# Patient Record
Sex: FEMALE | Race: WHITE | NOT HISPANIC OR LATINO | Employment: OTHER | URBAN - METROPOLITAN AREA
[De-identification: names, ages, dates, MRNs, and addresses within clinical notes are randomized per-mention and may not be internally consistent; named-entity substitution may affect disease eponyms.]

---

## 2017-02-13 PROBLEM — R10.2 PELVIC PAIN IN FEMALE: Status: ACTIVE | Noted: 2017-02-13

## 2017-02-14 PROBLEM — M35.00 SJOEGREN SYNDROME: Status: ACTIVE | Noted: 2017-02-14

## 2017-02-14 PROBLEM — M05.79 RHEUMATOID ARTHRITIS INVOLVING MULTIPLE SITES WITH POSITIVE RHEUMATOID FACTOR: Status: ACTIVE | Noted: 2017-02-14

## 2017-02-14 PROBLEM — N92.0 MENORRHAGIA WITH REGULAR CYCLE: Status: ACTIVE | Noted: 2017-02-14

## 2018-01-29 ENCOUNTER — HOSPITAL ENCOUNTER (OUTPATIENT)
Dept: RADIOLOGY | Facility: HOSPITAL | Age: 38
Discharge: HOME | End: 2018-01-29
Attending: INTERNAL MEDICINE
Payer: COMMERCIAL

## 2018-01-29 ENCOUNTER — OFFICE VISIT (OUTPATIENT)
Dept: RHEUMATOLOGY | Facility: CLINIC | Age: 38
End: 2018-01-29
Payer: COMMERCIAL

## 2018-01-29 VITALS
WEIGHT: 176.69 LBS | DIASTOLIC BLOOD PRESSURE: 98 MMHG | HEART RATE: 94 BPM | SYSTOLIC BLOOD PRESSURE: 153 MMHG | BODY MASS INDEX: 30.33 KG/M2

## 2018-01-29 DIAGNOSIS — M45.9 ANKYLOSING SPONDYLITIS, UNSPECIFIED SITE OF SPINE: ICD-10-CM

## 2018-01-29 DIAGNOSIS — M08.80 JIA (JUVENILE IDIOPATHIC ARTHRITIS): ICD-10-CM

## 2018-01-29 DIAGNOSIS — H20.9 UVEITIS: ICD-10-CM

## 2018-01-29 DIAGNOSIS — M35.00 SJOGREN'S SYNDROME, WITH UNSPECIFIED ORGAN INVOLVEMENT: Primary | ICD-10-CM

## 2018-01-29 PROCEDURE — 3008F BODY MASS INDEX DOCD: CPT | Mod: S$GLB,,, | Performed by: INTERNAL MEDICINE

## 2018-01-29 PROCEDURE — 72070 X-RAY EXAM THORAC SPINE 2VWS: CPT | Mod: TC,PO

## 2018-01-29 PROCEDURE — 72040 X-RAY EXAM NECK SPINE 2-3 VW: CPT | Mod: TC,PO

## 2018-01-29 PROCEDURE — 99999 PR PBB SHADOW E&M-EST. PATIENT-LVL IV: CPT | Mod: PBBFAC,,, | Performed by: INTERNAL MEDICINE

## 2018-01-29 PROCEDURE — 72110 X-RAY EXAM L-2 SPINE 4/>VWS: CPT | Mod: 26,,, | Performed by: RADIOLOGY

## 2018-01-29 PROCEDURE — 72220 X-RAY EXAM SACRUM TAILBONE: CPT | Mod: 26,,, | Performed by: RADIOLOGY

## 2018-01-29 PROCEDURE — 72040 X-RAY EXAM NECK SPINE 2-3 VW: CPT | Mod: 26,,, | Performed by: RADIOLOGY

## 2018-01-29 PROCEDURE — 72110 X-RAY EXAM L-2 SPINE 4/>VWS: CPT | Mod: TC,PO

## 2018-01-29 PROCEDURE — 96372 THER/PROPH/DIAG INJ SC/IM: CPT | Mod: S$GLB,,, | Performed by: INTERNAL MEDICINE

## 2018-01-29 PROCEDURE — 72070 X-RAY EXAM THORAC SPINE 2VWS: CPT | Mod: 26,,, | Performed by: RADIOLOGY

## 2018-01-29 PROCEDURE — 99205 OFFICE O/P NEW HI 60 MIN: CPT | Mod: 25,S$GLB,, | Performed by: INTERNAL MEDICINE

## 2018-01-29 PROCEDURE — 72220 X-RAY EXAM SACRUM TAILBONE: CPT | Mod: TC,PO

## 2018-01-29 RX ORDER — BUPRENORPHINE 10 UG/H
PATCH TRANSDERMAL
COMMUNITY
Start: 2017-08-24 | End: 2018-06-06

## 2018-01-29 RX ORDER — KETOROLAC TROMETHAMINE 30 MG/ML
60 INJECTION, SOLUTION INTRAMUSCULAR; INTRAVENOUS
Status: COMPLETED | OUTPATIENT
Start: 2018-01-29 | End: 2018-01-29

## 2018-01-29 RX ORDER — METHYLPREDNISOLONE ACETATE 80 MG/ML
160 INJECTION, SUSPENSION INTRA-ARTICULAR; INTRALESIONAL; INTRAMUSCULAR; SOFT TISSUE
Status: COMPLETED | OUTPATIENT
Start: 2018-01-29 | End: 2018-01-29

## 2018-01-29 RX ORDER — PREDNISONE 5 MG/1
10 TABLET ORAL DAILY
Qty: 60 TABLET | Refills: 3 | Status: SHIPPED | OUTPATIENT
Start: 2018-01-29 | End: 2018-02-28

## 2018-01-29 RX ORDER — LANOLIN ALCOHOL/MO/W.PET/CERES
1 CREAM (GRAM) TOPICAL
COMMUNITY
End: 2018-05-08

## 2018-01-29 RX ORDER — CYANOCOBALAMIN 1000 UG/ML
1000 INJECTION, SOLUTION INTRAMUSCULAR; SUBCUTANEOUS
Status: COMPLETED | OUTPATIENT
Start: 2018-01-29 | End: 2018-01-29

## 2018-01-29 RX ORDER — SODIUM FLUORIDE 5 MG/G
GEL, DENTIFRICE DENTAL
COMMUNITY
Start: 2016-09-01 | End: 2020-12-14

## 2018-01-29 RX ORDER — DOXEPIN HYDROCHLORIDE 25 MG/1
CAPSULE ORAL
COMMUNITY
Start: 2017-08-24 | End: 2018-12-05

## 2018-01-29 RX ORDER — FLUOROMETHOLONE 1 MG/ML
1 SUSPENSION/ DROPS OPHTHALMIC
COMMUNITY
Start: 2018-01-23 | End: 2018-02-02

## 2018-01-29 RX ORDER — ONDANSETRON HYDROCHLORIDE 8 MG/1
8 TABLET, FILM COATED ORAL EVERY 8 HOURS PRN
Qty: 45 TABLET | Refills: 2 | Status: SHIPPED | OUTPATIENT
Start: 2018-01-29 | End: 2018-08-15 | Stop reason: SDUPTHER

## 2018-01-29 RX ADMIN — METHYLPREDNISOLONE ACETATE 160 MG: 80 INJECTION, SUSPENSION INTRA-ARTICULAR; INTRALESIONAL; INTRAMUSCULAR; SOFT TISSUE at 02:01

## 2018-01-29 RX ADMIN — CYANOCOBALAMIN 1000 MCG: 1000 INJECTION, SOLUTION INTRAMUSCULAR; SUBCUTANEOUS at 02:01

## 2018-01-29 RX ADMIN — KETOROLAC TROMETHAMINE 60 MG: 30 INJECTION, SOLUTION INTRAMUSCULAR; INTRAVENOUS at 02:01

## 2018-01-29 NOTE — PROGRESS NOTES
Subjective:       Patient ID: Breanna Sanchez is a 37 y.o. female.    Chief Complaint: Consult    HPI: 36 yo female with CHARLIE, and RA, tried enbrel, Humira, Orencia, MTX, plaquenil. Patient complains of arthralgias and myalgias for which has been present for a few years. Pain is located in multiple joints, both shoulder(s), both elbow(s), both wrist(s), both MCP(s): 1st, 2nd, 3rd, 4th and 5th, both PIP(s): 1st, 2nd, 3rd, 4th and 5th, both DIP(s): 1st and 2nd, both hip(s), both knee(s) and both MTP(s): 1st, 2nd, 3rd, 4th and 5th, is described as aching, pulsating, shooting and throbbing, and is constant, moderate .  Associated symptoms include: crepitation, decreased range of motion, edema, effusion, tenderness and warmth.              Rheumatoid Arthritis    The disease course has been fluctuating.     She complains of pain, stiffness, joint swelling and joint warmth. The symptoms have been worsening. Affected locations include the neck, right shoulder, right elbow, right wrist, right MCP, right PIP, right DIP, right hip, right toes, right foot, right ankle, right knee, left shoulder, left elbow, left wrist, left MCP, left PIP, left DIP, left hip, left knee, left toes, left foot and left ankle. Associated symptoms include fatigue, fever, pain at night, pain while resting, Raynaud's syndrome, uveitis, myalgias and pleurisy. Pertinent negatives include no dysuria, trouble swallowing, dry eyes, headaches, stress, jaw pain or weight loss. She complains of morning stiffness lasting between 1 and 2 hours after awakening.The neck, left shoulder, left elbow, right elbow, left wrist, right wrist, left hand, right hand, left knee, right hip, left hip, right knee, left ankle and right ankle presents with Arthralgia.    Past treatments include corticosteroids, methotrexate and NSAIDs. The treatment provided significant relief. Factors aggravating her arthritis include activity.     Review of Systems   Constitutional: Positive  for fatigue and fever. Negative for activity change, appetite change, chills, diaphoresis, unexpected weight change and weight loss.   HENT: Negative for congestion, dental problem, ear discharge, ear pain, facial swelling, mouth sores, nosebleeds, postnasal drip, rhinorrhea, sinus pressure, sneezing, sore throat, tinnitus, trouble swallowing and voice change.    Eyes: Negative for photophobia, pain, discharge, redness and itching.   Respiratory: Negative for apnea, cough, chest tightness, shortness of breath and wheezing.    Cardiovascular: Positive for leg swelling. Negative for chest pain and palpitations.   Gastrointestinal: Negative for abdominal distention, abdominal pain, constipation, diarrhea, nausea and vomiting.   Endocrine: Negative for cold intolerance, heat intolerance, polydipsia and polyuria.   Genitourinary: Negative for decreased urine volume, difficulty urinating, dysuria, flank pain, frequency, hematuria and urgency.   Musculoskeletal: Positive for arthralgias, back pain, gait problem, joint swelling, myalgias, neck pain, neck stiffness and stiffness.   Skin: Negative for pallor, rash and wound.   Allergic/Immunologic: Positive for immunocompromised state.   Neurological: Negative for dizziness, tremors, weakness, numbness and headaches.   Hematological: Negative for adenopathy. Does not bruise/bleed easily.   Psychiatric/Behavioral: Negative for sleep disturbance. The patient is not nervous/anxious.          Objective:   BP (!) 153/98 (BP Location: Right arm, Patient Position: Sitting, BP Method: Large (Automatic))   Pulse 94   Wt 80.2 kg (176 lb 11.2 oz)   LMP 02/12/2017   BMI 30.33 kg/m²      Physical Exam   Nursing note and vitals reviewed.  Constitutional: She is oriented to person, place, and time. She appears distressed.   HENT:   Head: Normocephalic and atraumatic.   Mouth/Throat: Oropharynx is clear and moist.   Eyes: EOM are normal. Pupils are equal, round, and reactive to light.    Neck: Neck supple. No thyromegaly present.   Cardiovascular: Normal rate, regular rhythm and normal heart sounds.  Exam reveals no gallop and no friction rub.    No murmur heard.  Pulmonary/Chest: Breath sounds normal. She has no wheezes. She has no rales. She exhibits no tenderness.   Abdominal: There is no tenderness. There is no rebound and no guarding.       Right Side Rheumatological Exam     Examination finds the elbow normal.    The patient is tender to palpation of the shoulder, wrist, knee, 1st PIP, 1st MCP, 2nd PIP, 2nd MCP, 3rd PIP, 3rd MCP, 4th PIP, 4th MCP, 5th PIP and 5th MCP    She has swelling of the 1st PIP, 1st MCP, 2nd PIP, 2nd MCP, 3rd PIP, 3rd MCP, 4th PIP, 4th MCP, 5th PIP and 5th MCP    Shoulder Exam   Tenderness Location: no tenderness    Range of Motion   Active Abduction: abnormal   Adduction: abnormal  Sensation: normal    Knee Exam   Patellofemoral Crepitus: positive  Effusion: negative  Sensation: normal    Hip Exam   Tenderness Location: posterior  Sensation: normal    Elbow/Wrist Exam   Tenderness Location: no tenderness  Sensation: normal    Left Side Rheumatological Exam     Examination finds the elbow normal.    The patient is tender to palpation of the shoulder, wrist, knee, 1st PIP, 1st MCP, 2nd PIP, 2nd MCP, 3rd PIP, 3rd MCP, 4th PIP, 4th MCP, 5th PIP and 5th MCP.    She has swelling of the 1st PIP, 1st MCP, 2nd PIP, 2nd MCP, 3rd PIP, 3rd MCP, 4th PIP, 4th MCP, 5th PIP and 5th MCP    Shoulder Exam   Tenderness Location: no tenderness    Range of Motion   Active Abduction: abnormal   Sensation: normal    Knee Exam     Patellofemoral Crepitus: positive  Effusion: negative  Sensation: normal    Hip Exam   Tenderness Location: posterior  Sensation: normal    Elbow/Wrist Exam   Sensation: normal      Back/Neck Exam   General Inspection   Gait: normal         Lymphadenopathy:     She has no cervical adenopathy.   Neurological: She is alert and oriented to person, place, and time.  Gait normal.   Skin: No rash noted. No erythema. No pallor.     Psychiatric: Mood and affect normal.   Musculoskeletal: She exhibits tenderness and deformity.           Assessment:       1. Sjogren's syndrome, with unspecified organ involvement    2. Ankylosing spondylitis, unspecified site of spine    3. Uveitis    4. CHARLIE (juvenile idiopathic arthritis)            Plan:       Breanna was seen today for consult.    Diagnoses and all orders for this visit:    Sjogren's syndrome, with unspecified organ involvement  -     AGAPITO; Future  -     Anti Sm/RNP Antibody; Future  -     Anti-DNA antibody, double-stranded; Future  -     Anti-Histone Antibody; Future  -     Anti-scleroderma antibody; Future  -     Anti-Smith antibody; Future  -     IgE; Future  -     IgG; Future  -     IgG 1, 2, 3, and 4; Future  -     IgM; Future  -     CBC auto differential; Future  -     Comprehensive metabolic panel; Future  -     C-reactive protein; Future  -     Cyclic citrul peptide antibody, IgG; Future  -     Sjogrens syndrome-A extractable nuclear antibody; Future  -     Sjogrens syndrome-B extractable nuclear antibody; Future  -     Sedimentation rate, manual; Future  -     Rheumatoid factor; Future  -     TSH; Future  -     Thyroid peroxidase antibody; Future  -     T4, free; Future  -     T3, free; Future  -     Anti-thyroglobulin antibody; Future  -     IgA; Future  -     Vitamin D; Future  -     Celiac Disease HLA Genotyping; Future  -     Celiac Disease Panel; Future  -     Lymphocyte Profile II; Future  -     Urinalysis; Future  -     methylPREDNISolone acetate injection 160 mg; Inject 2 mLs (160 mg total) into the muscle one time.  -     ketorolac injection 60 mg; Inject 2 mLs (60 mg total) into the muscle one time.    Ankylosing spondylitis, unspecified site of spine  -     AGAPITO; Future  -     Anti Sm/RNP Antibody; Future  -     Anti-DNA antibody, double-stranded; Future  -     Anti-Histone Antibody; Future  -      Anti-scleroderma antibody; Future  -     Anti-Smith antibody; Future  -     IgE; Future  -     IgG; Future  -     IgG 1, 2, 3, and 4; Future  -     IgM; Future  -     CBC auto differential; Future  -     Comprehensive metabolic panel; Future  -     C-reactive protein; Future  -     Cyclic citrul peptide antibody, IgG; Future  -     Sjogrens syndrome-A extractable nuclear antibody; Future  -     Sjogrens syndrome-B extractable nuclear antibody; Future  -     Sedimentation rate, manual; Future  -     Rheumatoid factor; Future  -     TSH; Future  -     Thyroid peroxidase antibody; Future  -     T4, free; Future  -     T3, free; Future  -     Anti-thyroglobulin antibody; Future  -     IgA; Future  -     Vitamin D; Future  -     Celiac Disease HLA Genotyping; Future  -     Celiac Disease Panel; Future  -     Lymphocyte Profile II; Future  -     Urinalysis; Future  -     methylPREDNISolone acetate injection 160 mg; Inject 2 mLs (160 mg total) into the muscle one time.  -     ketorolac injection 60 mg; Inject 2 mLs (60 mg total) into the muscle one time.    Uveitis  -     AGAPITO; Future  -     Anti Sm/RNP Antibody; Future  -     Anti-DNA antibody, double-stranded; Future  -     Anti-Histone Antibody; Future  -     Anti-scleroderma antibody; Future  -     Anti-Smith antibody; Future  -     IgE; Future  -     IgG; Future  -     IgG 1, 2, 3, and 4; Future  -     IgM; Future  -     CBC auto differential; Future  -     Comprehensive metabolic panel; Future  -     C-reactive protein; Future  -     Cyclic citrul peptide antibody, IgG; Future  -     Sjogrens syndrome-A extractable nuclear antibody; Future  -     Sjogrens syndrome-B extractable nuclear antibody; Future  -     Sedimentation rate, manual; Future  -     Rheumatoid factor; Future  -     TSH; Future  -     Thyroid peroxidase antibody; Future  -     T4, free; Future  -     T3, free; Future  -     Anti-thyroglobulin antibody; Future  -     IgA; Future  -     Vitamin D;  Future  -     Celiac Disease HLA Genotyping; Future  -     Celiac Disease Panel; Future  -     Lymphocyte Profile II; Future  -     Urinalysis; Future  -     methylPREDNISolone acetate injection 160 mg; Inject 2 mLs (160 mg total) into the muscle one time.  -     ketorolac injection 60 mg; Inject 2 mLs (60 mg total) into the muscle one time.    CHARLIE (juvenile idiopathic arthritis)  -     AGAPITO; Future  -     Anti Sm/RNP Antibody; Future  -     Anti-DNA antibody, double-stranded; Future  -     Anti-Histone Antibody; Future  -     Anti-scleroderma antibody; Future  -     Anti-Smith antibody; Future  -     IgE; Future  -     IgG; Future  -     IgG 1, 2, 3, and 4; Future  -     IgM; Future  -     CBC auto differential; Future  -     Comprehensive metabolic panel; Future  -     C-reactive protein; Future  -     Cyclic citrul peptide antibody, IgG; Future  -     Sjogrens syndrome-A extractable nuclear antibody; Future  -     Sjogrens syndrome-B extractable nuclear antibody; Future  -     Sedimentation rate, manual; Future  -     Rheumatoid factor; Future  -     TSH; Future  -     Thyroid peroxidase antibody; Future  -     T4, free; Future  -     T3, free; Future  -     Anti-thyroglobulin antibody; Future  -     IgA; Future  -     Vitamin D; Future  -     Celiac Disease HLA Genotyping; Future  -     Celiac Disease Panel; Future  -     Lymphocyte Profile II; Future  -     Urinalysis; Future  -     methylPREDNISolone acetate injection 160 mg; Inject 2 mLs (160 mg total) into the muscle one time.  -     ketorolac injection 60 mg; Inject 2 mLs (60 mg total) into the muscle one time.

## 2018-02-02 RX ORDER — ERGOCALCIFEROL 1.25 MG/1
50000 CAPSULE ORAL
Qty: 4 CAPSULE | Refills: 6 | Status: SHIPPED | OUTPATIENT
Start: 2018-02-02 | End: 2018-11-16 | Stop reason: SDUPTHER

## 2018-02-02 RX ORDER — NITROFURANTOIN 25; 75 MG/1; MG/1
100 CAPSULE ORAL 2 TIMES DAILY
Qty: 20 CAPSULE | Refills: 0 | Status: SHIPPED | OUTPATIENT
Start: 2018-02-02 | End: 2018-02-12

## 2018-02-06 ENCOUNTER — PATIENT MESSAGE (OUTPATIENT)
Dept: RHEUMATOLOGY | Facility: CLINIC | Age: 38
End: 2018-02-06

## 2018-02-09 ENCOUNTER — PATIENT MESSAGE (OUTPATIENT)
Dept: RHEUMATOLOGY | Facility: CLINIC | Age: 38
End: 2018-02-09

## 2018-02-27 ENCOUNTER — PATIENT MESSAGE (OUTPATIENT)
Dept: RHEUMATOLOGY | Facility: CLINIC | Age: 38
End: 2018-02-27

## 2018-02-27 ENCOUNTER — TELEPHONE (OUTPATIENT)
Dept: RHEUMATOLOGY | Facility: CLINIC | Age: 38
End: 2018-02-27

## 2018-02-27 NOTE — TELEPHONE ENCOUNTER
Pt sent e-mail 2/6/2018. Requesting prescription for aquatic therapy and message therapy. Pt has specific location in mind, she will send the location/faciltiy via e-mail.   Pt is also requesting to know if she is going to start another medication, was told prior that her immune system is too weak for IV therapy. Please review and advise.

## 2018-02-27 NOTE — TELEPHONE ENCOUNTER
----- Message from Lanny Estrada sent at 2/27/2018 12:33 PM CST -----  Contact: self [phone]  She is calling back in regards to her message of 2/6.  She is hurting much worse.  Please call her about her medication.  Thank you!

## 2018-03-03 ENCOUNTER — NURSE TRIAGE (OUTPATIENT)
Dept: ADMINISTRATIVE | Facility: CLINIC | Age: 38
End: 2018-03-03

## 2018-03-04 NOTE — TELEPHONE ENCOUNTER
"  Reason for Disposition   Weakness of a leg or foot (e.g., unable to bear weight, dragging foot)    Answer Assessment - Initial Assessment Questions  1. ONSET: "When did the pain begin?"         2. LOCATION: "Where does it hurt?" (upper, mid or lower back)      *No Answer*  3. SEVERITY: "How bad is the pain?"  (e.g., Scale 1-10; mild, moderate, or severe)    - MILD (1-3): doesn't interfere with normal activities     - MODERATE (4-7): interferes with normal activities or awakens from sleep     - SEVERE (8-10): excruciating pain, unable to do any normal activities       *No Answer*  4. PATTERN: "Is the pain constant?" (e.g., yes, no; constant, intermittent)       *No Answer*  5. RADIATION: "Does the pain shoot into your legs or elsewhere?"      *No Answer*  6. CAUSE:  "What do you think is causing the back pain?"       *No Answer*  7. BACK OVERUSE:  "Any recent lifting of heavy objects, strenuous work or exercise?"      *No Answer*  8. MEDICATIONS: "What have you taken so far for the pain?" (e.g., nothing, acetaminophen, NSAIDS)      *No Answer*  9. NEUROLOGIC SYMPTOMS: "Do you have any weakness, numbness, or problems with bowel/bladder control?"      *No Answer*  10. OTHER SYMPTOMS: "Do you have any other symptoms?" (e.g., fever, abdominal pain, burning with urination, blood in urine)        *No Answer*  11. PREGNANCY: "Is there any chance you are pregnant?" (e.g., yes, no; LMP)        *No Answer*    Protocols used: ST BACK PAIN-A-AH    Patient states that her back is hot and inflamed and she is unable to walk because of the pain and weakness. Patient advised to go to the ED and she was afraid because it may be against her pain management contract. Patient states she will see if pain mgt has after hours line before she goes to the ED. Call ended.   "

## 2018-03-05 ENCOUNTER — HOSPITAL ENCOUNTER (EMERGENCY)
Facility: HOSPITAL | Age: 38
Discharge: HOME | End: 2018-03-05
Attending: EMERGENCY MEDICINE
Payer: COMMERCIAL

## 2018-03-05 ENCOUNTER — PATIENT MESSAGE (OUTPATIENT)
Dept: RHEUMATOLOGY | Facility: CLINIC | Age: 38
End: 2018-03-05

## 2018-03-05 VITALS
TEMPERATURE: 98 F | RESPIRATION RATE: 18 BRPM | DIASTOLIC BLOOD PRESSURE: 87 MMHG | BODY MASS INDEX: 29.37 KG/M2 | OXYGEN SATURATION: 97 % | HEART RATE: 86 BPM | HEIGHT: 64 IN | WEIGHT: 172 LBS | SYSTOLIC BLOOD PRESSURE: 133 MMHG

## 2018-03-05 DIAGNOSIS — R00.0 TACHYCARDIA: ICD-10-CM

## 2018-03-05 DIAGNOSIS — G89.29 OTHER CHRONIC PAIN: Primary | ICD-10-CM

## 2018-03-05 PROCEDURE — 96372 THER/PROPH/DIAG INJ SC/IM: CPT

## 2018-03-05 PROCEDURE — 25000003 PHARM REV CODE 250: Performed by: PHYSICIAN ASSISTANT

## 2018-03-05 PROCEDURE — 93005 ELECTROCARDIOGRAM TRACING: CPT

## 2018-03-05 PROCEDURE — 99283 EMERGENCY DEPT VISIT LOW MDM: CPT | Mod: 25

## 2018-03-05 PROCEDURE — 99284 EMERGENCY DEPT VISIT MOD MDM: CPT | Mod: ,,, | Performed by: PHYSICIAN ASSISTANT

## 2018-03-05 PROCEDURE — 63600175 PHARM REV CODE 636 W HCPCS: Performed by: PHYSICIAN ASSISTANT

## 2018-03-05 PROCEDURE — 93010 ELECTROCARDIOGRAM REPORT: CPT | Mod: ,,, | Performed by: INTERNAL MEDICINE

## 2018-03-05 RX ORDER — METHOCARBAMOL 750 MG/1
1500 TABLET, FILM COATED ORAL 3 TIMES DAILY
Qty: 30 TABLET | Refills: 0 | Status: SHIPPED | OUTPATIENT
Start: 2018-03-05 | End: 2018-03-10

## 2018-03-05 RX ORDER — OXYCODONE HYDROCHLORIDE 5 MG/1
5 TABLET ORAL
Status: COMPLETED | OUTPATIENT
Start: 2018-03-05 | End: 2018-03-05

## 2018-03-05 RX ORDER — ORPHENADRINE CITRATE 30 MG/ML
60 INJECTION INTRAMUSCULAR; INTRAVENOUS
Status: COMPLETED | OUTPATIENT
Start: 2018-03-05 | End: 2018-03-05

## 2018-03-05 RX ADMIN — OXYCODONE HYDROCHLORIDE 5 MG: 5 TABLET ORAL at 10:03

## 2018-03-05 RX ADMIN — ORPHENADRINE CITRATE 60 MG: 30 INJECTION INTRAMUSCULAR; INTRAVENOUS at 09:03

## 2018-03-06 NOTE — DISCHARGE INSTRUCTIONS
Use Robaxin as needed as directed  Follow up with your rheumatologist and a primary care doctor  Return to the emergency department for any new symptoms      Our goal in the emergency department is to always give you outstanding care and exceptional service. You may receive a survey by mail or e-mail in the next week regarding your experience in our ED. We would greatly appreciate your completing and returning the survey. Your feedback provides us with a way to recognize our staff who give very good care and it helps us learn how to improve when your experience was below our aspiration of excellence.

## 2018-03-06 NOTE — ED NOTES
Adult Physical Assessment  LOC: Breanna Sanchez, 37 y.o. female verified via two identifiers.  The patient is awake, alert, oriented and speaking appropriately at this time.  APPEARANCE: Patient appears to be in no acute distress at this time. Patient is clean and well groomed, patient's clothing is properly fastened.  SKIN:The skin is warm and dry, color consistent with ethnicity, patient has normal skin turgor and moist mucus membranes, skin intact, no breakdown or brusing noted.  MUSCULOSKELETAL: Patient moving all extremities well, ambulatory without difficulty.  RESPIRATORY: Airway is open and patent, respirations are spontaneous, patient has a normal effort and rate, no accessory muscle use noted.  NEUROLOGIC: Eyes open spontaneously, behavior appropriate to situation, follows commands, facial expression symmetrical, bilateral hand grasp equal and even, purposeful motor response noted, normal sensation in all extremities when touched with a finger.

## 2018-03-06 NOTE — ED PROVIDER NOTES
Encounter Date: 3/5/2018       History     Chief Complaint   Patient presents with    Back Pain     pt sees a specialist who was suppose to change her treatment but has not heard from MD. pt complaining of back pain and warm to the touch     37-year-old female to the ER for evaluation of chronic low back pain.  Patient has chronic pain secondary to her ankylosing spondylitis, fibromyalgia, and rheumatoid arthritis.  She is followed by rheumatology.  She reports recent changes in her medication regimen and currently having increased pain in her back.  She denies any fever, chills, nausea, vomiting.  She denies any shortness of breath or chest pain.  She denies any abdominal pain.  She is currently using a butrans patch for her pain.           Review of patient's allergies indicates:   Allergen Reactions    Seroquel [quetiapine] Anaphylaxis    Aleve [naproxen sodium]     Sulfa (sulfonamide antibiotics)     Tramadol      Past Medical History:   Diagnosis Date    Fibromyalgia     Lupus     Rheumatoid arthritis     Sjoegren syndrome      Past Surgical History:   Procedure Laterality Date    APPENDECTOMY      KNEE ARTHROSCOPY Left     SALPINGOOPHORECTOMY Right     TONSILLECTOMY      TOTAL SHOULDER ARTHROPLASTY Right      Family History   Problem Relation Age of Onset    Diabetes Mother      type 1    Stroke Mother     Heart disease Mother     Cancer Father      prostate    Alzheimer's disease Father     Cancer Sister      uterine    Diabetes Brother      type 2     Social History   Substance Use Topics    Smoking status: Current Every Day Smoker     Packs/day: 1.00     Types: Cigarettes     Start date: 2/10/1995    Smokeless tobacco: Never Used    Alcohol use 1.8 oz/week     3 Glasses of wine per week     Review of Systems   Constitutional: Negative for fever.   HENT: Negative for sore throat.    Respiratory: Negative for shortness of breath.    Cardiovascular: Negative for chest pain.    Gastrointestinal: Negative for nausea.   Genitourinary: Negative for dysuria.   Musculoskeletal: Positive for arthralgias and back pain.   Skin: Negative for rash.   Neurological: Negative for weakness.   Hematological: Does not bruise/bleed easily.       Physical Exam     Initial Vitals [03/05/18 2048]   BP Pulse Resp Temp SpO2   (!) 161/104 (!) 130 20 98.1 °F (36.7 °C) 98 %      MAP       123         Physical Exam    Constitutional: Vital signs are normal. She appears well-developed and well-nourished. She is not diaphoretic. No distress.   HENT:   Head: Normocephalic and atraumatic.   Right Ear: External ear normal.   Left Ear: External ear normal.   Eyes: Conjunctivae are normal.   Cardiovascular: Regular rhythm. Tachycardia present.  Exam reveals no gallop and no friction rub.    No murmur heard.  Pulmonary/Chest: No respiratory distress. She has no wheezes. She has no rhonchi. She has no rales. She exhibits no tenderness.   Lungs clear on exam   Abdominal: Soft. Normal appearance and bowel sounds are normal. She exhibits no distension and no mass. There is no tenderness. There is no rebound and no guarding.   Musculoskeletal: Normal range of motion.   Mild pain over the lumbar spine on exam  No brusing or rashes or lesions  Increased pain with ROM  No CVA pain     Neurological: She is alert and oriented to person, place, and time.   Strength, sensation, ROM normal  No acute findings   Skin: Skin is warm and intact.   Psychiatric: She has a normal mood and affect. Her speech is normal and behavior is normal. Cognition and memory are normal.         ED Course   Procedures  Labs Reviewed - No data to display          Medical Decision Making:   ED Management:  36 yo F with chronic pain secondary to multiple Rheumatologic conditions.   Pain improved in the ED with norflex and PO Oxycodone.   Plan, Continue pain medication @ home,   Followup with rheumatology and est. Care with PCP  Kristin PRN for a few  days  Return instructions given.               Attending Attestation:     Physician Attestation Statement for NP/PA:   I discussed this assessment and plan of this patient with the NP/PA, but I did not personally examine the patient. The face to face encounter was performed by the NP/PA.                     Clinical Impression:   The primary encounter diagnosis was Other chronic pain. A diagnosis of Tachycardia was also pertinent to this visit.    Disposition:   Disposition: Discharged  Condition: Stable      Silver June PA-C  03/05/18 [id number]       Rosio Soares MD  03/06/18 0055

## 2018-03-22 ENCOUNTER — OFFICE VISIT (OUTPATIENT)
Dept: RHEUMATOLOGY | Facility: CLINIC | Age: 38
End: 2018-03-22
Payer: COMMERCIAL

## 2018-03-22 ENCOUNTER — TELEPHONE (OUTPATIENT)
Dept: RHEUMATOLOGY | Facility: CLINIC | Age: 38
End: 2018-03-22

## 2018-03-22 VITALS
HEART RATE: 105 BPM | SYSTOLIC BLOOD PRESSURE: 159 MMHG | WEIGHT: 175.5 LBS | HEIGHT: 64 IN | DIASTOLIC BLOOD PRESSURE: 103 MMHG | BODY MASS INDEX: 29.96 KG/M2

## 2018-03-22 DIAGNOSIS — E87.6 POTASSIUM (K) DEFICIENCY: ICD-10-CM

## 2018-03-22 DIAGNOSIS — M45.5 ANKYLOSING SPONDYLITIS OF THORACOLUMBAR REGION: Primary | ICD-10-CM

## 2018-03-22 DIAGNOSIS — M35.00 SJOGREN'S SYNDROME, WITH UNSPECIFIED ORGAN INVOLVEMENT: ICD-10-CM

## 2018-03-22 DIAGNOSIS — H20.9 UVEITIS: ICD-10-CM

## 2018-03-22 PROCEDURE — 99999 PR PBB SHADOW E&M-EST. PATIENT-LVL III: CPT | Mod: PBBFAC,,, | Performed by: INTERNAL MEDICINE

## 2018-03-22 PROCEDURE — 96372 THER/PROPH/DIAG INJ SC/IM: CPT | Mod: S$GLB,,, | Performed by: INTERNAL MEDICINE

## 2018-03-22 PROCEDURE — 99214 OFFICE O/P EST MOD 30 MIN: CPT | Mod: 25,S$GLB,, | Performed by: INTERNAL MEDICINE

## 2018-03-22 RX ORDER — CYANOCOBALAMIN 1000 UG/ML
1000 INJECTION, SOLUTION INTRAMUSCULAR; SUBCUTANEOUS
Status: COMPLETED | OUTPATIENT
Start: 2018-03-22 | End: 2018-03-22

## 2018-03-22 RX ORDER — ACETAMINOPHEN 325 MG/1
650 TABLET ORAL
Status: CANCELLED | OUTPATIENT
Start: 2018-03-22

## 2018-03-22 RX ORDER — BUPRENORPHINE 15 UG/H
1 PATCH, EXTENDED RELEASE TRANSDERMAL
Refills: 1 | COMMUNITY
Start: 2017-12-13 | End: 2018-03-22 | Stop reason: SDUPTHER

## 2018-03-22 RX ORDER — PREDNISONE 5 MG/1
5 TABLET ORAL DAILY
COMMUNITY
End: 2018-12-05 | Stop reason: SDUPTHER

## 2018-03-22 RX ORDER — POTASSIUM CHLORIDE 20 MEQ/15ML
20 SOLUTION ORAL DAILY
Qty: 473 ML | Refills: 3 | Status: SHIPPED | OUTPATIENT
Start: 2018-03-22 | End: 2018-04-21

## 2018-03-22 RX ORDER — DIPHENHYDRAMINE HYDROCHLORIDE 50 MG/ML
25 INJECTION INTRAMUSCULAR; INTRAVENOUS
Status: CANCELLED | OUTPATIENT
Start: 2018-03-22

## 2018-03-22 RX ORDER — IBUPROFEN AND FAMOTIDINE 26.6; 8 MG/1; MG/1
1 TABLET ORAL 3 TIMES DAILY
Qty: 90 TABLET | Refills: 11 | Status: SHIPPED | OUTPATIENT
Start: 2018-03-22 | End: 2019-01-04 | Stop reason: SDUPTHER

## 2018-03-22 RX ORDER — EPINEPHRINE 1 MG/ML
0.3 INJECTION, SOLUTION, CONCENTRATE INTRAVENOUS
Status: CANCELLED | OUTPATIENT
Start: 2018-03-22

## 2018-03-22 RX ORDER — IPRATROPIUM BROMIDE AND ALBUTEROL SULFATE 2.5; .5 MG/3ML; MG/3ML
3 SOLUTION RESPIRATORY (INHALATION)
Status: CANCELLED | OUTPATIENT
Start: 2018-03-22

## 2018-03-22 RX ORDER — HEPARIN 100 UNIT/ML
500 SYRINGE INTRAVENOUS
Status: CANCELLED | OUTPATIENT
Start: 2018-03-22

## 2018-03-22 RX ORDER — METHYLPREDNISOLONE SOD SUCC 125 MG
80 VIAL (EA) INJECTION
Status: CANCELLED | OUTPATIENT
Start: 2018-03-22

## 2018-03-22 RX ORDER — METHYLPREDNISOLONE SOD SUCC 125 MG
40 VIAL (EA) INJECTION
Status: CANCELLED | OUTPATIENT
Start: 2018-03-22

## 2018-03-22 RX ORDER — KETOROLAC TROMETHAMINE 30 MG/ML
60 INJECTION, SOLUTION INTRAMUSCULAR; INTRAVENOUS
Status: COMPLETED | OUTPATIENT
Start: 2018-03-22 | End: 2018-03-22

## 2018-03-22 RX ORDER — SODIUM CHLORIDE 0.9 % (FLUSH) 0.9 %
10 SYRINGE (ML) INJECTION
Status: CANCELLED | OUTPATIENT
Start: 2018-03-22

## 2018-03-22 RX ORDER — METHYLPREDNISOLONE ACETATE 80 MG/ML
160 INJECTION, SUSPENSION INTRA-ARTICULAR; INTRALESIONAL; INTRAMUSCULAR; SOFT TISSUE
Status: COMPLETED | OUTPATIENT
Start: 2018-03-22 | End: 2018-03-22

## 2018-03-22 RX ADMIN — CYANOCOBALAMIN 1000 MCG: 1000 INJECTION, SOLUTION INTRAMUSCULAR; SUBCUTANEOUS at 03:03

## 2018-03-22 RX ADMIN — METHYLPREDNISOLONE ACETATE 160 MG: 80 INJECTION, SUSPENSION INTRA-ARTICULAR; INTRALESIONAL; INTRAMUSCULAR; SOFT TISSUE at 03:03

## 2018-03-22 RX ADMIN — KETOROLAC TROMETHAMINE 60 MG: 30 INJECTION, SOLUTION INTRAMUSCULAR; INTRAVENOUS at 03:03

## 2018-03-22 NOTE — PROGRESS NOTES
Subjective:       Patient ID: Breanna Sanchez is a 37 y.o. female.    Chief Complaint: Follow-up (labs xrays)    HPI: 36 yo female with CHARLIE, and RA, tried enbrel, Humira, Orencia, MTX, plaquenil. Patient complains of arthralgias and myalgias for which has been present for a few years. Pain is located in multiple joints, both shoulder(s), both elbow(s), both wrist(s), both MCP(s): 1st, 2nd, 3rd, 4th and 5th, both PIP(s): 1st, 2nd, 3rd, 4th and 5th, both DIP(s): 1st and 2nd, both hip(s), both knee(s) and both MTP(s): 1st, 2nd, 3rd, 4th and 5th, is described as aching, pulsating, shooting and throbbing, and is constant, moderate .  Associated symptoms include: crepitation, decreased range of motion, edema, effusion, tenderness and warmth.        Review of Systems   Constitutional: Positive for activity change and fatigue. Negative for appetite change, chills, diaphoresis and unexpected weight change.   HENT: Negative for congestion, dental problem, ear discharge, ear pain, facial swelling, mouth sores, nosebleeds, postnasal drip, rhinorrhea, sinus pressure, sneezing, sore throat, tinnitus and voice change.    Eyes: Negative for photophobia, pain, discharge, redness and itching.   Respiratory: Negative for apnea, cough, chest tightness, shortness of breath and wheezing.    Cardiovascular: Positive for leg swelling. Negative for chest pain and palpitations.   Gastrointestinal: Negative for abdominal distention, abdominal pain, constipation, diarrhea, nausea and vomiting.   Endocrine: Negative for cold intolerance, heat intolerance, polydipsia and polyuria.   Genitourinary: Negative for decreased urine volume, difficulty urinating, flank pain, frequency, hematuria and urgency.   Musculoskeletal: Positive for arthralgias, back pain, gait problem, neck pain and neck stiffness.   Skin: Negative for pallor, rash and wound.   Allergic/Immunologic: Positive for immunocompromised state.   Neurological: Negative for dizziness,  "tremors, weakness and numbness.   Hematological: Negative for adenopathy. Does not bruise/bleed easily.   Psychiatric/Behavioral: Negative for sleep disturbance. The patient is not nervous/anxious.          Objective:   BP (!) 159/103   Pulse 105   Ht 5' 4" (1.626 m)   Wt 79.6 kg (175 lb 7.8 oz)   LMP 02/12/2017   BMI 30.12 kg/m²      Physical Exam   Nursing note and vitals reviewed.  Constitutional: She is oriented to person, place, and time. She appears distressed.   HENT:   Head: Normocephalic and atraumatic.   Mouth/Throat: Oropharynx is clear and moist.   Eyes: EOM are normal. Pupils are equal, round, and reactive to light.   Neck: Neck supple. No thyromegaly present.   Cardiovascular: Normal rate, regular rhythm and normal heart sounds.  Exam reveals no gallop and no friction rub.    No murmur heard.  Pulmonary/Chest: Breath sounds normal. She has no wheezes. She has no rales. She exhibits no tenderness.   Abdominal: There is no tenderness. There is no rebound and no guarding.       Right Side Rheumatological Exam     Examination finds the elbow normal.    The patient is tender to palpation of the shoulder, wrist, knee, 1st PIP, 1st MCP, 2nd PIP, 2nd MCP, 3rd PIP, 3rd MCP, 4th PIP, 4th MCP, 5th PIP and 5th MCP    She has swelling of the 1st PIP, 1st MCP, 2nd PIP, 2nd MCP, 3rd PIP, 3rd MCP, 4th PIP, 4th MCP, 5th PIP and 5th MCP    Shoulder Exam   Tenderness Location: no tenderness    Range of Motion   Active Abduction: abnormal   Adduction: abnormal  Sensation: normal    Knee Exam   Patellofemoral Crepitus: positive  Effusion: negative  Sensation: normal    Hip Exam   Tenderness Location: posterior  Sensation: normal    Elbow/Wrist Exam   Tenderness Location: no tenderness  Sensation: normal    Left Side Rheumatological Exam     Examination finds the elbow normal.    The patient is tender to palpation of the shoulder, wrist, knee, 1st PIP, 1st MCP, 2nd PIP, 2nd MCP, 3rd PIP, 3rd MCP, 4th PIP, 4th MCP, 5th " PIP and 5th MCP.    She has swelling of the 1st PIP, 1st MCP, 2nd PIP, 2nd MCP, 3rd PIP, 3rd MCP, 4th PIP, 4th MCP, 5th PIP and 5th MCP    Shoulder Exam   Tenderness Location: no tenderness    Range of Motion   Active Abduction: abnormal   Sensation: normal    Knee Exam     Patellofemoral Crepitus: positive  Effusion: negative  Sensation: normal    Hip Exam   Tenderness Location: posterior  Sensation: normal    Elbow/Wrist Exam   Sensation: normal      Back/Neck Exam   General Inspection   Gait: normal       Tenderness Right paramedian tenderness of the Upper C-Spine, Upper T-Spine, Upper L-Spine and Lower L-Spine.Left paramedian tenderness of the Upper C-Spine, Lower C-Spine, Upper T-Spine, Lower T-Spine, Upper L-Spine and Lower L-Spine.    Neck Range of Motion   Flexion: Limited  Extension: Limited  Lymphadenopathy:     She has no cervical adenopathy.   Neurological: She is alert and oriented to person, place, and time. Gait normal.   Skin: No rash noted. No erythema. No pallor.     Psychiatric: Mood and affect normal.   Musculoskeletal: She exhibits tenderness and deformity.           Results for orders placed or performed in visit on 01/29/18   Urinalysis   Result Value Ref Range    Specimen UA Urine, Clean Catch     Color, UA Yellow Yellow, Straw, Janell    Appearance, UA Clear Clear    pH, UA 7.0 5.0 - 8.0    Specific Gravity, UA 1.010 1.005 - 1.030    Protein, UA Negative Negative    Glucose, UA Negative Negative    Ketones, UA Negative Negative    Bilirubin (UA) Negative Negative    Occult Blood UA Negative Negative    Nitrite, UA Positive (A) Negative    Leukocytes, UA Negative Negative   Urinalysis Microscopic   Result Value Ref Range    WBC, UA 5 0 - 5 /hpf    Bacteria, UA Many (A) None-Occ /hpf    Microscopic Comment SEE COMMENT      Notes Recorded by Michael Sawant MD on 2/2/2018 at 4:19 PM CST  Celiac neg, d is very low, immune system low, ( I already called in vit d)      Ref Range & Units 1mo ago      CD3 % Total T Cell 55 - 83 % 78.3    Absolute CD3 700 - 2100 cells/ul 2960     CD8 % Suppressor T Cell 10 - 39 % 29.7    Absolute CD8 200 - 900 cells/ul 1122     CD4 % [place] T Cell 28 - 57 % 47.8    Absolute CD4 300 - 1400 cells/ul 1809     CD4/CD8 Ratio 0.9 - 3.6 1.61    CD56 + CD16 Natural Killers 7 - 31 % 6.6     Absolute CD56 + CD16 90 - 600 cells/ul 248    CD19 B Cells 6 - 19 % 13.2    Absolute CD19 100 - 500 cells/ul 494    Comments: This test was developed and its performance characteristics   determined by [facility].     It has not been cleared or approved by the U.S. Food and Drug   Administration. The FDA has determined that such clearance or   approval is not necessary.  This test is used for clinical purposes.     It should not be regarded as investigational or for research.  This   laboratory is certified under CLIA-88 as qualified to perform high   complexity clinical laboratory testing.              Assessment:       1. Ankylosing spondylitis of thoracolumbar region    2. Potassium (K) deficiency    3. Uveitis    4. Sjogren's syndrome, with unspecified organ involvement            Plan:       Breanna was seen today for follow-up.    Diagnoses and all orders for this visit:    Ankylosing spondylitis of thoracolumbar region  -     Quantiferon Gold TB; Future  -     Hepatitis B core antibody, IgM; Future  -     Hepatitis C antibody; Future  -     Quantiferon Gold TB  -     Hepatitis B core antibody, IgM  -     Hepatitis C antibody  -     ketorolac injection 60 mg; Inject 2 mLs (60 mg total) into the muscle one time.  -     methylPREDNISolone acetate injection 160 mg; Inject 2 mLs (160 mg total) into the muscle one time.  -     cyanocobalamin injection 1,000 mcg; Inject 1 mL (1,000 mcg total) into the muscle one time.  -     ibuprofen-famotidine (DUEXIS) 800-26.6 mg Tab; Take 1 tablet by mouth 3 (three) times daily.    Potassium (K) deficiency  -      potassium chloride 10% (KAYCIEL) 20 mEq/15 mL solution; Take 15 mLs (20 mEq total) by mouth once daily.  -     Quantiferon Gold TB; Future  -     Hepatitis B core antibody, IgM; Future  -     Hepatitis C antibody; Future  -     Quantiferon Gold TB  -     Hepatitis B core antibody, IgM  -     Hepatitis C antibody  -     ketorolac injection 60 mg; Inject 2 mLs (60 mg total) into the muscle one time.  -     methylPREDNISolone acetate injection 160 mg; Inject 2 mLs (160 mg total) into the muscle one time.  -     cyanocobalamin injection 1,000 mcg; Inject 1 mL (1,000 mcg total) into the muscle one time.  -     ibuprofen-famotidine (DUEXIS) 800-26.6 mg Tab; Take 1 tablet by mouth 3 (three) times daily.    Uveitis  -     Quantiferon Gold TB; Future  -     Hepatitis B core antibody, IgM; Future  -     Hepatitis C antibody; Future  -     Quantiferon Gold TB  -     Hepatitis B core antibody, IgM  -     Hepatitis C antibody  -     ketorolac injection 60 mg; Inject 2 mLs (60 mg total) into the muscle one time.  -     methylPREDNISolone acetate injection 160 mg; Inject 2 mLs (160 mg total) into the muscle one time.  -     cyanocobalamin injection 1,000 mcg; Inject 1 mL (1,000 mcg total) into the muscle one time.  -     ibuprofen-famotidine (DUEXIS) 800-26.6 mg Tab; Take 1 tablet by mouth 3 (three) times daily.    Sjogren's syndrome, with unspecified organ involvement  -     ketorolac injection 60 mg; Inject 2 mLs (60 mg total) into the muscle one time.  -     methylPREDNISolone acetate injection 160 mg; Inject 2 mLs (160 mg total) into the muscle one time.  -     cyanocobalamin injection 1,000 mcg; Inject 1 mL (1,000 mcg total) into the muscle one time.  -     ibuprofen-famotidine (DUEXIS) 800-26.6 mg Tab; Take 1 tablet by mouth 3 (three) times daily.    Other orders  -     heparin, porcine (PF) 100 unit/mL injection flush 500 Units; Inject 5 mLs (500 Units total) into the vein as needed for Line Care (If port-a-cath accessed  then flush line after use.).  -     sodium chloride 0.9% flush 10 mL; Inject 10 mLs into the vein as needed for Line Care.  -     acetaminophen tablet 650 mg; Take 2 tablets (650 mg total) by mouth one time.  -     diphenhydrAMINE injection 25 mg; Inject 0.5 mLs (25 mg total) into the vein one time.  -     methylPREDNISolone sodium succinate injection 80 mg; Inject 80 mg into the vein one time.  -     inFLIXimab (REMICADE) 400 mg in sodium chloride 0.9% 250 mL IVPB; Inject 400 mg into the vein once.  -     inFLIXimab (REMICADE) 400 mg in sodium chloride 0.9% 250 mL IVPB; Inject 400 mg into the vein one time.  -     inFLIXimab (REMICADE) 400 mg in sodium chloride 0.9% 250 mL IVPB; Inject 400 mg into the vein one time.  -     diphenhydrAMINE injection 25 mg; Inject 0.5 mLs (25 mg total) into the vein as needed (Allergic Reaction).  -     methylPREDNISolone sodium succinate injection 40 mg; Inject 40 mg into the vein as needed (IV push if benadryl does not resolve reaction).  -     acetaminophen tablet 650 mg; Take 2 tablets (650 mg total) by mouth as needed for Temperature greater than 101.  -     albuterol-ipratropium 2.5mg-0.5mg/3mL nebulizer solution 3 mL; Take 3 mLs by nebulization as needed for Wheezing.  -     EPINEPHrine (PF) injection 0.3 mg; Inject 0.3 mLs (0.3 mg total) into the skin as needed (Allergic Reaction).       Pt is doing poorly we will start remicade asap, she needs help with additional pain control until her Remicade is effective

## 2018-03-22 NOTE — TELEPHONE ENCOUNTER
Jese has remicade plan in. Pt [place] TB gold and [place] [facility] Please contact pt to schedule.     Thanks,    Corky Paige

## 2018-03-22 NOTE — PROGRESS NOTES
Administered 1 cc Vitamin B12 1000mcg/cc to right gluteal. Pt tolerated well. No acute reaction noted to site. Pt instructed on S/S to report. Advised patient to wait in lobby 15 minutes after receiving injection to monitor for any reactions. Pt verbalized understanding.     Lot: [id number]  Exp: [id number]  Administered 2 cc DepoMedrol 80mg/cc  to right gluteal. Pt tolerated well. No acute reaction noted to site. Pt instructed on S/S to report. Advised patient to wait in lobby 15 minutes after receiving injection to monitor for any reactions..  Pt verbalized understanding.     Lot: [id number]  Exp: 02/2019  Administered 2 cc  Toradol 30mg/cc  to left gluteal. Pt tolerated well. No acute reaction noted to site. Pt instructed on S/S to report. Advised patient to wait in lobby 15 minutes after receiving injection to monitor for any reactions. Pt verbalized understanding.     Lot: -[id number]  Exp: [id number]

## 2018-03-29 ENCOUNTER — TELEPHONE (OUTPATIENT)
Dept: INFUSION THERAPY | Facility: HOSPITAL | Age: 38
End: 2018-03-29

## 2018-03-29 NOTE — TELEPHONE ENCOUNTER
Appointment was made  Jese has remicade plan in. Pt having TB gold and Hep panels done at [facility] today. Please contact pt to schedule.      Thanks,     Corky Paige

## 2018-04-04 ENCOUNTER — DOCUMENTATION ONLY (OUTPATIENT)
Dept: INFUSION THERAPY | Facility: HOSPITAL | Age: 38
End: 2018-04-04

## 2018-04-04 ENCOUNTER — INFUSION (OUTPATIENT)
Dept: INFUSION THERAPY | Facility: HOSPITAL | Age: 38
End: 2018-04-04
Attending: INTERNAL MEDICINE
Payer: COMMERCIAL

## 2018-04-04 VITALS — BODY MASS INDEX: 31.02 KG/M2 | HEIGHT: 64 IN | WEIGHT: 181.69 LBS

## 2018-04-04 DIAGNOSIS — M45.5 ANKYLOSING SPONDYLITIS OF THORACOLUMBAR REGION: Primary | ICD-10-CM

## 2018-04-04 PROCEDURE — 63600175 PHARM REV CODE 636 W HCPCS: Mod: PN | Performed by: INTERNAL MEDICINE

## 2018-04-04 PROCEDURE — 96413 CHEMO IV INFUSION 1 HR: CPT | Mod: PN

## 2018-04-04 PROCEDURE — A4216 STERILE WATER/SALINE, 10 ML: HCPCS | Mod: PN | Performed by: INTERNAL MEDICINE

## 2018-04-04 PROCEDURE — 96415 CHEMO IV INFUSION ADDL HR: CPT | Mod: PN

## 2018-04-04 PROCEDURE — 25000003 PHARM REV CODE 250: Mod: PN | Performed by: INTERNAL MEDICINE

## 2018-04-04 PROCEDURE — 96375 TX/PRO/DX INJ NEW DRUG ADDON: CPT | Mod: PN

## 2018-04-04 RX ORDER — METHYLPREDNISOLONE SOD SUCC 125 MG
40 VIAL (EA) INJECTION
Status: DISCONTINUED | OUTPATIENT
Start: 2018-04-04 | End: 2018-04-04 | Stop reason: HOSPADM

## 2018-04-04 RX ORDER — METHYLPREDNISOLONE SOD SUCC 125 MG
80 VIAL (EA) INJECTION
Status: COMPLETED | OUTPATIENT
Start: 2018-04-04 | End: 2018-04-04

## 2018-04-04 RX ORDER — EPINEPHRINE 1 MG/ML
0.3 INJECTION, SOLUTION, CONCENTRATE INTRAVENOUS
Status: CANCELLED | OUTPATIENT
Start: 2018-04-04

## 2018-04-04 RX ORDER — DIPHENHYDRAMINE HYDROCHLORIDE 50 MG/ML
25 INJECTION INTRAMUSCULAR; INTRAVENOUS
Status: COMPLETED | OUTPATIENT
Start: 2018-04-04 | End: 2018-04-04

## 2018-04-04 RX ORDER — EPINEPHRINE 1 MG/ML
0.3 INJECTION, SOLUTION INTRACARDIAC; INTRAMUSCULAR; INTRAVENOUS; SUBCUTANEOUS
Status: DISCONTINUED | OUTPATIENT
Start: 2018-04-04 | End: 2018-04-04 | Stop reason: HOSPADM

## 2018-04-04 RX ORDER — SODIUM CHLORIDE 0.9 % (FLUSH) 0.9 %
10 SYRINGE (ML) INJECTION
Status: DISCONTINUED | OUTPATIENT
Start: 2018-04-04 | End: 2018-04-04 | Stop reason: HOSPADM

## 2018-04-04 RX ORDER — DIPHENHYDRAMINE HYDROCHLORIDE 50 MG/ML
25 INJECTION INTRAMUSCULAR; INTRAVENOUS
Status: DISCONTINUED | OUTPATIENT
Start: 2018-04-04 | End: 2018-04-04 | Stop reason: SDUPTHER

## 2018-04-04 RX ORDER — HEPARIN 100 UNIT/ML
500 SYRINGE INTRAVENOUS
Status: DISCONTINUED | OUTPATIENT
Start: 2018-04-04 | End: 2018-04-04 | Stop reason: HOSPADM

## 2018-04-04 RX ORDER — HEPARIN 100 UNIT/ML
500 SYRINGE INTRAVENOUS
Status: CANCELLED | OUTPATIENT
Start: 2018-04-04

## 2018-04-04 RX ORDER — SODIUM CHLORIDE 0.9 % (FLUSH) 0.9 %
10 SYRINGE (ML) INJECTION
Status: CANCELLED | OUTPATIENT
Start: 2018-04-04

## 2018-04-04 RX ORDER — METHYLPREDNISOLONE SOD SUCC 125 MG
80 VIAL (EA) INJECTION
Status: CANCELLED | OUTPATIENT
Start: 2018-04-04

## 2018-04-04 RX ORDER — IPRATROPIUM BROMIDE AND ALBUTEROL SULFATE 2.5; .5 MG/3ML; MG/3ML
3 SOLUTION RESPIRATORY (INHALATION)
Status: DISCONTINUED | OUTPATIENT
Start: 2018-04-04 | End: 2018-04-04 | Stop reason: HOSPADM

## 2018-04-04 RX ORDER — ACETAMINOPHEN 325 MG/1
650 TABLET ORAL
Status: CANCELLED | OUTPATIENT
Start: 2018-04-04

## 2018-04-04 RX ORDER — METHYLPREDNISOLONE SOD SUCC 125 MG
40 VIAL (EA) INJECTION
Status: CANCELLED | OUTPATIENT
Start: 2018-04-04

## 2018-04-04 RX ORDER — IPRATROPIUM BROMIDE AND ALBUTEROL SULFATE 2.5; .5 MG/3ML; MG/3ML
3 SOLUTION RESPIRATORY (INHALATION)
Status: CANCELLED | OUTPATIENT
Start: 2018-04-04

## 2018-04-04 RX ORDER — DIPHENHYDRAMINE HYDROCHLORIDE 50 MG/ML
25 INJECTION INTRAMUSCULAR; INTRAVENOUS
Status: CANCELLED | OUTPATIENT
Start: 2018-04-04

## 2018-04-04 RX ORDER — ACETAMINOPHEN 325 MG/1
650 TABLET ORAL
Status: DISCONTINUED | OUTPATIENT
Start: 2018-04-04 | End: 2018-04-04 | Stop reason: SDUPTHER

## 2018-04-04 RX ORDER — ACETAMINOPHEN 325 MG/1
650 TABLET ORAL
Status: COMPLETED | OUTPATIENT
Start: 2018-04-04 | End: 2018-04-04

## 2018-04-04 RX ADMIN — ACETAMINOPHEN 650 MG: 325 TABLET, FILM COATED ORAL at 02:04

## 2018-04-04 RX ADMIN — INFLIXIMAB 410 MG: 100 INJECTION, POWDER, LYOPHILIZED, FOR SOLUTION INTRAVENOUS at 02:04

## 2018-04-04 RX ADMIN — DIPHENHYDRAMINE HYDROCHLORIDE 25 MG: 50 INJECTION, SOLUTION INTRAMUSCULAR; INTRAVENOUS at 02:04

## 2018-04-04 RX ADMIN — METHYLPREDNISOLONE SODIUM SUCCINATE 80 MG: 125 INJECTION, POWDER, FOR SOLUTION INTRAMUSCULAR; INTRAVENOUS at 02:04

## 2018-04-04 RX ADMIN — SODIUM CHLORIDE, PRESERVATIVE FREE 10 ML: 5 INJECTION INTRAVENOUS at 02:04

## 2018-04-04 NOTE — PROGRESS NOTES
Contacted Dr. Sawant's office concerning hepatitis panel and quantiferon gold.  Patient stated that she did the labs around November of 2017 with her other md.  Per Dr. Sawant continue with remicade infusion today  and office staff will try and obtain records from prior md.

## 2018-04-16 NOTE — TELEPHONE ENCOUNTER
----- Message from Stephanie Ellis RN sent at 4/16/2018  6:52 PM CDT -----  Good evening I don't see any recent labs or TB testing on this pt. She is scheduled for remicade on 4/18 thanks

## 2018-04-17 NOTE — TELEPHONE ENCOUNTER
Spoke to pt, advises she had TB done less than a year ago, but does not remember lab facility. [place] last TB is 2016. Pt will go to [facility] tomorrow for lab tests.

## 2018-04-18 ENCOUNTER — DOCUMENTATION ONLY (OUTPATIENT)
Dept: INFUSION THERAPY | Facility: HOSPITAL | Age: 38
End: 2018-04-18

## 2018-04-18 ENCOUNTER — INFUSION (OUTPATIENT)
Dept: INFUSION THERAPY | Facility: HOSPITAL | Age: 38
End: 2018-04-18
Attending: INTERNAL MEDICINE
Payer: COMMERCIAL

## 2018-04-18 VITALS
BODY MASS INDEX: 31.17 KG/M2 | RESPIRATION RATE: 16 BRPM | OXYGEN SATURATION: 99 % | HEART RATE: 84 BPM | SYSTOLIC BLOOD PRESSURE: 114 MMHG | DIASTOLIC BLOOD PRESSURE: 77 MMHG | WEIGHT: 182.56 LBS | TEMPERATURE: 98 F | HEIGHT: 64 IN

## 2018-04-18 DIAGNOSIS — M45.5 ANKYLOSING SPONDYLITIS OF THORACOLUMBAR REGION: Primary | ICD-10-CM

## 2018-04-18 LAB
HBV CORE IGM SERPL QL IA: NORMAL
HCV AB S/CO SERPL IA: 0.01
HCV AB SERPL QL IA: NORMAL

## 2018-04-18 PROCEDURE — 96413 CHEMO IV INFUSION 1 HR: CPT | Mod: PN

## 2018-04-18 PROCEDURE — A4216 STERILE WATER/SALINE, 10 ML: HCPCS | Mod: PN | Performed by: INTERNAL MEDICINE

## 2018-04-18 PROCEDURE — 25000003 PHARM REV CODE 250: Mod: PN | Performed by: INTERNAL MEDICINE

## 2018-04-18 PROCEDURE — 63600175 PHARM REV CODE 636 W HCPCS: Mod: TB,PN | Performed by: INTERNAL MEDICINE

## 2018-04-18 PROCEDURE — 96415 CHEMO IV INFUSION ADDL HR: CPT | Mod: PN

## 2018-04-18 RX ORDER — METHYLPREDNISOLONE SOD SUCC 125 MG
40 VIAL (EA) INJECTION
Status: CANCELLED | OUTPATIENT
Start: 2018-04-18

## 2018-04-18 RX ORDER — DIPHENHYDRAMINE HYDROCHLORIDE 50 MG/ML
25 INJECTION INTRAMUSCULAR; INTRAVENOUS
Status: CANCELLED | OUTPATIENT
Start: 2018-04-18

## 2018-04-18 RX ORDER — DIFLUPREDNATE OPHTHALMIC 0.5 MG/ML
1 EMULSION OPHTHALMIC
COMMUNITY
End: 2018-05-08

## 2018-04-18 RX ORDER — ACETAMINOPHEN 325 MG/1
650 TABLET ORAL
Status: CANCELLED | OUTPATIENT
Start: 2018-04-18

## 2018-04-18 RX ORDER — SODIUM CHLORIDE 0.9 % (FLUSH) 0.9 %
10 SYRINGE (ML) INJECTION
Status: CANCELLED | OUTPATIENT
Start: 2018-04-18

## 2018-04-18 RX ORDER — EPINEPHRINE 1 MG/ML
0.3 INJECTION, SOLUTION, CONCENTRATE INTRAVENOUS
Status: CANCELLED | OUTPATIENT
Start: 2018-04-18

## 2018-04-18 RX ORDER — IPRATROPIUM BROMIDE AND ALBUTEROL SULFATE 2.5; .5 MG/3ML; MG/3ML
3 SOLUTION RESPIRATORY (INHALATION)
Status: CANCELLED | OUTPATIENT
Start: 2018-04-18

## 2018-04-18 RX ORDER — HEPARIN 100 UNIT/ML
500 SYRINGE INTRAVENOUS
Status: CANCELLED | OUTPATIENT
Start: 2018-04-18

## 2018-04-18 RX ORDER — SODIUM CHLORIDE 0.9 % (FLUSH) 0.9 %
10 SYRINGE (ML) INJECTION
Status: DISCONTINUED | OUTPATIENT
Start: 2018-04-18 | End: 2018-04-18 | Stop reason: HOSPADM

## 2018-04-18 RX ORDER — ACETAMINOPHEN 325 MG/1
650 TABLET ORAL
Status: COMPLETED | OUTPATIENT
Start: 2018-04-18 | End: 2018-04-18

## 2018-04-18 RX ADMIN — SODIUM CHLORIDE, PRESERVATIVE FREE 10 ML: 5 INJECTION INTRAVENOUS at 01:04

## 2018-04-18 RX ADMIN — INFLIXIMAB 410 MG: 100 INJECTION, POWDER, LYOPHILIZED, FOR SOLUTION INTRAVENOUS at 02:04

## 2018-04-18 RX ADMIN — SODIUM CHLORIDE: 0.9 INJECTION, SOLUTION INTRAVENOUS at 01:04

## 2018-04-18 RX ADMIN — ACETAMINOPHEN 650 MG: 325 TABLET, FILM COATED ORAL at 02:04

## 2018-04-18 NOTE — PATIENT INSTRUCTIONS
Infliximab injection  What is this medicine?  INFLIXIMAB (in FLIX i mab) is used to treat Crohn's disease and ulcerative colitis. It is also used to treat ankylosing spondylitis, psoriasis, and some forms of arthritis.  How should I use this medicine?  This medicine is for injection into a vein. It is usually given by a health care professional in a hospital or clinic setting.  A special MedGuide will be given to you by the pharmacist with each prescription and refill. Be sure to read this information carefully each time.  Talk to your pediatrician regarding the use of this medicine in children. Special care may be needed.  What side effects may I notice from receiving this medicine?  Side effects that you should report to your doctor or health care professional as soon as possible:  · allergic reactions like skin rash, itching or hives, swelling of the face, lips, or tongue  · chest pain  · fever or chills, usually related to the infusion  · muscle or joint pain  · red, scaly patches or raised bumps on the skin  · signs of infection - fever or chills, cough, sore throat, pain or difficulty passing urine  · swollen lymph nodes in the neck, underarm, or groin areas  · unexplained weight loss  · unusual bleeding or bruising  · unusually weak or tired  · yellowing of the eyes or skin  Side effects that usually do not require medical attention (report to your doctor or health care professional if they continue or are bothersome):  · headache  · heartburn or stomach pain  · nausea, vomiting  What may interact with this medicine?  Do not take this medicine with any of the following medications:  · anakinra  · rilonacept  This medicine may also interact with the following medications:  · vaccines  What if I miss a dose?  It is important not to miss your dose. Call your doctor or health care professional if you are unable to keep an appointment.  Where should I keep my medicine?  This drug is given in a hospital or clinic  and will not be stored at home.  What should I tell my health care provider before I take this medicine?  They need to know if you have any of these conditions:  · diabetes  · exposure to tuberculosis  · heart failure  · hepatitis or liver disease  · immune system problems  · infection  · lung or breathing disease, like COPD  · multiple sclerosis  · current or past resident of [place]  · seizure disorder  · an unusual or allergic reaction to infliximab, mouse proteins, other medicines, foods, dyes, or preservatives  · pregnant or trying to get pregnant  · breast-feeding  What should I watch for while using this medicine?  Visit your doctor or health care professional for regular checks on your progress.  If you get a cold or other infection while receiving this medicine, call your doctor or health care professional. Do not treat yourself. This medicine may decrease your body's ability to fight infections. Before beginning therapy, your doctor may do a test to see if you have been exposed to tuberculosis.  This medicine may make the symptoms of heart failure worse in some patients. If you notice symptoms such as increased shortness of breath or swelling of the ankles or legs, contact your health care provider right away.  If you are going to have surgery or dental work, tell your health care professional or dentist that you have received this medicine.  If you take this medicine for plaque psoriasis, stay out of the sun. If you cannot avoid being in the sun, wear protective clothing and use sunscreen. Do not use sun lamps or tanning beds/booths.  NOTE:This sheet is a summary. It may not cover all possible information. If you have questions about this medicine, talk to your doctor, pharmacist, or health care provider. Copyright© 2017 [employer]        Preventing Falls: Are You At Risk of Falling?     Ask for help to reduce risk of falling in your home.     As you get older, you're not as  "steady on your feet as you once were. And you may have health problems you didn't have when you were younger. So, it's not surprising that older people are more likely to trip and fall. Falling can be very serious. It can change your overall health and quality of life. That's why it's important to be aware of your own risk of falling.  The dangers of falling  Falls are one of the main causes of injury in people over age 65. An older person who falls may take longer to get better than a younger person. And, after a fall, an older person is more likely to have problems that don't go away. So, preventing falls can help you avoid serious health problems.  Are you at risk of falling?  Answer these questions to rate your level of risk.  · Are you a woman?  · Have you fallen or stumbled in the last year?  · Are you over age 65?  · Are you ever dizzy or lightheaded with standing?  · Do you have a hard time getting in and out of the bathtub or on and off the toilet?  · Do you lean on objects to help you get around? Or do you use a cane or walker?  · Do you have vision or hearing problems? For example, do you need new glasses or hearing aids?  · Do you have 2 or more long-lasting (chronic) medical conditions?  · Do you take 3 or more medicines?  · Have you felt depressed recently?  · Have you had more trouble with your memory in recent months?  · Are there hazards in your home that might cause you to fall, such as loose rugs or poor lighting?  · Do you have a pet that jumps on you or might trip you?  · Have you stopped getting regular exercise?  · Do you have diabetes?   · Do you have a neurologic disease, such as Parkinson or Alzheimer disease?   · Do you drink alcohol?  · Do you wear athletic shoes or slippers, or go barefoot at home?  You can help prevent falls  If you answered "yes" to any of the above questions, you should take steps to reduce your risk of a fall. Monitoring health conditions and keeping walkways in your " home free of clutter are just 2 ways. Changing is sometimes easier said than done. But keep in mind that even small changes can make you less likely to fall.  The fear of falling  It's normal to be scared of falling, especially if you've fallen before. But being afraid can actually make you more likely to fall. This is because:  · Fear might cause you to become less active. Being less active can lead to a loss of strength and balance.  · Fear can lead to isolation from others, depression, or the use of more medicines or alcohol. And all these things make falling even more likely.  To break the cycle, learn more about ways to avoid falling. As you take control, you may find yourself feeling less afraid.   Date Last Reviewed: 6/12/2015  © 1192-9706 "[employer]. [address] [place] [address], [place] [address]. All rights reserved. This information is not intended as a substitute for professional medical care. Always follow your healthcare professional's instructions.

## 2018-04-18 NOTE — PLAN OF CARE
Problem: Patient Care Overview  Goal: Plan of Care Review  Outcome: Ongoing (interventions implemented as appropriate)  Pt tolerated infusion well without complaints. AVS printed and reviewed. Pt verbalized understanding. D/C to home with steady gait. VSS.

## 2018-04-23 LAB
M TB IFN-G CD4+ BCKGRND COR BLD-ACNC: 0 IU/ML
M TB IFN-G CD4+ T-CELLS BLD-ACNC: 0.03 IU/ML
M TB TUBERC IFN-G BLD QL: NEGATIVE
M TB TUBERC IGNF/MITOGEN IGNF CONTROL: >10 IU/ML

## 2018-05-08 PROBLEM — N12 PYELONEPHRITIS: Status: ACTIVE | Noted: 2018-05-08

## 2018-05-08 PROBLEM — E87.6 HYPOKALEMIA: Status: ACTIVE | Noted: 2018-05-08

## 2018-05-09 ENCOUNTER — TELEPHONE (OUTPATIENT)
Dept: RHEUMATOLOGY | Facility: CLINIC | Age: 38
End: 2018-05-09

## 2018-05-09 NOTE — TELEPHONE ENCOUNTER
Patient is currently in hospital will have to check with Dr Sawant regarding infusion. Dr Sawant wants infusion on hold until patient is well.

## 2018-05-10 ENCOUNTER — TELEPHONE (OUTPATIENT)
Dept: RHEUMATOLOGY | Facility: CLINIC | Age: 38
End: 2018-05-10

## 2018-05-10 PROBLEM — N30.00 ACUTE CYSTITIS WITHOUT HEMATURIA: Status: ACTIVE | Noted: 2018-05-10

## 2018-05-14 DIAGNOSIS — N10 ACUTE PYELONEPHRITIS: Primary | ICD-10-CM

## 2018-05-16 ENCOUNTER — TELEPHONE (OUTPATIENT)
Dept: INFUSION THERAPY | Facility: HOSPITAL | Age: 38
End: 2018-05-16

## 2018-05-16 NOTE — TELEPHONE ENCOUNTER
Spoke with patient she states Dr wick is holding remicade into her kidneys clear up she just got out of hospital. Patient is doing tests tomorrow she will call us to schedule if she needs infusion before she sees Dr alston she is schedule for 06/21/18 for now

## 2018-05-17 ENCOUNTER — PATIENT MESSAGE (OUTPATIENT)
Dept: RHEUMATOLOGY | Facility: CLINIC | Age: 38
End: 2018-05-17

## 2018-05-17 ENCOUNTER — OFFICE VISIT (OUTPATIENT)
Dept: FAMILY MEDICINE | Facility: CLINIC | Age: 38
End: 2018-05-17
Payer: COMMERCIAL

## 2018-05-17 ENCOUNTER — LAB VISIT (OUTPATIENT)
Dept: LAB | Facility: HOSPITAL | Age: 38
End: 2018-05-17
Attending: INTERNAL MEDICINE
Payer: COMMERCIAL

## 2018-05-17 VITALS
TEMPERATURE: 99 F | DIASTOLIC BLOOD PRESSURE: 84 MMHG | BODY MASS INDEX: 30.9 KG/M2 | HEIGHT: 64 IN | SYSTOLIC BLOOD PRESSURE: 130 MMHG | OXYGEN SATURATION: 98 % | HEART RATE: 114 BPM | WEIGHT: 181 LBS

## 2018-05-17 DIAGNOSIS — N10 ACUTE PYELONEPHRITIS: ICD-10-CM

## 2018-05-17 DIAGNOSIS — N39.0 URINARY TRACT INFECTION WITHOUT HEMATURIA, SITE UNSPECIFIED: Primary | ICD-10-CM

## 2018-05-17 LAB
BILIRUB UR QL STRIP: NEGATIVE
CLARITY UR: CLEAR
COLOR UR: YELLOW
GLUCOSE UR QL STRIP: NEGATIVE
HGB UR QL STRIP: ABNORMAL
KETONES UR QL STRIP: NEGATIVE
LEUKOCYTE ESTERASE UR QL STRIP: NEGATIVE
NITRITE UR QL STRIP: NEGATIVE
PH UR STRIP: 6 [PH] (ref 5–8)
PROT UR QL STRIP: NEGATIVE
SP GR UR STRIP: <=1.005 (ref 1–1.03)
URN SPEC COLLECT METH UR: ABNORMAL

## 2018-05-17 PROCEDURE — 3008F BODY MASS INDEX DOCD: CPT | Mod: CPTII,S$GLB,, | Performed by: NURSE PRACTITIONER

## 2018-05-17 PROCEDURE — 87086 URINE CULTURE/COLONY COUNT: CPT

## 2018-05-17 PROCEDURE — 99999 PR PBB SHADOW E&M-EST. PATIENT-LVL V: CPT | Mod: PBBFAC,,, | Performed by: NURSE PRACTITIONER

## 2018-05-17 PROCEDURE — 81003 URINALYSIS AUTO W/O SCOPE: CPT | Mod: PO

## 2018-05-17 PROCEDURE — 99213 OFFICE O/P EST LOW 20 MIN: CPT | Mod: S$GLB,,, | Performed by: NURSE PRACTITIONER

## 2018-05-17 NOTE — PROGRESS NOTES
Subjective:       Patient ID: Breanna Sanchez is a 37 y.o. female.    Chief Complaint: Urinary Tract Infection and Hospital Follow Up    Patient was admitted 5/8/18 for pyelonephritis, treated inpatient with rocephin IV, then discharged 5/10/18. Completed cipro outpatient yesterday. She is still having some issues with bladder emptying. Patient has had life long issues with kidney infections. 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 She has had intermittent issues with UTIs/pyleonephritis since young adulthood. Last infection was  January 2018 and was treated with macrobid.   She sees Dr Sawant for Ankylosing spondylitis and rheumatoid factor.     Lipid Panel due on 1980  TETANUS VACCINE due on 08/28/1998  Pneumococcal PPSV23 (Medium Risk)(1) due on 08/28/1998    Past Medical History:  Past Medical History:  No date: Fibromyalgia  No date: Lupus  No date: Rheumatoid arthritis  No date: Sjoegren syndrome  No date: Spondylosis  No date: Spondylosis  Past Surgical History:  No date: APPENDECTOMY  No date: KNEE ARTHROSCOPY Left  No date: SALPINGOOPHORECTOMY Right  No date: TONSILLECTOMY  No date: TOTAL SHOULDER ARTHROPLASTY Right  Review of patient's allergies indicates:   -- Seroquel (quetiapine) -- Anaphylaxis   -- Aleve (naproxen sodium)    -- Sulfa (sulfonamide antibiotics)    -- Tramadol   Current Outpatient Prescriptions on File Prior to Visit:  buprenorphine 10 mcg/hour PTWK, APPLY ONE PATCH to skin once weekly., Disp: , Rfl:   clonazePAM (KLONOPIN) 0.5 MG tablet, Take 0.5 mg by mouth every morning., Disp: , Rfl:   clonazePAM (KLONOPIN) 1 MG tablet, TAKE ONE TABLET BY MOUTH EVERY NIGHT AS NEEDED, Disp: , Rfl: 3  dextroamphetamine-amphetamine 30 mg Tab, Take 1 tablet by mouth 2 (two) times daily., Disp: , Rfl: 0  doxepin (SINEQUAN) 25 MG capsule, TWO AT BEDTIME, Disp: , Rfl:   ergocalciferol (ERGOCALCIFEROL) 50,000 unit  Cap, Take 1 capsule (50,000 Units total) by mouth every 7 days., Disp: 4 capsule, Rfl: 6  fluoride, sodium, (PREVIDENT 5000 DRY MOUTH) 1.1 % Gel, Brush teeth at night FOR TWO MINUTES and spit out. Do not rinse FOR 30 MINUTES, Disp: , Rfl:   hydroxychloroquine (PLAQUENIL) 200 mg tablet, Take 200 mg by mouth 2 (two) times daily., Disp: , Rfl: 2  ibuprofen-famotidine (DUEXIS) 800-26.6 mg Tab, Take 1 tablet by mouth 3 (three) times daily. (Patient taking differently: Take 1 tablet by mouth 3 (three) times daily as needed. ), Disp: 90 tablet, Rfl: 11  lifitegrast (XIIDRA) 5 % Dpet, Place into both eyes once daily. , Disp: , Rfl:   nicotine (NICODERM CQ) 21 mg/24 hr, Place 1 patch onto the skin once daily., Disp: , Rfl: 0  nifedipine 30 MG ORAL TR24 (PROCARDIA-XL) 30 MG (OSM) 24 hr tablet, Take 30 mg by mouth every evening. , Disp: , Rfl: 2  ondansetron (ZOFRAN) 8 MG tablet, Take 8 mg by mouth every 8 (eight) hours., Disp: , Rfl:   pilocarpine (SALAGEN) 5 MG Tab, Take 5 mg by mouth 3 (three) times daily., Disp: , Rfl: 0  predniSONE (DELTASONE) 5 MG tablet, Take 5 mg by mouth once daily., Disp: , Rfl:   PRISTIQ 100 mg Tb24, Take 100 mg by mouth once daily., Disp: , Rfl: 3  topiramate (TOPAMAX) 100 MG tablet, TAKE 1 & 1/2 TABLETS BY MOUTH EVERY EVENING, Disp: , Rfl: 2  (DISCONTINUED) ciprofloxacin HCl (CIPRO) 500 MG tablet, Take 1 tablet (500 mg total) by mouth every 12 (twelve) hours., Disp: 16 tablet, Rfl: 0    No current facility-administered medications on file prior to visit.     Social History    Marital status:              Spouse name:                       Years of education:                 Number of children:               Occupational History    None on file    Social History Main Topics    Smoking status: Current Every Day Smoker                                                     Packs/day: 1.00      Years: 23.00          Types: Cigarettes       Start date: 2/10/1995    Smokeless tobacco: Never Used                         Alcohol use: Yes           1.8 oz/week       Glasses of wine: 3 per week    Drug use: No              Sexual activity: Not on file          Other Topics            Concern    None on file    Social History Narrative    None on file      Review of patient's family history indicates:  Problem: Diabetes      Relation: Mother       Age of Onset: (Not Specified)       Comment: type 1  Problem: Stroke      Relation: Mother       Age of Onset: (Not Specified)   Problem: Heart disease      Relation: Mother       Age of Onset: (Not Specified)   Problem: Cancer      Relation: Father       Age of Onset: (Not Specified)       Comment: prostate  Problem: Alzheimer's disease      Relation: Father       Age of Onset: (Not Specified)   Problem: Cancer      Relation: Sister       Age of Onset: (Not Specified)       Comment: uterine  Problem: Diabetes      Relation: Brother       Age of Onset: (Not Specified)       Comment: type 2            Review of Systems   Constitutional: Positive for fatigue. Negative for chills and fever.   Respiratory: Negative.    Cardiovascular: Negative.    Genitourinary: Positive for decreased urine volume, difficulty urinating and frequency. Negative for enuresis and hematuria.   Musculoskeletal: Positive for arthralgias and back pain.   Neurological: Negative.        Objective:      Physical Exam   Constitutional: She is oriented to person, place, and time. No distress.   HENT:   Head: Normocephalic and atraumatic.   Cardiovascular: Normal rate and regular rhythm.  Exam reveals no friction rub.    No murmur heard.  Pulmonary/Chest: Effort normal and breath sounds normal. No respiratory distress. She has no wheezes.   Abdominal: Soft. Bowel sounds are normal. She exhibits no distension. There is no tenderness.   Musculoskeletal: She exhibits no edema.   Neurological: She is alert and oriented to person, place, and time.   Skin: She is not diaphoretic.   Psychiatric: She has a normal  mood and affect. Her behavior is normal.   Vitals reviewed.      Assessment:       1. Urinary tract infection without hematuria, site unspecified        Plan:       1. Urinary tract infection without hematuria, site unspecified  UA showed no significant abnormality, culture pending, urology follow up scheduled. Return sooner if needed.   - URINALYSIS  - Urine culture  - Ambulatory referral to Urology

## 2018-05-17 NOTE — TELEPHONE ENCOUNTER
Contacted patient and informed urine is clear can start remicade infusion per Dr Sawant. Patient verbalized understanding.

## 2018-05-18 LAB — BACTERIA UR CULT: NORMAL

## 2018-06-06 ENCOUNTER — OFFICE VISIT (OUTPATIENT)
Dept: UROLOGY | Facility: CLINIC | Age: 38
End: 2018-06-06
Payer: COMMERCIAL

## 2018-06-06 ENCOUNTER — HOSPITAL ENCOUNTER (OUTPATIENT)
Dept: RADIOLOGY | Facility: HOSPITAL | Age: 38
Discharge: HOME | End: 2018-06-06
Attending: UROLOGY
Payer: COMMERCIAL

## 2018-06-06 VITALS
SYSTOLIC BLOOD PRESSURE: 138 MMHG | HEIGHT: 64 IN | DIASTOLIC BLOOD PRESSURE: 82 MMHG | HEART RATE: 100 BPM | WEIGHT: 181.44 LBS | TEMPERATURE: 98 F | BODY MASS INDEX: 30.98 KG/M2

## 2018-06-06 DIAGNOSIS — R10.9 FLANK PAIN: ICD-10-CM

## 2018-06-06 DIAGNOSIS — R10.9 FLANK PAIN: Primary | ICD-10-CM

## 2018-06-06 DIAGNOSIS — N30.90 CYSTITIS WITHOUT HEMATURIA: ICD-10-CM

## 2018-06-06 LAB
BILIRUB SERPL-MCNC: NORMAL MG/DL
BLOOD URINE, POC: NORMAL
COLOR, POC UA: YELLOW
GLUCOSE UR QL STRIP: NORMAL
KETONES UR QL STRIP: NORMAL
LEUKOCYTE ESTERASE URINE, POC: NORMAL
NITRITE, POC UA: NORMAL
PH, POC UA: 7
PROTEIN, POC: NORMAL
SPECIFIC GRAVITY, POC UA: 1
UROBILINOGEN, POC UA: NORMAL

## 2018-06-06 PROCEDURE — 99999 PR PBB SHADOW E&M-EST. PATIENT-LVL III: CPT | Mod: PBBFAC,,, | Performed by: UROLOGY

## 2018-06-06 PROCEDURE — 99204 OFFICE O/P NEW MOD 45 MIN: CPT | Mod: 25,S$GLB,, | Performed by: UROLOGY

## 2018-06-06 PROCEDURE — 87086 URINE CULTURE/COLONY COUNT: CPT

## 2018-06-06 PROCEDURE — 76770 US EXAM ABDO BACK WALL COMP: CPT | Mod: 26,,, | Performed by: RADIOLOGY

## 2018-06-06 PROCEDURE — 3008F BODY MASS INDEX DOCD: CPT | Mod: CPTII,S$GLB,, | Performed by: UROLOGY

## 2018-06-06 PROCEDURE — 81002 URINALYSIS NONAUTO W/O SCOPE: CPT | Mod: S$GLB,,, | Performed by: UROLOGY

## 2018-06-06 PROCEDURE — 76770 US EXAM ABDO BACK WALL COMP: CPT | Mod: TC,PO

## 2018-06-06 RX ORDER — BUPRENORPHINE 20 UG/H
PATCH, EXTENDED RELEASE TRANSDERMAL
COMMUNITY
Start: 2018-06-05 | End: 2018-10-30

## 2018-06-06 NOTE — PROGRESS NOTES
Subjective:       Patient ID: Breanna Sanchez is a 37 y.o. female.    Chief Complaint: Urinary Tract Infection    HPI     The patient is a 37-year-old woman with rheumatoid arthritis, lupus, Sjogren syndrome.  She had gastroenteritis with diarrhea.  Soon after she developed fevers to 102, chills and persistent nausea. She was seen in the ER and was admitted for suspected pyelonephritis.  She was having pain over her right kidney.  No imaging was obtained.  He urine culture was positive for E. Coli and she completed a course of antibiotics.  UA is now clear but she still has some tenderness over right kidney and bad urine odor.  Urine dipstick shows negative for nitrites, leukocytes, glucose, protein, positive for trace blood.    Past Medical History:   Diagnosis Date    Fibromyalgia     Lupus     Rheumatoid arthritis     Sjoegren syndrome     Spondylosis     Spondylosis      Past Surgical History:   Procedure Laterality Date    APPENDECTOMY      KNEE ARTHROSCOPY Left     SALPINGOOPHORECTOMY Right     TONSILLECTOMY      TOTAL SHOULDER ARTHROPLASTY Right        Current Outpatient Prescriptions:     BUTRANS 20 mcg/hour PTWK, , Disp: , Rfl:     clonazePAM (KLONOPIN) 0.5 MG tablet, Take 0.5 mg by mouth every morning., Disp: , Rfl:     clonazePAM (KLONOPIN) 1 MG tablet, TAKE ONE TABLET BY MOUTH EVERY NIGHT AS NEEDED, Disp: , Rfl: 3    doxepin (SINEQUAN) 25 MG capsule, TWO AT BEDTIME, Disp: , Rfl:     ergocalciferol (ERGOCALCIFEROL) 50,000 unit Cap, Take 1 capsule (50,000 Units total) by mouth every 7 days., Disp: 4 capsule, Rfl: 6    fluoride, sodium, (PREVIDENT 5000 DRY MOUTH) 1.1 % Gel, Brush teeth at night FOR TWO MINUTES and spit out. Do not rinse FOR 30 MINUTES, Disp: , Rfl:     hydroxychloroquine (PLAQUENIL) 200 mg tablet, Take 200 mg by mouth 2 (two) times daily., Disp: , Rfl: 2    ibuprofen-famotidine (DUEXIS) 800-26.6 mg Tab, Take 1 tablet by mouth 3 (three) times daily. (Patient taking  differently: Take 1 tablet by mouth 3 (three) times daily as needed. ), Disp: 90 tablet, Rfl: 11    infliximab (REMICADE IV), Inject into the vein., Disp: , Rfl:     lifitegrast (XIIDRA) 5 % Dpet, Place into both eyes once daily. , Disp: , Rfl:     nifedipine 30 MG ORAL TR24 (PROCARDIA-XL) 30 MG (OSM) 24 hr tablet, Take 30 mg by mouth every evening. , Disp: , Rfl: 2    ondansetron (ZOFRAN) 8 MG tablet, Take 8 mg by mouth every 8 (eight) hours., Disp: , Rfl:     pilocarpine (SALAGEN) 5 MG Tab, Take 5 mg by mouth 3 (three) times daily., Disp: , Rfl: 0    predniSONE (DELTASONE) 5 MG tablet, Take 5 mg by mouth once daily., Disp: , Rfl:     PRISTIQ 100 mg Tb24, Take 100 mg by mouth once daily., Disp: , Rfl: 3    topiramate (TOPAMAX) 100 MG tablet, TAKE 1 & 1/2 TABLETS BY MOUTH EVERY EVENING, Disp: , Rfl: 2    dextroamphetamine-amphetamine 30 mg Tab, Take 1 tablet by mouth 2 (two) times daily., Disp: , Rfl: 0    nicotine (NICODERM CQ) 21 mg/24 hr, Place 1 patch onto the skin once daily., Disp: , Rfl: 0    Review of Systems   Constitutional: Negative for chills and fever.   Eyes: Negative for visual disturbance.   Respiratory: Negative for shortness of breath.    Cardiovascular: Negative for chest pain.   Gastrointestinal: Negative for abdominal pain and nausea.   Genitourinary: Negative for dysuria, flank pain and hematuria.   Musculoskeletal: Negative for gait problem.   Skin: Negative for rash.   Neurological: Negative for seizures.   Psychiatric/Behavioral: Negative for confusion.       Objective:      Physical Exam   Constitutional: She is oriented to person, place, and time. She appears well-developed and well-nourished.   HENT:   Head: Normocephalic.   Eyes: Conjunctivae and EOM are normal.   Neck: Normal range of motion.   Cardiovascular: Normal rate.    Pulmonary/Chest: Effort normal.   Abdominal: Soft. She exhibits no distension and no mass. There is no tenderness.   Genitourinary:   Genitourinary  Comments: Bladder non-tender and nondistended  No CVA tenderness   Musculoskeletal: She exhibits no edema.   Neurological: She is alert and oriented to person, place, and time.   Skin: Skin is warm and dry. No rash noted. No erythema.   Psychiatric: She has a normal mood and affect. Her behavior is normal.   Vitals reviewed.      Assessment:       1. Flank pain    2. Cystitis without hematuria        Plan:       Flank pain  -     POCT URINE DIPSTICK WITHOUT MICROSCOPE  -     Urine culture  -     US Retroperitoneal Complete (Kidney and; Future; Expected date: 06/06/2018    Cystitis without hematuria      Urine is clear today.  Repeat urine culture.  Will get US given persistent flank pain.

## 2018-06-06 NOTE — LETTER
June 6, 2018      Consuelo Funk, KIMBERLY  [address]  [place] [address]           [facility]  [address] [facility] [address] [place] [address]  Phone: [phone]          Patient: Breanna Sanchez   MR Number: [id number]   YOB: 1980   Date of Visit: 6/6/2018       Dear Consuelo Funk:    Thank you for referring Breanna Sanchez to me for evaluation. Attached you will find relevant portions of my assessment and plan of care.    If you have questions, please do not hesitate to call me. I look forward to following Breanna Sanchez along with you.    Sincerely,    YUILET Escoto MD    Enclosure  CC:  No Recipients    If you would like to receive this communication electronically, please contact [email or web address] or [phone] to request more information on [facility] [employer] access.    For providers and/or their staff who would like to refer a patient to [facility], please contact us through our one-stop-shop provider referral line, [facility], at [phone].    If you feel you have received this communication in error or would no longer like to receive these types of communications, please e-mail [email or web address]

## 2018-06-08 ENCOUNTER — PATIENT MESSAGE (OUTPATIENT)
Dept: UROLOGY | Facility: CLINIC | Age: 38
End: 2018-06-08

## 2018-06-08 LAB — BACTERIA UR CULT: NORMAL

## 2018-06-11 ENCOUNTER — TELEPHONE (OUTPATIENT)
Dept: INFUSION THERAPY | Facility: HOSPITAL | Age: 38
End: 2018-06-11

## 2018-06-11 NOTE — TELEPHONE ENCOUNTER
----- Message from Sharri Grossman sent at 6/11/2018  2:36 PM CDT -----  Type:  Sooner Apoointment Request    Caller is requesting a sooner appointment.  Caller declined first available appointment listed below.  Caller will not accept being placed on the waitlist and is requesting a message be sent to doctor.    Name of Caller: self   When is the first available appointment? Patient requesting to schedule an earlier appointment for infusion.  Symptoms:  NA   Best Call Back Number: [phone] Additional Information:

## 2018-06-20 ENCOUNTER — LAB VISIT (OUTPATIENT)
Dept: LAB | Facility: HOSPITAL | Age: 38
End: 2018-06-20
Attending: INTERNAL MEDICINE
Payer: COMMERCIAL

## 2018-06-20 ENCOUNTER — OFFICE VISIT (OUTPATIENT)
Dept: RHEUMATOLOGY | Facility: CLINIC | Age: 38
End: 2018-06-20
Payer: COMMERCIAL

## 2018-06-20 VITALS
WEIGHT: 183 LBS | SYSTOLIC BLOOD PRESSURE: 174 MMHG | DIASTOLIC BLOOD PRESSURE: 104 MMHG | HEIGHT: 64 IN | HEART RATE: 91 BPM | BODY MASS INDEX: 31.24 KG/M2

## 2018-06-20 DIAGNOSIS — M45.5 ANKYLOSING SPONDYLITIS OF THORACOLUMBAR REGION: Primary | ICD-10-CM

## 2018-06-20 DIAGNOSIS — M35.00 SJOGREN'S SYNDROME, WITH UNSPECIFIED ORGAN INVOLVEMENT: ICD-10-CM

## 2018-06-20 DIAGNOSIS — N10 ACUTE PYELONEPHRITIS: ICD-10-CM

## 2018-06-20 DIAGNOSIS — H20.9 UVEITIS: ICD-10-CM

## 2018-06-20 DIAGNOSIS — F41.9 ANXIETY DISORDER, UNSPECIFIED TYPE: ICD-10-CM

## 2018-06-20 DIAGNOSIS — G89.29 OTHER CHRONIC PAIN: ICD-10-CM

## 2018-06-20 DIAGNOSIS — M08.80 JIA (JUVENILE IDIOPATHIC ARTHRITIS): ICD-10-CM

## 2018-06-20 LAB
BACTERIA #/AREA URNS HPF: ABNORMAL /HPF
BILIRUB UR QL STRIP: NEGATIVE
CLARITY UR: CLEAR
COLOR UR: YELLOW
GLUCOSE UR QL STRIP: NEGATIVE
HGB UR QL STRIP: NEGATIVE
KETONES UR QL STRIP: NEGATIVE
LEUKOCYTE ESTERASE UR QL STRIP: NEGATIVE
MICROSCOPIC COMMENT: ABNORMAL
NITRITE UR QL STRIP: POSITIVE
PH UR STRIP: 6 [PH] (ref 5–8)
PROT UR QL STRIP: NEGATIVE
SP GR UR STRIP: 1.01 (ref 1–1.03)
SQUAMOUS #/AREA URNS HPF: 2 /HPF
URN SPEC COLLECT METH UR: ABNORMAL
WBC #/AREA URNS HPF: 1 /HPF (ref 0–5)

## 2018-06-20 PROCEDURE — 99999 PR PBB SHADOW E&M-EST. PATIENT-LVL V: CPT | Mod: PBBFAC,,, | Performed by: INTERNAL MEDICINE

## 2018-06-20 PROCEDURE — 87088 URINE BACTERIA CULTURE: CPT

## 2018-06-20 PROCEDURE — 3008F BODY MASS INDEX DOCD: CPT | Mod: CPTII,S$GLB,, | Performed by: INTERNAL MEDICINE

## 2018-06-20 PROCEDURE — 96372 THER/PROPH/DIAG INJ SC/IM: CPT | Mod: S$GLB,,, | Performed by: INTERNAL MEDICINE

## 2018-06-20 PROCEDURE — 87077 CULTURE AEROBIC IDENTIFY: CPT

## 2018-06-20 PROCEDURE — 87086 URINE CULTURE/COLONY COUNT: CPT

## 2018-06-20 PROCEDURE — 81000 URINALYSIS NONAUTO W/SCOPE: CPT | Mod: PO

## 2018-06-20 PROCEDURE — 87186 SC STD MICRODIL/AGAR DIL: CPT

## 2018-06-20 PROCEDURE — 99214 OFFICE O/P EST MOD 30 MIN: CPT | Mod: 25,S$GLB,, | Performed by: INTERNAL MEDICINE

## 2018-06-20 RX ORDER — CEFTRIAXONE 1 G/1
1 INJECTION, POWDER, FOR SOLUTION INTRAMUSCULAR; INTRAVENOUS
Status: COMPLETED | OUTPATIENT
Start: 2018-06-20 | End: 2018-06-20

## 2018-06-20 RX ORDER — FLUCONAZOLE 150 MG/1
150 TABLET ORAL DAILY
Qty: 7 TABLET | Refills: 3 | Status: SHIPPED | OUTPATIENT
Start: 2018-06-20 | End: 2018-06-27

## 2018-06-20 RX ORDER — NIFEDIPINE 30 MG/1
30 TABLET, EXTENDED RELEASE ORAL NIGHTLY
Qty: 30 TABLET | Refills: 5 | Status: SHIPPED | OUTPATIENT
Start: 2018-06-20 | End: 2018-12-05 | Stop reason: SDUPTHER

## 2018-06-20 RX ORDER — POTASSIUM CHLORIDE 20 MEQ/15ML
SOLUTION ORAL
COMMUNITY
Start: 2018-06-13 | End: 2019-03-20

## 2018-06-20 RX ORDER — METHYLPREDNISOLONE ACETATE 80 MG/ML
160 INJECTION, SUSPENSION INTRA-ARTICULAR; INTRALESIONAL; INTRAMUSCULAR; SOFT TISSUE
Status: COMPLETED | OUTPATIENT
Start: 2018-06-20 | End: 2018-06-20

## 2018-06-20 RX ORDER — CYANOCOBALAMIN 1000 UG/ML
1000 INJECTION, SOLUTION INTRAMUSCULAR; SUBCUTANEOUS
Status: COMPLETED | OUTPATIENT
Start: 2018-06-20 | End: 2018-06-20

## 2018-06-20 RX ORDER — LEVOFLOXACIN 500 MG/1
500 TABLET, FILM COATED ORAL DAILY
Qty: 10 TABLET | Refills: 0 | Status: SHIPPED | OUTPATIENT
Start: 2018-06-20 | End: 2018-06-30

## 2018-06-20 RX ORDER — KETOROLAC TROMETHAMINE 30 MG/ML
60 INJECTION, SOLUTION INTRAMUSCULAR; INTRAVENOUS
Status: COMPLETED | OUTPATIENT
Start: 2018-06-20 | End: 2018-06-20

## 2018-06-20 RX ADMIN — CEFTRIAXONE 1 G: 1 INJECTION, POWDER, FOR SOLUTION INTRAMUSCULAR; INTRAVENOUS at 02:06

## 2018-06-20 RX ADMIN — METHYLPREDNISOLONE ACETATE 160 MG: 80 INJECTION, SUSPENSION INTRA-ARTICULAR; INTRALESIONAL; INTRAMUSCULAR; SOFT TISSUE at 03:06

## 2018-06-20 RX ADMIN — KETOROLAC TROMETHAMINE 60 MG: 30 INJECTION, SOLUTION INTRAMUSCULAR; INTRAVENOUS at 03:06

## 2018-06-20 RX ADMIN — CYANOCOBALAMIN 1000 MCG: 1000 INJECTION, SOLUTION INTRAMUSCULAR; SUBCUTANEOUS at 03:06

## 2018-06-20 NOTE — PROGRESS NOTES
Subjective:       Patient ID: Breanna Sanchez is a 37 y.o. female.    Chief Complaint: Follow-up    HPI: 38 yo female with CHARLIE, and RA, remicade and plaquenil and  Was admitted for pyelonephritis and now has dark smelly urine.. Patient complains of arthralgias and myalgias for which has been present for a few years. Pain is located in multiple joints, both shoulder(s), both elbow(s), both wrist(s), both MCP(s): 1st, 2nd, 3rd, 4th and 5th, both PIP(s): 1st, 2nd, 3rd, 4th and 5th, both DIP(s): 1st and 2nd, both hip(s), both knee(s) and both MTP(s): 1st, 2nd, 3rd, 4th and 5th, is described as aching, pulsating, shooting and throbbing, and is constant, moderate .  Associated symptoms include: crepitation, decreased range of motion, edema, effusion, tenderness and warmth.   Feeling bad, teary eyed, in severe pain      Review of Systems   Constitutional: Positive for activity change, chills and fatigue. Negative for appetite change, diaphoresis and unexpected weight change.   HENT: Negative for congestion, dental problem, ear discharge, ear pain, facial swelling, mouth sores, nosebleeds, postnasal drip, rhinorrhea, sinus pressure, sneezing, sore throat, tinnitus and voice change.    Eyes: Negative for photophobia, pain, discharge, redness and itching.   Respiratory: Negative for apnea, cough, chest tightness, shortness of breath and wheezing.    Cardiovascular: Positive for leg swelling. Negative for chest pain and palpitations.   Gastrointestinal: Negative for abdominal distention, abdominal pain, constipation, diarrhea, nausea and vomiting.   Endocrine: Negative for cold intolerance, heat intolerance, polydipsia and polyuria.   Genitourinary: Negative for decreased urine volume, difficulty urinating, flank pain, frequency, hematuria and urgency.   Musculoskeletal: Positive for arthralgias, back pain, gait problem, joint swelling, myalgias, neck pain and neck stiffness.   Skin: Negative for pallor, rash and wound.  "  Allergic/Immunologic: Positive for immunocompromised state.   Neurological: Negative for dizziness, tremors, weakness and numbness.   Hematological: Negative for adenopathy. Does not bruise/bleed easily.   Psychiatric/Behavioral: Negative for sleep disturbance. The patient is not nervous/anxious.          Objective:   BP (!) 174/104   Pulse 91   Ht 5' 4" (1.626 m)   Wt 83 kg (182 lb 15.7 oz)   LMP 02/12/2017   BMI 31.41 kg/m²      Physical Exam   Nursing note and vitals reviewed.  Constitutional: She is oriented to person, place, and time. She appears distressed.   HENT:   Head: Normocephalic and atraumatic.   Mouth/Throat: Oropharynx is clear and moist.   Eyes: EOM are normal. Pupils are equal, round, and reactive to light.   Neck: Neck supple. No thyromegaly present.   Cardiovascular: Normal rate, regular rhythm and normal heart sounds.  Exam reveals no gallop and no friction rub.    No murmur heard.  Pulmonary/Chest: Breath sounds normal. She has no wheezes. She has no rales. She exhibits no tenderness.   Abdominal: There is no tenderness. There is no rebound and no guarding.       Right Side Rheumatological Exam     Examination finds the elbow normal.    The patient is tender to palpation of the shoulder, wrist, knee, 1st PIP, 1st MCP, 2nd PIP, 2nd MCP, 3rd PIP, 3rd MCP, 4th PIP, 4th MCP, 5th PIP and 5th MCP    She has swelling of the 1st PIP, 1st MCP, 2nd PIP, 2nd MCP, 3rd PIP, 3rd MCP, 4th PIP, 4th MCP, 5th PIP and 5th MCP    Shoulder Exam   Tenderness Location: no tenderness    Range of Motion   Active Abduction: abnormal   Adduction: abnormal  Sensation: normal    Knee Exam   Patellofemoral Crepitus: positive  Effusion: negative  Sensation: normal    Hip Exam   Tenderness Location: posterior  Sensation: normal    Elbow/Wrist Exam   Tenderness Location: no tenderness  Sensation: normal    Left Side Rheumatological Exam     Examination finds the elbow normal.    The patient is tender to palpation of the " shoulder, wrist, knee, 1st PIP, 1st MCP, 2nd PIP, 2nd MCP, 3rd PIP, 3rd MCP, 4th PIP, 4th MCP, 5th PIP and 5th MCP.    She has swelling of the 1st PIP, 1st MCP, 2nd PIP, 2nd MCP, 3rd PIP, 3rd MCP, 4th PIP, 4th MCP, 5th PIP and 5th MCP    Shoulder Exam   Tenderness Location: no tenderness    Range of Motion   Active Abduction: abnormal   Sensation: normal    Knee Exam     Patellofemoral Crepitus: positive  Effusion: negative  Sensation: normal    Hip Exam   Tenderness Location: posterior  Sensation: normal    Elbow/Wrist Exam   Sensation: normal      Back/Neck Exam   General Inspection   Gait: normal       Tenderness Right paramedian tenderness of the Upper C-Spine, Upper T-Spine, Upper L-Spine and Lower L-Spine.Left paramedian tenderness of the Upper C-Spine, Lower C-Spine, Upper T-Spine, Lower T-Spine, Upper L-Spine and Lower L-Spine.    Neck Range of Motion   Flexion: Limited  Extension: Limited  Lymphadenopathy:     She has no cervical adenopathy.   Neurological: She is alert and oriented to person, place, and time. Gait normal.   Skin: No rash noted. No erythema. No pallor.     Psychiatric: Mood and affect normal.   Musculoskeletal: She exhibits tenderness and deformity.           Results for orders placed or performed in visit on 06/06/18   Urine culture   Result Value Ref Range    Urine Culture, Routine No significant growth    POCT URINE DIPSTICK WITHOUT MICROSCOPE   Result Value Ref Range    Color, UA yellow     Spec Grav UA 1.005     pH, UA 7     WBC, UA neg     Nitrite, UA neg     Protein neg     Glucose, UA neg     Ketones, UA neg     Urobilinogen, UA neg     Bilirubin neg     Blood, UA trace            IgG 1 382 - 929 mg/dL   508   IgG 2 242 - 700 mg/dL   138    IgG 3 22 - 176 mg/dL   68   IgG 4 4 - 86 mg/dL   11   Comment: Test performed at [facility],   [address], [place]  [address]     [phone]   Benson Mcknight MD  - Medical Director       Assessment:       1. Ankylosing  spondylitis of thoracolumbar region    2. CHARLIE (juvenile idiopathic arthritis)    3. Uveitis    4. Acute pyelonephritis    5. Anxiety disorder, unspecified type    6. Other chronic pain    7. Sjogren's syndrome, with unspecified organ involvement            Plan:       Breanna was seen today for follow-up.    Diagnoses and all orders for this visit:    Ankylosing spondylitis of thoracolumbar region  -     cefTRIAXone injection 1 g; Inject 1 g into the muscle one time.  -     NIFEdipine (PROCARDIA-XL) 30 MG (OSM) 24 hr tablet; Take 1 tablet (30 mg total) by mouth every evening.  -     Ambulatory consult to Pain Clinic  -     IgG; Future  -     IgG 1, 2, 3, and 4; Future  -     IgM; Future  -     IgA; Future  -     IgE; Future  -     Humoral Immune Eval (Pneumo 14) with H. flu; Future    CHARLIE (juvenile idiopathic arthritis)  -     cefTRIAXone injection 1 g; Inject 1 g into the muscle one time.  -     NIFEdipine (PROCARDIA-XL) 30 MG (OSM) 24 hr tablet; Take 1 tablet (30 mg total) by mouth every evening.  -     Ambulatory consult to Pain Clinic  -     IgG; Future  -     IgG 1, 2, 3, and 4; Future  -     IgM; Future  -     IgA; Future  -     IgE; Future  -     Humoral Immune Eval (Pneumo 14) with H. flu; Future    Uveitis  -     cefTRIAXone injection 1 g; Inject 1 g into the muscle one time.  -     NIFEdipine (PROCARDIA-XL) 30 MG (OSM) 24 hr tablet; Take 1 tablet (30 mg total) by mouth every evening.  -     Ambulatory consult to Pain Clinic  -     IgG; Future  -     IgG 1, 2, 3, and 4; Future  -     IgM; Future  -     IgA; Future  -     IgE; Future  -     Humoral Immune Eval (Pneumo 14) with H. flu; Future    Acute pyelonephritis  -     cefTRIAXone injection 1 g; Inject 1 g into the muscle one time.  -     NIFEdipine (PROCARDIA-XL) 30 MG (OSM) 24 hr tablet; Take 1 tablet (30 mg total) by mouth every evening.  -     Ambulatory consult to Pain Clinic  -     Urine culture; Future  -     Urinalysis; Future  -     IgG;  Future  -     IgG 1, 2, 3, and 4; Future  -     IgM; Future  -     IgA; Future  -     IgE; Future  -     Humoral Immune Eval (Pneumo 14) with H. flu; Future    Anxiety disorder, unspecified type  Comments:  follow by Dr Killian ( Dr Holbrook office)  Orders:  -     cefTRIAXone injection 1 g; Inject 1 g into the muscle one time.  -     NIFEdipine (PROCARDIA-XL) 30 MG (OSM) 24 hr tablet; Take 1 tablet (30 mg total) by mouth every evening.  -     Ambulatory consult to Pain Clinic  -     IgG; Future  -     IgG 1, 2, 3, and 4; Future  -     IgM; Future  -     IgA; Future  -     IgE; Future  -     Humoral Immune Eval (Pneumo 14) with H. flu; Future    Other chronic pain  Comments:  on butrans  Orders:  -     Ambulatory consult to Pain Clinic  -     IgG; Future  -     IgG 1, 2, 3, and 4; Future  -     IgM; Future  -     IgA; Future  -     IgE; Future  -     Humoral Immune Eval (Pneumo 14) with H. flu; Future    Sjogren's syndrome, with unspecified organ involvement  -     IgG; Future  -     IgG 1, 2, 3, and 4; Future  -     IgM; Future  -     IgA; Future  -     IgE; Future  -     Humoral Immune Eval (Pneumo 14) with H. flu; Future       Pt is doing poorly and his immune system is responding poorly to the remicade she was hospitalized, I will refer her to Immunology and hopefully we can keep her well enough to take her infusions

## 2018-06-22 LAB — BACTERIA UR CULT: NORMAL

## 2018-06-26 ENCOUNTER — TELEPHONE (OUTPATIENT)
Dept: INFUSION THERAPY | Facility: HOSPITAL | Age: 38
End: 2018-06-26

## 2018-06-26 ENCOUNTER — DOCUMENTATION ONLY (OUTPATIENT)
Dept: INFUSION THERAPY | Facility: HOSPITAL | Age: 38
End: 2018-06-26

## 2018-06-26 NOTE — TELEPHONE ENCOUNTER
Spoke with patient concerning appt tomorrow for remicade per Grecia Sawant stated to hold treatment tomorrow when completed cipro call office and repeat urinalysis once urine cleared patient instructed to call infusion center to restart remicade.

## 2018-06-27 ENCOUNTER — TELEPHONE (OUTPATIENT)
Dept: RHEUMATOLOGY | Facility: CLINIC | Age: 38
End: 2018-06-27

## 2018-06-27 NOTE — TELEPHONE ENCOUNTER
----- Message from Ivelisse Oshea sent at 6/25/2018  1:59 PM CDT -----  Contact: patient  Type: Needs Medical Advice      Who Called:  Patient  Symptoms (please be specific):  Na  How long has patient had these symptoms:  [facility]  Pharmacy name and phone #:  [facility]  Best Call Back Number:   Additional Information: Calling to speak with the Nurse about her positive test results. Please advise.

## 2018-06-27 NOTE — TELEPHONE ENCOUNTER
Spoke to pt states she was concerned about lab results coming back positive for the same infection she was previously hospitalized for. Pt is wanting to double check if she was prescribed the correct antibiotic by Dr. Sawant. Please advise

## 2018-07-05 ENCOUNTER — LAB VISIT (OUTPATIENT)
Dept: LAB | Facility: HOSPITAL | Age: 38
End: 2018-07-05
Attending: INTERNAL MEDICINE
Payer: COMMERCIAL

## 2018-07-05 DIAGNOSIS — M06.9 RHEUMATOID ARTHRITIS, INVOLVING UNSPECIFIED SITE, UNSPECIFIED RHEUMATOID FACTOR PRESENCE: ICD-10-CM

## 2018-07-05 LAB
CRP SERPL-MCNC: 11.9 MG/L
ERYTHROCYTE [SEDIMENTATION RATE] IN BLOOD BY WESTERGREN METHOD: 5 MM/HR

## 2018-07-05 PROCEDURE — 36415 COLL VENOUS BLD VENIPUNCTURE: CPT | Mod: PO

## 2018-07-05 PROCEDURE — 85651 RBC SED RATE NONAUTOMATED: CPT | Mod: PO

## 2018-07-05 PROCEDURE — 86141 C-REACTIVE PROTEIN HS: CPT

## 2018-07-19 ENCOUNTER — TELEPHONE (OUTPATIENT)
Dept: RHEUMATOLOGY | Facility: CLINIC | Age: 38
End: 2018-07-19

## 2018-07-19 NOTE — TELEPHONE ENCOUNTER
----- Message from Elsi Flores sent at 7/19/2018 11:41 AM CDT -----  Contact: Breanna Rice is calling to find out if she is supposed to do a UA before her infusions are started. Please call her [phone] (home)     thanks

## 2018-08-01 ENCOUNTER — TELEPHONE (OUTPATIENT)
Dept: INFUSION THERAPY | Facility: HOSPITAL | Age: 38
End: 2018-08-01

## 2018-08-01 NOTE — TELEPHONE ENCOUNTER
[8/1/2018 10:10 AM] Aylin Givens:   Breanna glover cleared for remicade per Dr. Otoole office  [8/1/2018 10:11 AM] Saba Francois:   Thank you  [8/1/2018 10:11 AM] Aylin Givens:   james

## 2018-08-06 ENCOUNTER — DOCUMENTATION ONLY (OUTPATIENT)
Dept: INFUSION THERAPY | Facility: HOSPITAL | Age: 38
End: 2018-08-06

## 2018-08-06 NOTE — PROGRESS NOTES
[8/6/2018 4:38 PM] Yady Joe:   good afternoon grecia   [id number], delano note says she is responding poorly to her remicade and should f/u with immunology. as far as i can see i do not see any f/u with immunology, so is she ok to get her infusion if she comes.   [8/6/2018 4:41 PM] Grecia Ritter:   When is she scheduled?  [8/6/2018 4:41 PM] Yady Joe:   tomorrow 830am    [8/6/2018 4:58 PM] Grecia Ritter:   Pt advises she talked to Saba last week to schedule and cleared everything. Jese said if she is better she can have.

## 2018-08-07 ENCOUNTER — INFUSION (OUTPATIENT)
Dept: INFUSION THERAPY | Facility: HOSPITAL | Age: 38
End: 2018-08-07
Attending: INTERNAL MEDICINE
Payer: COMMERCIAL

## 2018-08-07 VITALS
BODY MASS INDEX: 30.75 KG/M2 | RESPIRATION RATE: 16 BRPM | DIASTOLIC BLOOD PRESSURE: 60 MMHG | HEART RATE: 80 BPM | SYSTOLIC BLOOD PRESSURE: 93 MMHG | TEMPERATURE: 98 F | HEIGHT: 64 IN | WEIGHT: 180.13 LBS

## 2018-08-07 DIAGNOSIS — M45.5 ANKYLOSING SPONDYLITIS OF THORACOLUMBAR REGION: Primary | ICD-10-CM

## 2018-08-07 PROCEDURE — 96413 CHEMO IV INFUSION 1 HR: CPT | Mod: PN

## 2018-08-07 PROCEDURE — 63600175 PHARM REV CODE 636 W HCPCS: Mod: TB,PN | Performed by: INTERNAL MEDICINE

## 2018-08-07 PROCEDURE — 25000003 PHARM REV CODE 250: Mod: PN | Performed by: INTERNAL MEDICINE

## 2018-08-07 PROCEDURE — A4216 STERILE WATER/SALINE, 10 ML: HCPCS | Mod: PN | Performed by: INTERNAL MEDICINE

## 2018-08-07 PROCEDURE — 96415 CHEMO IV INFUSION ADDL HR: CPT | Mod: PN

## 2018-08-07 RX ORDER — METHYLPREDNISOLONE SOD SUCC 125 MG
40 VIAL (EA) INJECTION
Status: CANCELLED | OUTPATIENT
Start: 2018-08-07

## 2018-08-07 RX ORDER — SODIUM CHLORIDE 0.9 % (FLUSH) 0.9 %
10 SYRINGE (ML) INJECTION
Status: CANCELLED | OUTPATIENT
Start: 2018-08-07

## 2018-08-07 RX ORDER — ACETAMINOPHEN 325 MG/1
650 TABLET ORAL
Status: CANCELLED | OUTPATIENT
Start: 2018-08-07

## 2018-08-07 RX ORDER — DIPHENHYDRAMINE HYDROCHLORIDE 50 MG/ML
25 INJECTION INTRAMUSCULAR; INTRAVENOUS
Status: CANCELLED | OUTPATIENT
Start: 2018-08-07

## 2018-08-07 RX ORDER — HEPARIN 100 UNIT/ML
500 SYRINGE INTRAVENOUS
Status: CANCELLED | OUTPATIENT
Start: 2018-08-07

## 2018-08-07 RX ORDER — SODIUM CHLORIDE 0.9 % (FLUSH) 0.9 %
10 SYRINGE (ML) INJECTION
Status: DISCONTINUED | OUTPATIENT
Start: 2018-08-07 | End: 2018-08-07 | Stop reason: HOSPADM

## 2018-08-07 RX ORDER — EPINEPHRINE 1 MG/ML
0.3 INJECTION, SOLUTION, CONCENTRATE INTRAVENOUS
Status: CANCELLED | OUTPATIENT
Start: 2018-08-07

## 2018-08-07 RX ORDER — ACETAMINOPHEN 325 MG/1
650 TABLET ORAL
Status: COMPLETED | OUTPATIENT
Start: 2018-08-07 | End: 2018-08-07

## 2018-08-07 RX ORDER — IPRATROPIUM BROMIDE AND ALBUTEROL SULFATE 2.5; .5 MG/3ML; MG/3ML
3 SOLUTION RESPIRATORY (INHALATION)
Status: CANCELLED | OUTPATIENT
Start: 2018-08-07

## 2018-08-07 RX ADMIN — SODIUM CHLORIDE: 0.9 INJECTION, SOLUTION INTRAVENOUS at 09:08

## 2018-08-07 RX ADMIN — Medication 10 ML: at 09:08

## 2018-08-07 RX ADMIN — ACETAMINOPHEN 650 MG: 325 TABLET, FILM COATED ORAL at 09:08

## 2018-08-07 RX ADMIN — INFLIXIMAB 410 MG: 100 INJECTION, POWDER, LYOPHILIZED, FOR SOLUTION INTRAVENOUS at 09:08

## 2018-08-07 RX ADMIN — Medication 10 ML: at 11:08

## 2018-08-07 NOTE — PLAN OF CARE
Problem: Patient Care Overview  Goal: Plan of Care Review  Outcome: Ongoing (interventions implemented as appropriate)  Adequate for discharge.   Pt tolerated Remicade infusion without noted distress.  Reviewed upcoming appointments.  All questions answered. Advised to call MD with any questions or concerns.   Ambulated from infusion center independently with daughter.

## 2018-08-08 ENCOUNTER — TELEPHONE (OUTPATIENT)
Dept: RHEUMATOLOGY | Facility: CLINIC | Age: 38
End: 2018-08-08

## 2018-08-09 NOTE — TELEPHONE ENCOUNTER
Just received a call from the patient who had a Remicade infusion patient states she had a temp of a 104° last night and it came down after she took Tylenol however today she had 104 tip in its only come down to 102 she has a previous history of a UTI.  I recommended going into the emergency room he is going to going to [facility] to be evaluated I also called the emergency room and inform the ER doctor on call that I was worried about sepsis infection and less likely an allergic reaction the patient's MRN number was given to the physician on call and he is awaiting her arrival

## 2018-08-15 DIAGNOSIS — H20.9 UVEITIS: ICD-10-CM

## 2018-08-15 DIAGNOSIS — M35.00 SJOGREN'S SYNDROME, WITH UNSPECIFIED ORGAN INVOLVEMENT: ICD-10-CM

## 2018-08-15 DIAGNOSIS — M08.80 JIA (JUVENILE IDIOPATHIC ARTHRITIS): ICD-10-CM

## 2018-08-15 DIAGNOSIS — M45.9 ANKYLOSING SPONDYLITIS, UNSPECIFIED SITE OF SPINE: ICD-10-CM

## 2018-08-17 RX ORDER — ONDANSETRON HYDROCHLORIDE 8 MG/1
TABLET, FILM COATED ORAL
Qty: 45 TABLET | Refills: 2 | Status: SHIPPED | OUTPATIENT
Start: 2018-08-17 | End: 2019-01-04 | Stop reason: SDUPTHER

## 2018-08-21 ENCOUNTER — PATIENT MESSAGE (OUTPATIENT)
Dept: RHEUMATOLOGY | Facility: CLINIC | Age: 38
End: 2018-08-21

## 2018-08-27 ENCOUNTER — DOCUMENTATION ONLY (OUTPATIENT)
Dept: RHEUMATOLOGY | Facility: CLINIC | Age: 38
End: 2018-08-27

## 2018-08-27 NOTE — PROGRESS NOTES
Physical medical statement auto immune disorder assessment form completed. A copy was sent to scan. A copy was placed in the mail to patient at address list in chart. The original is at nurse desk in disability folder.

## 2018-09-05 ENCOUNTER — TELEPHONE (OUTPATIENT)
Dept: RHEUMATOLOGY | Facility: CLINIC | Age: 38
End: 2018-09-05

## 2018-09-05 NOTE — TELEPHONE ENCOUNTER
----- Message from Jr Madrigal sent at 9/5/2018 11:53 AM CDT -----  Type: Needs Medical Advice    Who Called:  Patient    Best Call Back Number: [phone]  Additional Information: Patient calling.  States that she wants to know if she can come in for a steroid shot.    Patient also states that she wants to know if her disability paperwork has been filled out yet.  She states that she left the paperwork about a month and a half ago.

## 2018-09-06 ENCOUNTER — CLINICAL SUPPORT (OUTPATIENT)
Dept: RHEUMATOLOGY | Facility: CLINIC | Age: 38
End: 2018-09-06
Payer: COMMERCIAL

## 2018-09-06 DIAGNOSIS — M45.5 ANKYLOSING SPONDYLITIS OF THORACOLUMBAR REGION: Primary | ICD-10-CM

## 2018-09-06 DIAGNOSIS — M08.80 JIA (JUVENILE IDIOPATHIC ARTHRITIS): ICD-10-CM

## 2018-09-06 DIAGNOSIS — L93.0 LUPUS ERYTHEMATOSUS, UNSPECIFIED FORM: ICD-10-CM

## 2018-09-06 PROCEDURE — 96372 THER/PROPH/DIAG INJ SC/IM: CPT | Mod: S$GLB,,, | Performed by: INTERNAL MEDICINE

## 2018-09-06 PROCEDURE — 99999 PR PBB SHADOW E&M-EST. PATIENT-LVL III: CPT | Mod: PBBFAC,,,

## 2018-09-06 RX ORDER — METHYLPREDNISOLONE ACETATE 80 MG/ML
160 INJECTION, SUSPENSION INTRA-ARTICULAR; INTRALESIONAL; INTRAMUSCULAR; SOFT TISSUE
Status: COMPLETED | OUTPATIENT
Start: 2018-09-06 | End: 2018-09-06

## 2018-09-06 RX ORDER — CYANOCOBALAMIN 1000 UG/ML
1000 INJECTION, SOLUTION INTRAMUSCULAR; SUBCUTANEOUS
COMMUNITY
End: 2018-12-05

## 2018-09-06 RX ORDER — LANOLIN ALCOHOL/MO/W.PET/CERES
1 CREAM (GRAM) TOPICAL
COMMUNITY
End: 2018-12-05

## 2018-09-06 RX ORDER — CYANOCOBALAMIN 1000 UG/ML
1000 INJECTION, SOLUTION INTRAMUSCULAR; SUBCUTANEOUS
Status: COMPLETED | OUTPATIENT
Start: 2018-09-06 | End: 2018-09-06

## 2018-09-06 RX ADMIN — METHYLPREDNISOLONE ACETATE 160 MG: 80 INJECTION, SUSPENSION INTRA-ARTICULAR; INTRALESIONAL; INTRAMUSCULAR; SOFT TISSUE at 02:09

## 2018-09-06 RX ADMIN — CYANOCOBALAMIN 1000 MCG: 1000 INJECTION, SOLUTION INTRAMUSCULAR; SUBCUTANEOUS at 02:09

## 2018-09-06 NOTE — PROGRESS NOTES
Pt presents for nurse injections per DARWIN Black 160mg DepoMedrol and 1000mcg B12. Pt reports generalized pain 8/10 on pain scale. Administered 2 cc DepoMedrol 80mg/cc  to left upper outer gluteal. Pt tolerated well. No acute reaction noted to site. Pt instructed on S/S to report. Advised patient to wait in lobby 15 minutes after receiving injection to monitor for any reactions..  Pt verbalized understanding.     Lot: [id number]  Exp: 08/19  Administered 1 cc Vitamin B12 1000mcg/cc to left upper outer gluteal. Pt tolerated well. No acute reaction noted to site. Pt instructed on S/S to report. Advised patient to wait in lobby 15 minutes after receiving injection to monitor for any reactions. Pt verbalized understanding.     Lot: [id number]  Exp: Nov19

## 2018-09-18 ENCOUNTER — TELEPHONE (OUTPATIENT)
Dept: INFUSION THERAPY | Facility: HOSPITAL | Age: 38
End: 2018-09-18

## 2018-09-18 NOTE — TELEPHONE ENCOUNTER
----- Message from Marii Bee sent at 9/18/2018  2:35 PM CDT -----  Caller: Patient                   Callback number: [phone]                        Reason: Patient needs to reschedule today's infusion apt. And the apt in Oct.                Thank you

## 2018-10-01 DIAGNOSIS — M45.9 ANKYLOSING SPONDYLITIS, UNSPECIFIED SITE OF SPINE: Primary | ICD-10-CM

## 2018-10-03 ENCOUNTER — INFUSION (OUTPATIENT)
Dept: INFUSION THERAPY | Facility: HOSPITAL | Age: 38
End: 2018-10-03
Attending: INTERNAL MEDICINE
Payer: COMMERCIAL

## 2018-10-03 VITALS
DIASTOLIC BLOOD PRESSURE: 85 MMHG | TEMPERATURE: 98 F | BODY MASS INDEX: 30.25 KG/M2 | HEIGHT: 64 IN | WEIGHT: 177.19 LBS | HEART RATE: 102 BPM | RESPIRATION RATE: 18 BRPM | SYSTOLIC BLOOD PRESSURE: 120 MMHG | OXYGEN SATURATION: 99 %

## 2018-10-03 DIAGNOSIS — M45.5 ANKYLOSING SPONDYLITIS OF THORACOLUMBAR REGION: ICD-10-CM

## 2018-10-03 DIAGNOSIS — M45.9 ANKYLOSING SPONDYLITIS, UNSPECIFIED SITE OF SPINE: Primary | ICD-10-CM

## 2018-10-03 LAB
ALBUMIN SERPL BCP-MCNC: 3.9 G/DL
ALP SERPL-CCNC: 79 U/L
ALT SERPL W/O P-5'-P-CCNC: 41 U/L
ANION GAP SERPL CALC-SCNC: 9 MMOL/L
AST SERPL-CCNC: 21 U/L
BASOPHILS # BLD AUTO: 0.07 K/UL
BASOPHILS NFR BLD: 0.5 %
BILIRUB SERPL-MCNC: 0.6 MG/DL
BUN SERPL-MCNC: 11 MG/DL
CALCIUM SERPL-MCNC: 9 MG/DL
CHLORIDE SERPL-SCNC: 106 MMOL/L
CO2 SERPL-SCNC: 25 MMOL/L
CREAT SERPL-MCNC: 0.4 MG/DL
CRP SERPL-MCNC: 1.6 MG/DL
DIFFERENTIAL METHOD: ABNORMAL
EOSINOPHIL # BLD AUTO: 0.3 K/UL
EOSINOPHIL NFR BLD: 2.1 %
ERYTHROCYTE [DISTWIDTH] IN BLOOD BY AUTOMATED COUNT: 14.6 %
ERYTHROCYTE [SEDIMENTATION RATE] IN BLOOD: 14 MM/HR
EST. GFR  (AFRICAN AMERICAN): >60 ML/MIN/1.73 M^2
EST. GFR  (NON AFRICAN AMERICAN): >60 ML/MIN/1.73 M^2
GLUCOSE SERPL-MCNC: 124 MG/DL
HCT VFR BLD AUTO: 42.8 %
HGB BLD-MCNC: 14 G/DL
LYMPHOCYTES # BLD AUTO: 3.7 K/UL
LYMPHOCYTES NFR BLD: 25.3 %
MCH RBC QN AUTO: 29.1 PG
MCHC RBC AUTO-ENTMCNC: 32.7 G/DL
MCV RBC AUTO: 89 FL
MONOCYTES # BLD AUTO: 0.9 K/UL
MONOCYTES NFR BLD: 6.4 %
NEUTROPHILS # BLD AUTO: 9.6 K/UL
NEUTROPHILS NFR BLD: 65.7 %
NRBC BLD-RTO: 0 /100 WBC
PLATELET # BLD AUTO: 349 K/UL
PMV BLD AUTO: 9.2 FL
POTASSIUM SERPL-SCNC: 3.3 MMOL/L
PROT SERPL-MCNC: 6.6 G/DL
RBC # BLD AUTO: 4.81 M/UL
SODIUM SERPL-SCNC: 140 MMOL/L
WBC # BLD AUTO: 14.58 K/UL

## 2018-10-03 PROCEDURE — 63600175 PHARM REV CODE 636 W HCPCS: Mod: TB,PN | Performed by: INTERNAL MEDICINE

## 2018-10-03 PROCEDURE — 96415 CHEMO IV INFUSION ADDL HR: CPT | Mod: PN

## 2018-10-03 PROCEDURE — 86140 C-REACTIVE PROTEIN: CPT | Mod: PN

## 2018-10-03 PROCEDURE — 85025 COMPLETE CBC W/AUTO DIFF WBC: CPT | Mod: PN

## 2018-10-03 PROCEDURE — 25000003 PHARM REV CODE 250: Mod: PN | Performed by: INTERNAL MEDICINE

## 2018-10-03 PROCEDURE — 85652 RBC SED RATE AUTOMATED: CPT

## 2018-10-03 PROCEDURE — 86140 C-REACTIVE PROTEIN: CPT

## 2018-10-03 PROCEDURE — 96375 TX/PRO/DX INJ NEW DRUG ADDON: CPT | Mod: PN

## 2018-10-03 PROCEDURE — 80053 COMPREHEN METABOLIC PANEL: CPT

## 2018-10-03 PROCEDURE — 85652 RBC SED RATE AUTOMATED: CPT | Mod: PN

## 2018-10-03 PROCEDURE — 96413 CHEMO IV INFUSION 1 HR: CPT | Mod: PN

## 2018-10-03 PROCEDURE — 85025 COMPLETE CBC W/AUTO DIFF WBC: CPT

## 2018-10-03 PROCEDURE — 80053 COMPREHEN METABOLIC PANEL: CPT | Mod: PN

## 2018-10-03 PROCEDURE — A4216 STERILE WATER/SALINE, 10 ML: HCPCS | Mod: PN | Performed by: INTERNAL MEDICINE

## 2018-10-03 RX ORDER — ACETAMINOPHEN 325 MG/1
650 TABLET ORAL
Status: CANCELLED | OUTPATIENT
Start: 2018-10-03

## 2018-10-03 RX ORDER — METHYLPREDNISOLONE SOD SUCC 125 MG
40 VIAL (EA) INJECTION
Status: CANCELLED | OUTPATIENT
Start: 2018-10-03

## 2018-10-03 RX ORDER — DIPHENHYDRAMINE HYDROCHLORIDE 50 MG/ML
25 INJECTION INTRAMUSCULAR; INTRAVENOUS
Status: CANCELLED | OUTPATIENT
Start: 2018-10-03

## 2018-10-03 RX ORDER — SODIUM CHLORIDE 0.9 % (FLUSH) 0.9 %
10 SYRINGE (ML) INJECTION
Status: DISCONTINUED | OUTPATIENT
Start: 2018-10-03 | End: 2018-10-03 | Stop reason: HOSPADM

## 2018-10-03 RX ORDER — EPINEPHRINE 1 MG/ML
0.3 INJECTION, SOLUTION, CONCENTRATE INTRAVENOUS
Status: CANCELLED | OUTPATIENT
Start: 2018-10-03

## 2018-10-03 RX ORDER — DIPHENHYDRAMINE HYDROCHLORIDE 50 MG/ML
25 INJECTION INTRAMUSCULAR; INTRAVENOUS
Status: COMPLETED | OUTPATIENT
Start: 2018-10-03 | End: 2018-10-03

## 2018-10-03 RX ORDER — IPRATROPIUM BROMIDE AND ALBUTEROL SULFATE 2.5; .5 MG/3ML; MG/3ML
3 SOLUTION RESPIRATORY (INHALATION)
Status: CANCELLED | OUTPATIENT
Start: 2018-10-03

## 2018-10-03 RX ORDER — HEPARIN 100 UNIT/ML
500 SYRINGE INTRAVENOUS
Status: CANCELLED | OUTPATIENT
Start: 2018-10-03

## 2018-10-03 RX ORDER — SODIUM CHLORIDE 0.9 % (FLUSH) 0.9 %
10 SYRINGE (ML) INJECTION
Status: CANCELLED | OUTPATIENT
Start: 2018-10-03

## 2018-10-03 RX ORDER — ACETAMINOPHEN 325 MG/1
650 TABLET ORAL
Status: COMPLETED | OUTPATIENT
Start: 2018-10-03 | End: 2018-10-03

## 2018-10-03 RX ADMIN — SODIUM CHLORIDE: 9 INJECTION, SOLUTION INTRAVENOUS at 01:10

## 2018-10-03 RX ADMIN — METHYLPREDNISOLONE SODIUM SUCCINATE 40 MG: 40 INJECTION, POWDER, FOR SOLUTION INTRAMUSCULAR; INTRAVENOUS at 01:10

## 2018-10-03 RX ADMIN — Medication 10 ML: at 01:10

## 2018-10-03 RX ADMIN — ACETAMINOPHEN 650 MG: 325 TABLET, FILM COATED ORAL at 01:10

## 2018-10-03 RX ADMIN — DIPHENHYDRAMINE HYDROCHLORIDE 25 MG: 50 INJECTION INTRAMUSCULAR; INTRAVENOUS at 01:10

## 2018-10-03 RX ADMIN — INFLIXIMAB 400 MG: 100 INJECTION, POWDER, LYOPHILIZED, FOR SOLUTION INTRAVENOUS at 01:10

## 2018-10-08 ENCOUNTER — LAB VISIT (OUTPATIENT)
Dept: LAB | Facility: HOSPITAL | Age: 38
End: 2018-10-08
Attending: SPECIALIST
Payer: COMMERCIAL

## 2018-10-08 DIAGNOSIS — J30.1 ALLERGIC RHINITIS DUE TO POLLEN: Primary | ICD-10-CM

## 2018-10-08 DIAGNOSIS — N11.1 CHRONIC OBSTRUCTIVE PYELONEPHRITIS: ICD-10-CM

## 2018-10-08 DIAGNOSIS — J31.0 CHRONIC RHINITIS: ICD-10-CM

## 2018-10-08 LAB
T4 FREE SERPL-MCNC: 1.12 NG/DL
TSH SERPL DL<=0.005 MIU/L-ACNC: 0.37 UIU/ML

## 2018-10-08 PROCEDURE — 86376 MICROSOMAL ANTIBODY EACH: CPT

## 2018-10-08 PROCEDURE — 84439 ASSAY OF FREE THYROXINE: CPT

## 2018-10-08 PROCEDURE — 84432 ASSAY OF THYROGLOBULIN: CPT

## 2018-10-08 PROCEDURE — 84443 ASSAY THYROID STIM HORMONE: CPT

## 2018-10-08 PROCEDURE — 86317 IMMUNOASSAY INFECTIOUS AGENT: CPT

## 2018-10-09 LAB — THYROPEROXIDASE IGG SERPL-ACNC: <6 IU/ML

## 2018-10-10 ENCOUNTER — DOCUMENTATION ONLY (OUTPATIENT)
Dept: RHEUMATOLOGY | Facility: CLINIC | Age: 38
End: 2018-10-10

## 2018-10-10 LAB
THRYOGLOBULIN INTERPRETATION: ABNORMAL
THYROGLOB AB SERPL-ACNC: <1.8 IU/ML
THYROGLOB SERPL-MCNC: 13 NG/ML

## 2018-10-10 NOTE — PROGRESS NOTES
Received progress notes from Dr Holden. Patient was seen 8-2018. Patient is to see nephrology, have pneumococcal vaccine, if continues to be symptomatic and kidney function stable may be candidate for immuno globin  Treatment.

## 2018-10-15 LAB
DEPRECATED S PNEUM 1 IGG SER-MCNC: 14 MCG/ML
DEPRECATED S PNEUM12 IGG SER-MCNC: <0.3 MCG/ML
DEPRECATED S PNEUM14 IGG SER-MCNC: <0.3 MCG/ML
DEPRECATED S PNEUM19 IGG SER-MCNC: 0.8 MCG/ML
DEPRECATED S PNEUM23 IGG SER-MCNC: 0.7 MCG/ML
DEPRECATED S PNEUM3 IGG SER-MCNC: 0.4 MCG/ML
DEPRECATED S PNEUM4 IGG SER-MCNC: <0.3 MCG/ML
DEPRECATED S PNEUM5 IGG SER-MCNC: 2.8 MCG/ML
DEPRECATED S PNEUM8 IGG SER-MCNC: 20.1 MCG/ML
DEPRECATED S PNEUM9 IGG SER-MCNC: 8.2 MCG/ML
S PNEUM DA 18C IGG SER-MCNC: <0.3 MCG/ML
S PNEUM DA 6B IGG SER-MCNC: 1.8 MCG/ML
S PNEUM DA 7F IGG SER-MCNC: <0.3 MCG/ML
S PNEUM DA 9V IGG SER-MCNC: 8.9 MCG/ML

## 2018-10-26 ENCOUNTER — PATIENT MESSAGE (OUTPATIENT)
Dept: RHEUMATOLOGY | Facility: CLINIC | Age: 38
End: 2018-10-26

## 2018-10-29 ENCOUNTER — PATIENT MESSAGE (OUTPATIENT)
Dept: RHEUMATOLOGY | Facility: CLINIC | Age: 38
End: 2018-10-29

## 2018-10-30 ENCOUNTER — OFFICE VISIT (OUTPATIENT)
Dept: FAMILY MEDICINE | Facility: CLINIC | Age: 38
End: 2018-10-30
Payer: COMMERCIAL

## 2018-10-30 ENCOUNTER — HOSPITAL ENCOUNTER (OUTPATIENT)
Dept: RADIOLOGY | Facility: HOSPITAL | Age: 38
Discharge: HOME | End: 2018-10-30
Attending: NURSE PRACTITIONER
Payer: COMMERCIAL

## 2018-10-30 ENCOUNTER — CLINICAL SUPPORT (OUTPATIENT)
Dept: RHEUMATOLOGY | Facility: CLINIC | Age: 38
End: 2018-10-30
Payer: COMMERCIAL

## 2018-10-30 VITALS
HEART RATE: 103 BPM | OXYGEN SATURATION: 98 % | WEIGHT: 179 LBS | DIASTOLIC BLOOD PRESSURE: 78 MMHG | SYSTOLIC BLOOD PRESSURE: 138 MMHG | HEIGHT: 64 IN | BODY MASS INDEX: 30.56 KG/M2

## 2018-10-30 DIAGNOSIS — M35.00 SJOGREN'S SYNDROME, WITH UNSPECIFIED ORGAN INVOLVEMENT: Primary | ICD-10-CM

## 2018-10-30 DIAGNOSIS — E05.90 HYPERTHYROIDISM: Primary | ICD-10-CM

## 2018-10-30 DIAGNOSIS — M08.80 JIA (JUVENILE IDIOPATHIC ARTHRITIS): ICD-10-CM

## 2018-10-30 DIAGNOSIS — M45.9 ANKYLOSING SPONDYLITIS, UNSPECIFIED SITE OF SPINE: ICD-10-CM

## 2018-10-30 DIAGNOSIS — R53.83 OTHER FATIGUE: ICD-10-CM

## 2018-10-30 DIAGNOSIS — H20.9 UVEITIS: ICD-10-CM

## 2018-10-30 DIAGNOSIS — L93.0 LUPUS ERYTHEMATOSUS, UNSPECIFIED FORM: ICD-10-CM

## 2018-10-30 DIAGNOSIS — M05.79 RHEUMATOID ARTHRITIS INVOLVING MULTIPLE SITES WITH POSITIVE RHEUMATOID FACTOR: ICD-10-CM

## 2018-10-30 DIAGNOSIS — E05.90 HYPERTHYROIDISM: ICD-10-CM

## 2018-10-30 PROCEDURE — 76536 US EXAM OF HEAD AND NECK: CPT | Mod: 26,,, | Performed by: RADIOLOGY

## 2018-10-30 PROCEDURE — 99999 PR PBB SHADOW E&M-EST. PATIENT-LVL III: CPT | Mod: PBBFAC,,,

## 2018-10-30 PROCEDURE — 90471 IMMUNIZATION ADMIN: CPT | Mod: S$GLB,,, | Performed by: NURSE PRACTITIONER

## 2018-10-30 PROCEDURE — 90686 IIV4 VACC NO PRSV 0.5 ML IM: CPT | Mod: S$GLB,,, | Performed by: NURSE PRACTITIONER

## 2018-10-30 PROCEDURE — 99213 OFFICE O/P EST LOW 20 MIN: CPT | Mod: 25,S$GLB,, | Performed by: NURSE PRACTITIONER

## 2018-10-30 PROCEDURE — 99999 PR PBB SHADOW E&M-EST. PATIENT-LVL V: CPT | Mod: PBBFAC,,, | Performed by: NURSE PRACTITIONER

## 2018-10-30 PROCEDURE — 3008F BODY MASS INDEX DOCD: CPT | Mod: CPTII,S$GLB,, | Performed by: NURSE PRACTITIONER

## 2018-10-30 PROCEDURE — 76536 US EXAM OF HEAD AND NECK: CPT | Mod: TC,PO

## 2018-10-30 PROCEDURE — 99211 OFF/OP EST MAY X REQ PHY/QHP: CPT | Mod: S$GLB,,, | Performed by: INTERNAL MEDICINE

## 2018-10-30 RX ORDER — ALBUTEROL SULFATE 90 UG/1
AEROSOL, METERED RESPIRATORY (INHALATION)
COMMUNITY
End: 2018-12-05

## 2018-10-30 RX ORDER — CYANOCOBALAMIN 1000 UG/ML
1000 INJECTION, SOLUTION INTRAMUSCULAR; SUBCUTANEOUS
Status: COMPLETED | OUTPATIENT
Start: 2018-10-30 | End: 2018-10-30

## 2018-10-30 RX ADMIN — CYANOCOBALAMIN 1000 MCG: 1000 INJECTION, SOLUTION INTRAMUSCULAR; SUBCUTANEOUS at 11:10

## 2018-10-30 NOTE — PROGRESS NOTES
Pt presents to clinic for nurse visit. Dr. Sawant reviewed labs, VORB for 1000mcg B12. Administered 1 cc B12 1000mcg to right upper outer gluteal .  Pt tolerated well.No acute reaction noted to site. Pt instructed on S/S to report. Advised pt to waitn in lobby 15 minutes after receiving injection to monitor for any reactions. Pt verbalized understanding.     Lot:[id number]  Exp:[id number]

## 2018-10-30 NOTE — PROGRESS NOTES
Subjective:       Patient ID: Braenna Sanchez is a 38 y.o. female.    Chief Complaint: Hyperthyroidism and Hypertension    Patient recently had labs which showed minimally decreased tsh. She says she is having symptoms such as palpitations, anxiety, and elevated Bp related to this. Her appointment with endocrinology is not until January and she is concerned about waiting that long to be seen. Feels the area of her throat over her thyroid feels somewhat tender and possibly swollen.   Lipid Panel due on 1980  TETANUS VACCINE due on 08/28/1998    Past Medical History:  Past Medical History:  No date: Fibromyalgia  No date: Lupus  No date: Rheumatoid arthritis  No date: Sjoegren syndrome  No date: Spondylosis  No date: Spondylosis  Past Surgical History:  No date: APPENDECTOMY  2/13/2017: HYSTERECTOMY-VAGINAL-LAPAROSCOPIC (LAV[facility]); N/A      Comment:  [facility]  No date: KNEE ARTHROSCOPY; Left  No date: SALPINGOOPHORECTOMY; Right  No date: TONSILLECTOMY  No date: TOTAL SHOULDER ARTHROPLASTY; Right  Review of patient's allergies indicates:   -- Seroquel (quetiapine) -- Anaphylaxis   -- Aleve (naproxen sodium)    -- Sulfa (sulfonamide antibiotics)    -- Tramadol     --  seizure  Current Outpatient Medications on File Prior to Visit:  albuterol (PROVENTIL/VENTOLIN HFA) 90 mcg/actuation inhaler, ProAir HFA 90 mcg/actuation aerosol inhaler INHALE TWO puffs EVERY 4 HOURS AS NEEDED, Disp: , Rfl:   calcium citrate-vitamin D3 315-200 mg (CITRACAL+D) 315-200 mg-unit per tablet, Take 1 tablet by mouth., Disp: , Rfl:   clonazePAM (KLONOPIN) 0.5 MG tablet, Take 0.5 mg by mouth every morning., Disp: , Rfl:   clonazePAM (KLONOPIN) 1 MG tablet, TAKE ONE TABLET BY MOUTH EVERY NIGHT AS NEEDED, Disp: , Rfl: 3  cyanocobalamin (VITAMIN B-12) 1,000 mcg/mL injection, Inject 1,000 mcg into the muscle., Disp: , Rfl:   dextroamphetamine-amphetamine 30 mg Tab, Take 1 tablet by mouth 2 (two) times daily.,  Disp: , Rfl: 0  doxepin (SINEQUAN) 25 MG capsule, TWO AT BEDTIME, Disp: , Rfl:   ergocalciferol (ERGOCALCIFEROL) 50,000 unit Cap, Take 1 capsule (50,000 Units total) by mouth every 7 days., Disp: 4 capsule, Rfl: 6  fluoride, sodium, (PREVIDENT 5000 DRY MOUTH) 1.1 % Gel, Brush teeth at night FOR TWO MINUTES and spit out. Do not rinse FOR 30 MINUTES, Disp: , Rfl:   hydroxychloroquine (PLAQUENIL) 200 mg tablet, Take 200 mg by mouth 2 (two) times daily., Disp: , Rfl: 2  ibuprofen-famotidine (DUEXIS) 800-26.6 mg Tab, Take 1 tablet by mouth 3 (three) times daily. (Patient taking differently: Take 1 tablet by mouth 3 (three) times daily as needed. ), Disp: 90 tablet, Rfl: 11  infliximab (REMICADE IV), Inject into the vein., Disp: , Rfl:   lifitegrast (XIIDRA) 5 % Dpet, Place into both eyes once daily. , Disp: , Rfl:   NIFEdipine (PROCARDIA-XL) 30 MG (OSM) 24 hr tablet, Take 1 tablet (30 mg total) by mouth every evening., Disp: 30 tablet, Rfl: 5  ondansetron (ZOFRAN) 8 MG tablet, TAKE 1 TABLET BY MOUTH EVERY 8 HOURS AS NEEDED FOR NAUSEA, Disp: 45 tablet, Rfl: 2  pilocarpine (SALAGEN) 5 MG Tab, Take 5 mg by mouth 3 (three) times daily., Disp: , Rfl: 0  potassium chloride 10% (KAYCIEL) 20 mEq/15 mL oral solution, , Disp: , Rfl:    predniSONE (DELTASONE) 5 MG tablet, Take 5 mg by mouth once daily., Disp: , Rfl:   PRISTIQ 100 mg Tb24, Take 100 mg by mouth once daily., Disp: , Rfl: 3  topiramate (TOPAMAX) 100 MG tablet, TAKE 1 & 1/2 TABLETS BY MOUTH EVERY EVENING, Disp: , Rfl: 2    No current facility-administered medications on file prior to visit.     Social History    Socioeconomic History      Marital status:       Spouse name: Not on file      Number of children: Not on file      Years of education: Not on file      Highest education level: Not on file    Social Needs      Financial resource strain: Not on file      Food insecurity - worry: Not on file      Food insecurity - inability: Not on file      Transportation  needs - medical: Not on file      Transportation needs - non-medical: Not on file    Occupational History      Not on file    Tobacco Use      Smoking status: Current Every Day Smoker        Packs/day: 1.00        Years: 23.00        Pack years: 23        Types: Cigarettes        Start date: 2/10/1995      Smokeless tobacco: Never Used    Substance and Sexual Activity      Alcohol use: Yes        Alcohol/week: 1.8 oz        Types: 3 Glasses of wine per week      Drug use: No      Sexual activity: Not on file    Other Topics      Concerns:        Not on file    Social History Narrative      Not on file    Review of patient's family history indicates:  Problem: Diabetes      Relation: Mother          Age of Onset: (Not Specified)          Comment: type 1  Problem: Stroke      Relation: Mother          Age of Onset: (Not Specified)  Problem: Heart disease      Relation: Mother          Age of Onset: (Not Specified)  Problem: Cancer      Relation: Father          Age of Onset: (Not Specified)          Comment: prostate  Problem: Alzheimer's disease      Relation: Father          Age of Onset: (Not Specified)  Problem: Cancer      Relation: Sister          Age of Onset: (Not Specified)          Comment: uterine  Problem: Diabetes      Relation: Brother          Age of Onset: (Not Specified)          Comment: type 2            Review of Systems   Constitutional: Positive for diaphoresis and fatigue.   Cardiovascular: Positive for palpitations.   Genitourinary: Negative.    Neurological: Negative.    Psychiatric/Behavioral: The patient is nervous/anxious.        Objective:      Physical Exam   Constitutional: She is oriented to person, place, and time.   HENT:   Head: Normocephalic and atraumatic.   Eyes: Pupils are equal, round, and reactive to light.   Neck: No thyromegaly present.   Cardiovascular: Normal rate and regular rhythm. Exam reveals no friction rub.   No murmur heard.  Pulmonary/Chest: Effort normal and breath  sounds normal. No stridor. No respiratory distress.   Lymphadenopathy:     She has no cervical adenopathy.   Neurological: She is alert and oriented to person, place, and time.   Psychiatric: She has a normal mood and affect. Her behavior is normal.       Assessment:       1. Hyperthyroidism        Plan:       1. Hyperthyroidism  US of thyroid. Will send message to try to get sooner appt with Endo, new patient appt with PCP scheduled in 2 weeks.   - US Soft Tissue Head Neck Thyroid; Future

## 2018-11-02 NOTE — TELEPHONE ENCOUNTER
That is she will be the Klonopin also I do not write for Butrans patches which she was on last hello can cover for short-term with Norco

## 2018-11-05 ENCOUNTER — PATIENT MESSAGE (OUTPATIENT)
Dept: RHEUMATOLOGY | Facility: CLINIC | Age: 38
End: 2018-11-05

## 2018-11-05 ENCOUNTER — LAB VISIT (OUTPATIENT)
Dept: LAB | Facility: HOSPITAL | Age: 38
End: 2018-11-05
Attending: SPECIALIST
Payer: COMMERCIAL

## 2018-11-05 DIAGNOSIS — J31.0 CHRONIC RHINITIS: Primary | ICD-10-CM

## 2018-11-05 LAB
IGA SERPL-MCNC: 200 MG/DL
IGG SERPL-MCNC: 830 MG/DL
IGM SERPL-MCNC: 211 MG/DL

## 2018-11-05 PROCEDURE — 82787 IGG 1 2 3 OR 4 EACH: CPT | Mod: 59

## 2018-11-05 PROCEDURE — 36415 COLL VENOUS BLD VENIPUNCTURE: CPT | Mod: PO

## 2018-11-05 PROCEDURE — 82784 ASSAY IGA/IGD/IGG/IGM EACH: CPT | Mod: 59

## 2018-11-07 LAB
IGG1 SER-MCNC: 577 MG/DL
IGG2 SER-MCNC: 155 MG/DL
IGG3 SER-MCNC: 41 MG/DL
IGG4 SER-MCNC: 5 MG/DL

## 2018-11-09 ENCOUNTER — PATIENT MESSAGE (OUTPATIENT)
Dept: RHEUMATOLOGY | Facility: CLINIC | Age: 38
End: 2018-11-09

## 2018-11-09 RX ORDER — HYDROCODONE BITARTRATE AND ACETAMINOPHEN 10; 325 MG/1; MG/1
1 TABLET ORAL EVERY 8 HOURS PRN
Qty: 90 TABLET | Refills: 0 | Status: SHIPPED | OUTPATIENT
Start: 2018-11-09 | End: 2018-12-05 | Stop reason: SDUPTHER

## 2018-11-14 ENCOUNTER — INFUSION (OUTPATIENT)
Dept: INFUSION THERAPY | Facility: HOSPITAL | Age: 38
End: 2018-11-14
Attending: INTERNAL MEDICINE
Payer: COMMERCIAL

## 2018-11-14 VITALS
HEIGHT: 64 IN | WEIGHT: 187.13 LBS | RESPIRATION RATE: 18 BRPM | DIASTOLIC BLOOD PRESSURE: 75 MMHG | BODY MASS INDEX: 31.95 KG/M2 | SYSTOLIC BLOOD PRESSURE: 116 MMHG | TEMPERATURE: 98 F | HEART RATE: 80 BPM

## 2018-11-14 DIAGNOSIS — M45.5 ANKYLOSING SPONDYLITIS OF THORACOLUMBAR REGION: Primary | ICD-10-CM

## 2018-11-14 PROCEDURE — 96375 TX/PRO/DX INJ NEW DRUG ADDON: CPT | Mod: PN

## 2018-11-14 PROCEDURE — 25000003 PHARM REV CODE 250: Mod: PN | Performed by: INTERNAL MEDICINE

## 2018-11-14 PROCEDURE — 96415 CHEMO IV INFUSION ADDL HR: CPT | Mod: PN

## 2018-11-14 PROCEDURE — 96413 CHEMO IV INFUSION 1 HR: CPT | Mod: PN

## 2018-11-14 PROCEDURE — 63600175 PHARM REV CODE 636 W HCPCS: Mod: PN | Performed by: INTERNAL MEDICINE

## 2018-11-14 PROCEDURE — A4216 STERILE WATER/SALINE, 10 ML: HCPCS | Mod: PN | Performed by: INTERNAL MEDICINE

## 2018-11-14 RX ORDER — SODIUM CHLORIDE 0.9 % (FLUSH) 0.9 %
10 SYRINGE (ML) INJECTION
Status: CANCELLED | OUTPATIENT
Start: 2018-11-14

## 2018-11-14 RX ORDER — DIPHENHYDRAMINE HYDROCHLORIDE 50 MG/ML
25 INJECTION INTRAMUSCULAR; INTRAVENOUS
Status: CANCELLED | OUTPATIENT
Start: 2018-11-14

## 2018-11-14 RX ORDER — DIPHENHYDRAMINE HYDROCHLORIDE 50 MG/ML
25 INJECTION INTRAMUSCULAR; INTRAVENOUS
Status: COMPLETED | OUTPATIENT
Start: 2018-11-14 | End: 2018-11-14

## 2018-11-14 RX ORDER — HEPARIN 100 UNIT/ML
500 SYRINGE INTRAVENOUS
Status: CANCELLED | OUTPATIENT
Start: 2018-11-14

## 2018-11-14 RX ORDER — EPINEPHRINE 1 MG/ML
0.3 INJECTION, SOLUTION, CONCENTRATE INTRAVENOUS
Status: CANCELLED | OUTPATIENT
Start: 2018-11-14

## 2018-11-14 RX ORDER — SODIUM CHLORIDE 0.9 % (FLUSH) 0.9 %
10 SYRINGE (ML) INJECTION
Status: DISCONTINUED | OUTPATIENT
Start: 2018-11-14 | End: 2018-11-14 | Stop reason: HOSPADM

## 2018-11-14 RX ORDER — ACETAMINOPHEN 325 MG/1
650 TABLET ORAL
Status: DISCONTINUED | OUTPATIENT
Start: 2018-11-14 | End: 2018-11-14 | Stop reason: HOSPADM

## 2018-11-14 RX ORDER — ACETAMINOPHEN 325 MG/1
650 TABLET ORAL
Status: CANCELLED | OUTPATIENT
Start: 2018-11-14

## 2018-11-14 RX ORDER — METHYLPREDNISOLONE SOD SUCC 125 MG
40 VIAL (EA) INJECTION
Status: CANCELLED | OUTPATIENT
Start: 2018-11-14

## 2018-11-14 RX ORDER — IPRATROPIUM BROMIDE AND ALBUTEROL SULFATE 2.5; .5 MG/3ML; MG/3ML
3 SOLUTION RESPIRATORY (INHALATION)
Status: CANCELLED | OUTPATIENT
Start: 2018-11-14

## 2018-11-14 RX ADMIN — DIPHENHYDRAMINE HYDROCHLORIDE 25 MG: 50 INJECTION INTRAMUSCULAR; INTRAVENOUS at 12:11

## 2018-11-14 RX ADMIN — METHYLPREDNISOLONE SODIUM SUCCINATE 40 MG: 40 INJECTION, POWDER, FOR SOLUTION INTRAMUSCULAR; INTRAVENOUS at 12:11

## 2018-11-14 RX ADMIN — SODIUM CHLORIDE: 9 INJECTION, SOLUTION INTRAVENOUS at 12:11

## 2018-11-14 RX ADMIN — INFLIXIMAB 420 MG: 100 INJECTION, POWDER, LYOPHILIZED, FOR SOLUTION INTRAVENOUS at 12:11

## 2018-11-14 RX ADMIN — Medication 10 ML: at 12:11

## 2018-11-18 RX ORDER — ERGOCALCIFEROL 1.25 MG/1
50000 CAPSULE ORAL
Qty: 12 CAPSULE | Refills: 3 | Status: SHIPPED | OUTPATIENT
Start: 2018-11-18 | End: 2019-10-31 | Stop reason: SDUPTHER

## 2018-11-21 ENCOUNTER — PATIENT MESSAGE (OUTPATIENT)
Dept: ADMINISTRATIVE | Facility: HOSPITAL | Age: 38
End: 2018-11-21

## 2018-11-21 ENCOUNTER — PATIENT OUTREACH (OUTPATIENT)
Dept: ADMINISTRATIVE | Facility: HOSPITAL | Age: 38
End: 2018-11-21

## 2018-12-05 ENCOUNTER — OFFICE VISIT (OUTPATIENT)
Dept: RHEUMATOLOGY | Facility: CLINIC | Age: 38
End: 2018-12-05
Payer: COMMERCIAL

## 2018-12-05 ENCOUNTER — TELEPHONE (OUTPATIENT)
Dept: RHEUMATOLOGY | Facility: CLINIC | Age: 38
End: 2018-12-05

## 2018-12-05 ENCOUNTER — PATIENT MESSAGE (OUTPATIENT)
Dept: RHEUMATOLOGY | Facility: CLINIC | Age: 38
End: 2018-12-05

## 2018-12-05 VITALS
HEART RATE: 94 BPM | DIASTOLIC BLOOD PRESSURE: 101 MMHG | SYSTOLIC BLOOD PRESSURE: 147 MMHG | BODY MASS INDEX: 31.15 KG/M2 | HEIGHT: 64 IN | WEIGHT: 182.44 LBS

## 2018-12-05 DIAGNOSIS — M35.00 SJOGREN'S SYNDROME, WITH UNSPECIFIED ORGAN INVOLVEMENT: ICD-10-CM

## 2018-12-05 DIAGNOSIS — M45.5 ANKYLOSING SPONDYLITIS OF THORACOLUMBAR REGION: Primary | ICD-10-CM

## 2018-12-05 DIAGNOSIS — M08.80 JIA (JUVENILE IDIOPATHIC ARTHRITIS): ICD-10-CM

## 2018-12-05 DIAGNOSIS — D80.3 IGG2 SUBCLASS DEFICIENCY: ICD-10-CM

## 2018-12-05 DIAGNOSIS — F41.9 ANXIETY DISORDER, UNSPECIFIED TYPE: ICD-10-CM

## 2018-12-05 DIAGNOSIS — N10 ACUTE PYELONEPHRITIS: ICD-10-CM

## 2018-12-05 DIAGNOSIS — G89.29 OTHER CHRONIC PAIN: ICD-10-CM

## 2018-12-05 DIAGNOSIS — M05.79 RHEUMATOID ARTHRITIS INVOLVING MULTIPLE SITES WITH POSITIVE RHEUMATOID FACTOR: ICD-10-CM

## 2018-12-05 DIAGNOSIS — H20.9 UVEITIS: ICD-10-CM

## 2018-12-05 PROCEDURE — 99999 PR PBB SHADOW E&M-EST. PATIENT-LVL III: CPT | Mod: PBBFAC,,, | Performed by: INTERNAL MEDICINE

## 2018-12-05 PROCEDURE — 96372 THER/PROPH/DIAG INJ SC/IM: CPT | Mod: S$GLB,,, | Performed by: INTERNAL MEDICINE

## 2018-12-05 PROCEDURE — 99214 OFFICE O/P EST MOD 30 MIN: CPT | Mod: 25,S$GLB,, | Performed by: INTERNAL MEDICINE

## 2018-12-05 PROCEDURE — 3008F BODY MASS INDEX DOCD: CPT | Mod: CPTII,S$GLB,, | Performed by: INTERNAL MEDICINE

## 2018-12-05 RX ORDER — LEVOCETIRIZINE DIHYDROCHLORIDE 5 MG/1
5 TABLET, FILM COATED ORAL NIGHTLY
COMMUNITY
Start: 2018-12-05 | End: 2023-01-31

## 2018-12-05 RX ORDER — PREDNISONE 5 MG/1
5 TABLET ORAL DAILY
Qty: 90 TABLET | Refills: 3 | Status: SHIPPED | OUTPATIENT
Start: 2018-12-05 | End: 2019-03-05

## 2018-12-05 RX ORDER — HYDROCODONE BITARTRATE AND ACETAMINOPHEN 10; 325 MG/1; MG/1
1 TABLET ORAL EVERY 8 HOURS PRN
Qty: 90 TABLET | Refills: 0 | Status: SHIPPED | OUTPATIENT
Start: 2019-02-05 | End: 2018-12-06

## 2018-12-05 RX ORDER — PSEUDOEPHEDRINE HCL 120 MG/1
120 TABLET, FILM COATED, EXTENDED RELEASE ORAL
COMMUNITY
End: 2019-04-29

## 2018-12-05 RX ORDER — KETOROLAC TROMETHAMINE 30 MG/ML
60 INJECTION, SOLUTION INTRAMUSCULAR; INTRAVENOUS
Status: COMPLETED | OUTPATIENT
Start: 2018-12-05 | End: 2018-12-05

## 2018-12-05 RX ORDER — HYDROCODONE BITARTRATE AND ACETAMINOPHEN 10; 325 MG/1; MG/1
1 TABLET ORAL EVERY 8 HOURS PRN
Qty: 90 TABLET | Refills: 0 | Status: SHIPPED | OUTPATIENT
Start: 2018-12-05 | End: 2018-12-05 | Stop reason: SDUPTHER

## 2018-12-05 RX ORDER — CEFTRIAXONE 1 G/1
1 INJECTION, POWDER, FOR SOLUTION INTRAMUSCULAR; INTRAVENOUS
Status: COMPLETED | OUTPATIENT
Start: 2018-12-05 | End: 2018-12-05

## 2018-12-05 RX ORDER — NIFEDIPINE 30 MG/1
30 TABLET, EXTENDED RELEASE ORAL NIGHTLY
Qty: 90 TABLET | Refills: 3 | Status: SHIPPED | OUTPATIENT
Start: 2018-12-05 | End: 2019-12-12 | Stop reason: SDUPTHER

## 2018-12-05 RX ORDER — IBUPROFEN AND FAMOTIDINE 26.6; 8 MG/1; MG/1
1 TABLET ORAL 3 TIMES DAILY
Qty: 270 TABLET | Refills: 3 | Status: SHIPPED | OUTPATIENT
Start: 2018-12-05 | End: 2019-03-05

## 2018-12-05 RX ORDER — CYANOCOBALAMIN 1000 UG/ML
1000 INJECTION, SOLUTION INTRAMUSCULAR; SUBCUTANEOUS
Status: COMPLETED | OUTPATIENT
Start: 2018-12-05 | End: 2018-12-05

## 2018-12-05 RX ORDER — METHYLPREDNISOLONE ACETATE 80 MG/ML
80 INJECTION, SUSPENSION INTRA-ARTICULAR; INTRALESIONAL; INTRAMUSCULAR; SOFT TISSUE
Status: COMPLETED | OUTPATIENT
Start: 2018-12-05 | End: 2018-12-05

## 2018-12-05 RX ORDER — HYDROCODONE BITARTRATE AND ACETAMINOPHEN 10; 325 MG/1; MG/1
1 TABLET ORAL EVERY 8 HOURS PRN
Qty: 90 TABLET | Refills: 0 | Status: SHIPPED | OUTPATIENT
Start: 2019-01-05 | End: 2018-12-05 | Stop reason: SDUPTHER

## 2018-12-05 RX ADMIN — KETOROLAC TROMETHAMINE 60 MG: 30 INJECTION, SOLUTION INTRAMUSCULAR; INTRAVENOUS at 05:12

## 2018-12-05 RX ADMIN — CYANOCOBALAMIN 1000 MCG: 1000 INJECTION, SOLUTION INTRAMUSCULAR; SUBCUTANEOUS at 05:12

## 2018-12-05 RX ADMIN — CEFTRIAXONE 1 G: 1 INJECTION, POWDER, FOR SOLUTION INTRAMUSCULAR; INTRAVENOUS at 05:12

## 2018-12-05 RX ADMIN — METHYLPREDNISOLONE ACETATE 80 MG: 80 INJECTION, SUSPENSION INTRA-ARTICULAR; INTRALESIONAL; INTRAMUSCULAR; SOFT TISSUE at 05:12

## 2018-12-05 NOTE — PROGRESS NOTES
Administered 1 cc ( 1000 mcg/ml ) of b12 to the right upper outer gluteal. Informed of s/s to report verbalized understanding. No adverse reactions noted.    Lot # [id number]  Expiration mar 20    Administered 1 cc ( 80 mg/ml ) of depomedrol to the right upper outer gluteal. Informed of s/s to report verbalized understanding. No adverse reactions noted.    Lot # [id number]  Expiration 08/19    Administered 2 cc ( 30 mg/ml ) of toradol to the right upper outer gluteal. Informed of s/s to report verbalized understanding. No adverse reactions noted.    Lot # [id number]  Expiration 1 dec 2019    Administered 1 gram of rocephin mixed with 2.1 ml of lidocaine. Given in the left upper outer gluteal. Informed of s/s to report verbalized understanding. No adverse reactions noted.    Lot # [id number]  Expiration 03/2021

## 2018-12-05 NOTE — PROGRESS NOTES
Subjective:       Patient ID: Breanna Sanchez is a 38 y.o. female.    Chief Complaint: Follow-up    HPI: 38 yo female with AS,  CHARLIE, and RA, on  remicade and plaquenil and  Was admitted for pyelonephritis and now has dark smelly urine.. Patient complains of arthralgias and myalgias for which has been present for a few years. Pain is located in multiple joints, both shoulder(s), both elbow(s), both wrist(s), both MCP(s): 1st, 2nd, 3rd, 4th and 5th, both PIP(s): 1st, 2nd, 3rd, 4th and 5th, both DIP(s): 1st and 2nd, both hip(s), both knee(s) and both MTP(s): 1st, 2nd, 3rd, 4th and 5th, is described as aching, pulsating, shooting and throbbing, and is constant, moderate .  Associated symptoms include: crepitation, decreased range of motion, edema, effusion, tenderness and warmth.   Feeling bad, teary eyed, in severe pain      Review of Systems   Constitutional: Positive for activity change, chills and fatigue. Negative for appetite change, diaphoresis and unexpected weight change.   HENT: Negative for congestion, dental problem, ear discharge, ear pain, facial swelling, mouth sores, nosebleeds, postnasal drip, rhinorrhea, sinus pressure, sneezing, sore throat, tinnitus and voice change.    Eyes: Negative for photophobia, pain, discharge, redness and itching.   Respiratory: Negative for apnea, cough, chest tightness, shortness of breath and wheezing.    Cardiovascular: Positive for leg swelling. Negative for chest pain and palpitations.   Gastrointestinal: Negative for abdominal distention, abdominal pain, constipation, diarrhea, nausea and vomiting.   Endocrine: Negative for cold intolerance, heat intolerance, polydipsia and polyuria.   Genitourinary: Negative for decreased urine volume, difficulty urinating, flank pain, frequency, hematuria and urgency.   Musculoskeletal: Positive for arthralgias, back pain, gait problem, joint swelling, myalgias, neck pain and neck stiffness.   Skin: Negative for pallor, rash and  "wound.   Allergic/Immunologic: Positive for immunocompromised state.   Neurological: Negative for dizziness, tremors, weakness and numbness.   Hematological: Negative for adenopathy. Does not bruise/bleed easily.   Psychiatric/Behavioral: Negative for sleep disturbance. The patient is not nervous/anxious.          Objective:   BP (!) 147/101 (BP Location: Right arm, Patient Position: Sitting, BP Method: Medium (Automatic))   Pulse 94   Ht 5' 4" (1.626 m)   Wt 82.7 kg (182 lb 6.9 oz)   LMP 02/12/2017   BMI 31.31 kg/m²      Physical Exam   Nursing note and vitals reviewed.  Constitutional: She is oriented to person, place, and time. She appears distressed.   HENT:   Head: Normocephalic and atraumatic.   Mouth/Throat: Oropharynx is clear and moist.   Eyes: EOM are normal. Pupils are equal, round, and reactive to light.   Neck: Neck supple. No thyromegaly present.   Cardiovascular: Normal rate, regular rhythm and normal heart sounds.  Exam reveals no gallop and no friction rub.    No murmur heard.  Pulmonary/Chest: Breath sounds normal. She has no wheezes. She has no rales. She exhibits no tenderness.   Abdominal: There is no tenderness. There is no rebound and no guarding.       Right Side Rheumatological Exam     Examination finds the elbow normal.    The patient is tender to palpation of the shoulder, wrist, knee, 1st PIP, 1st MCP, 2nd PIP, 2nd MCP, 3rd PIP, 3rd MCP, 4th PIP, 4th MCP, 5th PIP and 5th MCP    She has swelling of the 1st PIP, 1st MCP, 2nd PIP, 2nd MCP, 3rd PIP, 3rd MCP, 4th PIP, 4th MCP, 5th PIP and 5th MCP    Shoulder Exam   Tenderness Location: no tenderness    Range of Motion   Active abduction: abnormal   Adduction: abnormal  Sensation: normal    Knee Exam   Patellofemoral Crepitus: positive  Effusion: negative  Sensation: normal    Hip Exam   Tenderness Location: posterior  Sensation: normal    Elbow/Wrist Exam   Tenderness Location: no tenderness  Sensation: normal    Left Side " Rheumatological Exam     Examination finds the elbow normal.    The patient is tender to palpation of the shoulder, wrist, knee, 1st PIP, 1st MCP, 2nd PIP, 2nd MCP, 3rd PIP, 3rd MCP, 4th PIP, 4th MCP, 5th PIP and 5th MCP.    She has swelling of the 1st PIP, 1st MCP, 2nd PIP, 2nd MCP, 3rd PIP, 3rd MCP, 4th PIP, 4th MCP, 5th PIP and 5th MCP    Shoulder Exam   Tenderness Location: no tenderness    Range of Motion   Active abduction: abnormal   Sensation: normal    Knee Exam     Patellofemoral Crepitus: positive  Effusion: negative  Sensation: normal    Hip Exam   Tenderness Location: posterior  Sensation: normal    Elbow/Wrist Exam   Sensation: normal      Back/Neck Exam   General Inspection   Gait: normal       Tenderness Right paramedian tenderness of the Upper C-Spine, Upper T-Spine, Upper L-Spine and Lower L-Spine.Left paramedian tenderness of the Upper C-Spine, Lower C-Spine, Upper T-Spine, Lower T-Spine, Upper L-Spine and Lower L-Spine.    Neck Range of Motion   Flexion: Limited  Extension: Limited  Lymphadenopathy:     She has no cervical adenopathy.   Neurological: She is alert and oriented to person, place, and time. Gait normal.   Skin: No rash noted. No erythema. No pallor.     Psychiatric: Mood and affect normal.   Musculoskeletal: She exhibits tenderness and deformity.           Results for orders placed or performed in visit on 11/05/18   IMMUNOGLOBULINS (IGG, IGA, IGM) QUANTITATIVE   Result Value Ref Range    IgG - Serum 830 650 - 1600 mg/dL    IgA 200 40 - 350 mg/dL    IgM 211 50 - 300 mg/dL   IGG 1, 2, 3, AND 4   Result Value Ref Range    IgG 1 577 382 - 929 mg/dL    IgG 2 155 (L) 242 - 700 mg/dL    IgG 3 41 22 - 176 mg/dL    IgG 4 5 4 - 86 mg/dL           IgG 1 382 - 929 mg/dL   508   IgG 2 242 - 700 mg/dL   138    IgG 3 22 - 176 mg/dL   68   IgG 4 4 - 86 mg/dL   11   Comment: Test performed at [facility],   [address], [place]  [address]     [phone]   Benson Mcknight MD   - Medical Director       Assessment:       1. Ankylosing spondylitis of thoracolumbar region    2. Sjogren's syndrome, with unspecified organ involvement    3. Rheumatoid arthritis involving multiple sites with positive rheumatoid factor    4. Other chronic pain    5. IgG2 subclass deficiency    6. CHARLIE (juvenile idiopathic arthritis)    7. Uveitis    8. Acute pyelonephritis    9. Anxiety disorder, unspecified type            Plan:       Breanna was seen today for follow-up.    Diagnoses and all orders for this visit:    Ankylosing spondylitis of thoracolumbar region  -     HLA B27 Antigen; Future  -     CBC auto differential; Future  -     Comprehensive metabolic panel; Future  -     C-reactive protein; Future  -     Sedimentation rate; Future  -     ketorolac injection 60 mg  -     methylPREDNISolone acetate injection 80 mg  -     cefTRIAXone injection 1 g  -     cyanocobalamin injection 1,000 mcg  -     NIFEdipine (PROCARDIA-XL) 30 MG (OSM) 24 hr tablet; Take 1 tablet (30 mg total) by mouth every evening.    Sjogren's syndrome, with unspecified organ involvement  -     HLA B27 Antigen; Future  -     CBC auto differential; Future  -     Comprehensive metabolic panel; Future  -     C-reactive protein; Future  -     Sedimentation rate; Future  -     ketorolac injection 60 mg  -     methylPREDNISolone acetate injection 80 mg  -     cefTRIAXone injection 1 g  -     cyanocobalamin injection 1,000 mcg    Rheumatoid arthritis involving multiple sites with positive rheumatoid factor  -     HLA B27 Antigen; Future  -     CBC auto differential; Future  -     Comprehensive metabolic panel; Future  -     C-reactive protein; Future  -     Sedimentation rate; Future  -     ketorolac injection 60 mg  -     methylPREDNISolone acetate injection 80 mg  -     cefTRIAXone injection 1 g  -     cyanocobalamin injection 1,000 mcg    Other chronic pain  -     HLA B27 Antigen; Future  -     CBC auto differential; Future  -      Comprehensive metabolic panel; Future  -     C-reactive protein; Future  -     Sedimentation rate; Future  -     ketorolac injection 60 mg  -     methylPREDNISolone acetate injection 80 mg  -     cefTRIAXone injection 1 g  -     cyanocobalamin injection 1,000 mcg    IgG2 subclass deficiency  -     HLA B27 Antigen; Future  -     CBC auto differential; Future  -     Comprehensive metabolic panel; Future  -     C-reactive protein; Future  -     Sedimentation rate; Future  -     ketorolac injection 60 mg  -     methylPREDNISolone acetate injection 80 mg  -     cefTRIAXone injection 1 g  -     cyanocobalamin injection 1,000 mcg    CHARLIE (juvenile idiopathic arthritis)  -     NIFEdipine (PROCARDIA-XL) 30 MG (OSM) 24 hr tablet; Take 1 tablet (30 mg total) by mouth every evening.    Uveitis  -     NIFEdipine (PROCARDIA-XL) 30 MG (OSM) 24 hr tablet; Take 1 tablet (30 mg total) by mouth every evening.    Acute pyelonephritis  -     NIFEdipine (PROCARDIA-XL) 30 MG (OSM) 24 hr tablet; Take 1 tablet (30 mg total) by mouth every evening.    Anxiety disorder, unspecified type  Comments:  follow by Dr Killian ( Dr Holbrook office)  Orders:  -     NIFEdipine (PROCARDIA-XL) 30 MG (OSM) 24 hr tablet; Take 1 tablet (30 mg total) by mouth every evening.    Other orders  -     predniSONE (DELTASONE) 5 MG tablet; Take 1 tablet (5 mg total) by mouth once daily.  -     Discontinue: HYDROcodone-acetaminophen (NORCO)  mg per tablet; Take 1 tablet by mouth every 8 (eight) hours as needed for Pain.  -     Discontinue: HYDROcodone-acetaminophen (NORCO)  mg per tablet; Take 1 tablet by mouth every 8 (eight) hours as needed for Pain.  -     Discontinue: HYDROcodone-acetaminophen (NORCO)  mg per tablet; Take 1 tablet by mouth every 8 (eight) hours as needed for Pain.  -     ibuprofen-famotidine (DUEXIS) 800-26.6 mg Tab; Take 1 tablet by mouth 3 (three) times daily.       Will adjust meds to simponi aria

## 2018-12-07 RX ORDER — HYDROCODONE BITARTRATE AND ACETAMINOPHEN 10; 325 MG/1; MG/1
1 TABLET ORAL EVERY 8 HOURS PRN
Qty: 90 TABLET | Refills: 0 | Status: SHIPPED | OUTPATIENT
Start: 2019-02-05 | End: 2019-02-27 | Stop reason: SDUPTHER

## 2018-12-27 ENCOUNTER — PATIENT MESSAGE (OUTPATIENT)
Dept: RHEUMATOLOGY | Facility: CLINIC | Age: 38
End: 2018-12-27

## 2018-12-28 ENCOUNTER — PATIENT MESSAGE (OUTPATIENT)
Dept: RHEUMATOLOGY | Facility: CLINIC | Age: 38
End: 2018-12-28

## 2019-01-04 ENCOUNTER — OFFICE VISIT (OUTPATIENT)
Dept: FAMILY MEDICINE | Facility: CLINIC | Age: 39
End: 2019-01-04
Payer: COMMERCIAL

## 2019-01-04 VITALS
HEART RATE: 109 BPM | TEMPERATURE: 98 F | WEIGHT: 182.75 LBS | BODY MASS INDEX: 31.2 KG/M2 | DIASTOLIC BLOOD PRESSURE: 86 MMHG | HEIGHT: 64 IN | OXYGEN SATURATION: 98 % | SYSTOLIC BLOOD PRESSURE: 138 MMHG

## 2019-01-04 DIAGNOSIS — M35.00 SJOGREN'S SYNDROME, WITH UNSPECIFIED ORGAN INVOLVEMENT: ICD-10-CM

## 2019-01-04 DIAGNOSIS — R05.9 COUGH: Primary | ICD-10-CM

## 2019-01-04 DIAGNOSIS — J02.9 SORE THROAT: ICD-10-CM

## 2019-01-04 DIAGNOSIS — R94.6 ABNORMAL THYROID FUNCTION TEST: ICD-10-CM

## 2019-01-04 DIAGNOSIS — M45.9 ANKYLOSING SPONDYLITIS, UNSPECIFIED SITE OF SPINE: ICD-10-CM

## 2019-01-04 DIAGNOSIS — R06.02 SHORTNESS OF BREATH: ICD-10-CM

## 2019-01-04 DIAGNOSIS — R73.09 ABNORMAL GLUCOSE: ICD-10-CM

## 2019-01-04 DIAGNOSIS — Z13.6 SCREENING FOR CARDIOVASCULAR CONDITION: ICD-10-CM

## 2019-01-04 PROCEDURE — 87081 CULTURE SCREEN ONLY: CPT

## 2019-01-04 PROCEDURE — 99999 PR PBB SHADOW E&M-EST. PATIENT-LVL IV: ICD-10-PCS | Mod: PBBFAC,,, | Performed by: FAMILY MEDICINE

## 2019-01-04 PROCEDURE — 99214 OFFICE O/P EST MOD 30 MIN: CPT | Mod: S$GLB,,, | Performed by: FAMILY MEDICINE

## 2019-01-04 PROCEDURE — 99214 PR OFFICE/OUTPT VISIT, EST, LEVL IV, 30-39 MIN: ICD-10-PCS | Mod: S$GLB,,, | Performed by: FAMILY MEDICINE

## 2019-01-04 PROCEDURE — 3008F BODY MASS INDEX DOCD: CPT | Mod: CPTII,S$GLB,, | Performed by: FAMILY MEDICINE

## 2019-01-04 PROCEDURE — 99999 PR PBB SHADOW E&M-EST. PATIENT-LVL IV: CPT | Mod: PBBFAC,,, | Performed by: FAMILY MEDICINE

## 2019-01-04 PROCEDURE — 3008F PR BODY MASS INDEX (BMI) DOCUMENTED: ICD-10-PCS | Mod: CPTII,S$GLB,, | Performed by: FAMILY MEDICINE

## 2019-01-04 RX ORDER — PROMETHAZINE HYDROCHLORIDE AND DEXTROMETHORPHAN HYDROBROMIDE 6.25; 15 MG/5ML; MG/5ML
5 SYRUP ORAL NIGHTLY PRN
Qty: 180 BOTTLE | Refills: 0 | Status: SHIPPED | OUTPATIENT
Start: 2019-01-04 | End: 2019-01-14

## 2019-01-04 RX ORDER — LANOLIN ALCOHOL/MO/W.PET/CERES
1 CREAM (GRAM) TOPICAL
COMMUNITY
End: 2019-01-04

## 2019-01-04 RX ORDER — ONDANSETRON HYDROCHLORIDE 8 MG/1
8 TABLET, FILM COATED ORAL EVERY 8 HOURS PRN
Qty: 30 TABLET | Refills: 0 | Status: SHIPPED | OUTPATIENT
Start: 2019-01-04 | End: 2019-01-22 | Stop reason: SDUPTHER

## 2019-01-04 RX ORDER — MONTELUKAST SODIUM 10 MG/1
10 TABLET ORAL NIGHTLY
Qty: 30 TABLET | Refills: 0 | Status: SHIPPED | OUTPATIENT
Start: 2019-01-04 | End: 2019-02-05 | Stop reason: SDUPTHER

## 2019-01-04 RX ORDER — CLARITHROMYCIN 500 MG/1
500 TABLET, FILM COATED ORAL 2 TIMES DAILY
Refills: 0 | COMMUNITY
Start: 2018-11-29 | End: 2019-01-04 | Stop reason: ALTCHOICE

## 2019-01-04 RX ORDER — DOXEPIN HYDROCHLORIDE 25 MG/1
CAPSULE ORAL
COMMUNITY
Start: 2017-08-24 | End: 2019-01-04

## 2019-01-07 ENCOUNTER — TELEPHONE (OUTPATIENT)
Dept: FAMILY MEDICINE | Facility: CLINIC | Age: 39
End: 2019-01-07

## 2019-01-07 ENCOUNTER — TELEPHONE (OUTPATIENT)
Dept: RHEUMATOLOGY | Facility: CLINIC | Age: 39
End: 2019-01-07

## 2019-01-07 PROBLEM — R06.02 SHORTNESS OF BREATH: Status: ACTIVE | Noted: 2019-01-07

## 2019-01-07 PROBLEM — R94.6 ABNORMAL THYROID FUNCTION TEST: Status: ACTIVE | Noted: 2019-01-07

## 2019-01-07 PROBLEM — R05.9 COUGH: Status: ACTIVE | Noted: 2019-01-07

## 2019-01-07 PROBLEM — R73.09 ABNORMAL GLUCOSE: Status: ACTIVE | Noted: 2019-01-07

## 2019-01-07 PROBLEM — J02.9 SORE THROAT: Status: ACTIVE | Noted: 2019-01-07

## 2019-01-07 LAB — BACTERIA THROAT CULT: NORMAL

## 2019-01-07 RX ORDER — HEPARIN 100 UNIT/ML
500 SYRINGE INTRAVENOUS
Status: CANCELLED | OUTPATIENT
Start: 2019-01-07

## 2019-01-07 RX ORDER — SODIUM CHLORIDE 0.9 % (FLUSH) 0.9 %
10 SYRINGE (ML) INJECTION
Status: CANCELLED | OUTPATIENT
Start: 2019-01-07

## 2019-01-07 RX ORDER — ACETAMINOPHEN 325 MG/1
650 TABLET ORAL
Status: CANCELLED | OUTPATIENT
Start: 2019-01-07

## 2019-01-07 RX ORDER — DIPHENHYDRAMINE HYDROCHLORIDE 50 MG/ML
50 INJECTION INTRAMUSCULAR; INTRAVENOUS
Status: CANCELLED | OUTPATIENT
Start: 2019-01-07

## 2019-01-07 NOTE — PROGRESS NOTES
Subjective:       Patient ID: Breanna Sanchez is a 38 y.o. female.    Chief Complaint: Establish Care and URI    The patient also complains of pain all the joints, the symptoms are getting worse, the patient currently seen the rheumatologist.  Taking prednisone.  Has an appointment to see the rheumatologist next week.  Upon review of the lab work, the patient had abnormal glucose and also abnormal thyroid function.      Cough   This is a new problem. The current episode started 1 to 4 weeks ago. The problem has been gradually worsening. The problem occurs constantly. The cough is non-productive. Associated symptoms include ear congestion, headaches, nasal congestion, postnasal drip, rhinorrhea and a sore throat. Pertinent negatives include no chest pain, chills, ear pain, fever, heartburn, hemoptysis, myalgias, rash, shortness of breath, sweats, weight loss or wheezing. Nothing aggravates the symptoms. She has tried rest for the symptoms. The treatment provided no relief. There is no history of COPD, emphysema or pneumonia.   Shortness of Breath   This is a new problem. The current episode started 1 to 4 weeks ago. The problem occurs intermittently. The problem has been waxing and waning. Associated symptoms include headaches, neck pain, rhinorrhea and a sore throat. Pertinent negatives include no chest pain, ear pain, fever, hemoptysis, leg swelling, orthopnea, rash, sputum production, swollen glands, vomiting or wheezing. The symptoms are aggravated by exercise. She has tried nothing for the symptoms. The treatment provided no relief. Her past medical history is significant for allergies. There is no history of CAD, chronic lung disease, COPD, PE or pneumonia.   Sore Throat    This is a new problem. The current episode started 1 to 4 weeks ago. The problem has been gradually worsening. There has been no fever. The pain is moderate. Associated symptoms include coughing, headaches, a hoarse voice, a plugged ear  sensation and neck pain. Pertinent negatives include no diarrhea, drooling, ear pain, shortness of breath, swollen glands, trouble swallowing or vomiting. She has had no exposure to strep. She has tried nothing for the symptoms. The treatment provided no relief.     Past medical history, past social history was reviewed and discussed with the patient.    Review of Systems   Constitutional: Positive for fatigue. Negative for activity change, chills, fever, unexpected weight change and weight loss.   HENT: Positive for hoarse voice, postnasal drip, rhinorrhea and sore throat. Negative for drooling, ear pain, hearing loss and trouble swallowing.    Eyes: Negative for discharge and visual disturbance.   Respiratory: Positive for cough. Negative for hemoptysis, sputum production, chest tightness, shortness of breath and wheezing.    Cardiovascular: Negative for chest pain, palpitations, orthopnea and leg swelling.   Gastrointestinal: Negative for blood in stool, constipation, diarrhea, heartburn and vomiting.   Endocrine: Negative for polydipsia and polyuria.   Genitourinary: Negative for difficulty urinating, dysuria, hematuria and menstrual problem.   Musculoskeletal: Positive for arthralgias, joint swelling and neck pain. Negative for myalgias.   Skin: Negative for rash.   Neurological: Positive for headaches. Negative for weakness.   Psychiatric/Behavioral: Positive for dysphoric mood. Negative for confusion.       Objective:      Physical Exam   Constitutional: She appears well-developed and well-nourished. No distress.   HENT:   Head: Normocephalic and atraumatic.   Right Ear: External ear normal.   Left Ear: External ear normal.   Nose: Nose normal. No nasal deformity. No epistaxis. Right sinus exhibits no maxillary sinus tenderness and no frontal sinus tenderness. Left sinus exhibits no maxillary sinus tenderness and no frontal sinus tenderness.   Mouth/Throat: Oropharynx is clear and moist. No oropharyngeal  exudate.   Eyes: Conjunctivae are normal. Pupils are equal, round, and reactive to light. Right eye exhibits no discharge. Left eye exhibits no discharge.   Neck: Neck supple. No thyromegaly present.   Cardiovascular: Normal rate, regular rhythm, normal heart sounds and intact distal pulses.   No murmur heard.  Pulmonary/Chest: Effort normal and breath sounds normal. No respiratory distress. She has no wheezes.   Abdominal: She exhibits no distension and no mass. There is no tenderness.   Musculoskeletal: She exhibits no tenderness or deformity.   Neurological: She displays normal reflexes. No cranial nerve deficit. Coordination normal.   Skin: No rash noted. She is not diaphoretic. No erythema. No pallor.   Psychiatric: She has a normal mood and affect. Her behavior is normal. Judgment and thought content normal.   Nursing note and vitals reviewed.      Assessment:       1. Cough    2. Shortness of breath    3. Screening for cardiovascular condition    4. Abnormal glucose    5. Abnormal thyroid function test    6. Sore throat    7. Sjogren's syndrome, with unspecified organ involvement    8. Ankylosing spondylitis, unspecified site of spine    9. Uveitis    10. CHARLIE (juvenile idiopathic arthritis)        Plan:       Cough:  New problem, next visit workup  -     montelukast (SINGULAIR) 10 mg tablet; Take 1 tablet (10 mg total) by mouth every evening.  Dispense: 30 tablet; Refill: 0  -     promethazine-dextromethorphan (PROMETHAZINE-DM) 6.25-15 mg/5 mL Syrp; Take 5 mLs by mouth nightly as needed.  Dispense: 180 Bottle; Refill: 0  Shortness of breath:  New problem, workup needed  -     CBC auto differential; Future; Expected date: 01/04/2019  -     D dimer, quantitative; Future; Expected date: 01/04/2019  -     Brain natriuretic peptide; Future; Expected date: 01/04/2019    Screening for cardiovascular condition  -     Lipid panel; Future; Expected date: 01/04/2019    Abnormal glucose:  Worsening  -     Hemoglobin A1c;  Future; Expected date: 01/04/2019    Abnormal thyroid function test:  Worsening  -     TSH; Future; Expected date: 01/04/2019    Sore throat:  New problem, workup needed  -     Strep A culture, throat    Sjogren's syndrome, with unspecified organ involvement:  Stable  -     ondansetron (ZOFRAN) 8 MG tablet; Take 1 tablet (8 mg total) by mouth every 8 (eight) hours as needed.  Dispense: 30 tablet; Refill: 0    Ankylosing spondylitis, unspecified site of spine:  Stable  -     ondansetron (ZOFRAN) 8 MG tablet; Take 1 tablet (8 mg total) by mouth every 8 (eight) hours as needed.  Dispense: 30 tablet; Refill:     Will call the patient after we have the results of the test, drink plenty water, if the symptoms get worse, the patient will notify us immediately.  Will start patient on montelukast 10 mg q.h.s..  ER warning signs given to the patient.  I spent 30 min is encounter, from this time more than 50% of the time was spent in counseling and plan of care.  Patient agreed with assessment and plan. Patient verbalized understanding.

## 2019-01-20 ENCOUNTER — PATIENT MESSAGE (OUTPATIENT)
Dept: RHEUMATOLOGY | Facility: CLINIC | Age: 39
End: 2019-01-20

## 2019-01-22 DIAGNOSIS — M35.00 SJOGREN'S SYNDROME, WITH UNSPECIFIED ORGAN INVOLVEMENT: ICD-10-CM

## 2019-01-22 DIAGNOSIS — M45.9 ANKYLOSING SPONDYLITIS, UNSPECIFIED SITE OF SPINE: ICD-10-CM

## 2019-01-23 ENCOUNTER — LAB VISIT (OUTPATIENT)
Dept: LAB | Facility: HOSPITAL | Age: 39
End: 2019-01-23
Attending: FAMILY MEDICINE
Payer: COMMERCIAL

## 2019-01-23 DIAGNOSIS — R73.09 ABNORMAL GLUCOSE: ICD-10-CM

## 2019-01-23 DIAGNOSIS — J31.0 CHRONIC RHINITIS: Primary | ICD-10-CM

## 2019-01-23 DIAGNOSIS — D83.0 PREDOMINANTLY B-CELL DEFECT: ICD-10-CM

## 2019-01-23 DIAGNOSIS — R06.02 SHORTNESS OF BREATH: ICD-10-CM

## 2019-01-23 DIAGNOSIS — Z13.6 SCREENING FOR CARDIOVASCULAR CONDITION: ICD-10-CM

## 2019-01-23 DIAGNOSIS — R94.6 ABNORMAL THYROID FUNCTION TEST: ICD-10-CM

## 2019-01-23 LAB
ALBUMIN SERPL BCP-MCNC: 4.1 G/DL
ALP SERPL-CCNC: 101 U/L
ALT SERPL W/O P-5'-P-CCNC: 25 U/L
ANION GAP SERPL CALC-SCNC: 11 MMOL/L
AST SERPL-CCNC: 22 U/L
BASOPHILS # BLD AUTO: 0.08 K/UL
BASOPHILS NFR BLD: 0.6 %
BILIRUB SERPL-MCNC: 0.8 MG/DL
BNP SERPL-MCNC: <10 PG/ML
BUN SERPL-MCNC: 11 MG/DL
CALCIUM SERPL-MCNC: 10 MG/DL
CHLORIDE SERPL-SCNC: 103 MMOL/L
CHOLEST SERPL-MCNC: 215 MG/DL
CHOLEST/HDLC SERPL: 4.2 {RATIO}
CO2 SERPL-SCNC: 26 MMOL/L
CREAT SERPL-MCNC: 0.7 MG/DL
D DIMER PPP IA.FEU-MCNC: 0.3 MG/L FEU
DIFFERENTIAL METHOD: ABNORMAL
EOSINOPHIL # BLD AUTO: 0.3 K/UL
EOSINOPHIL NFR BLD: 1.8 %
ERYTHROCYTE [DISTWIDTH] IN BLOOD BY AUTOMATED COUNT: 13.5 %
EST. GFR  (AFRICAN AMERICAN): >60 ML/MIN/1.73 M^2
EST. GFR  (NON AFRICAN AMERICAN): >60 ML/MIN/1.73 M^2
ESTIMATED AVG GLUCOSE: 105 MG/DL
GLUCOSE SERPL-MCNC: 92 MG/DL
HBA1C MFR BLD HPLC: 5.3 %
HCT VFR BLD AUTO: 47.9 %
HDLC SERPL-MCNC: 51 MG/DL
HDLC SERPL: 23.7 %
HGB BLD-MCNC: 15.4 G/DL
IMM GRANULOCYTES # BLD AUTO: 0.09 K/UL
IMM GRANULOCYTES NFR BLD AUTO: 0.6 %
LDLC SERPL CALC-MCNC: 133 MG/DL
LYMPHOCYTES # BLD AUTO: 3.3 K/UL
LYMPHOCYTES NFR BLD: 23.2 %
MCH RBC QN AUTO: 29.4 PG
MCHC RBC AUTO-ENTMCNC: 32.2 G/DL
MCV RBC AUTO: 91 FL
MONOCYTES # BLD AUTO: 1.4 K/UL
MONOCYTES NFR BLD: 9.5 %
NEUTROPHILS # BLD AUTO: 9.2 K/UL
NEUTROPHILS NFR BLD: 64.3 %
NONHDLC SERPL-MCNC: 164 MG/DL
NRBC BLD-RTO: 0 /100 WBC
PLATELET # BLD AUTO: 424 K/UL
PMV BLD AUTO: 10.6 FL
POTASSIUM SERPL-SCNC: 3.5 MMOL/L
PROT SERPL-MCNC: 7.7 G/DL
RBC # BLD AUTO: 5.24 M/UL
SODIUM SERPL-SCNC: 140 MMOL/L
T4 FREE SERPL-MCNC: 1.25 NG/DL
THYROPEROXIDASE IGG SERPL-ACNC: <6 IU/ML
TRIGL SERPL-MCNC: 155 MG/DL
TSH SERPL DL<=0.005 MIU/L-ACNC: 0.87 UIU/ML
WBC # BLD AUTO: 14.26 K/UL

## 2019-01-23 PROCEDURE — 84439 ASSAY OF FREE THYROXINE: CPT

## 2019-01-23 PROCEDURE — 84443 ASSAY THYROID STIM HORMONE: CPT

## 2019-01-23 PROCEDURE — 80061 LIPID PANEL: CPT

## 2019-01-23 PROCEDURE — 85025 COMPLETE CBC W/AUTO DIFF WBC: CPT

## 2019-01-23 PROCEDURE — 86376 MICROSOMAL ANTIBODY EACH: CPT

## 2019-01-23 PROCEDURE — 86800 THYROGLOBULIN ANTIBODY: CPT

## 2019-01-23 PROCEDURE — 85379 FIBRIN DEGRADATION QUANT: CPT

## 2019-01-23 PROCEDURE — 83036 HEMOGLOBIN GLYCOSYLATED A1C: CPT

## 2019-01-23 PROCEDURE — 80053 COMPREHEN METABOLIC PANEL: CPT

## 2019-01-23 PROCEDURE — 83880 ASSAY OF NATRIURETIC PEPTIDE: CPT

## 2019-01-23 PROCEDURE — 36415 COLL VENOUS BLD VENIPUNCTURE: CPT | Mod: PO

## 2019-01-23 RX ORDER — ONDANSETRON HYDROCHLORIDE 8 MG/1
8 TABLET, FILM COATED ORAL EVERY 8 HOURS PRN
Qty: 30 TABLET | Refills: 0 | Status: SHIPPED | OUTPATIENT
Start: 2019-01-23 | End: 2019-04-30 | Stop reason: SDUPTHER

## 2019-01-25 LAB
THRYOGLOBULIN INTERPRETATION: ABNORMAL
THYROGLOB AB SERPL-ACNC: <1.8 IU/ML
THYROGLOB SERPL-MCNC: 34 NG/ML

## 2019-01-26 ENCOUNTER — TELEPHONE (OUTPATIENT)
Dept: FAMILY MEDICINE | Facility: CLINIC | Age: 39
End: 2019-01-26

## 2019-01-26 NOTE — TELEPHONE ENCOUNTER
----- Message from Roxanna Borja MD sent at 1/24/2019 10:47 AM CST -----  Please notify patient that the thyroid function was okay, the white blood cell count is still elevated went up from 13.5 to 14.2, the platelet count is elevated but improved from previous.  The patient went to the emergency room receive another course of antibiotics, please make her an appointment to see me back next week.  Decrease fried food, processed meat, processed starches.  Please schedule the patient for a follow-up appointment after ER visit.  Thank you

## 2019-01-28 ENCOUNTER — OFFICE VISIT (OUTPATIENT)
Dept: FAMILY MEDICINE | Facility: CLINIC | Age: 39
End: 2019-01-28
Payer: COMMERCIAL

## 2019-01-28 VITALS
OXYGEN SATURATION: 98 % | WEIGHT: 181 LBS | HEART RATE: 102 BPM | BODY MASS INDEX: 30.9 KG/M2 | SYSTOLIC BLOOD PRESSURE: 130 MMHG | TEMPERATURE: 98 F | DIASTOLIC BLOOD PRESSURE: 86 MMHG | HEIGHT: 64 IN

## 2019-01-28 DIAGNOSIS — D72.828 OTHER ELEVATED WHITE BLOOD CELL (WBC) COUNT: ICD-10-CM

## 2019-01-28 DIAGNOSIS — J30.89 SEASONAL ALLERGIC RHINITIS DUE TO OTHER ALLERGIC TRIGGER: Primary | ICD-10-CM

## 2019-01-28 DIAGNOSIS — E66.1 CLASS 1 DRUG-INDUCED OBESITY WITH SERIOUS COMORBIDITY AND BODY MASS INDEX (BMI) OF 31.0 TO 31.9 IN ADULT: ICD-10-CM

## 2019-01-28 DIAGNOSIS — F17.200 TOBACCO DEPENDENCE: ICD-10-CM

## 2019-01-28 DIAGNOSIS — R73.09 INCREASED GLUCOSE LEVEL: ICD-10-CM

## 2019-01-28 DIAGNOSIS — D75.839 THROMBOCYTOSIS: ICD-10-CM

## 2019-01-28 DIAGNOSIS — E87.6 HYPOKALEMIA: ICD-10-CM

## 2019-01-28 PROCEDURE — 3008F BODY MASS INDEX DOCD: CPT | Mod: CPTII,S$GLB,, | Performed by: FAMILY MEDICINE

## 2019-01-28 PROCEDURE — 3008F PR BODY MASS INDEX (BMI) DOCUMENTED: ICD-10-PCS | Mod: CPTII,S$GLB,, | Performed by: FAMILY MEDICINE

## 2019-01-28 PROCEDURE — 99213 PR OFFICE/OUTPT VISIT, EST, LEVL III, 20-29 MIN: ICD-10-PCS | Mod: S$GLB,,, | Performed by: FAMILY MEDICINE

## 2019-01-28 PROCEDURE — 99999 PR PBB SHADOW E&M-EST. PATIENT-LVL V: ICD-10-PCS | Mod: PBBFAC,,, | Performed by: FAMILY MEDICINE

## 2019-01-28 PROCEDURE — 99213 OFFICE O/P EST LOW 20 MIN: CPT | Mod: S$GLB,,, | Performed by: FAMILY MEDICINE

## 2019-01-28 PROCEDURE — 99999 PR PBB SHADOW E&M-EST. PATIENT-LVL V: CPT | Mod: PBBFAC,,, | Performed by: FAMILY MEDICINE

## 2019-01-28 RX ORDER — MOXIFLOXACIN HYDROCHLORIDE 400 MG/1
TABLET ORAL
Refills: 0 | COMMUNITY
Start: 2019-01-18 | End: 2019-02-13

## 2019-01-29 NOTE — PROGRESS NOTES
Subjective:       Patient ID: Breanna Sanchez is a 38 y.o. female.    Chief Complaint: Hospital Follow Up (URI since 12/28/18)    The patient is coming here today for a follow-up visit, upon review of the blood work, the patient had increased glucose levels, increased white blood cell count, increased platelet count, hypokalemia, the patient is coming here for assessment.  The patient went to the ER secondary to worsening symptoms of sinus, she has an appointment to see the allergist soon.      Sinus Problem   This is a chronic problem. The current episode started more than 1 month ago. The problem has been gradually worsening since onset. There has been no fever. Her pain is at a severity of 5/10. The pain is moderate. Associated symptoms include congestion, coughing, a hoarse voice and sinus pressure. Pertinent negatives include no chills, diaphoresis, ear pain, headaches, neck pain, shortness of breath, sneezing, sore throat or swollen glands. Past treatments include antibiotics (The patient was prescribed moxifloxacin). The treatment provided moderate relief.      Past medical history, past social history was reviewed discussed with the patient.  Review of Systems   Constitutional: Negative for activity change, chills, diaphoresis and unexpected weight change.   HENT: Positive for congestion, hoarse voice, rhinorrhea and sinus pressure. Negative for ear pain, hearing loss, sneezing, sore throat and trouble swallowing.    Eyes: Negative for discharge and visual disturbance.   Respiratory: Positive for cough. Negative for chest tightness, shortness of breath and wheezing.    Cardiovascular: Negative for chest pain and palpitations.   Gastrointestinal: Negative for blood in stool, constipation, diarrhea and vomiting.   Endocrine: Negative for polydipsia and polyuria.   Genitourinary: Negative for difficulty urinating, dysuria, hematuria and menstrual problem.   Musculoskeletal: Positive for arthralgias. Negative  for joint swelling and neck pain.   Neurological: Negative for weakness and headaches.   Psychiatric/Behavioral: Negative for confusion and dysphoric mood.       Objective:      Physical Exam   Constitutional: She appears well-developed and well-nourished. No distress.   HENT:   Head: Normocephalic and atraumatic.   Right Ear: External ear normal.   Left Ear: External ear normal.   Nose: Mucosal edema and rhinorrhea present.   Mouth/Throat: Oropharynx is clear and moist. No oropharyngeal exudate.   Eyes: Pupils are equal, round, and reactive to light. Right eye exhibits no discharge. Left eye exhibits no discharge. Right conjunctiva is injected. Left conjunctiva is injected.   Neck: Neck supple. No thyromegaly present.   Cardiovascular: Normal rate, regular rhythm, normal heart sounds and intact distal pulses.   No murmur heard.  Pulmonary/Chest: Effort normal and breath sounds normal. No respiratory distress. She has no wheezes.   Abdominal: She exhibits no distension.   Musculoskeletal: She exhibits no tenderness or deformity.   Lymphadenopathy:     She has no cervical adenopathy.   Neurological: No cranial nerve deficit.   Skin: She is not diaphoretic. No erythema. No pallor.   Psychiatric: She has a normal mood and affect. Her behavior is normal. Judgment and thought content normal.   Nursing note and vitals reviewed.      Assessment:       1. Seasonal allergic rhinitis due to other allergic trigger    2. Tobacco dependence    3. Increased glucose level    4. Hypokalemia    5. Other elevated white blood cell (WBC) count    6. Thrombocytosis    7. Class 1 drug-induced obesity with serious comorbidity and body mass index (BMI) of 31.0 to 31.9 in adult        Plan:       Seasonal allergic rhinitis due to other allergic trigger:  Worsening    Tobacco dependence:  Worsening    Increased glucose level:  New problem, workup needed  -     Hemoglobin A1c; Future; Expected date: 01/28/2019    Hypokalemia:  New problem,  workup needed  -     Basic metabolic panel; Future; Expected date: 01/28/2019    Other elevated white blood cell (WBC) count:  Worsening  -     CBC auto differential; Future; Expected date: 01/28/2019    Thrombocytosis:  Worsening  -     CBC auto differential; Future; Expected date: 01/28/2019    Class 1 drug-induced obesity with serious comorbidity and body mass index (BMI) of 31.0 to 31.9 in adult:  Worsening  -     INSULIN, RANDOM; Future; Expected date: 01/28/2019    Will call the patient after we have the results of the test, follow with allergies as directed.  The patient was strongly advised to quit tobacco, she is in the contemplation phase.The patient's BMI has been recorded in the chart. The patient has been provided educational materials regarding the benefits of attaining and maintaining a normal weight. We will continue to address and follow this issue during follow up visits.Patient agreed with assessment and plan. Patient verbalized understanding.

## 2019-02-05 DIAGNOSIS — R05.9 COUGH: ICD-10-CM

## 2019-02-05 RX ORDER — MONTELUKAST SODIUM 10 MG/1
TABLET ORAL
Qty: 30 TABLET | Refills: 0 | Status: SHIPPED | OUTPATIENT
Start: 2019-02-05 | End: 2019-03-01 | Stop reason: SDUPTHER

## 2019-02-11 ENCOUNTER — DOCUMENTATION ONLY (OUTPATIENT)
Dept: PHARMACY | Facility: HOSPITAL | Age: 39
End: 2019-02-11

## 2019-02-11 NOTE — NURSING
Breannakayla Sanchez, 38 y.o.  1980     Indication: Ankylosing spondylitis, Rheumatoid Arthritis (failed prior DMARD and first line biologic therapy)    Prescribed to receive RHU GOLIMUMAB (SIMPONI ARIA) INITIAL & RHU GOLIMUMAB (SIMPONI ARIA) MAINTENANCE    Last Hepatitis B screen:  No results found for: HEPBSAG  No results found for: HEPBCAB  Lab Results   Component Value Date    HEPBIGM NON-REACTIVE 04/17/2018       As needed medication  -Acetaminophen 650 mg PO  -Diphenhydramine 50 mg IVPB     Patient to receive Golimumab (SIMPONI ARIA) 2 mg/kg for weeks 0, 4 then 2 mg/kg every 8 weeks.

## 2019-02-13 ENCOUNTER — INFUSION (OUTPATIENT)
Dept: INFUSION THERAPY | Facility: HOSPITAL | Age: 39
End: 2019-02-13
Attending: INTERNAL MEDICINE
Payer: COMMERCIAL

## 2019-02-13 VITALS
DIASTOLIC BLOOD PRESSURE: 81 MMHG | HEIGHT: 64 IN | WEIGHT: 183.63 LBS | HEART RATE: 91 BPM | TEMPERATURE: 98 F | SYSTOLIC BLOOD PRESSURE: 128 MMHG | BODY MASS INDEX: 31.35 KG/M2 | RESPIRATION RATE: 16 BRPM

## 2019-02-13 DIAGNOSIS — M45.5 ANKYLOSING SPONDYLITIS OF THORACOLUMBAR REGION: Primary | ICD-10-CM

## 2019-02-13 PROCEDURE — 96375 TX/PRO/DX INJ NEW DRUG ADDON: CPT | Mod: PN

## 2019-02-13 PROCEDURE — 96365 THER/PROPH/DIAG IV INF INIT: CPT | Mod: PN

## 2019-02-13 PROCEDURE — 25000003 PHARM REV CODE 250: Mod: PN | Performed by: INTERNAL MEDICINE

## 2019-02-13 PROCEDURE — 63600175 PHARM REV CODE 636 W HCPCS: Mod: PN | Performed by: INTERNAL MEDICINE

## 2019-02-13 RX ORDER — SODIUM CHLORIDE 0.9 % (FLUSH) 0.9 %
10 SYRINGE (ML) INJECTION
Status: CANCELLED | OUTPATIENT
Start: 2019-02-13

## 2019-02-13 RX ORDER — DIPHENHYDRAMINE HYDROCHLORIDE 50 MG/ML
50 INJECTION INTRAMUSCULAR; INTRAVENOUS
Status: CANCELLED | OUTPATIENT
Start: 2019-02-13

## 2019-02-13 RX ORDER — HEPARIN 100 UNIT/ML
500 SYRINGE INTRAVENOUS
Status: CANCELLED | OUTPATIENT
Start: 2019-02-13

## 2019-02-13 RX ORDER — ACETAMINOPHEN 325 MG/1
650 TABLET ORAL
Status: CANCELLED | OUTPATIENT
Start: 2019-02-13

## 2019-02-13 RX ORDER — DIPHENHYDRAMINE HYDROCHLORIDE 50 MG/ML
50 INJECTION INTRAMUSCULAR; INTRAVENOUS
Status: DISCONTINUED | OUTPATIENT
Start: 2019-02-13 | End: 2019-02-13 | Stop reason: HOSPADM

## 2019-02-13 RX ADMIN — DIPHENHYDRAMINE HYDROCHLORIDE 50 MG: 50 INJECTION INTRAMUSCULAR; INTRAVENOUS at 03:02

## 2019-02-13 RX ADMIN — SODIUM CHLORIDE: 9 INJECTION, SOLUTION INTRAVENOUS at 03:02

## 2019-02-13 RX ADMIN — GOLIMUMAB 162.5 MG: 50 SOLUTION INTRAVENOUS at 03:02

## 2019-02-13 NOTE — PLAN OF CARE
Problem: Adult Inpatient Plan of Care  Goal: Plan of Care Review  Outcome: Ongoing (interventions implemented as appropriate)  Pt tolerated 1st Simponi infusion well.  No s/s of infusion reaction noted.  Instructed to call MD with any problems.

## 2019-03-01 DIAGNOSIS — R05.9 COUGH: ICD-10-CM

## 2019-03-01 RX ORDER — MONTELUKAST SODIUM 10 MG/1
10 TABLET ORAL NIGHTLY
Qty: 90 TABLET | Refills: 3 | Status: SHIPPED | OUTPATIENT
Start: 2019-03-01 | End: 2021-04-12

## 2019-03-01 RX ORDER — HYDROCODONE BITARTRATE AND ACETAMINOPHEN 10; 325 MG/1; MG/1
1 TABLET ORAL EVERY 8 HOURS PRN
Qty: 90 TABLET | Refills: 0 | Status: SHIPPED | OUTPATIENT
Start: 2019-03-04 | End: 2019-04-02 | Stop reason: SDUPTHER

## 2019-03-13 ENCOUNTER — INFUSION (OUTPATIENT)
Dept: INFUSION THERAPY | Facility: HOSPITAL | Age: 39
End: 2019-03-13
Attending: INTERNAL MEDICINE
Payer: COMMERCIAL

## 2019-03-13 VITALS
RESPIRATION RATE: 18 BRPM | HEART RATE: 78 BPM | SYSTOLIC BLOOD PRESSURE: 142 MMHG | TEMPERATURE: 98 F | DIASTOLIC BLOOD PRESSURE: 95 MMHG | WEIGHT: 181.13 LBS | HEIGHT: 64 IN | OXYGEN SATURATION: 98 % | BODY MASS INDEX: 30.92 KG/M2

## 2019-03-13 DIAGNOSIS — M45.5 ANKYLOSING SPONDYLITIS OF THORACOLUMBAR REGION: ICD-10-CM

## 2019-03-13 DIAGNOSIS — M45.9 ANKYLOSING SPONDYLITIS, UNSPECIFIED SITE OF SPINE: Primary | ICD-10-CM

## 2019-03-13 LAB
ALBUMIN SERPL BCP-MCNC: 4.4 G/DL
ALP SERPL-CCNC: 81 U/L
ALT SERPL W/O P-5'-P-CCNC: 19 U/L
ANION GAP SERPL CALC-SCNC: 9 MMOL/L
AST SERPL-CCNC: 20 U/L
BASOPHILS # BLD AUTO: 0.06 K/UL
BASOPHILS NFR BLD: 0.4 %
BILIRUB SERPL-MCNC: 0.3 MG/DL
BUN SERPL-MCNC: 11 MG/DL
CALCIUM SERPL-MCNC: 9.4 MG/DL
CHLORIDE SERPL-SCNC: 102 MMOL/L
CO2 SERPL-SCNC: 28 MMOL/L
CREAT SERPL-MCNC: 0.48 MG/DL
CRP SERPL-MCNC: 0.8 MG/DL
DIFFERENTIAL METHOD: ABNORMAL
EOSINOPHIL # BLD AUTO: 0.1 K/UL
EOSINOPHIL NFR BLD: 0.9 %
ERYTHROCYTE [DISTWIDTH] IN BLOOD BY AUTOMATED COUNT: 13.6 %
ERYTHROCYTE [SEDIMENTATION RATE] IN BLOOD BY PHOTOMETRIC METHOD: 16 MM/HR
EST. GFR  (AFRICAN AMERICAN): >60 ML/MIN/1.73 M^2
EST. GFR  (NON AFRICAN AMERICAN): >60 ML/MIN/1.73 M^2
GLUCOSE SERPL-MCNC: 106 MG/DL
HCT VFR BLD AUTO: 46.7 %
HGB BLD-MCNC: 15 G/DL
IMM GRANULOCYTES # BLD AUTO: 0.06 K/UL
IMM GRANULOCYTES NFR BLD AUTO: 0.4 %
LYMPHOCYTES # BLD AUTO: 4.2 K/UL
LYMPHOCYTES NFR BLD: 27.5 %
MCH RBC QN AUTO: 29.1 PG
MCHC RBC AUTO-ENTMCNC: 32.1 G/DL
MCV RBC AUTO: 91 FL
MONOCYTES # BLD AUTO: 1.2 K/UL
MONOCYTES NFR BLD: 7.6 %
NEUTROPHILS # BLD AUTO: 9.5 K/UL
NEUTROPHILS NFR BLD: 63.2 %
NRBC BLD-RTO: 0 /100 WBC
PLATELET # BLD AUTO: 385 K/UL
PMV BLD AUTO: 9.7 FL
POTASSIUM SERPL-SCNC: 3.4 MMOL/L
PROT SERPL-MCNC: 7.5 G/DL
RBC # BLD AUTO: 5.16 M/UL
SODIUM SERPL-SCNC: 139 MMOL/L
WBC # BLD AUTO: 15.1 K/UL

## 2019-03-13 PROCEDURE — 85652 RBC SED RATE AUTOMATED: CPT | Mod: PN

## 2019-03-13 PROCEDURE — 63600175 PHARM REV CODE 636 W HCPCS: Mod: JG,PN | Performed by: INTERNAL MEDICINE

## 2019-03-13 PROCEDURE — 80053 COMPREHEN METABOLIC PANEL: CPT | Mod: PN

## 2019-03-13 PROCEDURE — 80053 COMPREHEN METABOLIC PANEL: CPT

## 2019-03-13 PROCEDURE — 86140 C-REACTIVE PROTEIN: CPT

## 2019-03-13 PROCEDURE — 96365 THER/PROPH/DIAG IV INF INIT: CPT | Mod: PN

## 2019-03-13 PROCEDURE — 85652 RBC SED RATE AUTOMATED: CPT

## 2019-03-13 PROCEDURE — 25000003 PHARM REV CODE 250: Mod: PN | Performed by: INTERNAL MEDICINE

## 2019-03-13 PROCEDURE — 85025 COMPLETE CBC W/AUTO DIFF WBC: CPT

## 2019-03-13 PROCEDURE — 85025 COMPLETE CBC W/AUTO DIFF WBC: CPT | Mod: PN

## 2019-03-13 PROCEDURE — A4216 STERILE WATER/SALINE, 10 ML: HCPCS | Mod: PN | Performed by: INTERNAL MEDICINE

## 2019-03-13 PROCEDURE — 86140 C-REACTIVE PROTEIN: CPT | Mod: PN

## 2019-03-13 RX ORDER — SODIUM CHLORIDE 0.9 % (FLUSH) 0.9 %
10 SYRINGE (ML) INJECTION
Status: CANCELLED | OUTPATIENT
Start: 2019-03-13

## 2019-03-13 RX ORDER — DIPHENHYDRAMINE HYDROCHLORIDE 50 MG/ML
50 INJECTION INTRAMUSCULAR; INTRAVENOUS
Status: CANCELLED | OUTPATIENT
Start: 2019-03-13

## 2019-03-13 RX ORDER — ACETAMINOPHEN 325 MG/1
650 TABLET ORAL
Status: CANCELLED | OUTPATIENT
Start: 2019-03-13

## 2019-03-13 RX ORDER — HEPARIN 100 UNIT/ML
500 SYRINGE INTRAVENOUS
Status: CANCELLED | OUTPATIENT
Start: 2019-03-13

## 2019-03-13 RX ORDER — SODIUM CHLORIDE 0.9 % (FLUSH) 0.9 %
10 SYRINGE (ML) INJECTION
Status: DISCONTINUED | OUTPATIENT
Start: 2019-03-13 | End: 2019-03-13 | Stop reason: HOSPADM

## 2019-03-13 RX ORDER — ACETAMINOPHEN 325 MG/1
650 TABLET ORAL
Status: DISCONTINUED | OUTPATIENT
Start: 2019-03-13 | End: 2019-03-13 | Stop reason: HOSPADM

## 2019-03-13 RX ADMIN — SODIUM CHLORIDE: 9 INJECTION, SOLUTION INTRAVENOUS at 04:03

## 2019-03-13 RX ADMIN — ACETAMINOPHEN 650 MG: 325 TABLET, FILM COATED ORAL at 04:03

## 2019-03-13 RX ADMIN — Medication 10 ML: at 05:03

## 2019-03-13 RX ADMIN — GOLIMUMAB 162.5 MG: 50 SOLUTION INTRAVENOUS at 04:03

## 2019-03-13 NOTE — PLAN OF CARE
Problem: Adult Inpatient Plan of Care  Goal: Plan of Care Review  Outcome: Ongoing (interventions implemented as appropriate)  Patient tolerated treatment without any complications. PIV removed, catheter tip intact.  D/C VS taken and within normal range. Patient aware of next appt date and time. Patient leaving with ind gait.

## 2019-03-20 ENCOUNTER — PATIENT MESSAGE (OUTPATIENT)
Dept: FAMILY MEDICINE | Facility: CLINIC | Age: 39
End: 2019-03-20

## 2019-03-20 ENCOUNTER — TELEPHONE (OUTPATIENT)
Dept: HEMATOLOGY/ONCOLOGY | Facility: CLINIC | Age: 39
End: 2019-03-20

## 2019-03-20 ENCOUNTER — TELEPHONE (OUTPATIENT)
Dept: RHEUMATOLOGY | Facility: CLINIC | Age: 39
End: 2019-03-20

## 2019-03-20 DIAGNOSIS — D72.829 LEUKOCYTOSIS, UNSPECIFIED TYPE: Primary | ICD-10-CM

## 2019-03-20 DIAGNOSIS — R79.89 ELEVATED PLATELET COUNT: ICD-10-CM

## 2019-03-20 RX ORDER — PROMETHAZINE HYDROCHLORIDE 25 MG/1
25 TABLET ORAL EVERY 8 HOURS PRN
Qty: 15 TABLET | Refills: 0 | Status: SHIPPED | OUTPATIENT
Start: 2019-03-20 | End: 2020-04-17

## 2019-03-20 RX ORDER — POTASSIUM CHLORIDE 750 MG/1
10 TABLET, EXTENDED RELEASE ORAL DAILY
Qty: 30 TABLET | Refills: 0 | Status: SHIPPED | OUTPATIENT
Start: 2019-03-20 | End: 2019-04-10

## 2019-03-20 NOTE — TELEPHONE ENCOUNTER
----- Message from Faina Saba sent at 3/20/2019  2:30 PM CDT -----  Contact: Breanna Schmitt: Needs Medical Advice    Who Called:  Patient   Best Call Back Number: [phone]  Additional Information:  Referral from Margo Acuna dated 3/20/19. Please call when scheduled. Thanks!

## 2019-03-20 NOTE — TELEPHONE ENCOUNTER
Kidney function is normal. Inflammation markers are normal. WBC remains elevated. Is she having any infectious symptoms or has she been sick recently? She had simponi infusion yesterday. If no infection then we should refer her to Hematology for evaluation.

## 2019-03-20 NOTE — TELEPHONE ENCOUNTER
----- Message from Margo Acuna PA-C sent at 3/14/2019  2:28 PM CDT -----  Kidney function is normal. Inflammation markers are normal. WBC remains elevated. Is she having any infectious symptoms or has she been sick recently? She had simponi infusion yesterday. If no infection then we should refer her to Hematology for evaluation.

## 2019-03-21 ENCOUNTER — TELEPHONE (OUTPATIENT)
Dept: HEMATOLOGY/ONCOLOGY | Facility: CLINIC | Age: 39
End: 2019-03-21

## 2019-03-21 NOTE — TELEPHONE ENCOUNTER
Spoke with patient to confirm date, time and location of appt with Dr. Clemente.  Patient verbalized understanding.

## 2019-03-29 ENCOUNTER — OFFICE VISIT (OUTPATIENT)
Dept: HEMATOLOGY/ONCOLOGY | Facility: CLINIC | Age: 39
End: 2019-03-29
Payer: COMMERCIAL

## 2019-03-29 ENCOUNTER — LAB VISIT (OUTPATIENT)
Dept: LAB | Facility: HOSPITAL | Age: 39
End: 2019-03-29
Attending: INTERNAL MEDICINE
Payer: COMMERCIAL

## 2019-03-29 VITALS
HEIGHT: 64 IN | SYSTOLIC BLOOD PRESSURE: 150 MMHG | BODY MASS INDEX: 30.48 KG/M2 | TEMPERATURE: 98 F | DIASTOLIC BLOOD PRESSURE: 100 MMHG | WEIGHT: 178.56 LBS | RESPIRATION RATE: 18 BRPM | HEART RATE: 91 BPM

## 2019-03-29 DIAGNOSIS — D72.823 LEUKEMOID REACTION: ICD-10-CM

## 2019-03-29 DIAGNOSIS — D75.839 THROMBOCYTOSIS: ICD-10-CM

## 2019-03-29 DIAGNOSIS — D75.839 THROMBOCYTOSIS: Primary | ICD-10-CM

## 2019-03-29 DIAGNOSIS — M45.5 ANKYLOSING SPONDYLITIS OF THORACOLUMBAR REGION: Primary | ICD-10-CM

## 2019-03-29 LAB
BASOPHILS # BLD AUTO: 0.07 K/UL (ref 0–0.2)
BASOPHILS NFR BLD: 0.5 % (ref 0–1.9)
DIFFERENTIAL METHOD: ABNORMAL
EOSINOPHIL # BLD AUTO: 0.2 K/UL (ref 0–0.5)
EOSINOPHIL NFR BLD: 1.3 % (ref 0–8)
ERYTHROCYTE [DISTWIDTH] IN BLOOD BY AUTOMATED COUNT: 13.7 % (ref 11.5–14.5)
HCT VFR BLD AUTO: 45.9 % (ref 37–48.5)
HGB BLD-MCNC: 15 G/DL (ref 12–16)
IMM GRANULOCYTES # BLD AUTO: 0.05 K/UL (ref 0–0.04)
IMM GRANULOCYTES NFR BLD AUTO: 0.4 % (ref 0–0.5)
LYMPHOCYTES # BLD AUTO: 4 K/UL (ref 1–4.8)
LYMPHOCYTES NFR BLD: 29.6 % (ref 18–48)
MCH RBC QN AUTO: 29.8 PG (ref 27–31)
MCHC RBC AUTO-ENTMCNC: 32.7 G/DL (ref 32–36)
MCV RBC AUTO: 91 FL (ref 82–98)
MONOCYTES # BLD AUTO: 1.1 K/UL (ref 0.3–1)
MONOCYTES NFR BLD: 8.4 % (ref 4–15)
NEUTROPHILS # BLD AUTO: 8.1 K/UL (ref 1.8–7.7)
NEUTROPHILS NFR BLD: 59.8 % (ref 38–73)
NRBC BLD-RTO: 0 /100 WBC
PATH REV BLD -IMP: NORMAL
PLATELET # BLD AUTO: 324 K/UL (ref 150–350)
PMV BLD AUTO: 11.1 FL (ref 9.2–12.9)
RBC # BLD AUTO: 5.03 M/UL (ref 4–5.4)
WBC # BLD AUTO: 13.5 K/UL (ref 3.9–12.7)

## 2019-03-29 PROCEDURE — 85025 COMPLETE CBC W/AUTO DIFF WBC: CPT

## 2019-03-29 PROCEDURE — 99999 PR PBB SHADOW E&M-EST. PATIENT-LVL V: ICD-10-PCS | Mod: PBBFAC,,, | Performed by: INTERNAL MEDICINE

## 2019-03-29 PROCEDURE — 3008F PR BODY MASS INDEX (BMI) DOCUMENTED: ICD-10-PCS | Mod: CPTII,S$GLB,, | Performed by: INTERNAL MEDICINE

## 2019-03-29 PROCEDURE — 81270 JAK2 GENE: CPT

## 2019-03-29 PROCEDURE — 81219 CALR GENE COM VARIANTS: CPT

## 2019-03-29 PROCEDURE — 85025 COMPLETE CBC W/AUTO DIFF WBC: CPT | Mod: PN

## 2019-03-29 PROCEDURE — 99204 OFFICE O/P NEW MOD 45 MIN: CPT | Mod: S$GLB,,, | Performed by: INTERNAL MEDICINE

## 2019-03-29 PROCEDURE — 36415 COLL VENOUS BLD VENIPUNCTURE: CPT | Mod: PN

## 2019-03-29 PROCEDURE — 99999 PR PBB SHADOW E&M-EST. PATIENT-LVL V: CPT | Mod: PBBFAC,,, | Performed by: INTERNAL MEDICINE

## 2019-03-29 PROCEDURE — 3008F BODY MASS INDEX DOCD: CPT | Mod: CPTII,S$GLB,, | Performed by: INTERNAL MEDICINE

## 2019-03-29 PROCEDURE — 99204 PR OFFICE/OUTPT VISIT, NEW, LEVL IV, 45-59 MIN: ICD-10-PCS | Mod: S$GLB,,, | Performed by: INTERNAL MEDICINE

## 2019-03-29 PROCEDURE — 81403 MOPATH PROCEDURE LEVEL 4: CPT

## 2019-03-29 NOTE — PROGRESS NOTES
Subjective:       Patient ID: Breanna Sanchez is a 38 y.o. female.    Chief Complaint: hx of ankylosing spondylitis   DR Jese fregoso MD. On plaquenil   started sympony infusions has had2 doses every 4 weeks   WAS dx with Juvenile idipathic arthritis . 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 Takes allergy shots        HPI:     Social History     Socioeconomic History    Marital status:      Spouse name: None    Number of children: None    Years of education: None    Highest education level: None   Occupational History    None   Social Needs    Financial resource strain: None    Food insecurity:     Worry: None     Inability: None    Transportation needs:     Medical: None     Non-medical: None   Tobacco Use    Smoking status: Current Every Day Smoker     Packs/day: 1.00     Years: 23.00     Pack years: 23.00     Types: Cigarettes     Start date: 2/10/1995    Smokeless tobacco: Never Used   Substance and Sexual Activity    Alcohol use: Yes     Alcohol/week: 1.8 oz     Types: 3 Glasses of wine per week    Drug use: No    Sexual activity: None   Lifestyle    Physical activity:     Days per week: None     Minutes per session: None    Stress: None   Relationships    Social connections:     Talks on phone: None     Gets together: None     Attends Yarsani service: None     Active member of club or organization: None     Attends meetings of clubs or organizations: None     Relationship status: None    Intimate partner violence:     Fear of current or ex partner: None     Emotionally abused: None     Physically abused: None     Forced sexual activity: None   Other Topics Concern    None   Social History Narrative    None     Family History   Problem Relation Age of Onset    Diabetes Mother         type 1    Stroke Mother     Heart disease Mother      Cancer Father         prostate    Alzheimer's disease Father     Cancer Sister         uterine    Diabetes Brother         type 2     Past Surgical History:   Procedure Laterality Date    APPENDECTOMY      HYSTERECTOMY-VAGINAL-LAPAROSCOPIC (LAV[facility]) N/A 2/13/2017    Performed by [facility]    KNEE ARTHROSCOPY Left     SALPINGOOPHORECTOMY Right     TONSILLECTOMY      TOTAL SHOULDER ARTHROPLASTY Right      Past Medical History:   Diagnosis Date    Fibromyalgia     Lupus     Rheumatoid arteritis     Rheumatoid arthritis     Sjoegren syndrome     Spondylitis     Spondylosis     Spondylosis        Current Outpatient Medications:     clonazePAM (KLONOPIN) 1 MG tablet, TAKE ONE TABLET BY MOUTH EVERY NIGHT AS NEEDED, Disp: , Rfl: 3    dextroamphetamine-amphetamine 30 mg Tab, Take 1 tablet by mouth 2 (two) times daily., Disp: , Rfl: 0    ergocalciferol (ERGOCALCIFEROL) 50,000 unit Cap, Take 1 capsule (50,000 Units total) by mouth every 7 days., Disp: 12 capsule, Rfl: 3    fluoride, sodium, (PREVIDENT 5000 DRY MOUTH) 1.1 % Gel, Brush teeth at night FOR TWO MINUTES and spit out. Do not rinse FOR 30 MINUTES, Disp: , Rfl:     HYDROcodone-acetaminophen (NORCO)  mg per tablet, Take 1 tablet by mouth every 8 (eight) hours as needed for Pain., Disp: 90 tablet, Rfl: 0    hydroxychloroquine (PLAQUENIL) 200 mg tablet, Take 200 mg by mouth 2 (two) times daily., Disp: , Rfl: 2    levocetirizine (XYZAL) 5 MG tablet, Take 5 mg by mouth every evening., Disp: , Rfl:     lifitegrast (XIIDRA) 5 % Dpet, Place into both eyes once daily. , Disp: , Rfl:     montelukast (SINGULAIR) 10 mg tablet, Take 1 tablet (10 mg total) by mouth every evening., Disp: 90 tablet, Rfl: 3    NIFEdipine (PROCARDIA-XL) 30 MG (OSM) 24 hr tablet, Take 1 tablet (30 mg total) by mouth every evening., Disp: 90 tablet, Rfl: 3    ondansetron (ZOFRAN) 8 MG tablet, Take 1 tablet (8 mg total) by mouth every 8 (eight) hours  as needed., Disp: 30 tablet, Rfl: 0    pilocarpine (SALAGEN) 5 MG Tab, Take 5 mg by mouth 3 (three) times daily., Disp: , Rfl: 0    potassium chloride (KLOR-CON) 10 MEQ TbSR, Take 1 tablet (10 mEq total) by mouth once daily., Disp: 30 tablet, Rfl: 0    PRISTIQ 100 mg Tb24, Take 100 mg by mouth once daily., Disp: , Rfl: 3    promethazine (PHENERGAN) 25 MG tablet, Take 1 tablet (25 mg total) by mouth every 8 (eight) hours as needed for Nausea., Disp: 15 tablet, Rfl: 0    pseudoephedrine (SUDAFED) 120 mg 12 hr tablet, Take 120 mg by mouth every 12 (twelve) hours., Disp: , Rfl:     topiramate (TOPAMAX) 100 MG tablet, TAKE 1 & 1/2 TABLETS BY MOUTH EVERY EVENING, Disp: , Rfl: 2  No current facility-administered medications for this visit.     Facility-Administered Medications Ordered in Other Visits:     acetaminophen tablet 650 mg, 650 mg, Oral, PRN, Michael Sawant MD    diphenhydramine (BENADRYL) 50 mg in NS 50 mL IVPB, 50 mg, Intravenous, 1 time in Clinic/HOD, Michael Sawant MD    sodium chloride 0.9% 100 mL flush bag, , Intravenous, PRN, Michael Sawant MD    sodium chloride 0.9% flush 10 mL, 10 mL, Intravenous, PRN, Michael Sawant MD    sodium chloride 0.9% flush 10 mL, 10 mL, Intravenous, PRN, Michael Sawant MD  Review of patient's allergies indicates:   Allergen Reactions    Seroquel [quetiapine] Anaphylaxis    Aleve [naproxen sodium]     Sulfa (sulfonamide antibiotics)     Tramadol      seizure         REVIEW OF SYSTEMS:     CONSTITUTIONAL: The patient denies any weight change. There is no apparent    change in appetite, fever, night sweats, headaches, fatigue, dizziness, or    weakness.      SKIN: Denies rash, issues with nails, non-healing sores, bleeding, blotching    skin or abnormal bruising. Denies new moles or changes to existing moles.      BREASTS: There is no swelling around breasts or nipple discharge.    EYES: Denies eye pain, blurred vision, swelling, redness or discharge.      ENT  AND MOUTH: Denies runny nose, stuffiness, sinus trouble or sores. Denies    nosebleeds. Denies, hoarseness, change in voice or swelling in front of the    neck.      CARDIOVASCULAR: Denies chest pain, discomfort or palpitations. Denies neck    swelling or episodes of passing out.      RESPIRATORY: Denies cough, sputum production, blood in sputum, and denies    shortness of breath.      GI: Denies trouble swallowing, indigestion, heartburn, abdominal pain, nausea,    vomiting, diarrhea, altered bowel habits, blood in stool, discoloration of    stools, change in nature of stool, bloating, increased abdominal girth.      GENITOURINARY: No discharge. No pelvic pain or lumps. No rash around groin or  lesions. No urinary frequency, hesitation, painful urination or blood in    urine. Denies incontinence. No problems with intercourse.      MUSCULOSKELETAL muscular pain bone pains and goes along with ankylosing spondylitis    NEUROLOGICAL: Denies tingling, numbness, altered mentation changes to nerve    function in the face, weakness to one or both of the body. Denies changes to    gait and denies multiple falls or accidents.      PSYCHIATRIC: Denies nervousness, anxiety, hallucinations, depression, suicidal    ideation, trouble sleeping or changes in behavior noticed by family.      The patient denies recent foreign travel or recent exposure to chemicals or    products of concern or infectious diseases.     PHYSICAL EXAM:     Vitals:    03/29/19 0923   BP: (!) 150/100   Pulse: 91   Resp: 18   Temp: 97.7 °F (36.5 °C)       GENERAL: Comfortable looking patient. Patient is in no distress.  Awake, alert and oriented to time, person and place.  No anxiety, or agitation.      HEENT: Normal conjunctivae and eyelids. WNL.  PERRLA 3 to 4 mm. No icterus, no pallor, no congestion, and no discharge noted.     NECK:  Supple. Trachea is central.  No crepitus.  No JVD or masses.    RESPIRATORY:  No intercostal retractions.  No dullness to  percussion.  Chest is clear to auscultation.  No rales, rhonchi or wheezes.  No crepitus.  Good air entry bilaterally.    CARDIOVASCULAR:  S1 and S2 are normally heard without murmurs or gallops.  All peripheral pulses are present.    ABDOMEN:  Normal abdomen.  No hepatosplenomegaly.  No free fluid.  Bowel sounds are present.  No hernia noted. No masses.  No rebound or tenderness.  No guarding or rigidity.  Umbilicus is midline.    LYMPHATICS:  No axillary, cervical, supraclavicular, submental, or inguinal lymphadenopathy.    SKIN/MUSCULOSKELETAL:  There is no evidence of excoriation marks or ecchmosis.  No rashes.  No cyanosis.  No clubbing.  No joint or skeletal deformities noted.  Normal range of motion.    NEUROLOGIC:  Higher functions are appropriate.  No cranial nerve deficits.  Normal tierra.  Normal strength.  Motor and sensory functions are normal.  Deep tendon reflexes are normal.    GENITAL/RECTAL:  Exams are deferred.      Laboratory:     CBC:  Lab Results   Component Value Date    WBC 15.10 (H) 03/13/2019    RBC 5.16 03/13/2019    HGB 15.0 03/13/2019    HCT 46.7 03/13/2019    MCV 91 03/13/2019    MCH 29.1 03/13/2019    MCHC 32.1 03/13/2019    RDW 13.6 03/13/2019     (H) 03/13/2019    MPV 9.7 03/13/2019    GRAN 9.5 (H) 03/13/2019    GRAN 63.2 03/13/2019    LYMPH 4.2 03/13/2019    LYMPH 27.5 03/13/2019    MONO 1.2 (H) 03/13/2019    MONO 7.6 03/13/2019    EOS 0.1 03/13/2019    BASO 0.06 03/13/2019    EOSINOPHIL 0.9 03/13/2019    BASOPHIL 0.4 03/13/2019       BMP: BMP  Lab Results   Component Value Date     03/13/2019    K 3.4 (L) 03/13/2019     03/13/2019    CO2 28 03/13/2019    BUN 11 03/13/2019    CREATININE 0.48 (L) 03/13/2019    CALCIUM 9.4 03/13/2019    ANIONGAP 9 03/13/2019    ESTGFRAFRICA >60 03/13/2019    EGFRNONAA >60 03/13/2019       LFT:   Lab Results   Component Value Date    ALT 19 03/13/2019    AST 20 03/13/2019    ALKPHOS 81 03/13/2019    BILITOT 0.3 03/13/2019          Assessment/Plan:       Leukocytosis probably related to her inflammatory conditions such as ankylosing spondylitis will proceed with myeloproliferative neoplasm workup with JAK2 reflexing to ivan-r and mpl  BCR-ABL  CBC with path review  And return to clinic to discuss results  Continue follow-up with Dr. allan for immunodeficiency and DR Sawant for ankylosing spondylitis

## 2019-03-29 NOTE — LETTER
April 1, 2019      Margo Acuna PA-C  [address] [facility] [address]  [facility] [address]           [facility] [place] [address]  [facility] [address]  Phone: [phone]  Fax: [phone]          Patient: Breanna Sanchez   MR Number: [id number]   YOB: 1980   Date of Visit: 3/29/2019       Dear Margo Acuna:    Thank you for referring Breanna Sanchez to me for evaluation. Attached you will find relevant portions of my assessment and plan of care.    If you have questions, please do not hesitate to call me. I look forward to following Breanna Sanchez along with you.    Sincerely,    April Clemente MD    Enclosure  CC:  No Recipients    If you would like to receive this communication electronically, please contact [email or web address] or [phone] to request more information on [employer] Link access.    For providers and/or their staff who would like to refer a patient to [facility], please contact us through our one-stop-shop provider referral line, [facility], at [phone].    If you feel you have received this communication in error or would no longer like to receive these types of communications, please e-mail [email or web address]

## 2019-04-01 PROBLEM — D72.823 LEUKEMOID REACTION: Status: ACTIVE | Noted: 2019-04-01

## 2019-04-01 LAB — PATH REV BLD -IMP: NORMAL

## 2019-04-02 ENCOUNTER — PATIENT MESSAGE (OUTPATIENT)
Dept: RHEUMATOLOGY | Facility: CLINIC | Age: 39
End: 2019-04-02

## 2019-04-02 DIAGNOSIS — M45.9 ANKYLOSING SPONDYLITIS, UNSPECIFIED SITE OF SPINE: Primary | ICD-10-CM

## 2019-04-02 RX ORDER — HYDROCODONE BITARTRATE AND ACETAMINOPHEN 10; 325 MG/1; MG/1
1 TABLET ORAL EVERY 8 HOURS PRN
Qty: 90 TABLET | Refills: 0 | Status: CANCELLED | OUTPATIENT
Start: 2019-04-02 | End: 2019-05-02

## 2019-04-03 RX ORDER — HYDROCODONE BITARTRATE AND ACETAMINOPHEN 10; 325 MG/1; MG/1
1 TABLET ORAL EVERY 8 HOURS PRN
Qty: 90 TABLET | Refills: 0 | OUTPATIENT
Start: 2019-04-03 | End: 2019-05-03

## 2019-04-03 RX ORDER — HYDROCODONE BITARTRATE AND ACETAMINOPHEN 10; 325 MG/1; MG/1
1 TABLET ORAL EVERY 8 HOURS PRN
Qty: 90 TABLET | Refills: 0 | Status: SHIPPED | OUTPATIENT
Start: 2019-04-03 | End: 2019-04-30 | Stop reason: SDUPTHER

## 2019-04-05 ENCOUNTER — PATIENT MESSAGE (OUTPATIENT)
Dept: FAMILY MEDICINE | Facility: CLINIC | Age: 39
End: 2019-04-05

## 2019-04-05 LAB
MPNR  FINAL DIAGNOSIS: NORMAL
MPNR  SPECIMEN TYPE: NORMAL
MPNR RESULT: NORMAL

## 2019-04-10 ENCOUNTER — TELEPHONE (OUTPATIENT)
Dept: INFUSION THERAPY | Facility: HOSPITAL | Age: 39
End: 2019-04-10

## 2019-04-10 ENCOUNTER — DOCUMENTATION ONLY (OUTPATIENT)
Dept: INFUSION THERAPY | Facility: HOSPITAL | Age: 39
End: 2019-04-10

## 2019-04-10 ENCOUNTER — OFFICE VISIT (OUTPATIENT)
Dept: ENDOCRINOLOGY | Facility: CLINIC | Age: 39
End: 2019-04-10
Payer: COMMERCIAL

## 2019-04-10 VITALS
WEIGHT: 179.13 LBS | BODY MASS INDEX: 31.74 KG/M2 | HEIGHT: 63 IN | HEART RATE: 92 BPM | SYSTOLIC BLOOD PRESSURE: 110 MMHG | DIASTOLIC BLOOD PRESSURE: 64 MMHG

## 2019-04-10 DIAGNOSIS — R94.6 ABNORMAL THYROID FUNCTION TEST: Primary | ICD-10-CM

## 2019-04-10 DIAGNOSIS — R11.2 NAUSEA AND VOMITING, INTRACTABILITY OF VOMITING NOT SPECIFIED, UNSPECIFIED VOMITING TYPE: ICD-10-CM

## 2019-04-10 PROCEDURE — 99204 OFFICE O/P NEW MOD 45 MIN: CPT | Mod: S$GLB,,, | Performed by: INTERNAL MEDICINE

## 2019-04-10 PROCEDURE — 99999 PR PBB SHADOW E&M-EST. PATIENT-LVL III: CPT | Mod: PBBFAC,,, | Performed by: INTERNAL MEDICINE

## 2019-04-10 PROCEDURE — 3008F PR BODY MASS INDEX (BMI) DOCUMENTED: ICD-10-PCS | Mod: CPTII,S$GLB,, | Performed by: INTERNAL MEDICINE

## 2019-04-10 PROCEDURE — 99999 PR PBB SHADOW E&M-EST. PATIENT-LVL III: ICD-10-PCS | Mod: PBBFAC,,, | Performed by: INTERNAL MEDICINE

## 2019-04-10 PROCEDURE — 3008F BODY MASS INDEX DOCD: CPT | Mod: CPTII,S$GLB,, | Performed by: INTERNAL MEDICINE

## 2019-04-10 PROCEDURE — 99204 PR OFFICE/OUTPT VISIT, NEW, LEVL IV, 45-59 MIN: ICD-10-PCS | Mod: S$GLB,,, | Performed by: INTERNAL MEDICINE

## 2019-04-10 NOTE — TELEPHONE ENCOUNTER
Spoke with Breanna this am re: recent UTI and abx tx.  Denies any further s/s of back pain or dysuria.  States she completed her ABX 4/9/19 AM.  She denies any fever/chills or diarrhea as well.  States has been having nausea this AM that could be attributed to pain meds/diseas process since this is intermittent complaint.  She has zofran/phenergan for prn use.  Discussed resting her gut, eating small freq non-spicy and non-greasy foods today to aid with relief.  Verb agreeemnt with plan and will come for her Simponi tx today unless something else occurs.

## 2019-04-10 NOTE — LETTER
April 10, 2019      Jeri Holden MD  [address]  [facility] [address]           [facility]  [address] [facility] [address] [place] [address]  Phone: [phone]  Fax: [phone]          Patient: Breanna Sanchez   MR Number: [id number]   YOB: 1980   Date of Visit: 4/10/2019       Dear Dr. Jeri Holden:    Thank you for referring Breanna Sanchez to me for evaluation. Attached you will find relevant portions of my assessment and plan of care.    If you have questions, please do not hesitate to call me. I look forward to following Breanna Sanchez along with you.    Sincerely,    Peterson Valentino, DO [place]  CC:  No Recipients    If you would like to receive this communication electronically, please contact [email or web address] or [phone] to request more information on [employer] Link access.    For providers and/or their staff who would like to refer a patient to [facility], please contact us through our one-stop-shop provider referral line, [facility], at [phone].    If you feel you have received this communication in error or would no longer like to receive these types of communications, please e-mail [email or web address]

## 2019-04-10 NOTE — PROGRESS NOTES
CHIEF COMPLAINT: Suppressed TSH  38 year old being seen as a new patient. In Oct 2018 was found to have a suppressed TSH. Was having palpitations. Has had some anxiety. No illness at the time of labs. No steroids. Has not been taking sudafed. No biotin.       PAST MEDICAL HISTORY/PAST SURGICAL HISTORY:  Reviewed in [facility]    SOCIAL HISTORY: Social alcohol. 1 ppd    FAMILY HISTORY:  + Hypothyroidism. Mother with DM 1. + DM 2.     MEDICATIONS/ALLERGIES: The patient's MedCard has been updated and reviewed.      ROS:   Constitutional: has lost weight since Oct 2018.   Eyes: No recent visual changes  ENT: No dysphagia  Cardiovascular: Denies current anginal symptoms  Respiratory: Denies current respiratory difficulty  Gastrointestinal: she has some occasional N/V  GenitoUrinary - No dysuria  Skin: No new skin rash  Neurologic: No focal neurologic complaints  Remainder ROS negative        PE:    GENERAL: Well developed, well nourished.  PSYCH:  appropriate mood and affect  EYES:  PERRL, EOM intact.  ENT: Nares patent, oropharynx clear, mucosa pink,   NECK: Supple, trachea midline, No palpable thyroid nodules  CHEST: Resp even and unlabored, CTA bilateral.  CARDIAC: RRR, S1, S2 heard, no murmurs, rubs, S3, or S4  ABDOMEN: Soft, non-tender, non-distended;  No organomegaly  VASCULAR:  DP pulses +2/4 bilaterally, no edema  NEURO: Gait steady, CN II-VII grossly intact  SKIN: No areas of breakdown, no acanthosis nigracans.    LABS   Results for JARAD ARIAS (MRN [id number]) as of 4/10/2019 10:08   Ref. Range 10/8/2018 16:04 1/23/2019 08:45   TSH Latest Ref Range: 0.400 - 4.000 uIU/mL 0.367 (L) 0.868   Free T4 Latest Ref Range: 0.71 - 1.51 ng/dL 1.12 1.25   Thyroglobulin Interpretation Unknown SEE BELOW SEE BELOW   Thyroglobulin Antibody Screen Latest Ref Range: <4.0 IU/mL <1.8 <1.8   Thyroglobulin, Tumor Marker Latest Units: ng/mL 13 (H) 34 (H)   Thyroperoxidase Antibodies Latest Ref Range: <6.0 IU/mL <6.0 <6.0        EXAMINATION:  US SOFT TISSUE HEAD NECK THYROID    CLINICAL HISTORY:  Thyrotoxicosis, unspecified without thyrotoxic crisis or storm    TECHNIQUE:  Ultrasound of the thyroid and cervical lymph nodes was performed.    COMPARISON:  None.    FINDINGS:  The thyroid gland is normal in size and echotexture without focal nodule or mass.  The right lobe measures 4.6 x 1.6 x 1.6 cm with an estimated volume of 6.0 mL.  The thyroid isthmus measures 2 mm in thickness.  The left lobe measures 3.8 x 1.2 x 1.4 cm with an estimated volume of 3.9 mL.  Total thyroid volume is 9.9 mL.      Impression       1. Normal thyroid ultrasound.         ASSESSMENT/PLAN:  1. Suppressed TSH- appears resolved. Appears that palpitations resolved. Possible causes are lab error or thyroiditis. Will need a TSH when gets yearly physical.     2. N/V- has been tested for celiac disease. She does have some darkening of the skin. Will check for adrenal insufficiency.       FOLLOWUP  8 AM ACTH, Cortisol.

## 2019-04-10 NOTE — PROGRESS NOTES
Julia Cooper 5:09 PM:   can you look at  [id number] for infusion tomorrow and she was just in the er with uti         Grecia Stone 7:09 PM:   No fever, chills and finished abx, she can have infusion.

## 2019-04-11 ENCOUNTER — LAB VISIT (OUTPATIENT)
Dept: LAB | Facility: HOSPITAL | Age: 39
End: 2019-04-11
Attending: INTERNAL MEDICINE
Payer: COMMERCIAL

## 2019-04-11 DIAGNOSIS — M45.9 ANKYLOSING SPONDYLITIS, UNSPECIFIED SITE OF SPINE: ICD-10-CM

## 2019-04-11 DIAGNOSIS — R11.2 NAUSEA AND VOMITING, INTRACTABILITY OF VOMITING NOT SPECIFIED, UNSPECIFIED VOMITING TYPE: ICD-10-CM

## 2019-04-11 LAB — CORTIS SERPL-MCNC: 17.2 UG/DL

## 2019-04-11 PROCEDURE — 86803 HEPATITIS C AB TEST: CPT

## 2019-04-11 PROCEDURE — 82024 ASSAY OF ACTH: CPT

## 2019-04-11 PROCEDURE — 82533 TOTAL CORTISOL: CPT

## 2019-04-11 PROCEDURE — 86480 TB TEST CELL IMMUN MEASURE: CPT

## 2019-04-11 PROCEDURE — 86706 HEP B SURFACE ANTIBODY: CPT

## 2019-04-12 ENCOUNTER — TELEPHONE (OUTPATIENT)
Dept: ENDOCRINOLOGY | Facility: CLINIC | Age: 39
End: 2019-04-12

## 2019-04-12 LAB
ACTH PLAS-MCNC: 32 PG/ML (ref 0–46)
HBV SURFACE AB SER-ACNC: ABNORMAL M[IU]/ML
HCV AB SERPL QL IA: NEGATIVE
M TB IFN-G CD4+ BCKGRND COR BLD-ACNC: 0.03 IU/ML
MITOGEN IGNF BCKGRD COR BLD-ACNC: 7.1 IU/ML
MITOGEN IGNF BCKGRD COR BLD-ACNC: NEGATIVE [IU]/ML
NIL: 0.03 IU/ML
TB2 - NIL: 0.03 IU/ML

## 2019-04-28 RX ORDER — POTASSIUM CHLORIDE 750 MG/1
TABLET, FILM COATED, EXTENDED RELEASE ORAL
Qty: 30 TABLET | Refills: 0 | Status: SHIPPED | OUTPATIENT
Start: 2019-04-28 | End: 2019-06-03 | Stop reason: SDUPTHER

## 2019-04-29 ENCOUNTER — OFFICE VISIT (OUTPATIENT)
Dept: FAMILY MEDICINE | Facility: CLINIC | Age: 39
End: 2019-04-29
Payer: COMMERCIAL

## 2019-04-29 VITALS
HEIGHT: 63 IN | WEIGHT: 174.19 LBS | HEART RATE: 95 BPM | SYSTOLIC BLOOD PRESSURE: 130 MMHG | BODY MASS INDEX: 30.86 KG/M2 | DIASTOLIC BLOOD PRESSURE: 90 MMHG

## 2019-04-29 DIAGNOSIS — I70.90 ATHEROSCLEROSIS OF ARTERY: ICD-10-CM

## 2019-04-29 DIAGNOSIS — I10 ESSENTIAL HYPERTENSION: ICD-10-CM

## 2019-04-29 DIAGNOSIS — E66.9 CLASS 1 OBESITY: ICD-10-CM

## 2019-04-29 DIAGNOSIS — F17.200 TOBACCO DEPENDENCE: ICD-10-CM

## 2019-04-29 DIAGNOSIS — E53.8 B12 DEFICIENCY: Primary | ICD-10-CM

## 2019-04-29 DIAGNOSIS — J98.11 ATELECTASIS: ICD-10-CM

## 2019-04-29 DIAGNOSIS — J30.1 SEASONAL ALLERGIC RHINITIS DUE TO POLLEN: ICD-10-CM

## 2019-04-29 PROBLEM — E66.811 CLASS 1 OBESITY: Status: ACTIVE | Noted: 2019-04-29

## 2019-04-29 PROCEDURE — 99214 PR OFFICE/OUTPT VISIT, EST, LEVL IV, 30-39 MIN: ICD-10-PCS | Mod: 25,S$GLB,, | Performed by: FAMILY MEDICINE

## 2019-04-29 PROCEDURE — 3008F BODY MASS INDEX DOCD: CPT | Mod: CPTII,S$GLB,, | Performed by: FAMILY MEDICINE

## 2019-04-29 PROCEDURE — 3080F DIAST BP >= 90 MM HG: CPT | Mod: CPTII,S$GLB,, | Performed by: FAMILY MEDICINE

## 2019-04-29 PROCEDURE — 99999 PR PBB SHADOW E&M-EST. PATIENT-LVL IV: ICD-10-PCS | Mod: PBBFAC,,, | Performed by: FAMILY MEDICINE

## 2019-04-29 PROCEDURE — 3075F SYST BP GE 130 - 139MM HG: CPT | Mod: CPTII,S$GLB,, | Performed by: FAMILY MEDICINE

## 2019-04-29 PROCEDURE — 3080F PR MOST RECENT DIASTOLIC BLOOD PRESSURE >= 90 MM HG: ICD-10-PCS | Mod: CPTII,S$GLB,, | Performed by: FAMILY MEDICINE

## 2019-04-29 PROCEDURE — 3075F PR MOST RECENT SYSTOLIC BLOOD PRESS GE 130-139MM HG: ICD-10-PCS | Mod: CPTII,S$GLB,, | Performed by: FAMILY MEDICINE

## 2019-04-29 PROCEDURE — 99214 OFFICE O/P EST MOD 30 MIN: CPT | Mod: 25,S$GLB,, | Performed by: FAMILY MEDICINE

## 2019-04-29 PROCEDURE — 3008F PR BODY MASS INDEX (BMI) DOCUMENTED: ICD-10-PCS | Mod: CPTII,S$GLB,, | Performed by: FAMILY MEDICINE

## 2019-04-29 PROCEDURE — 96372 THER/PROPH/DIAG INJ SC/IM: CPT | Mod: S$GLB,,, | Performed by: FAMILY MEDICINE

## 2019-04-29 PROCEDURE — 99999 PR PBB SHADOW E&M-EST. PATIENT-LVL IV: CPT | Mod: PBBFAC,,, | Performed by: FAMILY MEDICINE

## 2019-04-29 PROCEDURE — 96372 PR INJECTION,THERAP/PROPH/DIAG2ST, IM OR SUBCUT: ICD-10-PCS | Mod: S$GLB,,, | Performed by: FAMILY MEDICINE

## 2019-04-29 RX ORDER — CYANOCOBALAMIN 1000 UG/ML
1000 INJECTION, SOLUTION INTRAMUSCULAR; SUBCUTANEOUS ONCE
Status: COMPLETED | OUTPATIENT
Start: 2019-04-29 | End: 2019-04-29

## 2019-04-29 RX ADMIN — CYANOCOBALAMIN 1000 MCG: 1000 INJECTION, SOLUTION INTRAMUSCULAR; SUBCUTANEOUS at 09:04

## 2019-04-29 NOTE — PATIENT INSTRUCTIONS
Nicotine Transdermal over the counter, use 21 mg Patch/24 hour for 6 weeks, then 14 mg for 2 weeks and then 7 mg for 2 weeks.

## 2019-04-29 NOTE — PROGRESS NOTES
Subjective:       Patient ID: Breanna Sanchez is a 38 y.o. female.    Chief Complaint: Allergies (here for f/u,doing injections,feeling better. Has been getting tired a lot.fatigue for months and getting worst)    HPI     The patient is coming here today for a follow-up visit, the patient has B12 deficiency which she received B12 sublingual and also B12 injections every 3 months when she sees the rheumatologist, the patient stated that she has not been able to use the sublingual B12 because is having problems with worsening nausea and not able to tolerate the medication.  The patient stated also that every time that she check her blood work and to the infusion is having problems with her veins and having bruising.  She states that she went to the emergency room secondary to dysuria, had urinalysis done and urine culture which showed presence of Klebsiella pneumoniae bacteria, she was placed on antibiotics and the symptoms subside.  But the CT scan showed presence of atelectasis on the lungs, atherosclerosis of the arteries, and also presence of punctate calculus on the kidneys.  The patient stated that she is concerned of the atherosclerosis and wants to quit smoking.  She also stated that the cough symptoms since she is being doing the allergy injections are improved considerably.  But states that still having some cough and wheezing sometimes.    The patient stated that the Remicade was discontinue by the rheumatologist, and since then she has been losing weight, she usually eats healthy and she is trying to be more active.    The patient denies any symptoms of dysuria but stated that the symptoms of fatigue are getting worse.    Past medical history, past social history was reviewed discussed with the patient.  Review of Systems   Constitutional: Positive for fatigue. Negative for activity change and unexpected weight change.   HENT: Negative for hearing loss.    Eyes: Negative for discharge and visual  disturbance.   Respiratory: Negative for chest tightness and wheezing.    Cardiovascular: Negative for chest pain and palpitations.   Gastrointestinal: Negative for diarrhea.   Musculoskeletal: Positive for arthralgias and neck pain. Negative for joint swelling.   Neurological: Positive for weakness. Negative for headaches.   Psychiatric/Behavioral: Negative for confusion and dysphoric mood.       Objective:      Physical Exam   Constitutional: She appears well-developed and well-nourished. No distress.   HENT:   Head: Normocephalic and atraumatic.   Right Ear: External ear normal.   Left Ear: External ear normal.   Nose: Nose normal.   Mouth/Throat: Oropharynx is clear and moist.   Eyes: Pupils are equal, round, and reactive to light. Conjunctivae are normal. Right eye exhibits no discharge.   Cardiovascular: Normal rate, regular rhythm and normal heart sounds.   No murmur heard.  Pulmonary/Chest: Effort normal. No respiratory distress. She has wheezes.   Musculoskeletal: She exhibits no tenderness or deformity.   Neurological: No cranial nerve deficit.   Skin: She is not diaphoretic. No erythema. No pallor.   Psychiatric: She has a normal mood and affect. Her behavior is normal. Judgment and thought content normal.   Nursing note and vitals reviewed.      Assessment:       1. B12 deficiency    2. Seasonal allergic rhinitis due to pollen    3. Atherosclerosis of artery    4. Atelectasis    5. Class 1 obesity    6. Essential hypertension        Plan:       B12 deficiency:  Stable  -     Vitamin B12; Future; Expected date: 04/29/2019  -     cyanocobalamin injection 1,000 mcg  Seasonal allergic rhinitis due to pollen:  Improved    Atherosclerosis of artery:  New problem, next visit workup needed    Atelectasis:  Stable    Class 1 obesity:  Improved    Essential hypertension:  Uncontrolled    The patient was strongly advised to quit tobacco, she is in action phase.  She was recommended to start using over-the-counter  and nicotine transdermal patches 21 mg per 24 hr for 6 weeks and then decrease to 14 mg for 2 weeks and then 7 mg for 2 weeks.  Healthy eating habits, continue losing weight.  Increase physical activity as tolerated.  Recommend to use albuterol inhaler as needed for wheezing.  The patient's BMI has been recorded in the chart. The patient has been provided educational materials regarding the benefits of attaining and maintaining a normal weight. We will continue to address and follow this issue during follow up visits.Patient agreed with assessment and plan. Patient verbalized understanding.

## 2019-04-30 ENCOUNTER — OFFICE VISIT (OUTPATIENT)
Dept: RHEUMATOLOGY | Facility: CLINIC | Age: 39
End: 2019-04-30
Payer: COMMERCIAL

## 2019-04-30 VITALS
DIASTOLIC BLOOD PRESSURE: 93 MMHG | HEART RATE: 100 BPM | SYSTOLIC BLOOD PRESSURE: 133 MMHG | HEIGHT: 63 IN | BODY MASS INDEX: 31.54 KG/M2 | WEIGHT: 178 LBS

## 2019-04-30 DIAGNOSIS — M35.00 SJOGREN'S SYNDROME, WITH UNSPECIFIED ORGAN INVOLVEMENT: ICD-10-CM

## 2019-04-30 DIAGNOSIS — M08.80 JIA (JUVENILE IDIOPATHIC ARTHRITIS): ICD-10-CM

## 2019-04-30 DIAGNOSIS — M45.9 ANKYLOSING SPONDYLITIS, UNSPECIFIED SITE OF SPINE: Primary | ICD-10-CM

## 2019-04-30 DIAGNOSIS — M45.9 ANKYLOSING SPONDYLITIS, UNSPECIFIED SITE OF SPINE: ICD-10-CM

## 2019-04-30 DIAGNOSIS — R79.89 ELEVATED PLATELET COUNT: ICD-10-CM

## 2019-04-30 PROCEDURE — 3075F SYST BP GE 130 - 139MM HG: CPT | Mod: CPTII,S$GLB,, | Performed by: INTERNAL MEDICINE

## 2019-04-30 PROCEDURE — 99214 OFFICE O/P EST MOD 30 MIN: CPT | Mod: 25,S$GLB,, | Performed by: INTERNAL MEDICINE

## 2019-04-30 PROCEDURE — 99214 PR OFFICE/OUTPT VISIT, EST, LEVL IV, 30-39 MIN: ICD-10-PCS | Mod: 25,S$GLB,, | Performed by: INTERNAL MEDICINE

## 2019-04-30 PROCEDURE — 3008F PR BODY MASS INDEX (BMI) DOCUMENTED: ICD-10-PCS | Mod: CPTII,S$GLB,, | Performed by: INTERNAL MEDICINE

## 2019-04-30 PROCEDURE — 99999 PR PBB SHADOW E&M-EST. PATIENT-LVL III: CPT | Mod: PBBFAC,,, | Performed by: INTERNAL MEDICINE

## 2019-04-30 PROCEDURE — 3080F PR MOST RECENT DIASTOLIC BLOOD PRESSURE >= 90 MM HG: ICD-10-PCS | Mod: CPTII,S$GLB,, | Performed by: INTERNAL MEDICINE

## 2019-04-30 PROCEDURE — 3075F PR MOST RECENT SYSTOLIC BLOOD PRESS GE 130-139MM HG: ICD-10-PCS | Mod: CPTII,S$GLB,, | Performed by: INTERNAL MEDICINE

## 2019-04-30 PROCEDURE — 96372 PR INJECTION,THERAP/PROPH/DIAG2ST, IM OR SUBCUT: ICD-10-PCS | Mod: 59,S$GLB,, | Performed by: INTERNAL MEDICINE

## 2019-04-30 PROCEDURE — 3008F BODY MASS INDEX DOCD: CPT | Mod: CPTII,S$GLB,, | Performed by: INTERNAL MEDICINE

## 2019-04-30 PROCEDURE — 99999 PR PBB SHADOW E&M-EST. PATIENT-LVL III: ICD-10-PCS | Mod: PBBFAC,,, | Performed by: INTERNAL MEDICINE

## 2019-04-30 PROCEDURE — 3080F DIAST BP >= 90 MM HG: CPT | Mod: CPTII,S$GLB,, | Performed by: INTERNAL MEDICINE

## 2019-04-30 PROCEDURE — 96372 THER/PROPH/DIAG INJ SC/IM: CPT | Mod: 59,S$GLB,, | Performed by: INTERNAL MEDICINE

## 2019-04-30 RX ORDER — ONDANSETRON HYDROCHLORIDE 8 MG/1
8 TABLET, FILM COATED ORAL EVERY 8 HOURS PRN
Qty: 60 TABLET | Refills: 4 | Status: SHIPPED | OUTPATIENT
Start: 2019-04-30 | End: 2019-05-30

## 2019-04-30 RX ORDER — HYDROCODONE BITARTRATE AND ACETAMINOPHEN 10; 325 MG/1; MG/1
1 TABLET ORAL EVERY 8 HOURS PRN
Qty: 90 TABLET | Refills: 0 | Status: SHIPPED | OUTPATIENT
Start: 2019-06-01 | End: 2019-04-30 | Stop reason: SDUPTHER

## 2019-04-30 RX ORDER — HYDROCODONE BITARTRATE AND ACETAMINOPHEN 10; 325 MG/1; MG/1
1 TABLET ORAL EVERY 8 HOURS PRN
Qty: 90 TABLET | Refills: 0 | Status: SHIPPED | OUTPATIENT
Start: 2019-05-01 | End: 2019-04-30 | Stop reason: SDUPTHER

## 2019-04-30 RX ORDER — METHYLPREDNISOLONE ACETATE 80 MG/ML
80 INJECTION, SUSPENSION INTRA-ARTICULAR; INTRALESIONAL; INTRAMUSCULAR; SOFT TISSUE
Status: COMPLETED | OUTPATIENT
Start: 2019-04-30 | End: 2019-04-30

## 2019-04-30 RX ORDER — ONDANSETRON HYDROCHLORIDE 8 MG/1
8 TABLET, FILM COATED ORAL EVERY 8 HOURS PRN
Qty: 30 TABLET | Refills: 0 | Status: CANCELLED | OUTPATIENT
Start: 2019-04-30

## 2019-04-30 RX ORDER — HYDROCODONE BITARTRATE AND ACETAMINOPHEN 10; 325 MG/1; MG/1
1 TABLET ORAL EVERY 8 HOURS PRN
Qty: 90 TABLET | Refills: 0 | Status: CANCELLED | OUTPATIENT
Start: 2019-04-30 | End: 2019-05-30

## 2019-04-30 RX ORDER — HYDROCODONE BITARTRATE AND ACETAMINOPHEN 10; 325 MG/1; MG/1
1 TABLET ORAL EVERY 8 HOURS PRN
Qty: 90 TABLET | Refills: 0 | Status: SHIPPED | OUTPATIENT
Start: 2019-07-01 | End: 2019-07-29 | Stop reason: SDUPTHER

## 2019-04-30 RX ORDER — KETOROLAC TROMETHAMINE 30 MG/ML
60 INJECTION, SOLUTION INTRAMUSCULAR; INTRAVENOUS
Status: COMPLETED | OUTPATIENT
Start: 2019-04-30 | End: 2019-04-30

## 2019-04-30 RX ORDER — CYANOCOBALAMIN 1000 UG/ML
1000 INJECTION, SOLUTION INTRAMUSCULAR; SUBCUTANEOUS
Status: COMPLETED | OUTPATIENT
Start: 2019-04-30 | End: 2019-04-30

## 2019-04-30 RX ADMIN — KETOROLAC TROMETHAMINE 60 MG: 30 INJECTION, SOLUTION INTRAMUSCULAR; INTRAVENOUS at 03:04

## 2019-04-30 RX ADMIN — METHYLPREDNISOLONE ACETATE 80 MG: 80 INJECTION, SUSPENSION INTRA-ARTICULAR; INTRALESIONAL; INTRAMUSCULAR; SOFT TISSUE at 03:04

## 2019-04-30 RX ADMIN — CYANOCOBALAMIN 1000 MCG: 1000 INJECTION, SOLUTION INTRAMUSCULAR; SUBCUTANEOUS at 03:04

## 2019-04-30 NOTE — PROGRESS NOTES
Subjective:       Patient ID: Breanna Sanchez is a 38 y.o. female.    Chief Complaint: Rheumatoid Arthritis; Disease Management; Pain; and Fibromyalgia    HPI: 36 yo female with AS,  CHARLIE, and RA, on simponi and plaquenil and  . Patient complains of arthralgias and myalgias for which has been present for a few years. Pain is located in multiple joints, both shoulder(s), both elbow(s), both wrist(s), both MCP(s): 1st, 2nd, 3rd, 4th and 5th, both PIP(s): 1st, 2nd, 3rd, 4th and 5th, both DIP(s): 1st and 2nd, both hip(s), both knee(s) and both MTP(s): 1st, 2nd, 3rd, 4th and 5th, is described as aching, pulsating, shooting and throbbing, and is constant, moderate .  Associated symptoms include: crepitation, decreased range of motion, edema, effusion, tenderness and warmth.   Severely fatigue.    Review of Systems   Constitutional: Positive for activity change. Negative for appetite change and unexpected weight change.   HENT: Negative for dental problem, ear discharge, ear pain, facial swelling, mouth sores, nosebleeds, postnasal drip, rhinorrhea, sinus pressure, sneezing, tinnitus and voice change.    Eyes: Negative for photophobia, pain, discharge, redness and itching.   Respiratory: Negative for apnea, chest tightness, shortness of breath and wheezing.    Cardiovascular: Positive for leg swelling. Negative for palpitations.   Gastrointestinal: Negative for abdominal distention, constipation and diarrhea.   Endocrine: Negative for cold intolerance, heat intolerance, polydipsia and polyuria.   Genitourinary: Negative for decreased urine volume, difficulty urinating, flank pain, frequency, hematuria and urgency.   Musculoskeletal: Positive for back pain, gait problem and neck stiffness.   Skin: Negative for pallor and wound.   Allergic/Immunologic: Positive for immunocompromised state.   Neurological: Negative for dizziness and tremors.   Hematological: Negative for adenopathy. Does not bruise/bleed easily.  "  Psychiatric/Behavioral: Negative for sleep disturbance. The patient is not nervous/anxious.          Objective:   BP (!) 133/93 (BP Location: Left arm, Patient Position: Sitting, BP Method: Medium (Automatic))   Pulse 100   Ht 5' 3" (1.6 m)   Wt 80.7 kg (178 lb)   LMP 02/12/2017   BMI 31.53 kg/m²      Physical Exam   Nursing note and vitals reviewed.  Constitutional: She is oriented to person, place, and time. She appears distressed.   HENT:   Head: Normocephalic and atraumatic.   Mouth/Throat: Oropharynx is clear and moist.   Eyes: EOM are normal. Pupils are equal, round, and reactive to light.   Neck: Neck supple. No thyromegaly present.   Cardiovascular: Normal rate, regular rhythm and normal heart sounds.  Exam reveals no gallop and no friction rub.    No murmur heard.  Pulmonary/Chest: Breath sounds normal. She has no wheezes. She has no rales. She exhibits no tenderness.   Abdominal: There is no tenderness. There is no rebound and no guarding.       Right Side Rheumatological Exam     Examination finds the elbow normal.    The patient is tender to palpation of the shoulder, wrist, knee, 1st PIP, 1st MCP, 2nd PIP, 2nd MCP, 3rd PIP, 3rd MCP, 4th PIP, 4th MCP, 5th PIP and 5th MCP    She has swelling of the 1st PIP, 1st MCP, 2nd PIP, 2nd MCP, 3rd PIP, 3rd MCP, 4th PIP, 4th MCP, 5th PIP and 5th MCP    Shoulder Exam   Tenderness Location: no tenderness    Range of Motion   Active abduction: abnormal   Adduction: abnormal  Sensation: normal    Knee Exam   Patellofemoral Crepitus: positive  Effusion: negative  Sensation: normal    Hip Exam   Tenderness Location: posterior  Sensation: normal    Elbow/Wrist Exam   Tenderness Location: no tenderness  Sensation: normal    Left Side Rheumatological Exam     Examination finds the elbow normal.    The patient is tender to palpation of the shoulder, wrist, knee, 1st PIP, 1st MCP, 2nd PIP, 2nd MCP, 3rd PIP, 3rd MCP, 4th PIP, 4th MCP, 5th PIP and 5th MCP.    She has " swelling of the 1st PIP, 1st MCP, 2nd PIP, 2nd MCP, 3rd PIP, 3rd MCP, 4th PIP, 4th MCP, 5th PIP and 5th MCP    Shoulder Exam   Tenderness Location: no tenderness    Range of Motion   Active abduction: abnormal   Sensation: normal    Knee Exam     Patellofemoral Crepitus: positive  Effusion: negative  Sensation: normal    Hip Exam   Tenderness Location: posterior  Sensation: normal    Elbow/Wrist Exam   Sensation: normal      Back/Neck Exam   General Inspection   Gait: normal       Tenderness Right paramedian tenderness of the Upper C-Spine, Upper T-Spine, Upper L-Spine and Lower L-Spine.Left paramedian tenderness of the Upper C-Spine, Lower C-Spine, Upper T-Spine, Lower T-Spine, Upper L-Spine and Lower L-Spine.    Neck Range of Motion   Flexion: Limited  Extension: Limited  Lymphadenopathy:     She has no cervical adenopathy.   Neurological: She is alert and oriented to person, place, and time. Gait normal.   Skin: No rash noted. No erythema. No pallor.     Psychiatric: Mood and affect normal.   Musculoskeletal: She exhibits tenderness and deformity.           Results for orders placed or performed in visit on 04/11/19   ACTH   Result Value Ref Range    ACTH 32 0 - 46 pg/mL   Cortisol   Result Value Ref Range    Cortisol 17.20 ug/dL   QUANTIFERON GOLD TB   Result Value Ref Range    NIL 0.030 See text IU/mL    TB1 - Nil 0.030 See text IU/mL    TB2 - Nil 0.030 See text IU/mL    Mitogen - Nil 7.100 See text IU/mL    TB Gold Plus Negative    Hepatitis B surface antibody   Result Value Ref Range    Hep B S Ab Grayzone (A)    HEPATITIS C ANTIBODY   Result Value Ref Range    Hepatitis C Ab Negative                Assessment:       1. Ankylosing spondylitis, unspecified site of spine    2. CHARLIE (juvenile idiopathic arthritis)    3. Elevated platelet count    4. Sjogren's syndrome, with unspecified organ involvement            Plan:       Breanna was seen today for rheumatoid arthritis, disease management, pain and  fibromyalgia.    Diagnoses and all orders for this visit:    Ankylosing spondylitis, unspecified site of spine  -     Hemochromatosis DNA Analysis (PCR); Future  -     Hepatitis B surface antibody; Future  -     Hepatitis B core antibody, IgM; Future  -     Hepatitis B e antibody; Future  -     Iron and TIBC; Future  -     cyanocobalamin injection 1,000 mcg  -     ketorolac injection 60 mg  -     methylPREDNISolone acetate injection 80 mg  -     CBC auto differential; Future  -     ondansetron (ZOFRAN) 8 MG tablet; Take 1 tablet (8 mg total) by mouth every 8 (eight) hours as needed.    CHARLIE (juvenile idiopathic arthritis)  -     Hemochromatosis DNA Analysis (PCR); Future  -     Hepatitis B surface antibody; Future  -     Hepatitis B core antibody, IgM; Future  -     Hepatitis B e antibody; Future  -     Iron and TIBC; Future  -     cyanocobalamin injection 1,000 mcg  -     ketorolac injection 60 mg  -     methylPREDNISolone acetate injection 80 mg  -     CBC auto differential; Future    Elevated platelet count  -     Hemochromatosis DNA Analysis (PCR); Future  -     Hepatitis B surface antibody; Future  -     Hepatitis B core antibody, IgM; Future  -     Hepatitis B e antibody; Future  -     Iron and TIBC; Future  -     cyanocobalamin injection 1,000 mcg  -     ketorolac injection 60 mg  -     methylPREDNISolone acetate injection 80 mg  -     CBC auto differential; Future    Sjogren's syndrome, with unspecified organ involvement  -     ondansetron (ZOFRAN) 8 MG tablet; Take 1 tablet (8 mg total) by mouth every 8 (eight) hours as needed.    Other orders  -     Discontinue: HYDROcodone-acetaminophen (NORCO)  mg per tablet; Take 1 tablet by mouth every 8 (eight) hours as needed for Pain.  -     Discontinue: HYDROcodone-acetaminophen (NORCO)  mg per tablet; Take 1 tablet by mouth every 8 (eight) hours as needed for Pain.  -     HYDROcodone-acetaminophen (NORCO)  mg per tablet; Take 1 tablet by mouth  every 8 (eight) hours as needed for Pain.       Fatigue, consider donating blood     The complexity of care for this patient was moderate to severe due to disease state

## 2019-04-30 NOTE — PROGRESS NOTES
Administered 1 cc ( 1000 mcg/ml ) of b12 to the right upper outer gluteal. Informed of s/s to report verbalized understanding. No adverse reactions noted.    Lot # [id number]  Expiration oct 20    Administered 1 cc ( 80 mg/ml ) of depomedrol to the right upper outer gluteal. Informed of s/s to report verbalized understanding. No adverse reactions noted.    Lot # [id number]  Expiration 04/20    Administered 2 cc ( 30 mg/ml ) of toradol to the left upper outer gluteal. Informed of s/s to report verbalized understanding. No adverse reactions noted.    Lot # [id number]  Expiration 1 my 2020

## 2019-05-08 ENCOUNTER — INFUSION (OUTPATIENT)
Dept: INFUSION THERAPY | Facility: HOSPITAL | Age: 39
End: 2019-05-08
Attending: INTERNAL MEDICINE
Payer: COMMERCIAL

## 2019-05-08 VITALS
HEIGHT: 63 IN | SYSTOLIC BLOOD PRESSURE: 129 MMHG | WEIGHT: 177.81 LBS | BODY MASS INDEX: 31.5 KG/M2 | HEART RATE: 80 BPM | DIASTOLIC BLOOD PRESSURE: 83 MMHG

## 2019-05-08 VITALS
DIASTOLIC BLOOD PRESSURE: 78 MMHG | HEART RATE: 85 BPM | WEIGHT: 177.81 LBS | SYSTOLIC BLOOD PRESSURE: 145 MMHG | BODY MASS INDEX: 31.5 KG/M2 | TEMPERATURE: 98 F | RESPIRATION RATE: 16 BRPM | OXYGEN SATURATION: 99 %

## 2019-05-08 DIAGNOSIS — M45.9 ANKYLOSING SPONDYLITIS, UNSPECIFIED SITE OF SPINE: ICD-10-CM

## 2019-05-08 DIAGNOSIS — R79.89 ELEVATED PLATELET COUNT: ICD-10-CM

## 2019-05-08 DIAGNOSIS — M08.80 JIA (JUVENILE IDIOPATHIC ARTHRITIS): ICD-10-CM

## 2019-05-08 DIAGNOSIS — M45.5 ANKYLOSING SPONDYLITIS OF THORACOLUMBAR REGION: Primary | ICD-10-CM

## 2019-05-08 LAB
BASOPHILS # BLD AUTO: 0.07 K/UL (ref 0–0.2)
BASOPHILS NFR BLD: 0.4 % (ref 0–1.9)
DIFFERENTIAL METHOD: ABNORMAL
EOSINOPHIL # BLD AUTO: 0.2 K/UL (ref 0–0.5)
EOSINOPHIL NFR BLD: 1.2 % (ref 0–8)
ERYTHROCYTE [DISTWIDTH] IN BLOOD BY AUTOMATED COUNT: 13.7 % (ref 11.5–14.5)
HBV CORE IGM SERPL QL IA: NEGATIVE
HCT VFR BLD AUTO: 45 % (ref 37–48.5)
HGB BLD-MCNC: 15.1 G/DL (ref 12–16)
IMM GRANULOCYTES # BLD AUTO: 0.12 K/UL (ref 0–0.04)
IMM GRANULOCYTES NFR BLD AUTO: 0.6 % (ref 0–0.5)
IRON SATN MFR SERPL: 16 % (ref 20–50)
IRON SERPL-MCNC: 53 UG/DL (ref 30–160)
LYMPHOCYTES # BLD AUTO: 3.9 K/UL (ref 1–4.8)
LYMPHOCYTES NFR BLD: 20.8 % (ref 18–48)
MCH RBC QN AUTO: 30.4 PG (ref 27–31)
MCHC RBC AUTO-ENTMCNC: 33.6 G/DL (ref 32–36)
MCV RBC AUTO: 91 FL (ref 82–98)
MONOCYTES # BLD AUTO: 1.3 K/UL (ref 0.3–1)
MONOCYTES NFR BLD: 6.7 % (ref 4–15)
NEUTROPHILS # BLD AUTO: 13 K/UL (ref 1.8–7.7)
NEUTROPHILS NFR BLD: 70.3 % (ref 38–73)
NRBC BLD-RTO: 0 /100 WBC
PLATELET # BLD AUTO: 344 K/UL (ref 150–350)
PMV BLD AUTO: 10.1 FL (ref 9.2–12.9)
RBC # BLD AUTO: 4.97 M/UL (ref 4–5.4)
TOTAL IRON BINDING CAPACITY: 328 UG/DL (ref 265–497)
WBC # BLD AUTO: 18.52 K/UL (ref 3.9–12.7)

## 2019-05-08 PROCEDURE — A4216 STERILE WATER/SALINE, 10 ML: HCPCS | Mod: PN | Performed by: INTERNAL MEDICINE

## 2019-05-08 PROCEDURE — 83540 ASSAY OF IRON: CPT

## 2019-05-08 PROCEDURE — 86706 HEP B SURFACE ANTIBODY: CPT

## 2019-05-08 PROCEDURE — 63600175 PHARM REV CODE 636 W HCPCS: Mod: JG,PN | Performed by: INTERNAL MEDICINE

## 2019-05-08 PROCEDURE — 85025 COMPLETE CBC W/AUTO DIFF WBC: CPT | Mod: PN

## 2019-05-08 PROCEDURE — 86705 HEP B CORE ANTIBODY IGM: CPT

## 2019-05-08 PROCEDURE — 25000003 PHARM REV CODE 250: Mod: PN | Performed by: INTERNAL MEDICINE

## 2019-05-08 PROCEDURE — 83540 ASSAY OF IRON: CPT | Mod: PN

## 2019-05-08 PROCEDURE — 86705 HEP B CORE ANTIBODY IGM: CPT | Mod: PN

## 2019-05-08 PROCEDURE — 81256 HFE GENE: CPT

## 2019-05-08 PROCEDURE — 85025 COMPLETE CBC W/AUTO DIFF WBC: CPT

## 2019-05-08 PROCEDURE — 96365 THER/PROPH/DIAG IV INF INIT: CPT | Mod: PN

## 2019-05-08 RX ORDER — SODIUM CHLORIDE 0.9 % (FLUSH) 0.9 %
10 SYRINGE (ML) INJECTION
Status: DISCONTINUED | OUTPATIENT
Start: 2019-05-08 | End: 2019-05-08 | Stop reason: HOSPADM

## 2019-05-08 RX ORDER — DIPHENHYDRAMINE HYDROCHLORIDE 50 MG/ML
50 INJECTION INTRAMUSCULAR; INTRAVENOUS
Status: CANCELLED | OUTPATIENT
Start: 2019-06-05

## 2019-05-08 RX ORDER — ACETAMINOPHEN 325 MG/1
650 TABLET ORAL
Status: CANCELLED | OUTPATIENT
Start: 2019-06-05

## 2019-05-08 RX ORDER — SODIUM CHLORIDE 0.9 % (FLUSH) 0.9 %
10 SYRINGE (ML) INJECTION
Status: CANCELLED | OUTPATIENT
Start: 2019-06-05

## 2019-05-08 RX ORDER — HEPARIN 100 UNIT/ML
500 SYRINGE INTRAVENOUS
Status: CANCELLED | OUTPATIENT
Start: 2019-06-05

## 2019-05-08 RX ADMIN — GOLIMUMAB 162.5 MG: 50 SOLUTION INTRAVENOUS at 03:05

## 2019-05-08 RX ADMIN — Medication 10 ML: at 03:05

## 2019-05-08 RX ADMIN — SODIUM CHLORIDE: 9 INJECTION, SOLUTION INTRAVENOUS at 03:05

## 2019-05-09 DIAGNOSIS — D72.829 LEUKOCYTOSIS, UNSPECIFIED TYPE: Primary | ICD-10-CM

## 2019-05-09 LAB — HBV SURFACE AB SER-ACNC: ABNORMAL M[IU]/ML

## 2019-05-14 ENCOUNTER — PATIENT MESSAGE (OUTPATIENT)
Dept: FAMILY MEDICINE | Facility: CLINIC | Age: 39
End: 2019-05-14

## 2019-05-14 ENCOUNTER — OFFICE VISIT (OUTPATIENT)
Dept: FAMILY MEDICINE | Facility: CLINIC | Age: 39
End: 2019-05-14
Payer: COMMERCIAL

## 2019-05-14 VITALS
BODY MASS INDEX: 30.97 KG/M2 | TEMPERATURE: 99 F | SYSTOLIC BLOOD PRESSURE: 140 MMHG | HEIGHT: 63 IN | WEIGHT: 174.81 LBS | HEART RATE: 100 BPM | DIASTOLIC BLOOD PRESSURE: 94 MMHG

## 2019-05-14 DIAGNOSIS — R19.7 DIARRHEA, UNSPECIFIED TYPE: ICD-10-CM

## 2019-05-14 DIAGNOSIS — D72.829 LEUKOCYTOSIS, UNSPECIFIED TYPE: Primary | ICD-10-CM

## 2019-05-14 PROBLEM — J02.9 SORE THROAT: Status: RESOLVED | Noted: 2019-01-07 | Resolved: 2019-05-14

## 2019-05-14 PROCEDURE — 99999 PR PBB SHADOW E&M-EST. PATIENT-LVL IV: CPT | Mod: PBBFAC,,, | Performed by: PHYSICIAN ASSISTANT

## 2019-05-14 PROCEDURE — 3008F PR BODY MASS INDEX (BMI) DOCUMENTED: ICD-10-PCS | Mod: CPTII,S$GLB,, | Performed by: PHYSICIAN ASSISTANT

## 2019-05-14 PROCEDURE — 99999 PR PBB SHADOW E&M-EST. PATIENT-LVL IV: ICD-10-PCS | Mod: PBBFAC,,, | Performed by: PHYSICIAN ASSISTANT

## 2019-05-14 PROCEDURE — 3080F DIAST BP >= 90 MM HG: CPT | Mod: CPTII,S$GLB,, | Performed by: PHYSICIAN ASSISTANT

## 2019-05-14 PROCEDURE — 3077F PR MOST RECENT SYSTOLIC BLOOD PRESSURE >= 140 MM HG: ICD-10-PCS | Mod: CPTII,S$GLB,, | Performed by: PHYSICIAN ASSISTANT

## 2019-05-14 PROCEDURE — 3008F BODY MASS INDEX DOCD: CPT | Mod: CPTII,S$GLB,, | Performed by: PHYSICIAN ASSISTANT

## 2019-05-14 PROCEDURE — 3080F PR MOST RECENT DIASTOLIC BLOOD PRESSURE >= 90 MM HG: ICD-10-PCS | Mod: CPTII,S$GLB,, | Performed by: PHYSICIAN ASSISTANT

## 2019-05-14 PROCEDURE — 3077F SYST BP >= 140 MM HG: CPT | Mod: CPTII,S$GLB,, | Performed by: PHYSICIAN ASSISTANT

## 2019-05-14 PROCEDURE — 99214 PR OFFICE/OUTPT VISIT, EST, LEVL IV, 30-39 MIN: ICD-10-PCS | Mod: S$GLB,,, | Performed by: PHYSICIAN ASSISTANT

## 2019-05-14 PROCEDURE — 99214 OFFICE O/P EST MOD 30 MIN: CPT | Mod: S$GLB,,, | Performed by: PHYSICIAN ASSISTANT

## 2019-05-14 NOTE — TELEPHONE ENCOUNTER
Can you please make her an appointment to see Radha, I do not have anything available for today.  Thank you   No

## 2019-05-14 NOTE — PROGRESS NOTES
Subjective:       Patient ID: Breanna Sanchez is a 38 y.o. female    Chief Complaint: Diarrhea and Abdominal Pain    HPI  The patient is new to me, PCP is Dr Borja.  She presents today CO diarrhea and diffuse abdominal cramping and pain that began 2 days ago.  She denies any nausea or vomiting. She has a history of RA and ankylosing spondylitis that is treated by rheumatology.  She last had her infusion of golimumab (SIMPONI ARIA) 162.5 mg in sodium chloride 0.9% 100 mL infusion on 5/8/19.  She reports that her white count was elevated at her last infusion.      Review of Systems   Constitutional: Positive for fatigue. Negative for activity change, chills and fever.   HENT: Negative for congestion and sore throat.    Eyes: Negative for visual disturbance.   Respiratory: Negative for cough and shortness of breath.    Cardiovascular: Negative for chest pain and palpitations.   Gastrointestinal: Positive for abdominal pain and diarrhea. Negative for nausea and vomiting.   Endocrine: Negative for polydipsia and polyuria.   Musculoskeletal: Negative for myalgias.   Skin: Negative for rash.   Neurological: Negative for headaches.   Psychiatric/Behavioral: The patient is not nervous/anxious.         Objective:   Physical Exam   Constitutional: She is oriented to person, place, and time. She appears well-developed and well-nourished. No distress.   HENT:   Head: Normocephalic and atraumatic.   Eyes: Pupils are equal, round, and reactive to light. EOM are normal.   Neck: Normal range of motion. Neck supple.   Cardiovascular: Normal rate, regular rhythm, normal heart sounds and intact distal pulses. Exam reveals no gallop and no friction rub.   No murmur heard.  Pulmonary/Chest: Effort normal and breath sounds normal. No respiratory distress. She has no wheezes. She has no rales. She exhibits no tenderness.   Abdominal: Soft. She exhibits no distension and no mass. There is no tenderness. There is no rebound and no guarding.  No hernia.   Hyperactive BS X 4 quadrants   Musculoskeletal: Normal range of motion.   Neurological: She is alert and oriented to person, place, and time.   Skin: Skin is warm and dry. No rash noted. She is not diaphoretic.   Psychiatric: She has a normal mood and affect. Her behavior is normal. Judgment and thought content normal.        Assessment:       1. Leukocytosis, unspecified type  CBC auto differential   2. Diarrhea, unspecified type          Plan:       Leukocytosis, unspecified type  -     CBC auto differential; Future; Expected date: 05/17/2019    Diarrhea, unspecified type  - recommended plenty fluids and rest  - BRAT diet   - RTC as needed for any new or worsening symptoms

## 2019-05-17 LAB
GENETICIST REVIEW: NORMAL
HFE GENE MUT ANL BLD/T: NORMAL
HFE RELEASED BY: NORMAL
HFE RESULT SUMMARY: NORMAL
REF LAB TEST METHOD: NORMAL
SPECIMEN SOURCE: NORMAL
SPECIMEN,  HEMOCHROMATOSIS: NORMAL

## 2019-05-22 ENCOUNTER — PATIENT MESSAGE (OUTPATIENT)
Dept: RHEUMATOLOGY | Facility: CLINIC | Age: 39
End: 2019-05-22

## 2019-05-22 ENCOUNTER — PATIENT MESSAGE (OUTPATIENT)
Dept: FAMILY MEDICINE | Facility: CLINIC | Age: 39
End: 2019-05-22

## 2019-05-22 RX ORDER — CYANOCOBALAMIN 1000 UG/ML
1000 INJECTION, SOLUTION INTRAMUSCULAR; SUBCUTANEOUS
Qty: 1 ML | Refills: 11 | Status: SHIPPED | OUTPATIENT
Start: 2019-05-22 | End: 2020-05-26

## 2019-06-03 RX ORDER — POTASSIUM CHLORIDE 750 MG/1
TABLET, FILM COATED, EXTENDED RELEASE ORAL
Qty: 30 TABLET | Refills: 0 | Status: SHIPPED | OUTPATIENT
Start: 2019-06-03 | End: 2019-07-04 | Stop reason: SDUPTHER

## 2019-06-19 ENCOUNTER — PATIENT MESSAGE (OUTPATIENT)
Dept: RHEUMATOLOGY | Facility: CLINIC | Age: 39
End: 2019-06-19

## 2019-07-03 ENCOUNTER — TELEPHONE (OUTPATIENT)
Dept: RHEUMATOLOGY | Facility: CLINIC | Age: 39
End: 2019-07-03

## 2019-07-03 NOTE — TELEPHONE ENCOUNTER
After reviewing chart for pt's infusion, found that pt is scheduled for surgery d/t pelvic mass. Reviewed chart with Dr. Sawant, she advises pt must hold infusion until pathology results come back. Contacted pt, notified of 's advisement, pt verbalized understanding. Advised nurse will update infusion of what is going on.

## 2019-07-08 RX ORDER — POTASSIUM CHLORIDE 750 MG/1
TABLET, FILM COATED, EXTENDED RELEASE ORAL
Qty: 30 TABLET | Refills: 0 | Status: SHIPPED | OUTPATIENT
Start: 2019-07-08 | End: 2019-07-31 | Stop reason: SDUPTHER

## 2019-07-22 PROBLEM — R19.00 PELVIC MASS: Status: ACTIVE | Noted: 2019-07-22

## 2019-07-22 PROBLEM — N73.6 FEMALE PELVIC PERITONEAL ADHESION: Status: ACTIVE | Noted: 2019-07-22

## 2019-07-29 NOTE — TELEPHONE ENCOUNTER
checked last filled [id number] for quantity of 20 tablets for 5 days by Dr Adams last filled by Dr Sawant 7-1-19 last OV 4-30-19 next OV 8-13-19

## 2019-07-30 RX ORDER — HYDROCODONE BITARTRATE AND ACETAMINOPHEN 10; 325 MG/1; MG/1
1 TABLET ORAL EVERY 8 HOURS PRN
Qty: 90 TABLET | Refills: 0 | Status: SHIPPED | OUTPATIENT
Start: 2019-07-30 | End: 2019-08-13 | Stop reason: SDUPTHER

## 2019-07-31 RX ORDER — POTASSIUM CHLORIDE 750 MG/1
TABLET, FILM COATED, EXTENDED RELEASE ORAL
Qty: 30 TABLET | Refills: 0 | Status: SHIPPED | OUTPATIENT
Start: 2019-07-31 | End: 2019-08-30 | Stop reason: SDUPTHER

## 2019-08-13 ENCOUNTER — TELEPHONE (OUTPATIENT)
Dept: INFUSION THERAPY | Facility: HOSPITAL | Age: 39
End: 2019-08-13

## 2019-08-13 ENCOUNTER — OFFICE VISIT (OUTPATIENT)
Dept: RHEUMATOLOGY | Facility: CLINIC | Age: 39
End: 2019-08-13
Payer: MEDICARE

## 2019-08-13 VITALS
HEART RATE: 105 BPM | HEIGHT: 63 IN | DIASTOLIC BLOOD PRESSURE: 101 MMHG | BODY MASS INDEX: 29.23 KG/M2 | WEIGHT: 165 LBS | SYSTOLIC BLOOD PRESSURE: 141 MMHG

## 2019-08-13 DIAGNOSIS — M45.9 ANKYLOSING SPONDYLITIS, UNSPECIFIED SITE OF SPINE: Primary | ICD-10-CM

## 2019-08-13 DIAGNOSIS — M35.00 SJOGREN'S SYNDROME, WITH UNSPECIFIED ORGAN INVOLVEMENT: ICD-10-CM

## 2019-08-13 DIAGNOSIS — M08.80 JIA (JUVENILE IDIOPATHIC ARTHRITIS): ICD-10-CM

## 2019-08-13 PROCEDURE — 99999 PR PBB SHADOW E&M-EST. PATIENT-LVL III: CPT | Mod: PBBFAC,,, | Performed by: INTERNAL MEDICINE

## 2019-08-13 PROCEDURE — 99213 OFFICE O/P EST LOW 20 MIN: CPT | Mod: PBBFAC,PO | Performed by: INTERNAL MEDICINE

## 2019-08-13 PROCEDURE — 99215 OFFICE O/P EST HI 40 MIN: CPT | Mod: 25,S$GLB,, | Performed by: INTERNAL MEDICINE

## 2019-08-13 PROCEDURE — 99999 PR PBB SHADOW E&M-EST. PATIENT-LVL III: ICD-10-PCS | Mod: PBBFAC,,, | Performed by: INTERNAL MEDICINE

## 2019-08-13 PROCEDURE — 99215 PR OFFICE/OUTPT VISIT, EST, LEVL V, 40-54 MIN: ICD-10-PCS | Mod: 25,S$GLB,, | Performed by: INTERNAL MEDICINE

## 2019-08-13 RX ORDER — METHYLPREDNISOLONE ACETATE 80 MG/ML
160 INJECTION, SUSPENSION INTRA-ARTICULAR; INTRALESIONAL; INTRAMUSCULAR; SOFT TISSUE
Status: COMPLETED | OUTPATIENT
Start: 2019-08-13 | End: 2019-08-13

## 2019-08-13 RX ORDER — HYDROCODONE BITARTRATE AND ACETAMINOPHEN 10; 325 MG/1; MG/1
1 TABLET ORAL EVERY 8 HOURS PRN
Qty: 90 TABLET | Refills: 0 | Status: SHIPPED | OUTPATIENT
Start: 2019-08-29 | End: 2019-08-13 | Stop reason: SDUPTHER

## 2019-08-13 RX ORDER — PREDNISONE 5 MG/1
5 TABLET ORAL
COMMUNITY
End: 2019-12-12 | Stop reason: SDUPTHER

## 2019-08-13 RX ORDER — POLYMYXIN B SULFATE AND TRIMETHOPRIM 1; 10000 MG/ML; [USP'U]/ML
1 SOLUTION OPHTHALMIC
COMMUNITY
Start: 2019-07-19 | End: 2019-08-15

## 2019-08-13 RX ORDER — NALOXONE HYDROCHLORIDE 4 MG/.1ML
1 SPRAY NASAL ONCE
Qty: 1 EACH | Refills: 0 | Status: SHIPPED | OUTPATIENT
Start: 2019-08-13 | End: 2019-08-13

## 2019-08-13 RX ORDER — LANOLIN ALCOHOL/MO/W.PET/CERES
1 CREAM (GRAM) TOPICAL
COMMUNITY
End: 2019-08-15

## 2019-08-13 RX ORDER — ESTRADIOL 1 MG/1
1 TABLET ORAL DAILY
Refills: 0 | COMMUNITY
Start: 2019-08-06 | End: 2021-04-12

## 2019-08-13 RX ORDER — HYDROCODONE BITARTRATE AND ACETAMINOPHEN 10; 325 MG/1; MG/1
1 TABLET ORAL EVERY 8 HOURS PRN
Qty: 90 TABLET | Refills: 0 | Status: SHIPPED | OUTPATIENT
Start: 2019-09-28 | End: 2019-08-19 | Stop reason: SDUPTHER

## 2019-08-13 RX ORDER — KETOROLAC TROMETHAMINE 30 MG/ML
60 INJECTION, SOLUTION INTRAMUSCULAR; INTRAVENOUS
Status: COMPLETED | OUTPATIENT
Start: 2019-08-13 | End: 2019-08-13

## 2019-08-13 RX ADMIN — METHYLPREDNISOLONE ACETATE 160 MG: 80 INJECTION, SUSPENSION INTRA-ARTICULAR; INTRALESIONAL; INTRAMUSCULAR; SOFT TISSUE at 02:08

## 2019-08-13 RX ADMIN — KETOROLAC TROMETHAMINE 60 MG: 30 INJECTION, SOLUTION INTRAMUSCULAR; INTRAVENOUS at 02:08

## 2019-08-13 ASSESSMENT — ROUTINE ASSESSMENT OF PATIENT INDEX DATA (RAPID3)
TOTAL RAPID3 SCORE: 5.55
PSYCHOLOGICAL DISTRESS SCORE: 3.3
PATIENT GLOBAL ASSESSMENT SCORE: 6
MDHAQ FUNCTION SCORE: 1.1
PAIN SCORE: 7

## 2019-08-13 NOTE — TELEPHONE ENCOUNTER
----- Message from Marii Bee sent at 8/13/2019  2:42 PM CDT -----  Caller: Patient                  Callback number: [phone]                       Reason: Patient request a call to schedule infusion apt.                 Thank you

## 2019-08-13 NOTE — PROGRESS NOTES
Subjective:       Patient ID: Breanna Sanchez is a 38 y.o. female.    Chief Complaint: Disease Management (Ankylosing spondylitis ) and Rheumatoid Arthritis    HPI: 38 yo female with AS,  CHARLIE, and RA,heterozygot  Hemachromatosis, she held her simponi  For her surgery tumor was benign. . Patient complains of arthralgias and myalgias for which has been present for a few years. Pain is located in multiple joints, both shoulder(s), both elbow(s), both wrist(s), both MCP(s): 1st, 2nd, 3rd, 4th and 5th, both PIP(s): 1st, 2nd, 3rd, 4th and 5th, both DIP(s): 1st and 2nd, both hip(s), both knee(s) and both MTP(s): 1st, 2nd, 3rd, 4th and 5th, is described as aching, pulsating, shooting and throbbing, and is constant, moderate .  WBC count dropped after surgery.    Review of Systems   Constitutional: Positive for activity change. Negative for appetite change and unexpected weight change.   HENT: Negative for dental problem, ear discharge, ear pain, facial swelling, mouth sores, nosebleeds, postnasal drip, rhinorrhea, sinus pressure, sneezing, tinnitus and voice change.    Eyes: Negative for photophobia, pain, discharge, redness and itching.   Respiratory: Negative for apnea, chest tightness, shortness of breath and wheezing.    Cardiovascular: Positive for leg swelling. Negative for palpitations.   Gastrointestinal: Negative for abdominal distention, constipation and diarrhea.   Endocrine: Negative for cold intolerance, heat intolerance, polydipsia and polyuria.   Genitourinary: Negative for decreased urine volume, difficulty urinating, flank pain, frequency, hematuria and urgency.   Musculoskeletal: Positive for back pain, gait problem, joint swelling, myalgias, neck pain and neck stiffness.   Skin: Negative for pallor and wound.   Allergic/Immunologic: Positive for immunocompromised state.   Neurological: Negative for dizziness and tremors.   Hematological: Negative for adenopathy. Does not bruise/bleed easily.  "  Psychiatric/Behavioral: Negative for sleep disturbance. The patient is not nervous/anxious.          Objective:   BP (!) 141/101 (BP Location: Left arm, Patient Position: Sitting, BP Method: Medium (Automatic))   Pulse 105   Ht 5' 3" (1.6 m)   Wt 74.8 kg (165 lb)   LMP 02/12/2017   BMI 29.23 kg/m²      Physical Exam   Nursing note and vitals reviewed.  Constitutional: She is oriented to person, place, and time. No distress.   HENT:   Head: Normocephalic and atraumatic.   Mouth/Throat: Oropharynx is clear and moist.   Eyes: EOM are normal. Pupils are equal, round, and reactive to light.   Neck: Neck supple. No thyromegaly present.   Cardiovascular: Normal rate, regular rhythm and normal heart sounds.  Exam reveals no gallop and no friction rub.    No murmur heard.  Pulmonary/Chest: Breath sounds normal. She has no wheezes. She has no rales. She exhibits no tenderness.   Abdominal: There is no tenderness. There is no rebound and no guarding.       Right Side Rheumatological Exam     Examination finds the elbow normal.    The patient is tender to palpation of the shoulder, wrist, knee, 1st PIP, 1st MCP, 2nd PIP, 2nd MCP, 3rd PIP, 3rd MCP, 4th PIP, 4th MCP, 5th PIP and 5th MCP    She has swelling of the 1st PIP, 1st MCP, 2nd PIP, 2nd MCP, 3rd PIP, 3rd MCP, 4th PIP, 4th MCP, 5th PIP and 5th MCP    Shoulder Exam   Tenderness Location: no tenderness    Range of Motion   Active abduction: abnormal   Adduction: abnormal  Sensation: normal    Knee Exam   Patellofemoral Crepitus: positive  Effusion: negative  Sensation: normal    Hip Exam   Tenderness Location: posterior  Sensation: normal    Elbow/Wrist Exam   Tenderness Location: no tenderness  Sensation: normal    Left Side Rheumatological Exam     Examination finds the elbow normal.    The patient is tender to palpation of the shoulder, wrist, knee, 1st PIP, 1st MCP, 2nd PIP, 2nd MCP, 3rd PIP, 3rd MCP, 4th PIP, 4th MCP, 5th PIP and 5th MCP.    She has swelling of " the 1st PIP, 1st MCP, 2nd PIP, 2nd MCP, 3rd PIP, 3rd MCP, 4th PIP, 4th MCP, 5th PIP and 5th MCP    Shoulder Exam   Tenderness Location: no tenderness    Range of Motion   Active abduction: abnormal   Sensation: normal    Knee Exam     Patellofemoral Crepitus: positive  Effusion: negative  Sensation: normal    Hip Exam   Tenderness Location: posterior  Sensation: normal    Elbow/Wrist Exam   Sensation: normal      Back/Neck Exam   General Inspection   Gait: normal       Tenderness Right paramedian tenderness of the Upper C-Spine, Upper T-Spine, Upper L-Spine and Lower L-Spine.Left paramedian tenderness of the Upper C-Spine, Lower C-Spine, Upper T-Spine, Lower T-Spine, Upper L-Spine and Lower L-Spine.    Neck Range of Motion   Flexion: Limited  Extension: Limited  Lymphadenopathy:     She has no cervical adenopathy.   Neurological: She is alert and oriented to person, place, and time. Gait normal.   Skin: No rash noted. No erythema. No pallor.     Psychiatric: Mood and affect normal.   Musculoskeletal: She exhibits tenderness and deformity.           Results for orders placed or performed during the hospital encounter of 07/19/19   CBC Without Differential   Result Value Ref Range    WBC 11.88 3.90 - 12.70 K/uL    RBC 4.77 4.00 - 5.40 M/uL    Hemoglobin 14.2 12.0 - 16.0 g/dL    Hematocrit 42.1 37.0 - 48.5 %    Mean Corpuscular Volume 88 82 - 98 fL    Mean Corpuscular Hemoglobin 29.8 27.0 - 31.0 pg    Mean Corpuscular Hemoglobin Conc 33.7 32.0 - 36.0 g/dL    RDW 13.5 11.5 - 14.5 %    Platelets 331 150 - 350 K/uL    MPV 11.4 9.2 - 12.9 fL   Basic metabolic panel   Result Value Ref Range    Sodium 140 136 - 145 mmol/L    Potassium 3.4 (L) 3.5 - 5.1 mmol/L    Chloride 103 95 - 110 mmol/L    CO2 29 22 - 31 mmol/L    Glucose 84 70 - 110 mg/dL    BUN, Bld 7 7 - 18 mg/dL    Creatinine 0.53 0.50 - 1.40 mg/dL    Calcium 9.6 8.4 - 10.2 mg/dL    Anion Gap 8 8 - 16 mmol/L    eGFR if African American >60 >60 mL/min/1.73 m^2    eGFR  if non African American >60 >60 mL/min/1.73 m^2   Type And Screen Preop   Result Value Ref Range    Group & Rh O POS     Indirect Chuck GEL NEG          Assessment:       1. Ankylosing spondylitis, unspecified site of spine    2. CHARLIE (juvenile idiopathic arthritis)    3. Sjogren's syndrome, with unspecified organ involvement            Plan:       Breanna was seen today for disease management and rheumatoid arthritis.    Diagnoses and all orders for this visit:    Ankylosing spondylitis, unspecified site of spine  -     Discontinue: HYDROcodone-acetaminophen (NORCO)  mg per tablet; Take 1 tablet by mouth every 8 (eight) hours as needed for Pain. Chronic pain more than 7 day required, medically necessary dx code G89.4  -     HYDROcodone-acetaminophen (NORCO)  mg per tablet; Take 1 tablet by mouth every 8 (eight) hours as needed for Pain. Chronic pain more than 7 day required, medically necessary dx code G89.4  -     CBC auto differential; Future  -     Comprehensive metabolic panel; Future  -     Sedimentation rate; Future  -     C-reactive protein; Future    CHARLIE (juvenile idiopathic arthritis)  -     Discontinue: HYDROcodone-acetaminophen (NORCO)  mg per tablet; Take 1 tablet by mouth every 8 (eight) hours as needed for Pain. Chronic pain more than 7 day required, medically necessary dx code G89.4  -     HYDROcodone-acetaminophen (NORCO)  mg per tablet; Take 1 tablet by mouth every 8 (eight) hours as needed for Pain. Chronic pain more than 7 day required, medically necessary dx code G89.4  -     CBC auto differential; Future  -     Comprehensive metabolic panel; Future  -     Sedimentation rate; Future  -     C-reactive protein; Future    Sjogren's syndrome, with unspecified organ involvement  -     Discontinue: HYDROcodone-acetaminophen (NORCO)  mg per tablet; Take 1 tablet by mouth every 8 (eight) hours as needed for Pain. Chronic pain more than 7 day required, medically necessary  dx code G89.4  -     HYDROcodone-acetaminophen (NORCO)  mg per tablet; Take 1 tablet by mouth every 8 (eight) hours as needed for Pain. Chronic pain more than 7 day required, medically necessary dx code G89.4  -     CBC auto differential; Future  -     Comprehensive metabolic panel; Future  -     Sedimentation rate; Future  -     C-reactive protein; Future    Other orders  -     naloxone (NARCAN) 4 mg/actuation Spry; 1 spray (4 mg total) by Nasal route once. for 1 dose  -     methylPREDNISolone acetate injection 160 mg  -     ketorolac injection 60 mg       She is cleared to restart her simponi aria infusion  Pain agreement.  The complexity of care for this patient was moderate to severe due to disease state

## 2019-08-13 NOTE — PROGRESS NOTES
Administered 2 cc ( 80 mg/ml ) of depomedrol to the right upper outer gluteal. Informed of s/s to report verbalized understanding. No adverse reactions noted.    Lot # [id number]  Expiration 12/20    Administered 2 cc ( 30 mg/ml ) of toradol to the left upper outer gluteal. Informed of s/s to report verbalized understanding. No adverse reactions noted.    Lot # [id number]  Expiration 1 jul 2020

## 2019-08-15 ENCOUNTER — INFUSION (OUTPATIENT)
Dept: INFUSION THERAPY | Facility: HOSPITAL | Age: 39
End: 2019-08-15
Attending: INTERNAL MEDICINE
Payer: COMMERCIAL

## 2019-08-15 VITALS
TEMPERATURE: 98 F | WEIGHT: 166.69 LBS | DIASTOLIC BLOOD PRESSURE: 85 MMHG | BODY MASS INDEX: 29.54 KG/M2 | HEIGHT: 63 IN | HEART RATE: 98 BPM | SYSTOLIC BLOOD PRESSURE: 129 MMHG | RESPIRATION RATE: 18 BRPM

## 2019-08-15 DIAGNOSIS — M45.5 ANKYLOSING SPONDYLITIS OF THORACOLUMBAR REGION: Primary | ICD-10-CM

## 2019-08-15 PROCEDURE — 25000003 PHARM REV CODE 250: Mod: PN | Performed by: INTERNAL MEDICINE

## 2019-08-15 PROCEDURE — 63600175 PHARM REV CODE 636 W HCPCS: Mod: PN | Performed by: INTERNAL MEDICINE

## 2019-08-15 PROCEDURE — 96365 THER/PROPH/DIAG IV INF INIT: CPT | Mod: PN

## 2019-08-15 PROCEDURE — A4216 STERILE WATER/SALINE, 10 ML: HCPCS | Mod: PN | Performed by: INTERNAL MEDICINE

## 2019-08-15 RX ORDER — SODIUM CHLORIDE 0.9 % (FLUSH) 0.9 %
10 SYRINGE (ML) INJECTION
Status: DISCONTINUED | OUTPATIENT
Start: 2019-08-15 | End: 2019-08-15 | Stop reason: HOSPADM

## 2019-08-15 RX ORDER — HEPARIN 100 UNIT/ML
500 SYRINGE INTRAVENOUS
Status: CANCELLED | OUTPATIENT
Start: 2019-08-29

## 2019-08-15 RX ORDER — ACETAMINOPHEN 325 MG/1
650 TABLET ORAL
Status: CANCELLED | OUTPATIENT
Start: 2019-08-29

## 2019-08-15 RX ORDER — DIPHENHYDRAMINE HYDROCHLORIDE 50 MG/ML
50 INJECTION INTRAMUSCULAR; INTRAVENOUS
Status: CANCELLED | OUTPATIENT
Start: 2019-08-29

## 2019-08-15 RX ORDER — ACETAMINOPHEN 325 MG/1
650 TABLET ORAL
Status: DISCONTINUED | OUTPATIENT
Start: 2019-08-15 | End: 2019-08-15 | Stop reason: HOSPADM

## 2019-08-15 RX ORDER — SODIUM CHLORIDE 0.9 % (FLUSH) 0.9 %
10 SYRINGE (ML) INJECTION
Status: CANCELLED | OUTPATIENT
Start: 2019-08-29

## 2019-08-15 RX ADMIN — Medication 10 ML: at 09:08

## 2019-08-15 RX ADMIN — GOLIMUMAB 150 MG: 50 SOLUTION INTRAVENOUS at 09:08

## 2019-08-15 RX ADMIN — ACETAMINOPHEN 650 MG: 325 TABLET, FILM COATED ORAL at 09:08

## 2019-08-15 RX ADMIN — SODIUM CHLORIDE: 9 INJECTION, SOLUTION INTRAVENOUS at 09:08

## 2019-08-15 NOTE — PLAN OF CARE
Problem: Adult Inpatient Plan of Care  Goal: Plan of Care Review  Outcome: Ongoing (interventions implemented as appropriate)  Tolerated Jesse Bill well, VSS, schedule reviewed with pt, ambulated from infusion center, no distress noted

## 2019-08-16 ENCOUNTER — PATIENT MESSAGE (OUTPATIENT)
Dept: RHEUMATOLOGY | Facility: CLINIC | Age: 39
End: 2019-08-16

## 2019-08-16 DIAGNOSIS — M45.9 ANKYLOSING SPONDYLITIS, UNSPECIFIED SITE OF SPINE: ICD-10-CM

## 2019-08-16 DIAGNOSIS — M08.80 JIA (JUVENILE IDIOPATHIC ARTHRITIS): ICD-10-CM

## 2019-08-16 DIAGNOSIS — M35.00 SJOGREN'S SYNDROME, WITH UNSPECIFIED ORGAN INVOLVEMENT: ICD-10-CM

## 2019-08-19 ENCOUNTER — PATIENT MESSAGE (OUTPATIENT)
Dept: FAMILY MEDICINE | Facility: CLINIC | Age: 39
End: 2019-08-19

## 2019-08-19 RX ORDER — HYDROCODONE BITARTRATE AND ACETAMINOPHEN 10; 325 MG/1; MG/1
1 TABLET ORAL EVERY 8 HOURS PRN
Qty: 90 TABLET | Refills: 0 | Status: SHIPPED | OUTPATIENT
Start: 2019-08-26 | End: 2019-09-25

## 2019-08-23 ENCOUNTER — TELEPHONE (OUTPATIENT)
Dept: RHEUMATOLOGY | Facility: CLINIC | Age: 39
End: 2019-08-23

## 2019-08-23 ENCOUNTER — PATIENT MESSAGE (OUTPATIENT)
Dept: RHEUMATOLOGY | Facility: CLINIC | Age: 39
End: 2019-08-23

## 2019-08-23 ENCOUNTER — CLINICAL SUPPORT (OUTPATIENT)
Dept: RHEUMATOLOGY | Facility: CLINIC | Age: 39
End: 2019-08-23
Payer: COMMERCIAL

## 2019-08-23 ENCOUNTER — IMMUNIZATION (OUTPATIENT)
Dept: FAMILY MEDICINE | Facility: CLINIC | Age: 39
End: 2019-08-23
Payer: COMMERCIAL

## 2019-08-23 VITALS — DIASTOLIC BLOOD PRESSURE: 106 MMHG | HEART RATE: 95 BPM | SYSTOLIC BLOOD PRESSURE: 159 MMHG

## 2019-08-23 DIAGNOSIS — M08.80 JIA (JUVENILE IDIOPATHIC ARTHRITIS): ICD-10-CM

## 2019-08-23 DIAGNOSIS — Z23 NEED FOR INFLUENZA VACCINATION: Primary | ICD-10-CM

## 2019-08-23 DIAGNOSIS — M35.00 SJOGREN'S SYNDROME, WITH UNSPECIFIED ORGAN INVOLVEMENT: ICD-10-CM

## 2019-08-23 DIAGNOSIS — M45.9 ANKYLOSING SPONDYLITIS, UNSPECIFIED SITE OF SPINE: Primary | ICD-10-CM

## 2019-08-23 PROCEDURE — 99999 PR PBB SHADOW E&M-EST. PATIENT-LVL V: ICD-10-PCS | Mod: PBBFAC,,,

## 2019-08-23 PROCEDURE — 99999 PR PBB SHADOW E&M-EST. PATIENT-LVL I: CPT | Mod: PBBFAC,,,

## 2019-08-23 PROCEDURE — 96372 PR INJECTION,THERAP/PROPH/DIAG2ST, IM OR SUBCUT: ICD-10-PCS | Mod: S$GLB,,, | Performed by: INTERNAL MEDICINE

## 2019-08-23 PROCEDURE — 90686 FLU VACCINE (QUAD) GREATER THAN OR EQUAL TO 3YO PRESERVATIVE FREE IM: ICD-10-PCS | Mod: S$GLB,,, | Performed by: FAMILY MEDICINE

## 2019-08-23 PROCEDURE — 90471 FLU VACCINE (QUAD) GREATER THAN OR EQUAL TO 3YO PRESERVATIVE FREE IM: ICD-10-PCS | Mod: S$GLB,,, | Performed by: FAMILY MEDICINE

## 2019-08-23 PROCEDURE — 96372 THER/PROPH/DIAG INJ SC/IM: CPT | Mod: S$GLB,,, | Performed by: INTERNAL MEDICINE

## 2019-08-23 PROCEDURE — 90686 IIV4 VACC NO PRSV 0.5 ML IM: CPT | Mod: S$GLB,,, | Performed by: FAMILY MEDICINE

## 2019-08-23 PROCEDURE — 99999 PR PBB SHADOW E&M-EST. PATIENT-LVL V: CPT | Mod: PBBFAC,,,

## 2019-08-23 PROCEDURE — 90471 IMMUNIZATION ADMIN: CPT | Mod: S$GLB,,, | Performed by: FAMILY MEDICINE

## 2019-08-23 PROCEDURE — 99999 PR PBB SHADOW E&M-EST. PATIENT-LVL I: ICD-10-PCS | Mod: PBBFAC,,,

## 2019-08-23 RX ORDER — DEXAMETHASONE SODIUM PHOSPHATE 4 MG/ML
4 INJECTION, SOLUTION INTRA-ARTICULAR; INTRALESIONAL; INTRAMUSCULAR; INTRAVENOUS; SOFT TISSUE
Status: COMPLETED | OUTPATIENT
Start: 2019-08-23 | End: 2019-08-23

## 2019-08-23 RX ORDER — KETOROLAC TROMETHAMINE 30 MG/ML
60 INJECTION, SOLUTION INTRAMUSCULAR; INTRAVENOUS
Status: COMPLETED | OUTPATIENT
Start: 2019-08-23 | End: 2019-08-23

## 2019-08-23 RX ADMIN — KETOROLAC TROMETHAMINE 60 MG: 30 INJECTION, SOLUTION INTRAMUSCULAR; INTRAVENOUS at 01:08

## 2019-08-23 RX ADMIN — DEXAMETHASONE SODIUM PHOSPHATE 4 MG: 4 INJECTION, SOLUTION INTRA-ARTICULAR; INTRALESIONAL; INTRAMUSCULAR; INTRAVENOUS; SOFT TISSUE at 01:08

## 2019-08-23 NOTE — PROGRESS NOTES
Pt presents to clinic for nurse visit. She reports she is s/p abdominal surgery with oophorectomy and has been off Simponi. She reports generalized pain 8/10 on pain scale. VORB for 4 mg decadron and 60 mg Toradol per Dr. Sawant. Pt regularly has high BP and advises she forgot to take her Procardia last night. Pt advises she will take her Procardia and we will follow up on her. Administered 1 cc dexamethasone 4mg/cc  to right upper outer gluteal. Pt tolerated well. No acute reaction noted to site. Pt instructed on S/S to report. Pt verbalized understanding.     Lot: [id number]  Exp:08/20  Administered 2 cc  Toradol 30mg/cc  to left upper outer gluteal. Pt tolerated well. No acute reaction noted to site. Pt instructed on S/S to report. Advised patient to wait in lobby 15 minutes after receiving injection to monitor for any reactions. Pt verbalized understanding.     Lot: [id number]  Exp: [id number]

## 2019-08-23 NOTE — TELEPHONE ENCOUNTER
Called pt to check to see if she took her bp medication and was doing better. She reports she did take her medication and is doing better. She inquired about the port placement referral. Advised referral has been sent to Dr. Springer and his nurse should be reaching out soon. No further questions.

## 2019-08-30 RX ORDER — POTASSIUM CHLORIDE 750 MG/1
TABLET, FILM COATED, EXTENDED RELEASE ORAL
Qty: 30 TABLET | Refills: 0 | Status: SHIPPED | OUTPATIENT
Start: 2019-08-30 | End: 2019-09-27

## 2019-09-18 ENCOUNTER — DOCUMENTATION ONLY (OUTPATIENT)
Dept: RHEUMATOLOGY | Facility: CLINIC | Age: 39
End: 2019-09-18

## 2019-09-18 ENCOUNTER — PATIENT MESSAGE (OUTPATIENT)
Dept: RHEUMATOLOGY | Facility: CLINIC | Age: 39
End: 2019-09-18

## 2019-09-18 ENCOUNTER — TELEPHONE (OUTPATIENT)
Dept: INFUSION THERAPY | Facility: HOSPITAL | Age: 39
End: 2019-09-18

## 2019-09-18 DIAGNOSIS — M54.5 CHRONIC BILATERAL LOW BACK PAIN, WITH SCIATICA PRESENCE UNSPECIFIED: ICD-10-CM

## 2019-09-18 DIAGNOSIS — M45.9 ANKYLOSING SPONDYLITIS, UNSPECIFIED SITE OF SPINE: Primary | ICD-10-CM

## 2019-09-18 DIAGNOSIS — G89.29 CHRONIC BILATERAL LOW BACK PAIN, WITH SCIATICA PRESENCE UNSPECIFIED: ICD-10-CM

## 2019-09-18 DIAGNOSIS — M08.80 JIA (JUVENILE IDIOPATHIC ARTHRITIS): ICD-10-CM

## 2019-09-18 NOTE — TELEPHONE ENCOUNTER
----- Message from Marii Bee sent at 9/18/2019  9:26 AM CDT -----  Caller: Patient                  Callback number: [phone]                       Reason: Patient request a call to reschedule her infusion apt on 10/10 to 10/09 if possible due to transportation. Please advise                 Thank you

## 2019-09-18 NOTE — TELEPHONE ENCOUNTER
Spoke with patient she needs to come on a Wednesday due to transportation we reschedule her appointment for  10/09/19

## 2019-09-22 ENCOUNTER — PATIENT MESSAGE (OUTPATIENT)
Dept: RHEUMATOLOGY | Facility: CLINIC | Age: 39
End: 2019-09-22

## 2019-09-23 ENCOUNTER — PATIENT MESSAGE (OUTPATIENT)
Dept: RHEUMATOLOGY | Facility: CLINIC | Age: 39
End: 2019-09-23

## 2019-09-25 ENCOUNTER — OFFICE VISIT (OUTPATIENT)
Dept: SURGERY | Facility: CLINIC | Age: 39
End: 2019-09-25
Payer: COMMERCIAL

## 2019-09-25 VITALS
SYSTOLIC BLOOD PRESSURE: 158 MMHG | HEIGHT: 63 IN | DIASTOLIC BLOOD PRESSURE: 99 MMHG | HEART RATE: 98 BPM | BODY MASS INDEX: 30.62 KG/M2 | TEMPERATURE: 98 F | WEIGHT: 172.81 LBS

## 2019-09-25 DIAGNOSIS — I87.8 POOR VENOUS ACCESS: Primary | ICD-10-CM

## 2019-09-25 PROCEDURE — 99999 PR PBB SHADOW E&M-EST. PATIENT-LVL III: ICD-10-PCS | Mod: PBBFAC,,, | Performed by: SURGERY

## 2019-09-25 PROCEDURE — 99243 OFF/OP CNSLTJ NEW/EST LOW 30: CPT | Mod: S$GLB,,, | Performed by: SURGERY

## 2019-09-25 PROCEDURE — 99243 PR OFFICE CONSULTATION,LEVEL III: ICD-10-PCS | Mod: S$GLB,,, | Performed by: SURGERY

## 2019-09-25 PROCEDURE — 99999 PR PBB SHADOW E&M-EST. PATIENT-LVL III: CPT | Mod: PBBFAC,,, | Performed by: SURGERY

## 2019-09-25 RX ORDER — NALOXONE HYDROCHLORIDE 4 MG/.1ML
1 SPRAY NASAL ONCE
Status: ON HOLD | COMMUNITY
Start: 2019-09-09 | End: 2023-01-11 | Stop reason: CLARIF

## 2019-09-25 NOTE — PATIENT INSTRUCTIONS
Surgery is scheduled for 10/2/19 arrival time will be given by the the preop nurse.  The preop nurse will call you from [phone]  Nothing to eat or drink after midnight.  Someone to drive you home.    THE PREOP NURSE WILL CALL, SOMETIMES AS LATE AS 4 or 5 PM IN THE AFTERNOON THE DAY BEFORE SURGERY.    Bathe the night before and the morning of your procedure with a Chlorhexidine wash such as Hibiclens, can be purchased at most Pharmacy's no prescription needed.    Special Instruction: none

## 2019-09-25 NOTE — LETTER
September 25, 2019      Michael Sawant MD  [address] [facility] [address]  [facility] [address]           [facility]  [address] [place] [address]  Phone: [phone]          Patient: Breanna Sanchez   MR Number: [id number]   YOB: 1980   Date of Visit: 9/25/2019       Dear Dr. Michael Sawant:    Thank you for referring Breanna Sanchez to me for evaluation. Attached you will find relevant portions of my assessment and plan of care.    If you have questions, please do not hesitate to call me. I look forward to following Breanna Sanchez along with you.    Sincerely,    Lenin Springer MD    Enclosure  CC:  No Recipients    If you would like to receive this communication electronically, please contact [email or web address] or [phone] to request more information on [employer] access.    For providers and/or their staff who would like to refer a patient to [facility], please contact us through our one-stop-shop provider referral line, [facility], at [phone].    If you feel you have received this communication in error or would no longer like to receive these types of communications, please e-mail [email or web address]

## 2019-09-25 NOTE — PROGRESS NOTES
Subjective:       Patient ID: Breanna Sanchez is a 39 y.o. female.    Chief Complaint: Consult (Port placement)    HPI  Pleasant 38 yo F referred to me in consultation from Dr Sawant for evaluation of port placement. Pt notes that she has ankylosing spondylosis and sjoegren sydnromes.  She received infusion therapy every 8 weeks.  Pt notes that she has poor venous access and that it is difficulty to obtain IV for treatment. She is referred for port.  She denies history of ESRD.  NO history of chest surgery.  Pt with no signficant PShx.  Her PMhx is also significant for fibromyalgia.    Review of Systems   Constitutional: Negative for activity change, appetite change, fever and unexpected weight change.   HENT: Negative for congestion and hearing loss.    Respiratory: Negative for chest tightness, shortness of breath and wheezing.    Cardiovascular: Negative for chest pain.   Gastrointestinal: Negative for abdominal distention, abdominal pain, diarrhea, nausea and vomiting.   Genitourinary: Negative for difficulty urinating, dysuria and frequency.   Musculoskeletal: Positive for arthralgias and joint swelling.   Skin: Negative for color change and wound.   Neurological: Negative for dizziness.   Hematological: Negative for adenopathy.   Psychiatric/Behavioral: Negative for agitation and decreased concentration.       Objective:      Physical Exam   Constitutional: She is oriented to person, place, and time. She appears well-developed and well-nourished.   HENT:   Head: Normocephalic and atraumatic.   Eyes: Pupils are equal, round, and reactive to light.   Neck: Normal range of motion. Neck supple. No tracheal deviation present. No thyromegaly present.   Cardiovascular: Normal rate, regular rhythm and normal heart sounds.   No murmur heard.  Pulmonary/Chest: Effort normal and breath sounds normal. She exhibits no tenderness.   Abdominal: Soft. Bowel sounds are normal. She exhibits no distension, no abdominal bruit, no  pulsatile midline mass and no mass. There is no hepatosplenomegaly. There is no tenderness. There is no rigidity, no rebound, no guarding, no tenderness at McBurney's point and negative Price's sign. No hernia. Hernia confirmed negative in the ventral area.   Genitourinary: Rectum normal.   Lymphadenopathy:     She has no cervical adenopathy.   Neurological: She is alert and oriented to person, place, and time.   Skin: Skin is warm. No rash noted. No erythema.   Psychiatric: She has a normal mood and affect. Her behavior is normal.   Vitals reviewed.      Assessment:     venous insufficiency  No diagnosis found.    Plan:       D/w pt.  I have explained risks and benefits of port procedure to the pt and have received informed consent.  Pt desires to proceed with port and will plan on placement on Wednesday.

## 2019-09-25 NOTE — Clinical Note
I saw your patient, Breanna Sanchez in the office.  Attached are my findings and plan.  Thank you for referring her to my office and if you have any questions please do not hesitate to call my cell [phone].Son Springer

## 2019-09-26 ENCOUNTER — PATIENT MESSAGE (OUTPATIENT)
Dept: FAMILY MEDICINE | Facility: CLINIC | Age: 39
End: 2019-09-26

## 2019-09-27 ENCOUNTER — OFFICE VISIT (OUTPATIENT)
Dept: FAMILY MEDICINE | Facility: CLINIC | Age: 39
End: 2019-09-27
Payer: COMMERCIAL

## 2019-09-27 VITALS
WEIGHT: 172.63 LBS | BODY MASS INDEX: 30.58 KG/M2 | SYSTOLIC BLOOD PRESSURE: 140 MMHG | OXYGEN SATURATION: 98 % | HEART RATE: 105 BPM | DIASTOLIC BLOOD PRESSURE: 90 MMHG

## 2019-09-27 DIAGNOSIS — D72.828 OTHER ELEVATED WHITE BLOOD CELL (WBC) COUNT: ICD-10-CM

## 2019-09-27 DIAGNOSIS — M45.5 ANKYLOSING SPONDYLITIS OF THORACOLUMBAR REGION: ICD-10-CM

## 2019-09-27 DIAGNOSIS — F17.200 TOBACCO DEPENDENCE: ICD-10-CM

## 2019-09-27 DIAGNOSIS — F32.4 MAJOR DEPRESSIVE DISORDER WITH SINGLE EPISODE, IN PARTIAL REMISSION: ICD-10-CM

## 2019-09-27 DIAGNOSIS — E87.6 HYPOKALEMIA: Primary | ICD-10-CM

## 2019-09-27 DIAGNOSIS — M05.79 RHEUMATOID ARTHRITIS INVOLVING MULTIPLE SITES WITH POSITIVE RHEUMATOID FACTOR: ICD-10-CM

## 2019-09-27 DIAGNOSIS — R94.31 ABNORMAL EKG: ICD-10-CM

## 2019-09-27 PROCEDURE — 3080F PR MOST RECENT DIASTOLIC BLOOD PRESSURE >= 90 MM HG: ICD-10-PCS | Mod: CPTII,S$GLB,, | Performed by: FAMILY MEDICINE

## 2019-09-27 PROCEDURE — 3077F PR MOST RECENT SYSTOLIC BLOOD PRESSURE >= 140 MM HG: ICD-10-PCS | Mod: CPTII,S$GLB,, | Performed by: FAMILY MEDICINE

## 2019-09-27 PROCEDURE — 99999 PR PBB SHADOW E&M-EST. PATIENT-LVL IV: ICD-10-PCS | Mod: PBBFAC,,, | Performed by: FAMILY MEDICINE

## 2019-09-27 PROCEDURE — 99999 PR PBB SHADOW E&M-EST. PATIENT-LVL IV: CPT | Mod: PBBFAC,,, | Performed by: FAMILY MEDICINE

## 2019-09-27 PROCEDURE — 3080F DIAST BP >= 90 MM HG: CPT | Mod: CPTII,S$GLB,, | Performed by: FAMILY MEDICINE

## 2019-09-27 PROCEDURE — 3077F SYST BP >= 140 MM HG: CPT | Mod: CPTII,S$GLB,, | Performed by: FAMILY MEDICINE

## 2019-09-27 PROCEDURE — 99214 OFFICE O/P EST MOD 30 MIN: CPT | Mod: S$GLB,,, | Performed by: FAMILY MEDICINE

## 2019-09-27 PROCEDURE — 3008F BODY MASS INDEX DOCD: CPT | Mod: CPTII,S$GLB,, | Performed by: FAMILY MEDICINE

## 2019-09-27 PROCEDURE — 3008F PR BODY MASS INDEX (BMI) DOCUMENTED: ICD-10-PCS | Mod: CPTII,S$GLB,, | Performed by: FAMILY MEDICINE

## 2019-09-27 PROCEDURE — 99214 PR OFFICE/OUTPT VISIT, EST, LEVL IV, 30-39 MIN: ICD-10-PCS | Mod: S$GLB,,, | Performed by: FAMILY MEDICINE

## 2019-09-27 RX ORDER — POTASSIUM CHLORIDE 20 MEQ/1
20 TABLET, EXTENDED RELEASE ORAL 2 TIMES DAILY
Qty: 30 TABLET | Refills: 2 | Status: SHIPPED | OUTPATIENT
Start: 2019-09-27 | End: 2019-09-27 | Stop reason: SDUPTHER

## 2019-09-27 RX ORDER — LIDOCAINE HYDROCHLORIDE 10 MG/ML
1 INJECTION, SOLUTION EPIDURAL; INFILTRATION; INTRACAUDAL; PERINEURAL ONCE
Status: CANCELLED | OUTPATIENT
Start: 2019-09-27 | End: 2019-09-27

## 2019-09-27 RX ORDER — SODIUM CHLORIDE 9 MG/ML
INJECTION, SOLUTION INTRAVENOUS CONTINUOUS
Status: CANCELLED | OUTPATIENT
Start: 2019-09-27

## 2019-09-27 RX ORDER — POTASSIUM CHLORIDE 20 MEQ/1
20 TABLET, EXTENDED RELEASE ORAL DAILY
Qty: 30 TABLET | Refills: 2 | Status: SHIPPED | OUTPATIENT
Start: 2019-09-27 | End: 2021-04-12

## 2019-09-27 NOTE — PATIENT INSTRUCTIONS
Eating Heart-Healthy Food: Using the DASH Plan    Eating for your heart doesnt have to be hard or boring. You just need to know how to make healthier choices. The DASH eating plan has been developed to help you do just that. DASH stands for Dietary Approaches to Stop Hypertension. It is a plan that has been proven to be healthier for your heart and to lower your risk for high blood pressure. It can also help lower your risk for cancer, heart disease, osteoporosis, and diabetes.  Choosing from each food group  Choose foods from each of the food groups below each day. Try to get the recommended number of servings for each food group. The serving numbers are based on a diet of 2,000 calories a day. Talk to your doctor if youre unsure about your calorie needs. Along with getting the correct servings, the DASH plan also recommends a sodium intake less than 2,300 mg per day.        Grains  Servings: 6 to 8 a day  A serving is:  · 1 slice bread  · 1 ounce dry cereal  · Half a cup cooked rice, pasta or cereal  Best choices: Whole grains and any grains high in fiber. Vegetables  Servings: 4 to 5 a day  A serving is:  · 1 cup raw leafy vegetable  · Half a cup cut-up raw or cooked vegetable  · Half a cup vegetable juice  Best choices: Fresh or frozen vegetables prepared without added salt or fat.   Fruits  Servings: 4 to 5 a day  A serving is:  · 1 medium fruit  · One-quarter cup dried fruit  · Half a cup fresh, frozen, or canned fruit  · Half a cup of 100% fruit juices  Best choices: A variety of fresh fruits of different colors. Whole fruits are a better choice than fruit juices. Low-fat or fat-free dairy  Servings: 2 to 3 a day  A serving is:  · 1 cup milk  · 1 cup yogurt  · One and a half ounces cheese  Best choices: Skim or 1% milk, low-fat or fat-free yogurt or buttermilk, and low-fat cheeses.         Lean meats, poultry, fish  Servings: 6 or fewer a day  A serving is:  · 1 ounce cooked meats, poultry, or fish  · 1  egg  Best choices: Lean poultry and fish. Trim away visible fat. Broil, grill, roast, or boil instead of frying. Remove skin from poultry before eating. Limit how much red meat you eat.  Nuts, seeds, beans  Servings: 4 to 5 a week  A serving is:  · One-third cup nuts (one and a half ounces)  · 2 tablespoons nut butter or seeds  · Half a cup cooked dry beans or legumes  Best choices: Dry roasted nuts with no salt added, lentils, kidney beans, garbanzo beans, and whole summers beans.   Fats and oils  Servings: 2 to 3 a day  A serving is:  · 1 teaspoon vegetable oil  · 1 teaspoon soft margarine  · 1 tablespoon mayonnaise  · 2 tablespoons salad dressing  Best choices: Nut and vegetable oils (nontropical vegetable oils), such as olive and canola oil. Sweets  Servings: 5 a week or fewer  A serving is:  · 1 tablespoon sugar, maple syrup, or honey  · 1 tablespoon jam or jelly  · 1 half-ounce jelly beans (about 15)  · 1 cup lemonade  Best choices: Dried fruit can be a satisfying sweet. Choose low-fat sweets. And watch your serving sizes!      For more on the DASH eating plan, visit:  www.nhlbi.nih.gov/health/health-topics/topics/dash   Date Last Reviewed: 6/1/2016  © 5197-4108 [employer]. [address] [place] [address], [place] [address]. All rights reserved. This information is not intended as a substitute for professional medical care. Always follow your healthcare professional's instructions.

## 2019-09-30 ENCOUNTER — TELEPHONE (OUTPATIENT)
Dept: FAMILY MEDICINE | Facility: CLINIC | Age: 39
End: 2019-09-30

## 2019-09-30 ENCOUNTER — CLINICAL SUPPORT (OUTPATIENT)
Dept: FAMILY MEDICINE | Facility: CLINIC | Age: 39
End: 2019-09-30
Payer: COMMERCIAL

## 2019-09-30 ENCOUNTER — LAB VISIT (OUTPATIENT)
Dept: LAB | Facility: HOSPITAL | Age: 39
End: 2019-09-30
Attending: FAMILY MEDICINE
Payer: COMMERCIAL

## 2019-09-30 DIAGNOSIS — E87.6 HYPOKALEMIA: ICD-10-CM

## 2019-09-30 DIAGNOSIS — D72.828 OTHER ELEVATED WHITE BLOOD CELL (WBC) COUNT: ICD-10-CM

## 2019-09-30 DIAGNOSIS — R94.31 ABNORMAL EKG: Primary | ICD-10-CM

## 2019-09-30 LAB
ANION GAP SERPL CALC-SCNC: 11 MMOL/L (ref 8–16)
BASOPHILS # BLD AUTO: 0.07 K/UL (ref 0–0.2)
BASOPHILS NFR BLD: 0.5 % (ref 0–1.9)
BUN SERPL-MCNC: 9 MG/DL (ref 6–20)
CALCIUM SERPL-MCNC: 10 MG/DL (ref 8.7–10.5)
CHLORIDE SERPL-SCNC: 99 MMOL/L (ref 95–110)
CO2 SERPL-SCNC: 30 MMOL/L (ref 23–29)
CREAT SERPL-MCNC: 0.7 MG/DL (ref 0.5–1.4)
DIFFERENTIAL METHOD: ABNORMAL
EOSINOPHIL # BLD AUTO: 0.1 K/UL (ref 0–0.5)
EOSINOPHIL NFR BLD: 0.9 % (ref 0–8)
ERYTHROCYTE [DISTWIDTH] IN BLOOD BY AUTOMATED COUNT: 14.2 % (ref 11.5–14.5)
EST. GFR  (AFRICAN AMERICAN): >60 ML/MIN/1.73 M^2
EST. GFR  (NON AFRICAN AMERICAN): >60 ML/MIN/1.73 M^2
GIANT PLATELETS BLD QL SMEAR: PRESENT
GLUCOSE SERPL-MCNC: 103 MG/DL (ref 70–110)
HCT VFR BLD AUTO: 46.3 % (ref 37–48.5)
HGB BLD-MCNC: 14.7 G/DL (ref 12–16)
IMM GRANULOCYTES # BLD AUTO: 0.09 K/UL (ref 0–0.04)
IMM GRANULOCYTES NFR BLD AUTO: 0.6 % (ref 0–0.5)
LYMPHOCYTES # BLD AUTO: 4 K/UL (ref 1–4.8)
LYMPHOCYTES NFR BLD: 28.6 % (ref 18–48)
MCH RBC QN AUTO: 29.8 PG (ref 27–31)
MCHC RBC AUTO-ENTMCNC: 31.7 G/DL (ref 32–36)
MCV RBC AUTO: 94 FL (ref 82–98)
MONOCYTES # BLD AUTO: 1.1 K/UL (ref 0.3–1)
MONOCYTES NFR BLD: 7.5 % (ref 4–15)
NEUTROPHILS # BLD AUTO: 8.7 K/UL (ref 1.8–7.7)
NEUTROPHILS NFR BLD: 61.9 % (ref 38–73)
NRBC BLD-RTO: 0 /100 WBC
PLATELET # BLD AUTO: 369 K/UL (ref 150–350)
PMV BLD AUTO: 10.4 FL (ref 9.2–12.9)
POTASSIUM SERPL-SCNC: 3.6 MMOL/L (ref 3.5–5.1)
RBC # BLD AUTO: 4.94 M/UL (ref 4–5.4)
SODIUM SERPL-SCNC: 140 MMOL/L (ref 136–145)
WBC # BLD AUTO: 14.03 K/UL (ref 3.9–12.7)

## 2019-09-30 PROCEDURE — 80048 BASIC METABOLIC PNL TOTAL CA: CPT

## 2019-09-30 PROCEDURE — 85025 COMPLETE CBC W/AUTO DIFF WBC: CPT

## 2019-09-30 PROCEDURE — 93010 ELECTROCARDIOGRAM REPORT: CPT | Mod: S$GLB,,, | Performed by: INTERNAL MEDICINE

## 2019-09-30 PROCEDURE — 36415 COLL VENOUS BLD VENIPUNCTURE: CPT | Mod: PO

## 2019-09-30 PROCEDURE — 99211 OFF/OP EST MAY X REQ PHY/QHP: CPT | Mod: S$GLB,,, | Performed by: FAMILY MEDICINE

## 2019-09-30 PROCEDURE — 99211 PR OFFICE/OUTPT VISIT, EST, LEVL I: ICD-10-PCS | Mod: S$GLB,,, | Performed by: FAMILY MEDICINE

## 2019-09-30 PROCEDURE — 93005 ELECTROCARDIOGRAM TRACING: CPT | Mod: S$GLB,,, | Performed by: FAMILY MEDICINE

## 2019-09-30 PROCEDURE — 93005 EKG 12-LEAD: ICD-10-PCS | Mod: S$GLB,,, | Performed by: FAMILY MEDICINE

## 2019-09-30 PROCEDURE — 93010 EKG 12-LEAD: ICD-10-PCS | Mod: S$GLB,,, | Performed by: INTERNAL MEDICINE

## 2019-10-01 ENCOUNTER — PATIENT MESSAGE (OUTPATIENT)
Dept: FAMILY MEDICINE | Facility: CLINIC | Age: 39
End: 2019-10-01

## 2019-10-01 ENCOUNTER — ANESTHESIA EVENT (OUTPATIENT)
Dept: SURGERY | Facility: HOSPITAL | Age: 39
End: 2019-10-01
Payer: COMMERCIAL

## 2019-10-01 RX ORDER — HYDROCODONE BITARTRATE AND ACETAMINOPHEN 10; 325 MG/1; MG/1
1 TABLET ORAL
Status: ON HOLD | COMMUNITY
End: 2019-10-02 | Stop reason: HOSPADM

## 2019-10-01 NOTE — TELEPHONE ENCOUNTER
Patient is feeling better, if any new symptoms develop, she will let me know, EKGs improved from previous.

## 2019-10-02 ENCOUNTER — HOSPITAL ENCOUNTER (OUTPATIENT)
Dept: RADIOLOGY | Facility: HOSPITAL | Age: 39
Discharge: HOME | End: 2019-10-02
Attending: SURGERY | Admitting: SURGERY
Payer: COMMERCIAL

## 2019-10-02 ENCOUNTER — ANESTHESIA (OUTPATIENT)
Dept: SURGERY | Facility: HOSPITAL | Age: 39
End: 2019-10-02
Payer: MEDICARE

## 2019-10-02 ENCOUNTER — HOSPITAL ENCOUNTER (OUTPATIENT)
Dept: RADIOLOGY | Facility: HOSPITAL | Age: 39
Discharge: HOME | End: 2019-10-02
Attending: SURGERY
Payer: COMMERCIAL

## 2019-10-02 ENCOUNTER — HOSPITAL ENCOUNTER (OUTPATIENT)
Facility: HOSPITAL | Age: 39
Discharge: HOME | End: 2019-10-02
Attending: SURGERY | Admitting: SURGERY
Payer: COMMERCIAL

## 2019-10-02 DIAGNOSIS — I87.8 POOR VENOUS ACCESS: ICD-10-CM

## 2019-10-02 DIAGNOSIS — Z95.828 PORT-A-CATH IN PLACE: ICD-10-CM

## 2019-10-02 PROCEDURE — S0028 INJECTION, FAMOTIDINE, 20 MG: HCPCS | Mod: PO | Performed by: ANESTHESIOLOGY

## 2019-10-02 PROCEDURE — 77001 FLUOROGUIDE FOR VEIN DEVICE: CPT | Mod: 26,,, | Performed by: SURGERY

## 2019-10-02 PROCEDURE — D9220A PRA ANESTHESIA: Mod: ANES,,, | Performed by: ANESTHESIOLOGY

## 2019-10-02 PROCEDURE — 63600175 PHARM REV CODE 636 W HCPCS: Mod: PO | Performed by: NURSE ANESTHETIST, CERTIFIED REGISTERED

## 2019-10-02 PROCEDURE — 37000009 HC ANESTHESIA EA ADD 15 MINS: Mod: PO | Performed by: SURGERY

## 2019-10-02 PROCEDURE — 76000 FLUOROSCOPY <1 HR PHYS/QHP: CPT | Mod: TC,PO

## 2019-10-02 PROCEDURE — 71000015 HC POSTOP RECOV 1ST HR: Mod: PO | Performed by: SURGERY

## 2019-10-02 PROCEDURE — 25000003 PHARM REV CODE 250: Mod: PO | Performed by: ANESTHESIOLOGY

## 2019-10-02 PROCEDURE — D9220A PRA ANESTHESIA: ICD-10-PCS | Mod: ANES,,, | Performed by: ANESTHESIOLOGY

## 2019-10-02 PROCEDURE — D9220A PRA ANESTHESIA: Mod: CRNA,,, | Performed by: NURSE ANESTHETIST, CERTIFIED REGISTERED

## 2019-10-02 PROCEDURE — 71000033 HC RECOVERY, INTIAL HOUR: Mod: PO | Performed by: SURGERY

## 2019-10-02 PROCEDURE — 71045 XR CHEST AP PORTABLE: ICD-10-PCS | Mod: 26,,, | Performed by: RADIOLOGY

## 2019-10-02 PROCEDURE — 25000003 PHARM REV CODE 250: Mod: PO | Performed by: SURGERY

## 2019-10-02 PROCEDURE — 71045 X-RAY EXAM CHEST 1 VIEW: CPT | Mod: 26,,, | Performed by: RADIOLOGY

## 2019-10-02 PROCEDURE — 63600175 PHARM REV CODE 636 W HCPCS: Mod: PO | Performed by: SURGERY

## 2019-10-02 PROCEDURE — 77001 FLUOROGUIDE FOR VEIN DEVICE: CPT | Mod: TC,PO

## 2019-10-02 PROCEDURE — 36000707: Mod: PO | Performed by: SURGERY

## 2019-10-02 PROCEDURE — D9220A PRA ANESTHESIA: ICD-10-PCS | Mod: CRNA,,, | Performed by: NURSE ANESTHETIST, CERTIFIED REGISTERED

## 2019-10-02 PROCEDURE — 71045 X-RAY EXAM CHEST 1 VIEW: CPT | Mod: TC,FY,PO

## 2019-10-02 PROCEDURE — 36000706: Mod: PO | Performed by: SURGERY

## 2019-10-02 PROCEDURE — C1788 PORT, INDWELLING, IMP: HCPCS | Mod: PO | Performed by: SURGERY

## 2019-10-02 PROCEDURE — 36561 PR INSERT TUNNELED CV CATH WITH PORT: ICD-10-PCS | Mod: LT,,, | Performed by: SURGERY

## 2019-10-02 PROCEDURE — 37000008 HC ANESTHESIA 1ST 15 MINUTES: Mod: PO | Performed by: SURGERY

## 2019-10-02 PROCEDURE — 77001 CHG FLUOROGUIDE CNTRL VEN ACCESS,PLACE,REPLACE,REMOVE: ICD-10-PCS | Mod: 26,,, | Performed by: SURGERY

## 2019-10-02 PROCEDURE — 36561 INSERT TUNNELED CV CATH: CPT | Mod: LT,,, | Performed by: SURGERY

## 2019-10-02 PROCEDURE — 63600175 PHARM REV CODE 636 W HCPCS: Mod: PO | Performed by: ANESTHESIOLOGY

## 2019-10-02 DEVICE — KIT POWERPORT SINGLE 8FR: Type: IMPLANTABLE DEVICE | Site: CHEST | Status: FUNCTIONAL

## 2019-10-02 RX ORDER — PROPOFOL 10 MG/ML
VIAL (ML) INTRAVENOUS CONTINUOUS PRN
Status: DISCONTINUED | OUTPATIENT
Start: 2019-10-02 | End: 2019-10-02

## 2019-10-02 RX ORDER — SCOLOPAMINE TRANSDERMAL SYSTEM 1 MG/1
1 PATCH, EXTENDED RELEASE TRANSDERMAL
Status: DISCONTINUED | OUTPATIENT
Start: 2019-10-02 | End: 2019-10-02 | Stop reason: HOSPADM

## 2019-10-02 RX ORDER — LIDOCAINE HCL/PF 100 MG/5ML
SYRINGE (ML) INTRAVENOUS
Status: DISCONTINUED | OUTPATIENT
Start: 2019-10-02 | End: 2019-10-02

## 2019-10-02 RX ORDER — HEPARIN 100 UNIT/ML
SYRINGE INTRAVENOUS
Status: DISCONTINUED | OUTPATIENT
Start: 2019-10-02 | End: 2019-10-02 | Stop reason: HOSPADM

## 2019-10-02 RX ORDER — MIDAZOLAM HYDROCHLORIDE 1 MG/ML
INJECTION, SOLUTION INTRAMUSCULAR; INTRAVENOUS
Status: DISCONTINUED | OUTPATIENT
Start: 2019-10-02 | End: 2019-10-02

## 2019-10-02 RX ORDER — LIDOCAINE HYDROCHLORIDE 10 MG/ML
1 INJECTION, SOLUTION EPIDURAL; INFILTRATION; INTRACAUDAL; PERINEURAL ONCE
Status: DISCONTINUED | OUTPATIENT
Start: 2019-10-02 | End: 2019-10-02 | Stop reason: HOSPADM

## 2019-10-02 RX ORDER — FENTANYL CITRATE 50 UG/ML
INJECTION, SOLUTION INTRAMUSCULAR; INTRAVENOUS
Status: DISCONTINUED | OUTPATIENT
Start: 2019-10-02 | End: 2019-10-02

## 2019-10-02 RX ORDER — SODIUM CHLORIDE 9 MG/ML
INJECTION, SOLUTION INTRAVENOUS CONTINUOUS
Status: DISCONTINUED | OUTPATIENT
Start: 2019-10-02 | End: 2019-10-02

## 2019-10-02 RX ORDER — FENTANYL CITRATE 50 UG/ML
25 INJECTION, SOLUTION INTRAMUSCULAR; INTRAVENOUS EVERY 5 MIN PRN
Status: DISCONTINUED | OUTPATIENT
Start: 2019-10-02 | End: 2019-10-02 | Stop reason: HOSPADM

## 2019-10-02 RX ORDER — OXYCODONE HYDROCHLORIDE 5 MG/1
5 TABLET ORAL
Status: DISCONTINUED | OUTPATIENT
Start: 2019-10-02 | End: 2019-10-02 | Stop reason: HOSPADM

## 2019-10-02 RX ORDER — BUPIVACAINE HCL/EPINEPHRINE 0.25-.0005
VIAL (ML) INJECTION
Status: DISCONTINUED | OUTPATIENT
Start: 2019-10-02 | End: 2019-10-02 | Stop reason: HOSPADM

## 2019-10-02 RX ORDER — LIDOCAINE HYDROCHLORIDE 10 MG/ML
1 INJECTION, SOLUTION EPIDURAL; INFILTRATION; INTRACAUDAL; PERINEURAL ONCE
Status: DISCONTINUED | OUTPATIENT
Start: 2019-10-02 | End: 2019-10-02

## 2019-10-02 RX ORDER — HYDROCODONE BITARTRATE AND ACETAMINOPHEN 5; 325 MG/1; MG/1
1 TABLET ORAL EVERY 6 HOURS PRN
Qty: 15 TABLET | Refills: 0 | Status: SHIPPED | OUTPATIENT
Start: 2019-10-02 | End: 2019-12-12 | Stop reason: ALTCHOICE

## 2019-10-02 RX ORDER — HYDROCODONE BITARTRATE AND ACETAMINOPHEN 5; 325 MG/1; MG/1
1 TABLET ORAL EVERY 4 HOURS PRN
Status: DISCONTINUED | OUTPATIENT
Start: 2019-10-02 | End: 2019-10-02 | Stop reason: HOSPADM

## 2019-10-02 RX ORDER — LIDOCAINE HYDROCHLORIDE 10 MG/ML
1 INJECTION, SOLUTION EPIDURAL; INFILTRATION; INTRACAUDAL; PERINEURAL ONCE
Status: COMPLETED | OUTPATIENT
Start: 2019-10-02 | End: 2019-10-02

## 2019-10-02 RX ORDER — ONDANSETRON 2 MG/ML
4 INJECTION INTRAMUSCULAR; INTRAVENOUS EVERY 12 HOURS PRN
Status: DISCONTINUED | OUTPATIENT
Start: 2019-10-02 | End: 2019-10-02 | Stop reason: HOSPADM

## 2019-10-02 RX ORDER — PROPOFOL 10 MG/ML
VIAL (ML) INTRAVENOUS
Status: DISCONTINUED | OUTPATIENT
Start: 2019-10-02 | End: 2019-10-02

## 2019-10-02 RX ORDER — SODIUM CHLORIDE, SODIUM LACTATE, POTASSIUM CHLORIDE, CALCIUM CHLORIDE 600; 310; 30; 20 MG/100ML; MG/100ML; MG/100ML; MG/100ML
INJECTION, SOLUTION INTRAVENOUS CONTINUOUS
Status: DISCONTINUED | OUTPATIENT
Start: 2019-10-02 | End: 2019-10-02 | Stop reason: HOSPADM

## 2019-10-02 RX ORDER — HYDROMORPHONE HYDROCHLORIDE 2 MG/ML
0.2 INJECTION, SOLUTION INTRAMUSCULAR; INTRAVENOUS; SUBCUTANEOUS EVERY 5 MIN PRN
Status: DISCONTINUED | OUTPATIENT
Start: 2019-10-02 | End: 2019-10-02 | Stop reason: HOSPADM

## 2019-10-02 RX ORDER — FAMOTIDINE 10 MG/ML
20 INJECTION INTRAVENOUS ONCE
Status: COMPLETED | OUTPATIENT
Start: 2019-10-02 | End: 2019-10-02

## 2019-10-02 RX ORDER — SODIUM CHLORIDE 9 MG/ML
INJECTION, SOLUTION INTRAVENOUS CONTINUOUS
Status: DISCONTINUED | OUTPATIENT
Start: 2019-10-02 | End: 2019-10-02 | Stop reason: HOSPADM

## 2019-10-02 RX ORDER — CEFAZOLIN SODIUM 2 G/50ML
2 SOLUTION INTRAVENOUS
Status: DISCONTINUED | OUTPATIENT
Start: 2019-10-02 | End: 2019-10-02 | Stop reason: HOSPADM

## 2019-10-02 RX ORDER — ACETAMINOPHEN 10 MG/ML
INJECTION, SOLUTION INTRAVENOUS
Status: DISCONTINUED | OUTPATIENT
Start: 2019-10-02 | End: 2019-10-02

## 2019-10-02 RX ADMIN — OXYCODONE HYDROCHLORIDE 5 MG: 5 TABLET ORAL at 10:10

## 2019-10-02 RX ADMIN — PROPOFOL 50 MG: 10 INJECTION, EMULSION INTRAVENOUS at 09:10

## 2019-10-02 RX ADMIN — SCOPALAMINE 1 PATCH: 1 PATCH, EXTENDED RELEASE TRANSDERMAL at 10:10

## 2019-10-02 RX ADMIN — FENTANYL CITRATE 25 MCG: 50 INJECTION, SOLUTION INTRAMUSCULAR; INTRAVENOUS at 09:10

## 2019-10-02 RX ADMIN — FENTANYL CITRATE 25 MCG: 50 INJECTION INTRAMUSCULAR; INTRAVENOUS at 10:10

## 2019-10-02 RX ADMIN — PROPOFOL 200 MCG/KG/MIN: 10 INJECTION, EMULSION INTRAVENOUS at 09:10

## 2019-10-02 RX ADMIN — LIDOCAINE HYDROCHLORIDE 100 MG: 20 INJECTION PARENTERAL at 08:10

## 2019-10-02 RX ADMIN — FAMOTIDINE 20 MG: 10 INJECTION INTRAVENOUS at 09:10

## 2019-10-02 RX ADMIN — ACETAMINOPHEN 1000 MG: 10 INJECTION, SOLUTION INTRAVENOUS at 09:10

## 2019-10-02 RX ADMIN — FENTANYL CITRATE 50 MCG: 50 INJECTION, SOLUTION INTRAMUSCULAR; INTRAVENOUS at 09:10

## 2019-10-02 RX ADMIN — FENTANYL CITRATE 50 MCG: 50 INJECTION, SOLUTION INTRAMUSCULAR; INTRAVENOUS at 08:10

## 2019-10-02 RX ADMIN — MIDAZOLAM HYDROCHLORIDE 2 MG: 1 INJECTION, SOLUTION INTRAMUSCULAR; INTRAVENOUS at 08:10

## 2019-10-02 RX ADMIN — LIDOCAINE HYDROCHLORIDE 10 MG: 10 INJECTION, SOLUTION EPIDURAL; INFILTRATION; INTRACAUDAL; PERINEURAL at 08:10

## 2019-10-02 RX ADMIN — SODIUM CHLORIDE, SODIUM LACTATE, POTASSIUM CHLORIDE, AND CALCIUM CHLORIDE: .6; .31; .03; .02 INJECTION, SOLUTION INTRAVENOUS at 08:10

## 2019-10-02 RX ADMIN — MIDAZOLAM HYDROCHLORIDE 2 MG: 1 INJECTION, SOLUTION INTRAMUSCULAR; INTRAVENOUS at 09:10

## 2019-10-02 RX ADMIN — PROPOFOL 50 MG: 10 INJECTION, EMULSION INTRAVENOUS at 08:10

## 2019-10-02 NOTE — BRIEF OP NOTE
[facility]  Brief Operative Note     SUMMARY     Surgery Date: 10/2/2019     Surgeon(s) and Role:     * Lenin Springer MD - Primary    Assisting Surgeon: None    Pre-op Diagnosis:  Poor venous access [I87.8]    Post-op Diagnosis:  Post-Op Diagnosis Codes:     * Poor venous access [I87.8]    Procedure(s) (LRB):  [id number]-CATH (N/A)    Anesthesia: General/MAC    Description of the findings of the procedure: Placement of L subclavian port a cath.  Two percutaneous sticks required.      Findings/Key Components: see above.    Estimated Blood Loss: 10 cc's       Specimens:   Specimen (12h ago, onward)    None          Discharge Note    SUMMARY     Admit Date: 10/2/2019    Discharge Date and Time:  10/02/2019 9:49 AM    Hospital Course (synopsis of major diagnoses, care, treatment, and services provided during the course of the hospital stay): Pt presented for port placement.  Procedure and post op course were without event and pt was discharged to home.         Final Diagnosis: Post-Op Diagnosis Codes:     * Poor venous access [I87.8]    Disposition: Home or Self Care    Follow Up/Patient Instructions:     Medications:  Reconciled Home Medications:      Medication List      START taking these medications    HYDROcodone-acetaminophen 5-325 mg per tablet  Commonly known as:  NORCO  Take 1 tablet by mouth every 6 (six) hours as needed for Pain.  Replaces:  HYDROcodone-acetaminophen  mg per tablet        STOP taking these medications    HYDROcodone-acetaminophen  mg per tablet  Commonly known as:  NORCO  Replaced by:  HYDROcodone-acetaminophen 5-325 mg per tablet        ASK your doctor about these medications    ALBUTEROL INHL  Inhale 2 puffs into the lungs 2 (two) times daily as needed. USE AND BRING TO HOSPITAL DAY OF SURGERY     clonazePAM 1 MG tablet  Commonly known as:  KLONOPIN  TAKE ONE TABLET BY MOUTH EVERY NIGHT AS NEEDED  USE AS USUAL     cyanocobalamin 1,000 mcg/mL  injection  Inject 1 mL (1,000 mcg total) into the muscle every 28 days.     dextroamphetamine-amphetamine 30 mg Tab  Take 1 tablet by mouth 2 (two) times daily. HOLD AM OF SURGERY     DUEXIS 800-26.6 mg Tab  Generic drug:  ibuprofen-famotidine  Take 1 tablet by mouth every 8 (eight) hours as needed. HOLD AM OF SURGERY     EPIPEN INJ  Inject 1 application as directed continuous prn.     ergocalciferol 50,000 unit Cap  Commonly known as:  ERGOCALCIFEROL  Take 1 capsule (50,000 Units total) by mouth every 7 days.     estradiol 1 MG tablet  Commonly known as:  ESTRACE  Take 1 mg by mouth once daily.     hydroxychloroquine 200 mg tablet  Commonly known as:  PLAQUENIL  Take 200 mg by mouth 2 (two) times daily. TAKE AM OF SURGERY WITH A SIP OF WATER     levocetirizine 5 MG tablet  Commonly known as:  XYZAL  Take 5 mg by mouth every evening. USE AS USUAL     montelukast 10 mg tablet  Commonly known as:  SINGULAIR  Take 1 tablet (10 mg total) by mouth every evening.     NARCAN 4 mg/actuation Spry  Generic drug:  naloxone  as needed.     neomycin-polymyxin-dexamethasone 3.5mg/mL-10,000 unit/mL-0.1 % Drps  Commonly known as:  MAXITROL  1 drop every 3 (three) hours.     NIFEdipine 30 MG (OSM) 24 hr tablet  Commonly known as:  PROCARDIA-XL  Take 1 tablet (30 mg total) by mouth every evening.     ondansetron 8 MG tablet  Commonly known as:  ZOFRAN  Take 8 mg by mouth every 8 (eight) hours. IF NEEDED TAKE AM OF SURGERY WITH A SIP OF WATER     pilocarpine 5 MG Tab  Commonly known as:  SALAGEN  Take 5 mg by mouth 3 (three) times daily. HOLD AM OF SURGERY     potassium chloride SA 20 MEQ tablet  Commonly known as:  K-DUR,KLOR-CON  Take 1 tablet (20 mEq total) by mouth once daily.     predniSONE 5 MG tablet  Commonly known as:  DELTASONE  Take 5 mg by mouth.     PREVIDENT 5000 DRY MOUTH 1.1 % Gel  Generic drug:  fluoride (sodium)  Brush teeth at night FOR TWO MINUTES and spit out. Do not rinse FOR 30 MINUTES     PRISTIQ 100 MG  Tb24  Generic drug:  desvenlafaxine succinate  Take 100 mg by mouth once daily. TAKE AM OF SURGERY WITH A SIP OF WATER     promethazine 25 MG tablet  Commonly known as:  PHENERGAN  Take 1 tablet (25 mg total) by mouth every 8 (eight) hours as needed for Nausea.     sodium chloride 0.9% SolP 100 mL with golimumab 12.5 mg/mL Soln 2 mg/kg  Inject 2 mg/kg into the vein every 8 weeks.     topiramate 100 MG tablet  Commonly known as:  TOPAMAX  TAKE 1 & 1/2 TABLETS BY MOUTH EVERY EVENING  USE AS USUAL     UNABLE TO FIND  IMMUNOTHERAPY ALLERGY SHOTS WEEKLY          Discharge Procedure Orders   Diet general     Call MD for:  extreme fatigue     Call MD for:  persistent dizziness or light-headedness     Call MD for:  hives     Call MD for:  redness, tenderness, or signs of infection (pain, swelling, redness, odor or green/yellow discharge around incision site)     Call MD for:  difficulty breathing, headache or visual disturbances     Call MD for:  severe uncontrolled pain     Call MD for:  persistent nausea and vomiting     Call MD for:  temperature >100.4     Remove dressing in 48 hours     Activity as tolerated     Follow-up Information     Lenin Springer MD In 2 weeks.    Specialties:  General Surgery, Colon and Rectal Surgery, Surgery  Contact information:  [address] [place] [address] [phone]

## 2019-10-02 NOTE — INTERVAL H&P NOTE
The patient has been examined and the H&P has been reviewed:    I concur with the findings and no changes have occurred since H&P was written.    Anesthesia/Surgery risks, benefits and alternative options discussed and understood by patient/family.          Active Hospital Problems    Diagnosis  POA    Poor venous access [I87.8]  Yes      Resolved Hospital Problems   No resolved problems to display.

## 2019-10-02 NOTE — PLAN OF CARE
Patient tolerating oral liquids without difficulty. No apparent s&s of distress noted at this time.States pain 2/10 to left chest. Dressing C,D, I to left chest. Power port instruction manual given to pt. Discharge instructions reviewed with patient and pts spouse with good verbal feedback received. Patient ready for discharge

## 2019-10-02 NOTE — ANESTHESIA POSTPROCEDURE EVALUATION
Anesthesia Post Evaluation    Patient: Breanna Sanchez    Procedure(s) Performed: Procedure(s) (LRB):  [id number] (N/A)    Final Anesthesia Type: MAC  Patient location during evaluation: PACU  Patient participation: Yes- Able to Participate  Level of consciousness: awake and alert and awake  Post-procedure vital signs: reviewed and stable  Pain management: adequate  Airway patency: patent  PONV status at discharge: No PONV  Anesthetic complications: no      Cardiovascular status: blood pressure returned to baseline  Respiratory status: spontaneous ventilation  Hydration status: euvolemic  Follow-up not needed.          Vitals Value Taken Time   /77 10/2/2019  9:34 AM   Temp 36.5 °C (97.7 °F) 10/2/2019  9:34 AM   Pulse 100 10/2/2019  9:34 AM   Resp 16 10/2/2019  9:34 AM   SpO2 98 % 10/2/2019  9:34 AM         No case tracking events are documented in the log.      Pain/Lynn Score: Lynn Score: 10 (10/2/2019  9:34 AM)

## 2019-10-02 NOTE — OP NOTE
DATE OF PROCEDURE:  10/02/2019    STAFF SURGEON:  Lenin Springer M.D.    PREOPERATIVE DIAGNOSIS:  Rheumatoid arthritis, Sjogren's disease.    POSTOPERATIVE DIAGNOSES:  Rheumatoid arthritis, Sjogren's disease.    PROCEDURE:  Placement of left subclavian Port-A-Cath.    ANESTHESIA:  Monitored anesthesia.    INDICATION FOR PROCEDURE:  This is a pleasant 39-year-old female with   significant autoimmune disease requiring infusional therapy.  The patient was in   need of port for IV access and was scheduled for this procedure on the   above-mentioned date.    DESCRIPTION OF PROCEDURE:  Following signing of informed consent, the patient   was taken to the OR and placed in supine position.  SCDs were placed.  A   shoulder roll was placed.  Both arms were tucked.  Monitored anesthesia was   administered without event.  Chest and neck were prepped bilaterally and   appropriate timeout procedure was performed.  I obtained access to the left   subclavian vein.  This did require two percutaneous sticks.  On the first stick,   I do have venous return of blood and there was no arterial pulsation noted;   however, on fluoroscopy, wire is not seen crossing the midline.  I suspect that   this is in lymphatic system, but decision was made to remove the wire and   reattempt.  On the second stick, I gained venous access again.  Fluoroscopy   confirms wire crossing the midline and this was more consistent with a central   venous access.  I then made an incision incorporating the insertion site into my   incision, carried down to deep dermal tissue down to the fascia of the   pectoralis muscle raising a flap inferiorly to easily hold the port.  I then   took the dilator tunneling sheath passing it over the guidewire under   fluoroscopic visualization.  I removed the guidewire as well as the dilator and   I passed a catheter through the tunneling sheath and removed the tunneling   sheath.  I then cut the distal tip of the catheter and  secured the catheter to   the port and secured the port down to the chest wall with 2-0 Vicryl sutures.  I   ensured adequate hemostasis.  I then closed the wound in two layers with 3-0   Vicryl and 4-0 Monocryl.  The patient was then awakened and taken to Recovery   Room in stable condition.  There were no immediate complications.  Blood loss   was approximately 15 mL.        STEPHANE  dd: 10/02/2019 09:53:13 (CDT)  td: 10/02/2019 15:15:16 (CDT)  Doc ID   #[id number]  Job ID #[id number]    CC:

## 2019-10-02 NOTE — ANESTHESIA PREPROCEDURE EVALUATION
10/02/2019  Breanna Sanchez is a 39 y.o., female.    Anesthesia Evaluation    I have reviewed the Patient Summary Reports.    I have reviewed the Nursing Notes.   I have reviewed the Medications.     Review of Systems  Anesthesia Hx:  PONV   EENT/Dental:   chronic allergic rhinitis   Cardiovascular:   Exercise tolerance: good Hypertension    Pulmonary:  Pulmonary Normal    Renal/:  Renal/ Normal     Hepatic/GI:  Hepatic/GI Normal Dry mouth, no esophagus dysmotility   Musculoskeletal:   Arthritis     Endocrine:   sjogren's syndrome, SLE, Ankylosing spondylitis   Psych:   anxiety          Physical Exam  General:  Well nourished    Airway/Jaw/Neck:  Airway Findings: Mouth Opening: Normal Tongue: Normal  General Airway Assessment: Adult  Mallampati: II  Improves to II with phonation.  Jaw/Neck Findings:  Neck ROM: Normal ROM      Dental:  Dental Findings: In tact   Chest/Lungs:  Chest/Lungs Findings: Clear to auscultation, Normal Respiratory Rate         Mental Status:  Mental Status Findings:  Cooperative, Alert and Oriented, Anxious         Anesthesia Plan  Type of Anesthesia, risks & benefits discussed:  Anesthesia Type:  general  Patient's Preference:   Intra-op Monitoring Plan: standard ASA monitors  Intra-op Monitoring Plan Comments:   Post Op Pain Control Plan: IV/PO Opioids PRN  Post Op Pain Control Plan Comments:   Induction:   Inhalation and IV  Beta Blocker:  Patient is not currently on a Beta-Blocker (No further documentation required).       Informed Consent: Patient understands risks and agrees with Anesthesia plan.  Questions answered. Anesthesia consent signed with patient.  ASA Score: 2     Day of Surgery Review of History & Physical:            Ready For Surgery From Anesthesia Perspective.

## 2019-10-02 NOTE — ADDENDUM NOTE
Addendum  created 10/02/19 0955 by Clarke Purvis MD    Order list changed, Order sets accessed

## 2019-10-02 NOTE — DISCHARGE INSTRUCTIONS
WOUND CARE    After Surgery    DOS:   May shower in 48 hours   May remove outer dressing in 48 hours. Leave steri-strips in place. If steri-strips get wet, pat dry. If they fall off, do not worry.   Minimal activity for 48 hours.   Advance diet as tolerated.   Resume home medications as prescribed    DONT:   Do not soak in the tub   No driving for 24 hours or while taking narcotic pain medication   DO NOT TAKE ADDITIONAL TYLENOL/ACETAMINOPHEN WHILE TAKING NARCOTIC PAIN MEDICATION THAT CONTAINS TYLENOL/ACETAMINOPHEN.    CALL PHYSICIAN FOR:   Redness, swelling or bleeding.   Fever greater then 101.   Drainage from the incision site that appears infected.   Persistent pain not relieved by pain medication.    RETURN APPOINTMENT: Call to schedule appointment in 10-14 days    FOR EMERGENCIES: Contact your doctor at [phone]      Discharge Instructions: After Your Surgery  Youve just had surgery. During surgery, you were given medicine called anesthesia to keep you relaxed and free of pain. After surgery, you may have some pain or nausea. This is common. Here are some tips for feeling better and getting well after surgery.     Stay on schedule with your medicine.   Going home  Your healthcare provider will show you how to take care of yourself when you go home. He or she will also answer your questions. Have an adult family member or friend drive you home. For the first 24 hours after your surgery:  · Do not drive or use heavy equipment.  · Do not make important decisions or sign legal papers.  · Do not drink alcohol.  · Have someone stay with you, if needed. He or she can watch for problems and help keep you safe.  Be sure to go to all follow-up visits with your healthcare provider. And rest after your surgery for as long as your healthcare provider tells you to.  Coping with pain  If you have pain after surgery, pain medicine will help you feel better. Take it as told, before pain becomes severe.  Also, ask your healthcare provider or pharmacist about other ways to control pain. This might be with heat, ice, or relaxation. And follow any other instructions your surgeon or nurse gives you.  Tips for taking pain medicine  To get the best relief possible, remember these points:  · Pain medicines can upset your stomach. Taking them with a little food may help.  · Most pain relievers taken by mouth need at least 20 to 30 minutes to start to work.  · Taking medicine on a schedule can help you remember to take it. Try to time your medicine so that you can take it before starting an activity. This might be before you get dressed, go for a walk, or sit down for dinner.  · Constipation is a common side effect of pain medicines. Call your healthcare provider before taking any medicines such as laxatives or stool softeners to help ease constipation. Also ask if you should skip any foods. Drinking lots of fluids and eating foods such as fruits and vegetables that are high in fiber can also help. Remember, do not take laxatives unless your surgeon has prescribed them.  · Drinking alcohol and taking pain medicine can cause dizziness and slow your breathing. It can even be deadly. Do not drink alcohol while taking pain medicine.  · Pain medicine can make you react more slowly to things. Do not drive or run machinery while taking pain medicine.  Your healthcare provider may tell you to take acetaminophen to help ease your pain. Ask him or her how much you are supposed to take each day. Acetaminophen or other pain relievers may interact with your prescription medicines or other over-the-counter (OTC) medicines. Some prescription medicines have acetaminophen and other ingredients. Using both prescription and OTC acetaminophen for pain can cause you to overdose. Read the labels on your OTC medicines with care. This will help you to clearly know the list of ingredients, how much to take, and any warnings. It may also help you not  take too much acetaminophen. If you have questions or do not understand the information, ask your pharmacist or healthcare provider to explain it to you before you take the OTC medicine.  Managing nausea  Some people have an upset stomach after surgery. This is often because of anesthesia, pain, or pain medicine, or the stress of surgery. These tips will help you handle nausea and eat healthy foods as you get better. If you were on a special food plan before surgery, ask your healthcare provider if you should follow it while you get better. These tips may help:  · Do not push yourself to eat. Your body will tell you when to eat and how much.  · Start off with clear liquids and soup. They are easier to digest.  · Next try semi-solid foods, such as mashed potatoes, applesauce, and gelatin, as you feel ready.  · Slowly move to solid foods. Dont eat fatty, rich, or spicy foods at first.  · Do not force yourself to have 3 large meals a day. Instead eat smaller amounts more often.  · Take pain medicines with a small amount of solid food, such as crackers or toast, to avoid nausea.     Call your surgeon if  · You still have pain an hour after taking medicine. The medicine may not be strong enough.  · You feel too sleepy, dizzy, or groggy. The medicine may be too strong.  · You have side effects like nausea, vomiting, or skin changes, such as rash, itching, or hives.       If you have obstructive sleep apnea  You were given anesthesia medicine during surgery to keep you comfortable and free of pain. After surgery, you may have more apnea spells because of this medicine and other medicines you were given. The spells may last longer than usual.   At home:  · Keep using the continuous positive airway pressure (CPAP) device when you sleep. Unless your healthcare provider tells you not to, use it when you sleep, day or night. CPAP is a common device used to treat obstructive sleep apnea.  · Talk with your provider before  taking any pain medicine, muscle relaxants, or sedatives. Your provider will tell you about the possible dangers of taking these medicines.  Date Last Reviewed: 12/1/2016  © 2543-9376 [employer]. [address] [place] [address], [place] [address]. All rights reserved. This information is not intended as a substitute for professional medical care. Always follow your healthcare professional's instructions.

## 2019-10-02 NOTE — TRANSFER OF CARE
"Anesthesia Transfer of Care Note    Patient: Breanna Sanchez    Procedure(s) Performed: Procedure(s) (LRB):  [id number] (N/A)    Patient location: PACU    Anesthesia Type: MAC    Transport from OR: Transported from OR on room air with adequate spontaneous ventilation    Post pain: adequate analgesia    Post assessment: no apparent anesthetic complications    Post vital signs: stable    Level of consciousness: awake, alert and oriented    Nausea/Vomiting: no nausea/vomiting    Complications: none    Transfer of care protocol was followed      Last vitals:   Visit Vitals  /88 (BP Location: Right arm, Patient Position: Sitting)   Pulse 94   Temp 36.6 °C (97.9 °F) (Skin)   Resp 16   Ht 5' 3" (1.6 m)   Wt 77.1 kg (170 lb)   LMP 02/12/2017   SpO2 98%   Breastfeeding? No   BMI 30.11 kg/m²     "

## 2019-10-03 VITALS
HEIGHT: 63 IN | WEIGHT: 170 LBS | SYSTOLIC BLOOD PRESSURE: 123 MMHG | HEART RATE: 91 BPM | OXYGEN SATURATION: 99 % | RESPIRATION RATE: 20 BRPM | DIASTOLIC BLOOD PRESSURE: 76 MMHG | BODY MASS INDEX: 30.12 KG/M2 | TEMPERATURE: 98 F

## 2019-10-07 NOTE — PROGRESS NOTES
Subjective:       Patient ID: Breanna Sanchez is a 39 y.o. female.    Chief Complaint: Cough; Back Pain (top back); and Hospital Follow Up    HPI     The patient is coming here today for follow-up, she went to the emergency room secondary to having pain on the back and had a EKG done which was abnormal, the patient is unsure if she needs to see a cardiologist for this.  The patient is coming here for assessment.  The patient still smoking cigarettes daily, she does not want to quit at this time, she drinks Cokes every day, she does not healthy.  The patient is going to have a procedure for a port placement with Dr. baird and wants to make sure that she can have the procedure with the EKG abnormal.    Depression:  The patient is currently Pristiq and taking also clonazepam.  She is stable on current medications.    Back pain:  Complains of pain on the back, has ankylosis spondylitis, the patient stated that he is not able to exercise much due to the pain on the back.  She also complained of pain on the thoracic area when she coughs.    Upon review of the last blood work, the patient has increase of white blood cell count.  Potassium levels also were low.    Past medical history, past social history was reviewed and discussed with the patient.    Review of Systems   Constitutional: Positive for fatigue. Negative for activity change and unexpected weight change.   HENT: Negative for hearing loss, rhinorrhea and trouble swallowing.    Eyes: Negative for discharge and visual disturbance.   Respiratory: Negative for chest tightness and wheezing.    Cardiovascular: Negative for chest pain and palpitations.   Gastrointestinal: Negative for blood in stool, constipation, diarrhea and vomiting.   Endocrine: Negative for polydipsia and polyuria.   Genitourinary: Negative for difficulty urinating, dysuria, hematuria and menstrual problem.   Musculoskeletal: Positive for arthralgias and back pain. Negative for joint swelling and  neck pain.   Neurological: Negative for weakness and headaches.   Psychiatric/Behavioral: Positive for dysphoric mood. Negative for confusion. The patient is nervous/anxious.        Objective:      Physical Exam   Constitutional: She appears well-developed and well-nourished. She appears distressed.   HENT:   Head: Normocephalic and atraumatic.   Right Ear: External ear normal.   Left Ear: External ear normal.   Nose: Nose normal.   Mouth/Throat: Oropharynx is clear and moist. No oropharyngeal exudate.   Eyes: Pupils are equal, round, and reactive to light. Conjunctivae are normal. Right eye exhibits no discharge. Left eye exhibits no discharge. No scleral icterus.   Neck: Neck supple. No tracheal deviation present. No thyromegaly present.   Cardiovascular: Normal rate, regular rhythm and normal heart sounds.   No murmur heard.  Pulmonary/Chest: Effort normal and breath sounds normal. No respiratory distress. She has no wheezes.   Tenderness to palpation on the left thoracic area   Abdominal: She exhibits no distension. There is tenderness.   Musculoskeletal: She exhibits no tenderness or deformity.   Lymphadenopathy:     She has no cervical adenopathy.   Neurological: No cranial nerve deficit. Coordination normal.   Skin: She is not diaphoretic. No erythema. No pallor.   Psychiatric: She has a normal mood and affect. Her behavior is normal. Judgment and thought content normal.   Nursing note and vitals reviewed.      Assessment:       1. Hypokalemia    2. Ankylosing spondylitis of thoracolumbar region    3. Rheumatoid arthritis involving multiple sites with positive rheumatoid factor    4. Major depressive disorder with single episode, in partial remission    5. Other elevated white blood cell (WBC) count    6. Abnormal EKG        Plan:       Hypokalemia:  New problem workup needed  -     potassium chloride SA (K-DUR,KLOR-CON) 20 MEQ tablet; Take 1 tablet (20 mEq total) by mouth once daily.  Dispense: 30 tablet;  Refill: 2  -     Basic metabolic panel; Future; Expected date: 09/27/2019    Ankylosing spondylitis of thoracolumbar region:  Worsening  -     Ambulatory Referral to Physical/Occupational Therapy    Rheumatoid arthritis involving multiple sites with positive rheumatoid factor:  Stable  -     Ambulatory Referral to Physical/Occupational Therapy    Major depressive disorder with single episode, in partial remission:  Stable    Other elevated white blood cell (WBC) count:  New problem workup needed  -     CBC auto differential; Future; Expected date: 09/27/2019    Abnormal EKG:  New problem, workup needed  -     Cancel: IN OFFICE EKG 12-LEAD (to Muse)  -     IN OFFICE EKG 12-LEAD (to Muse)    Tobacco dependence:  Worsening    Healthy habits, avoid fried foods, red meat and processed starches, drink plenty water, taking potassium daily as directed, the patient was advised to avoid drinking cokes and stop smoking, the patient is in the pre contemplation phase.  I spent 25 min in this encounter, from this time more than 50% of the time was spent in counseling and plan of care for this patient.The patient's BMI has been recorded in the chart. The patient has been provided educational materials regarding the benefits of attaining and maintaining a normal weight. We will continue to address and follow this issue during follow up visits.  The patient will return for a EKG tomorrow.

## 2019-10-08 ENCOUNTER — PATIENT MESSAGE (OUTPATIENT)
Dept: RHEUMATOLOGY | Facility: CLINIC | Age: 39
End: 2019-10-08

## 2019-10-08 ENCOUNTER — PATIENT MESSAGE (OUTPATIENT)
Dept: SURGERY | Facility: CLINIC | Age: 39
End: 2019-10-08

## 2019-10-08 RX ORDER — LIDOCAINE AND PRILOCAINE 25; 25 MG/G; MG/G
CREAM TOPICAL
Qty: 30 G | Refills: 3 | Status: ON HOLD | OUTPATIENT
Start: 2019-10-08 | End: 2023-01-11 | Stop reason: CLARIF

## 2019-10-08 NOTE — TELEPHONE ENCOUNTER
Scheduled f/u , no soaking full 10 day post-op, Emala Cream should be filled by the infusing physician.

## 2019-10-09 ENCOUNTER — INFUSION (OUTPATIENT)
Dept: INFUSION THERAPY | Facility: HOSPITAL | Age: 39
End: 2019-10-09
Attending: INTERNAL MEDICINE
Payer: COMMERCIAL

## 2019-10-09 VITALS
TEMPERATURE: 98 F | DIASTOLIC BLOOD PRESSURE: 89 MMHG | BODY MASS INDEX: 30.16 KG/M2 | RESPIRATION RATE: 18 BRPM | HEART RATE: 105 BPM | SYSTOLIC BLOOD PRESSURE: 135 MMHG | HEIGHT: 63 IN | WEIGHT: 170.19 LBS

## 2019-10-09 DIAGNOSIS — M45.5 ANKYLOSING SPONDYLITIS OF THORACOLUMBAR REGION: Primary | ICD-10-CM

## 2019-10-09 PROCEDURE — 96365 THER/PROPH/DIAG IV INF INIT: CPT | Mod: PN

## 2019-10-09 PROCEDURE — 25000003 PHARM REV CODE 250: Mod: PN | Performed by: INTERNAL MEDICINE

## 2019-10-09 PROCEDURE — A4216 STERILE WATER/SALINE, 10 ML: HCPCS | Mod: PN | Performed by: INTERNAL MEDICINE

## 2019-10-09 PROCEDURE — 63600175 PHARM REV CODE 636 W HCPCS: Mod: PN | Performed by: INTERNAL MEDICINE

## 2019-10-09 RX ORDER — HEPARIN 100 UNIT/ML
500 SYRINGE INTRAVENOUS
Status: DISCONTINUED | OUTPATIENT
Start: 2019-10-09 | End: 2019-10-09 | Stop reason: HOSPADM

## 2019-10-09 RX ORDER — HEPARIN 100 UNIT/ML
500 SYRINGE INTRAVENOUS
Status: CANCELLED | OUTPATIENT
Start: 2019-10-23

## 2019-10-09 RX ORDER — DIPHENHYDRAMINE HYDROCHLORIDE 50 MG/ML
50 INJECTION INTRAMUSCULAR; INTRAVENOUS
Status: CANCELLED | OUTPATIENT
Start: 2019-10-23

## 2019-10-09 RX ORDER — SODIUM CHLORIDE 0.9 % (FLUSH) 0.9 %
10 SYRINGE (ML) INJECTION
Status: CANCELLED | OUTPATIENT
Start: 2019-10-23

## 2019-10-09 RX ORDER — SODIUM CHLORIDE 0.9 % (FLUSH) 0.9 %
10 SYRINGE (ML) INJECTION
Status: DISCONTINUED | OUTPATIENT
Start: 2019-10-09 | End: 2019-10-09 | Stop reason: HOSPADM

## 2019-10-09 RX ORDER — ACETAMINOPHEN 325 MG/1
650 TABLET ORAL
Status: CANCELLED | OUTPATIENT
Start: 2019-10-23

## 2019-10-09 RX ORDER — ACETAMINOPHEN 325 MG/1
650 TABLET ORAL
Status: DISCONTINUED | OUTPATIENT
Start: 2019-10-09 | End: 2019-10-09 | Stop reason: HOSPADM

## 2019-10-09 RX ADMIN — SODIUM CHLORIDE: 9 INJECTION, SOLUTION INTRAVENOUS at 11:10

## 2019-10-09 RX ADMIN — Medication 10 ML: at 12:10

## 2019-10-09 RX ADMIN — GOLIMUMAB 150 MG: 50 SOLUTION INTRAVENOUS at 11:10

## 2019-10-09 RX ADMIN — ACETAMINOPHEN 650 MG: 325 TABLET, FILM COATED ORAL at 11:10

## 2019-10-09 RX ADMIN — HEPARIN 500 UNITS: 100 SYRINGE at 12:10

## 2019-10-09 NOTE — PLAN OF CARE
Pt tolerated Simponi well today. Vitals remain stable. AVS printed. Reviewed follow-up appointments. All questions were answered, ambulated independently at d/c.

## 2019-10-11 ENCOUNTER — CLINICAL SUPPORT (OUTPATIENT)
Dept: REHABILITATION | Facility: HOSPITAL | Age: 39
End: 2019-10-11
Attending: FAMILY MEDICINE
Payer: COMMERCIAL

## 2019-10-11 DIAGNOSIS — G89.29 CHRONIC MIDLINE LOW BACK PAIN WITHOUT SCIATICA: ICD-10-CM

## 2019-10-11 DIAGNOSIS — M53.86 DECREASED ROM OF LUMBAR SPINE: ICD-10-CM

## 2019-10-11 DIAGNOSIS — M54.50 CHRONIC MIDLINE LOW BACK PAIN WITHOUT SCIATICA: ICD-10-CM

## 2019-10-11 PROCEDURE — 97162 PT EVAL MOD COMPLEX 30 MIN: CPT | Mod: PO

## 2019-10-11 PROCEDURE — 97110 THERAPEUTIC EXERCISES: CPT | Mod: PO

## 2019-10-11 NOTE — PLAN OF CARE
[facility]  Physical Therapy Initial Evaluation    Name: Breanna Sanchez  Clinic Number: [id number]    Therapy Diagnosis:   Encounter Diagnoses   Name Primary?    Chronic midline low back pain without sciatica     Decreased ROM of lumbar spine      Physician: Roxanna Borja MD    Physician Orders: PT Eval and Treat Aquatic Therapy  Medical Diagnosis from Referral: Rheumatoid arthritis involving multiple sites with positive rheumatoid factor; Ankylosing spondylitis of thoracolumbar region  Evaluation Date: 10/11/2019  Authorization Period Expiration: 09/26/2020  Plan of Care Expiration: 12/13/2019  Visit # / Visits authorized: 1/ 1    Time In: 1003  Time Out: 1055  Total Billable Time: 52 minutes    Precautions: Standard    Subjective   Date of onset: Originally 2012, diagnosed with ankylosing spondylitis in 2017  History of current condition - Breanna reports: She reports began having low back pain in 2012 and was diagnosed with ankylosing spondylitis in 2017. She reports she attempted therapy before, but she was unable to afford the co-pay or tolerate land-based exercises. She reports she had to stop her infusions over the summer for a surgery and she began to have an increase in back pain. She reports increased pain with prolonged sitting, standing, sleeping, and walking. Breanna reports she is significantly limited with performing ADLs due to increased pain. She reports no numbness or tingling down the legs. She reports no prior history of back surgeries.      Medical History:   Past Medical History:   Diagnosis Date    Diarrhea     Fibromyalgia     History of acute bronchitis     History of kidney stones     History of UTI     IgG2 deficiency     Lupus     Rheumatoid arteritis     Sjoegren syndrome     Spondylitis     Spondylosis        Surgical History:   Breanna Sanchez  has a past surgical history that includes Knee arthroscopy (Left); Total shoulder arthroplasty  (Right); Salpingoophorectomy (Right); Appendectomy; Tonsillectomy; Hysterectomy; Ovarian cyst removal (Left); Dilation of urethra; Tubal ligation; Bartholin gland cyst excision; Laparoscopic salpingo-oophorectomy (Left, 7/22/2019); Laparoscopic lysis of adhesions (N/A, 7/22/2019); and Insertion of tunneled central venous catheter (CVC) with subcutaneous port (N/A, 10/2/2019).    Medications:   Breanna has a current medication list which includes the following prescription(s): albuterol, clonazepam, cyanocobalamin, dextroamphetamine-amphetamine, epinephrine, ergocalciferol, estradiol, fluoride (sodium), hydrocodone-acetaminophen, hydroxychloroquine, ibuprofen-famotidine, levocetirizine, lidocaine-prilocaine, montelukast, narcan, neomycin-polymyxin-dexamethasone, nifedipine, ondansetron, pilocarpine, potassium chloride sa, prednisone, pristiq, promethazine, sodium chloride 0.9% SolP 100 mL with golimumab 12.5 mg/mL Soln 2 mg/kg, topiramate, and UNABLE TO FIND, and the following Facility-Administered Medications: acetaminophen, acetaminophen, diphenhydramine 50 mg in ns 50 ml, diphenhydramine, sodium chloride 0.9% 100 mL flush bag, sodium chloride 0.9%, sodium chloride 0.9%, and sodium chloride 0.9%.    Allergies:   Review of patient's allergies indicates:   Allergen Reactions    Seroquel [quetiapine] Anaphylaxis    Aleve [naproxen sodium]     Sulfa (sulfonamide antibiotics)     Tramadol      seizure    Miralax [polyethylene glycol 3350] Rash        Imaging, X-ray: The lumbar vertebral bodies maintain normal height and alignment.  There is no significant disc space narrowing.  There is only mild facet joint arthropathy at the lower 2 lumbar levels.  The bones may be mildly osteopenic.  There is mild sacroiliac joint sclerosis.  There phleboliths in the pelvis or in the paraspinous soft tissues are normal    X-ray (Thoracic): There is very mild disc space narrowing and end plate osteophyte formation in the mid  "thoracic spine without acute fracture or malalignment.  The paraspinous soft tissues are normal.    Prior Therapy: Yes, for her shoulder and low back. Unable to perform exercises for her back on land.   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, goes to doctors visit, likes to garden   Prior Level of Function: Was doing well with new infusions, able to do some gardening over the summer  Current Level of Function: Pain with all ADLS, prolonged standing, sitting walking, difficulty sleeping      Pain:  Current 4/10, worst 8/10, best 3/10   Location: bilateral back   Description: Sharp and Deep  Aggravating Factors: Sitting, Standing, Bending, Walking, Morning, Extension and Lifting  Easing Factors: relaxation, ice and rest    Pts goals: To learn techniques for pain relief, strengthening     Red Flag Screening:   Cough  Sneeze  Strain: (--)  Bladder/ bowel: (--)  Falls: (--)  Night pain: (--)  Unexplained weight loss: (--)  General health: Rheumatoid arthritis, Lupus, HTN, Fibromyalgia    Objective     Observation: Patient in no acute distress, visible skin intact    Posture:  Sits with increased kyphosis, slouched posture    Lumbar Range of Motion:    % limited Pain   Flexion 50%   Mild pain        Extension 95%    Increased pain        Left Side Bending 25% No change in pain        Right Side Bending 25% No change in pain        Left rotation   50% "Stiff"        Right Rotation   25% "Feels good"             Lower Extremity Strength  Right LE  Left LE    Knee extension: 4+/5 Knee extension: 4+/5   Knee flexion: 4/5 Knee flexion: 4/5   Hip flexion: 4-/5 Hip flexion: 4/5   Hip extension:  4-/5 Hip extension: 4-/5   Hip abduction: 4/5 Hip abduction: 4/5   Ankle dorsiflexion: 4+/5 Ankle dorsiflexion: 4+/5   Ankle plantarflexion: 4+/5 Ankle plantarflexion: 4+/5       -SLS (L) : 10s  - SLS (R): 8s  - JILLIAN: Negative (B)  - FADIR: Negative (B)  - SCOUR: " Increased hip and back pain (B)    DTR:   Right Left Comment   Patellar (L3-4) 1+ 1+    Achilles (S1) 2+ 2+        Neuro Dynamic Testing:    Sciatic nerve:      SLR: R = Negative     L = Negative        Slump:  (B) Negative        Femoral Nerve:    Femoral nerve test: Negative      Joint Mobility: Hypomobile lumbar spine, unable to assess PA due to pain with palpation    Palpation: Severe TTP at (B) lumbar paraspinals, spinous process of T12 - S2, mild ttp at (B) thoracic paraspinals, (B) glutes      Sensation: Intact to LT L3-S2, no numbness or tingling in feet / legs reported    Flexibility: Limited with (L) hip ER, (R) hip IR    Ely's test: R = No limitation; L = No limitation    Hamstring test: R= Mild limitation; L = Mild limitation       CMS Impairment/Limitation/Restriction for FOTO Thoracic Survey    Therapist reviewed FOTO scores for Breanna Sanchez on 10/11/2019.   FOTO documents entered into [employer] - see Media section.    Limitation Score: 57%  Category: Mobility         TREATMENT   Treatment Time In: 1045  Treatment Time Out: 1055  Total Treatment time separate from Evaluation: 10 minutes    Breanna received therapeutic exercises to develop ROM and flexibility for 10 minutes including:  Piriformis stretch 2 x 30s  DKTC x 20 (green ball)   LTR x 20 each    Home Exercises and Patient Education Provided    Education provided:   - Role of PT, PT POC  - Aquatic Therapy  - HEP    Written Home Exercises Provided: yes.  Exercises were reviewed and Breanna was able to demonstrate them prior to the end of the session.  Breanna demonstrated good  understanding of the education provided.     See EMR under Media for exercises provided 10/11/2019.    Assessment   Breanna is a 39 y.o. female referred to outpatient Physical Therapy with a medical diagnosis of Rheumatoid arthritis involving multiple sites with positive rheumatoid factor; Ankylosing spondylitis of thoracolumbar region. Physical exam is consistent  with medical diagnosis. She is most limited with lumbar extension, hip extension strength, and L multi-segmental rotation. Issued gentle hip and lumbar exercises for HEP. Primary impairments include postural dysfunction, decreased core and LE strength, decreased lumbar and LE flexibility and mobility, impaired neuromuscular control of core and hip musculature and pain with functional activities. This pt is an good candidate for skilled PT tx and stands to benefit from a combination of manual therapy including joint mobilizations with trigger point/myofacscial release, therapeutic exercise to establish core/joint stability, neuromuscular re-education, aquatic therapy, and modalities Prn. Will focus on aquatic therapy to improve her pain and LE strengthening and progress as appropriate. The pt has been educated on their dx/POC and consents to further PT tx.     Pt prognosis is Fair.   Pt will benefit from skilled outpatient Physical Therapy to address the deficits stated above and in the chart below, provide pt/family education, and to maximize pt's level of independence.     Plan of care discussed with patient: Yes  Pt's spiritual, cultural and educational needs considered and patient is agreeable to the plan of care and goals as stated below:     Anticipated Barriers for therapy: None    Medical Necessity is demonstrated by the following  History  Co-morbidities and personal factors that may impact the plan of care Co-morbidities:   Rheumatoid arthritis, Lupus, HTN, Fibromyalgia, Ankylosing spondylitis    Personal Factors:   no deficits     moderate   Examination  Body Structures and Functions, activity limitations and participation restrictions that may impact the plan of care Body Regions:   back  lower extremities    Body Systems:    ROM  strength  gross coordinated movement  balance    Participation Restrictions:   Limited with all ADLs due to pain     Activity limitations:   Learning and applying knowledge  no  deficits    General Tasks and Commands  no deficits    Communication  no deficits    Mobility  lifting and carrying objects  walking  driving (bike, car, motorcycle)    Self care  no deficits    Domestic Life  doing house work (cleaning house, washing dishes, laundry)    Interactions/Relationships  family relationships    Life Areas  no deficits    Community and Social Life  recreation and leisure         moderate   Clinical Presentation evolving clinical presentation with changing clinical characteristics moderate   Decision Making/ Complexity Score: moderate     Goals:   Short Term Goals (4 Weeks):   1. Pt to increase strength to at least 4/5 of muscles tested to allow for improvement in functional activities such as performing chores and ADLs  2. Pt to improve gross spinal motion in rotation by 25% to allow for improved functional mobility.  3. Pt to tolerate sitting for >1 hour with <3/10 pain in low back to allow for improvement in IADLs.  4. Pt to tolerate aquatic therapy 2x/week with no increase in low back pain to allow for improved tolerance to activity.     Long Term Goals (8 Weeks):   1. Pt to achieve <52% limitation as measured by the FOTO to demonstrate decreased low back pain disability.  2. Pt to increase strength to at least 4+/5 of muscles tested to allow for improvement in functional activities such as performing chores.  3. Pt to improve spinal motion to <50% limitations grossly to allow for improved functional mobility with performing ADL's  4. Pt to tolerate standing and walking for community distances with <3/10 pain in low back to improve mobility with IADL's.   5. Pt to report compliance with HEP 3x/week and demonstrate proper exercise technique to PT to show independence with self management of condition.      Plan   Plan of care Certification: 10/11/2019 to 12/13/2019.    Outpatient Physical Therapy 2 times weekly for 16 visits to include the following interventions: Aquatic Therapy,  Cervical/Lumbar Traction, Electrical Stimulation prn, Manual Therapy, Moist Heat/ Ice, Neuromuscular Re-ed, Patient Education, Therapeutic Activites and Therapeutic Exercise.     Russ Roman, PT

## 2019-10-17 ENCOUNTER — OFFICE VISIT (OUTPATIENT)
Dept: SURGERY | Facility: CLINIC | Age: 39
End: 2019-10-17
Payer: COMMERCIAL

## 2019-10-17 VITALS — HEIGHT: 63 IN | BODY MASS INDEX: 30.74 KG/M2 | TEMPERATURE: 99 F | WEIGHT: 173.5 LBS

## 2019-10-17 DIAGNOSIS — Z09 POSTOP CHECK: Primary | ICD-10-CM

## 2019-10-17 PROCEDURE — 99999 PR PBB SHADOW E&M-EST. PATIENT-LVL III: ICD-10-PCS | Mod: PBBFAC,,, | Performed by: SURGERY

## 2019-10-17 PROCEDURE — 99024 PR POST-OP FOLLOW-UP VISIT: ICD-10-PCS | Mod: S$GLB,,, | Performed by: SURGERY

## 2019-10-17 PROCEDURE — 99024 POSTOP FOLLOW-UP VISIT: CPT | Mod: S$GLB,,, | Performed by: SURGERY

## 2019-10-17 PROCEDURE — 99999 PR PBB SHADOW E&M-EST. PATIENT-LVL III: CPT | Mod: PBBFAC,,, | Performed by: SURGERY

## 2019-10-17 RX ORDER — OXYCODONE AND ACETAMINOPHEN 10; 325 MG/1; MG/1
1 TABLET ORAL 3 TIMES DAILY PRN
Refills: 0 | COMMUNITY
Start: 2019-10-15 | End: 2020-09-01

## 2019-10-18 NOTE — PROGRESS NOTES
Cc: post op    HPI: 39 y.o.  female  2 weeks s/p port placement.   Pt notes that she is feeling well.  No pain or discomfort    PE: AFVSS    AAOx3  CTA  Soft/NT/nd  Inc: c/d/i            A/P:   Pt doing well post surgery.   F/U with me prn

## 2019-10-31 DIAGNOSIS — E55.9 VITAMIN D DEFICIENCY: Primary | ICD-10-CM

## 2019-11-01 RX ORDER — ERGOCALCIFEROL 1.25 MG/1
50000 CAPSULE ORAL
Qty: 12 CAPSULE | Refills: 0 | Status: SHIPPED | OUTPATIENT
Start: 2019-11-01 | End: 2020-01-21 | Stop reason: SDUPTHER

## 2019-11-04 ENCOUNTER — HOSPITAL ENCOUNTER (OUTPATIENT)
Dept: RADIOLOGY | Facility: HOSPITAL | Age: 39
Discharge: HOME | End: 2019-11-04
Attending: STUDENT IN AN ORGANIZED HEALTH CARE EDUCATION/TRAINING PROGRAM
Payer: COMMERCIAL

## 2019-11-04 ENCOUNTER — PATIENT MESSAGE (OUTPATIENT)
Dept: RHEUMATOLOGY | Facility: CLINIC | Age: 39
End: 2019-11-04

## 2019-11-04 DIAGNOSIS — M54.2 CERVICALGIA: ICD-10-CM

## 2019-11-04 DIAGNOSIS — M46.1 BILATERAL SACROILIITIS: ICD-10-CM

## 2019-11-04 DIAGNOSIS — M54.50 LUMBAGO: ICD-10-CM

## 2019-11-04 PROCEDURE — 72110 XR LUMBAR SPINE 5 VIEW WITH FLEX AND EXT: ICD-10-PCS | Mod: 26,,, | Performed by: RADIOLOGY

## 2019-11-04 PROCEDURE — 72200 X-RAY EXAM SI JOINTS: CPT | Mod: TC,FY,PO

## 2019-11-04 PROCEDURE — 72052 XR CERVICAL SPINE 5 VIEW WITH FLEX AND EXT: ICD-10-PCS | Mod: 26,,, | Performed by: RADIOLOGY

## 2019-11-04 PROCEDURE — 72110 X-RAY EXAM L-2 SPINE 4/>VWS: CPT | Mod: TC,FY,PO

## 2019-11-04 PROCEDURE — 72110 X-RAY EXAM L-2 SPINE 4/>VWS: CPT | Mod: 26,,, | Performed by: RADIOLOGY

## 2019-11-04 PROCEDURE — 72200 XR SACROILIAC JOINTS 3 VIEWS: ICD-10-PCS | Mod: 26,,, | Performed by: RADIOLOGY

## 2019-11-04 PROCEDURE — 72052 X-RAY EXAM NECK SPINE 6/>VWS: CPT | Mod: 26,,, | Performed by: RADIOLOGY

## 2019-11-04 PROCEDURE — 72200 X-RAY EXAM SI JOINTS: CPT | Mod: 26,,, | Performed by: RADIOLOGY

## 2019-11-04 PROCEDURE — 72052 X-RAY EXAM NECK SPINE 6/>VWS: CPT | Mod: TC,FY,PO

## 2019-11-06 ENCOUNTER — DOCUMENTATION ONLY (OUTPATIENT)
Dept: RHEUMATOLOGY | Facility: CLINIC | Age: 39
End: 2019-11-06

## 2019-11-06 NOTE — PROGRESS NOTES
Received progress notes from Dr Chery. Stop norco start percocet, xrays ordered, return to clinic in 1 month to discuss xray results and treatment options. Dr Chery will take over pain management.

## 2019-12-04 ENCOUNTER — INFUSION (OUTPATIENT)
Dept: INFUSION THERAPY | Facility: HOSPITAL | Age: 39
End: 2019-12-04
Attending: INTERNAL MEDICINE
Payer: COMMERCIAL

## 2019-12-04 ENCOUNTER — DOCUMENTATION ONLY (OUTPATIENT)
Dept: INFUSION THERAPY | Facility: HOSPITAL | Age: 39
End: 2019-12-04

## 2019-12-04 VITALS
RESPIRATION RATE: 16 BRPM | WEIGHT: 176.56 LBS | DIASTOLIC BLOOD PRESSURE: 84 MMHG | TEMPERATURE: 98 F | HEIGHT: 63 IN | BODY MASS INDEX: 31.29 KG/M2 | HEART RATE: 97 BPM | SYSTOLIC BLOOD PRESSURE: 122 MMHG

## 2019-12-04 DIAGNOSIS — M45.5 ANKYLOSING SPONDYLITIS OF THORACOLUMBAR REGION: Primary | ICD-10-CM

## 2019-12-04 PROCEDURE — 25000003 PHARM REV CODE 250: Mod: PN | Performed by: INTERNAL MEDICINE

## 2019-12-04 PROCEDURE — 63600175 PHARM REV CODE 636 W HCPCS: Mod: JG,PN | Performed by: INTERNAL MEDICINE

## 2019-12-04 PROCEDURE — 96365 THER/PROPH/DIAG IV INF INIT: CPT | Mod: PN

## 2019-12-04 RX ORDER — SODIUM CHLORIDE 0.9 % (FLUSH) 0.9 %
10 SYRINGE (ML) INJECTION
Status: CANCELLED | OUTPATIENT
Start: 2019-12-18

## 2019-12-04 RX ORDER — DIPHENHYDRAMINE HYDROCHLORIDE 50 MG/ML
50 INJECTION INTRAMUSCULAR; INTRAVENOUS
Status: CANCELLED | OUTPATIENT
Start: 2019-12-18

## 2019-12-04 RX ORDER — HEPARIN 100 UNIT/ML
500 SYRINGE INTRAVENOUS
Status: CANCELLED | OUTPATIENT
Start: 2019-12-18

## 2019-12-04 RX ORDER — SODIUM CHLORIDE 0.9 % (FLUSH) 0.9 %
10 SYRINGE (ML) INJECTION
Status: DISCONTINUED | OUTPATIENT
Start: 2019-12-04 | End: 2019-12-04 | Stop reason: HOSPADM

## 2019-12-04 RX ORDER — ACETAMINOPHEN 325 MG/1
650 TABLET ORAL
Status: CANCELLED | OUTPATIENT
Start: 2019-12-18

## 2019-12-04 RX ORDER — HEPARIN 100 UNIT/ML
500 SYRINGE INTRAVENOUS
Status: DISCONTINUED | OUTPATIENT
Start: 2019-12-04 | End: 2019-12-04 | Stop reason: HOSPADM

## 2019-12-04 RX ORDER — ACETAMINOPHEN 325 MG/1
650 TABLET ORAL
Status: DISCONTINUED | OUTPATIENT
Start: 2019-12-04 | End: 2019-12-04 | Stop reason: HOSPADM

## 2019-12-04 RX ADMIN — GOLIMUMAB 162.5 MG: 50 SOLUTION INTRAVENOUS at 11:12

## 2019-12-04 RX ADMIN — ACETAMINOPHEN 650 MG: 325 TABLET, FILM COATED ORAL at 11:12

## 2019-12-04 RX ADMIN — HEPARIN 500 UNITS: 100 SYRINGE at 12:12

## 2019-12-04 NOTE — PROGRESS NOTES
[12/4/2019 10:25 AM]  Julia Cooper   [id number]  she arrived with an elevated bp and stated that she forgot to take her bp med this am and does not have it with her she does have a headache she has tylenol as a premed for her simponi       [12/4/2019 10:27 AM]  Miryam Hernandez:    ok let me check        [12/4/2019 10:29 AM]  Miryam Hernandez:    Dr Samuel said go ahead just keep a check on it     [12/4/2019 10:29 AM]  Julia Cooper  okay thanks

## 2019-12-04 NOTE — PLAN OF CARE
Problem: Adult Inpatient Plan of Care  Goal: Patient-Specific Goal (Individualization)  Outcome: Ongoing, Progressing  Flowsheets (Taken 12/4/2019 1030)  Individualized Care Needs: bed, pillow,snacks  Anxieties, Fears or Concerns: None  Patient-Specific Goals (Include Timeframe): Infection prevention during tx

## 2019-12-04 NOTE — NURSING
1030- pt in room, c/o headache, BP- 154/104, pt states forgetting to take her BP med this am. Charge ns aware and will notify MD.  1045- charge ns said MD badillo to precede w/ tx.

## 2019-12-04 NOTE — PLAN OF CARE
Problem: Adult Inpatient Plan of Care  Goal: Plan of Care Review  Outcome: Ongoing, Progressing  Flowsheets (Taken 12/4/2019 1230)  Plan of Care Reviewed With: patient; spouse    Pt tolerated infusion well, NAD. No new complaints voiced. BP stable. Pt ambualted out of clinic without difficulty accompanied by her .

## 2019-12-04 NOTE — PLAN OF CARE
Problem: Fatigue (Oncology Care)  Goal: Improved Activity Tolerance  Intervention: Promote Energy Conservation  Flowsheets (Taken 12/4/2019 1030)  Fatigue Management: paced activity encouraged; frequent rest breaks encouraged  Sleep/Rest Enhancement: awakenings minimized; noise level reduced; regular sleep/rest pattern promoted

## 2019-12-12 ENCOUNTER — OFFICE VISIT (OUTPATIENT)
Dept: RHEUMATOLOGY | Facility: CLINIC | Age: 39
End: 2019-12-12
Payer: COMMERCIAL

## 2019-12-12 VITALS
DIASTOLIC BLOOD PRESSURE: 103 MMHG | SYSTOLIC BLOOD PRESSURE: 157 MMHG | HEIGHT: 63 IN | HEART RATE: 103 BPM | BODY MASS INDEX: 32.58 KG/M2 | WEIGHT: 183.88 LBS

## 2019-12-12 DIAGNOSIS — D80.3 IGG2 SUBCLASS DEFICIENCY: ICD-10-CM

## 2019-12-12 DIAGNOSIS — M79.7 FIBROMYALGIA: ICD-10-CM

## 2019-12-12 DIAGNOSIS — Z79.899 IMMUNOCOMPROMISED STATE DUE TO DRUG THERAPY: ICD-10-CM

## 2019-12-12 DIAGNOSIS — M45.9 ANKYLOSING SPONDYLITIS, UNSPECIFIED SITE OF SPINE: Primary | ICD-10-CM

## 2019-12-12 DIAGNOSIS — M08.80 JIA (JUVENILE IDIOPATHIC ARTHRITIS): ICD-10-CM

## 2019-12-12 DIAGNOSIS — I10 ESSENTIAL HYPERTENSION: ICD-10-CM

## 2019-12-12 DIAGNOSIS — M35.00 SJOGREN'S SYNDROME, WITH UNSPECIFIED ORGAN INVOLVEMENT: ICD-10-CM

## 2019-12-12 DIAGNOSIS — D84.821 IMMUNOCOMPROMISED STATE DUE TO DRUG THERAPY: ICD-10-CM

## 2019-12-12 PROCEDURE — 3077F PR MOST RECENT SYSTOLIC BLOOD PRESSURE >= 140 MM HG: ICD-10-PCS | Mod: CPTII,S$GLB,, | Performed by: PHYSICIAN ASSISTANT

## 2019-12-12 PROCEDURE — 3080F PR MOST RECENT DIASTOLIC BLOOD PRESSURE >= 90 MM HG: ICD-10-PCS | Mod: CPTII,S$GLB,, | Performed by: PHYSICIAN ASSISTANT

## 2019-12-12 PROCEDURE — 96372 THER/PROPH/DIAG INJ SC/IM: CPT | Mod: 59,S$GLB,, | Performed by: PHYSICIAN ASSISTANT

## 2019-12-12 PROCEDURE — 3008F PR BODY MASS INDEX (BMI) DOCUMENTED: ICD-10-PCS | Mod: CPTII,S$GLB,, | Performed by: PHYSICIAN ASSISTANT

## 2019-12-12 PROCEDURE — 3077F SYST BP >= 140 MM HG: CPT | Mod: CPTII,S$GLB,, | Performed by: PHYSICIAN ASSISTANT

## 2019-12-12 PROCEDURE — 99999 PR PBB SHADOW E&M-EST. PATIENT-LVL V: ICD-10-PCS | Mod: PBBFAC,,, | Performed by: PHYSICIAN ASSISTANT

## 2019-12-12 PROCEDURE — 99999 PR PBB SHADOW E&M-EST. PATIENT-LVL V: CPT | Mod: PBBFAC,,, | Performed by: PHYSICIAN ASSISTANT

## 2019-12-12 PROCEDURE — 99214 PR OFFICE/OUTPT VISIT, EST, LEVL IV, 30-39 MIN: ICD-10-PCS | Mod: 25,S$GLB,, | Performed by: PHYSICIAN ASSISTANT

## 2019-12-12 PROCEDURE — 3080F DIAST BP >= 90 MM HG: CPT | Mod: CPTII,S$GLB,, | Performed by: PHYSICIAN ASSISTANT

## 2019-12-12 PROCEDURE — 96372 PR INJECTION,THERAP/PROPH/DIAG2ST, IM OR SUBCUT: ICD-10-PCS | Mod: 59,S$GLB,, | Performed by: PHYSICIAN ASSISTANT

## 2019-12-12 PROCEDURE — 99214 OFFICE O/P EST MOD 30 MIN: CPT | Mod: 25,S$GLB,, | Performed by: PHYSICIAN ASSISTANT

## 2019-12-12 PROCEDURE — 3008F BODY MASS INDEX DOCD: CPT | Mod: CPTII,S$GLB,, | Performed by: PHYSICIAN ASSISTANT

## 2019-12-12 RX ORDER — HUMAN IMMUNOGLOBULIN G 0.2 G/ML
LIQUID SUBCUTANEOUS
COMMUNITY
Start: 2019-12-02 | End: 2021-03-16

## 2019-12-12 RX ORDER — PREDNISONE 5 MG/1
10 TABLET ORAL DAILY PRN
Qty: 180 TABLET | Refills: 0 | Status: SHIPPED | OUTPATIENT
Start: 2019-12-12 | End: 2020-08-13 | Stop reason: SDUPTHER

## 2019-12-12 RX ORDER — METHYLPREDNISOLONE ACETATE 80 MG/ML
160 INJECTION, SUSPENSION INTRA-ARTICULAR; INTRALESIONAL; INTRAMUSCULAR; SOFT TISSUE
Status: COMPLETED | OUTPATIENT
Start: 2019-12-12 | End: 2019-12-12

## 2019-12-12 RX ORDER — MOMETASONE FUROATE 50 UG/1
SPRAY, METERED NASAL
Refills: 0 | COMMUNITY
Start: 2019-11-07 | End: 2020-12-14

## 2019-12-12 RX ORDER — KETOROLAC TROMETHAMINE 30 MG/ML
60 INJECTION, SOLUTION INTRAMUSCULAR; INTRAVENOUS
Status: COMPLETED | OUTPATIENT
Start: 2019-12-12 | End: 2019-12-12

## 2019-12-12 RX ORDER — NIFEDIPINE 30 MG/1
30 TABLET, EXTENDED RELEASE ORAL NIGHTLY
Qty: 90 TABLET | Refills: 1 | Status: SHIPPED | OUTPATIENT
Start: 2019-12-12 | End: 2020-08-13 | Stop reason: SDUPTHER

## 2019-12-12 RX ORDER — BUPRENORPHINE 20 UG/H
1 PATCH TRANSDERMAL
Refills: 0 | COMMUNITY
Start: 2019-12-04 | End: 2022-04-22

## 2019-12-12 RX ADMIN — KETOROLAC TROMETHAMINE 60 MG: 30 INJECTION, SOLUTION INTRAMUSCULAR; INTRAVENOUS at 04:12

## 2019-12-12 RX ADMIN — METHYLPREDNISOLONE ACETATE 160 MG: 80 INJECTION, SUSPENSION INTRA-ARTICULAR; INTRALESIONAL; INTRAMUSCULAR; SOFT TISSUE at 03:12

## 2019-12-12 NOTE — PROGRESS NOTES
Subjective:       Patient ID: Breanna Sanchez is a 39 y.o. female.    Chief Complaint: Follow-up    Mrs. Sanchez is a 39 year old female who presents to clinic for follow up on ankylosing spondylitis. She is a new patient to me. She started IVIG yesterday home and has experienced a skin reaction with swelling, redness, and burning pain on the abdomen. She reports tx was given over 3 hours instead of 6 and she plans to f/u with Dr. Holden today. Her last simponi aria infusion was 12/4/19 and this is working well for her arthritis. She complains of pain involving her wrists, elbows, and low back. Pain is constant and aching. She reports noticing increased pain 1 week prior to her next infusion. No s/e of infusions noted. She has suffered from UTI and sinusitis since her last visit. She has established with pain management Dr. Chery and is on butrans patch and percocet, which is working well. She is scheduled for SI joint procedure next week.     Review of Systems   Constitutional: Positive for fatigue. Negative for activity change, appetite change, chills and fever.   Eyes: Negative for visual disturbance.   Respiratory: Negative for cough and shortness of breath.    Cardiovascular: Negative for chest pain, palpitations and leg swelling.   Gastrointestinal: Negative for abdominal pain, constipation, diarrhea, nausea and vomiting.   Musculoskeletal: Positive for arthralgias, back pain, joint swelling and myalgias.   Allergic/Immunologic: Positive for immunocompromised state.   Neurological: Negative for dizziness, weakness, light-headedness and headaches.         Objective:     Vitals:    12/12/19 1555   BP: (!) 157/103   Pulse: 103       Past Medical History:   Diagnosis Date    Diarrhea     Fibromyalgia     History of acute bronchitis     History of kidney stones     History of UTI     IgG2 deficiency     Lupus     Rheumatoid arteritis     Sjoegren syndrome     Spondylitis     Spondylosis      Past  Surgical History:   Procedure Laterality Date    APPENDECTOMY      BARTHOLIN GLAND CYST EXCISION      DILATION OF URETHRA      X 2    HYSTERECTOMY      INSERTION OF TUNNELED CENTRAL VENOUS CATHETER (CVC) WITH SUBCUTANEOUS PORT N/A 10/2/2019    Procedure: [id number];  Surgeon: Lenin Springer MD;  Location: [facility] OR;  Service: General;  Laterality: N/A;    KNEE ARTHROSCOPY Left     LAPAROSCOPIC LYSIS OF ADHESIONS N/A 7/22/2019    Procedure: LYSIS, ADHESIONS, LAPAROSCOPIC;  Surgeon: Claernce Adams MD;  Location: [facility] OR;  Service: OB/GYN;  Laterality: N/A;    LAPAROSCOPIC SALPINGO-OOPHORECTOMY Left 7/22/2019    Procedure: SALPINGO-OOPHORECTOMY, LAPAROSCOPIC- LEFT SIDE ONLY;  Surgeon: Clarence Adams MD;  Location: [facility] OR;  Service: OB/GYN;  Laterality: Left;    OVARIAN CYST REMOVAL Left     SALPINGOOPHORECTOMY Right     TONSILLECTOMY      TOTAL SHOULDER ARTHROPLASTY Right     TUBAL LIGATION            Physical Exam   Constitutional: She is well-developed, well-nourished, and in no distress.   Eyes: Right conjunctiva is not injected. Left conjunctiva is not injected. Right eye exhibits normal extraocular motion. Left eye exhibits normal extraocular motion.   Neck: No JVD present. No thyromegaly present.   Cardiovascular: Normal rate and regular rhythm.  Exam reveals no decreased pulses.    Pulmonary/Chest: She has no wheezes. She has no rhonchi. She has no rales.       Right Side Rheumatological Exam     Examination finds the shoulder, knee, 1st PIP, 1st MCP, 2nd PIP, 2nd MCP, 3rd PIP, 3rd MCP, 4th PIP, 4th MCP, 5th PIP and 5th MCP normal.    The patient is tender to palpation of the elbow and wrist    She has swelling of the elbow and wrist    Left Side Rheumatological Exam     Examination finds the shoulder, knee, 1st PIP, 1st MCP, 2nd PIP, 2nd MCP, 3rd PIP, 3rd MCP, 4th PIP, 4th MCP, 5th PIP and 5th MCP normal.    The patient is tender to palpation of the wrist.    She has swelling  of the elbow and wrist      Back/Neck Exam   Tenderness Right paramedian tenderness of the Lower L-Spine, SI Joint, Upper L-Spine and Lower T-Spine.Left paramedian tenderness of the Lower L-Spine, SI Joint, Lower T-Spine and Upper L-Spine.      Lymphadenopathy:     She has no cervical adenopathy.   Neurological: Gait normal.   Skin: There is erythema (on the lower abdomen, see picture).     Psychiatric: Mood and affect normal.         Recent labs reviewed:  Component      Latest Ref Rng & Units 9/30/2019 9/23/2019   WBC      3.90 - 12.70 K/uL 14.03 (H)    RBC      4.00 - 5.40 M/uL 4.94    Hemoglobin      12.0 - 16.0 g/dL 14.7    Hematocrit      37.0 - 48.5 % 46.3    MCV      82 - 98 fL 94    MCH      27.0 - 31.0 pg 29.8    MCHC      32.0 - 36.0 g/dL 31.7 (L)    RDW      11.5 - 14.5 % 14.2    Platelets      150 - 350 K/uL 369 (H)    MPV      9.2 - 12.9 fL 10.4    Immature Granulocytes      0.0 - 0.5 % 0.6 (H)    Gran # (ANC)      1.8 - 7.7 K/uL 8.7 (H)    Immature Grans (Abs)      0.00 - 0.04 K/uL 0.09 (H)    Lymph #      1.0 - 4.8 K/uL 4.0    Mono #      0.3 - 1.0 K/uL 1.1 (H)    Eos #      0.0 - 0.5 K/uL 0.1    Baso #      0.00 - 0.20 K/uL 0.07    nRBC      0 /100 WBC 0    Gran%      38.0 - 73.0 % 61.9    Lymph%      18.0 - 48.0 % 28.6    Mono%      4.0 - 15.0 % 7.5    Eosinophil%      0.0 - 8.0 % 0.9    Basophil%      0.0 - 1.9 % 0.5    Large/Giant Platelets       Present    Differential Method       Automated    Sodium      136 - 145 mmol/L 140    Potassium      3.5 - 5.1 mmol/L 3.6    Chloride      95 - 110 mmol/L 99    CO2      23 - 29 mmol/L 30 (H)    Glucose      70 - 110 mg/dL 103    BUN, Bld      6 - 20 mg/dL 9    Creatinine      0.5 - 1.4 mg/dL 0.7    Calcium      8.7 - 10.5 mg/dL 10.0    Anion Gap      8 - 16 mmol/L 11    eGFR if African American      >60 mL/min/1.73 m:2 >60.0    eGFR if non African American      >60 mL/min/1.73 m:2 >60.0    Sed Rate      0 - 19 mm/Hr  7   CRP      0.00 - 0.90 mg/dL  0.60      Assessment:       1. Ankylosing spondylitis, unspecified site of spine    2. Sjogren's syndrome, with unspecified organ involvement    3. CHARLIE (juvenile idiopathic arthritis)    4. IgG2 subclass deficiency    5. Fibromyalgia    6. Immunocompromised state due to drug therapy    7. Essential hypertension            Plan:       Ankylosing spondylitis, unspecified site of spine  -     CBC auto differential; Future; Expected date: 12/12/2019  -     Comprehensive metabolic panel; Future; Expected date: 12/12/2019  -     C-reactive protein; Future; Expected date: 12/12/2019  -     Sedimentation rate; Future; Expected date: 12/12/2019  -     methylPREDNISolone acetate injection 160 mg  -     ketorolac injection 60 mg    Sjogren's syndrome, with unspecified organ involvement  -     NIFEdipine (PROCARDIA-XL) 30 MG (OSM) 24 hr tablet; Take 1 tablet (30 mg total) by mouth every evening.  Dispense: 90 tablet; Refill: 1  -     predniSONE (DELTASONE) 5 MG tablet; Take 2 tablets (10 mg total) by mouth daily as needed.  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 Holden  --HTN  --chronic pain syndrome followed by Dr. Chery    Plan:  1. toradol 60, depo 160 today in clinic  2. Start nifedipine XL 30 mg daily. Monitor BP at home and keep a log. Notify clinic if BP is persistently >140/90.    3. Check labs  4. Cont. simponi aria q 8 weeks, prednisone 5 mg daily PRN  5. Port flush order every 4 weeks    Follow up:  3 months

## 2019-12-12 NOTE — PROGRESS NOTES
Administered 2 cc DepoMedrol 80mg/cc  to right upper outer gluteal. Pt tolerated well. No acute reaction noted to site. Pt instructed on S/S to report. Advised patient to wait in lobby 15 minutes after receiving injection to monitor for any reactions..  Pt verbalized understanding.     Lot: [id number]  Exp: 02/21  Administered 2 cc  Toradol 30mg/cc  to right dorsogluteal. Pt tolerated well. No acute reaction noted to site. Pt instructed on S/S to report. Advised patient to wait in lobby 15 minutes after receiving injection to monitor for any reactions. Pt verbalized understanding.     Lot: [id number]  Exp: 02/2021

## 2019-12-15 ASSESSMENT — ROUTINE ASSESSMENT OF PATIENT INDEX DATA (RAPID3)
MDHAQ FUNCTION SCORE: .8
PSYCHOLOGICAL DISTRESS SCORE: 1.1
FATIGUE SCORE: 2.2
PAIN SCORE: 6
PATIENT GLOBAL ASSESSMENT SCORE: 6
TOTAL RAPID3 SCORE: 4.89

## 2020-01-06 ENCOUNTER — DOCUMENTATION ONLY (OUTPATIENT)
Dept: REHABILITATION | Facility: HOSPITAL | Age: 40
End: 2020-01-06

## 2020-01-06 NOTE — PROGRESS NOTES
Outpatient Therapy Discharge Summary     Name: Breanna Sanchez  [facility] Number: [id number]    Therapy Diagnosis: Chronic midline low back pain without sciatica; Decreased ROM of lumbar spine   Physician: Roxanna Borja MD    Physician Orders: PT Eval and Treat Aquatic Therapy  Medical Diagnosis from Referral: Rheumatoid arthritis involving multiple sites with positive rheumatoid factor; Ankylosing spondylitis of thoracolumbar region  Evaluation Date: 10/11/2019    Date of Last visit: 10/11/2019  Total Visits Received: 1  Cancelled Visits: 3  No Show Visits: 3    Assessment    Goals: Short Term Goals (4 Weeks):   1. Pt to increase strength to at least 4/5 of muscles tested to allow for improvement in functional activities such as performing chores and ADLs  2. Pt to improve gross spinal motion in rotation by 25% to allow for improved functional mobility.  3. Pt to tolerate sitting for >1 hour with <3/10 pain in low back to allow for improvement in IADLs.  4. Pt to tolerate aquatic therapy 2x/week with no increase in low back pain to allow for improved tolerance to activity.      Long Term Goals (8 Weeks):   1. Pt to achieve <52% limitation as measured by the FOTO to demonstrate decreased low back pain disability.  2. Pt to increase strength to at least 4+/5 of muscles tested to allow for improvement in functional activities such as performing chores.  3. Pt to improve spinal motion to <50% limitations grossly to allow for improved functional mobility with performing ADL's  4. Pt to tolerate standing and walking for community distances with <3/10 pain in low back to improve mobility with IADL's.   5. Pt to report compliance with HEP 3x/week and demonstrate proper exercise technique to PT to show independence with self management of condition.    Discharge reason: Patient has not attended therapy since 10/11/2019    Plan   This patient is discharged from Physical Therapy      Russ Roman, PT,  JANT  01/06/2020

## 2020-01-21 DIAGNOSIS — E55.9 VITAMIN D DEFICIENCY: ICD-10-CM

## 2020-01-21 RX ORDER — ERGOCALCIFEROL 1.25 MG/1
50000 CAPSULE ORAL
Qty: 12 CAPSULE | Refills: 0 | Status: SHIPPED | OUTPATIENT
Start: 2020-01-21 | End: 2022-04-22 | Stop reason: SDUPTHER

## 2020-01-29 ENCOUNTER — INFUSION (OUTPATIENT)
Dept: INFUSION THERAPY | Facility: HOSPITAL | Age: 40
End: 2020-01-29
Attending: INTERNAL MEDICINE
Payer: COMMERCIAL

## 2020-01-29 VITALS
RESPIRATION RATE: 18 BRPM | HEIGHT: 63 IN | WEIGHT: 183.88 LBS | DIASTOLIC BLOOD PRESSURE: 85 MMHG | OXYGEN SATURATION: 98 % | SYSTOLIC BLOOD PRESSURE: 132 MMHG | BODY MASS INDEX: 32.58 KG/M2 | HEART RATE: 92 BPM | TEMPERATURE: 98 F

## 2020-01-29 DIAGNOSIS — E55.9 VITAMIN D DEFICIENCY: Primary | ICD-10-CM

## 2020-01-29 DIAGNOSIS — M45.9 ANKYLOSING SPONDYLITIS, UNSPECIFIED SITE OF SPINE: ICD-10-CM

## 2020-01-29 DIAGNOSIS — M45.5 ANKYLOSING SPONDYLITIS OF THORACOLUMBAR REGION: ICD-10-CM

## 2020-01-29 LAB
25(OH)D3+25(OH)D2 SERPL-MCNC: 28 NG/ML (ref 30–96)
ALBUMIN SERPL BCP-MCNC: 4.1 G/DL (ref 3.5–5.2)
ALP SERPL-CCNC: 93 U/L (ref 38–145)
ALT SERPL W/O P-5'-P-CCNC: 20 U/L (ref 0–35)
ANION GAP SERPL CALC-SCNC: 6 MMOL/L (ref 8–16)
AST SERPL-CCNC: 17 U/L (ref 14–36)
BASOPHILS # BLD AUTO: 0.07 K/UL (ref 0–0.2)
BASOPHILS NFR BLD: 0.5 % (ref 0–1.9)
BILIRUB SERPL-MCNC: 0.4 MG/DL (ref 0.2–1.3)
BUN SERPL-MCNC: 7 MG/DL (ref 7–18)
CALCIUM SERPL-MCNC: 9.1 MG/DL (ref 8.4–10.2)
CHLORIDE SERPL-SCNC: 105 MMOL/L (ref 95–110)
CO2 SERPL-SCNC: 28 MMOL/L (ref 22–31)
CREAT SERPL-MCNC: 0.39 MG/DL (ref 0.5–1.4)
CRP SERPL-MCNC: 2.9 MG/DL (ref 0–0.9)
DIFFERENTIAL METHOD: ABNORMAL
EOSINOPHIL # BLD AUTO: 0.2 K/UL (ref 0–0.5)
EOSINOPHIL NFR BLD: 1.3 % (ref 0–8)
ERYTHROCYTE [DISTWIDTH] IN BLOOD BY AUTOMATED COUNT: 13.7 % (ref 11.5–14.5)
ERYTHROCYTE [SEDIMENTATION RATE] IN BLOOD BY PHOTOMETRIC METHOD: 43 MM/HR (ref 0–19)
EST. GFR  (AFRICAN AMERICAN): >60 ML/MIN/1.73 M^2
EST. GFR  (NON AFRICAN AMERICAN): >60 ML/MIN/1.73 M^2
GLUCOSE SERPL-MCNC: 97 MG/DL (ref 70–110)
HCT VFR BLD AUTO: 44.2 % (ref 37–48.5)
HGB BLD-MCNC: 14.4 G/DL (ref 12–16)
IMM GRANULOCYTES # BLD AUTO: 0.08 K/UL (ref 0–0.04)
IMM GRANULOCYTES NFR BLD AUTO: 0.6 % (ref 0–0.5)
LYMPHOCYTES # BLD AUTO: 3.1 K/UL (ref 1–4.8)
LYMPHOCYTES NFR BLD: 24.2 % (ref 18–48)
MCH RBC QN AUTO: 29.1 PG (ref 27–31)
MCHC RBC AUTO-ENTMCNC: 32.6 G/DL (ref 32–36)
MCV RBC AUTO: 90 FL (ref 82–98)
MONOCYTES # BLD AUTO: 1.4 K/UL (ref 0.3–1)
MONOCYTES NFR BLD: 10.9 % (ref 4–15)
NEUTROPHILS # BLD AUTO: 8.1 K/UL (ref 1.8–7.7)
NEUTROPHILS NFR BLD: 62.5 % (ref 38–73)
NRBC BLD-RTO: 0 /100 WBC
PLATELET # BLD AUTO: 294 K/UL (ref 150–350)
PMV BLD AUTO: 9.9 FL (ref 9.2–12.9)
POTASSIUM SERPL-SCNC: 3.3 MMOL/L (ref 3.5–5.1)
PROT SERPL-MCNC: 7.1 G/DL (ref 6–8.4)
RBC # BLD AUTO: 4.94 M/UL (ref 4–5.4)
SODIUM SERPL-SCNC: 139 MMOL/L (ref 136–145)
WBC # BLD AUTO: 12.93 K/UL (ref 3.9–12.7)

## 2020-01-29 PROCEDURE — 85025 COMPLETE CBC W/AUTO DIFF WBC: CPT

## 2020-01-29 PROCEDURE — 82306 VITAMIN D 25 HYDROXY: CPT

## 2020-01-29 PROCEDURE — 86140 C-REACTIVE PROTEIN: CPT

## 2020-01-29 PROCEDURE — 85025 COMPLETE CBC W/AUTO DIFF WBC: CPT | Mod: PN

## 2020-01-29 PROCEDURE — 85652 RBC SED RATE AUTOMATED: CPT

## 2020-01-29 PROCEDURE — 63600175 PHARM REV CODE 636 W HCPCS: Mod: PN | Performed by: INTERNAL MEDICINE

## 2020-01-29 PROCEDURE — 85652 RBC SED RATE AUTOMATED: CPT | Mod: PN

## 2020-01-29 PROCEDURE — A4216 STERILE WATER/SALINE, 10 ML: HCPCS | Mod: PN | Performed by: INTERNAL MEDICINE

## 2020-01-29 PROCEDURE — 80053 COMPREHEN METABOLIC PANEL: CPT | Mod: PN

## 2020-01-29 PROCEDURE — 96365 THER/PROPH/DIAG IV INF INIT: CPT | Mod: PN

## 2020-01-29 PROCEDURE — 82306 VITAMIN D 25 HYDROXY: CPT | Mod: PN

## 2020-01-29 PROCEDURE — 86140 C-REACTIVE PROTEIN: CPT | Mod: PN

## 2020-01-29 PROCEDURE — 25000003 PHARM REV CODE 250: Mod: PN | Performed by: INTERNAL MEDICINE

## 2020-01-29 PROCEDURE — 80053 COMPREHEN METABOLIC PANEL: CPT

## 2020-01-29 RX ORDER — SODIUM CHLORIDE 0.9 % (FLUSH) 0.9 %
10 SYRINGE (ML) INJECTION
Status: CANCELLED | OUTPATIENT
Start: 2020-02-19

## 2020-01-29 RX ORDER — DIPHENHYDRAMINE HYDROCHLORIDE 50 MG/ML
50 INJECTION INTRAMUSCULAR; INTRAVENOUS
Status: CANCELLED | OUTPATIENT
Start: 2020-02-19

## 2020-01-29 RX ORDER — SODIUM CHLORIDE 0.9 % (FLUSH) 0.9 %
10 SYRINGE (ML) INJECTION
Status: DISCONTINUED | OUTPATIENT
Start: 2020-01-29 | End: 2020-01-29 | Stop reason: HOSPADM

## 2020-01-29 RX ORDER — ACETAMINOPHEN 325 MG/1
650 TABLET ORAL
Status: DISCONTINUED | OUTPATIENT
Start: 2020-01-29 | End: 2020-01-29 | Stop reason: HOSPADM

## 2020-01-29 RX ORDER — SODIUM CHLORIDE 0.9 % (FLUSH) 0.9 %
10 SYRINGE (ML) INJECTION
Status: CANCELLED | OUTPATIENT
Start: 2020-01-29

## 2020-01-29 RX ORDER — DIPHENHYDRAMINE HYDROCHLORIDE 50 MG/ML
50 INJECTION INTRAMUSCULAR; INTRAVENOUS
Status: DISCONTINUED | OUTPATIENT
Start: 2020-01-29 | End: 2020-01-29 | Stop reason: HOSPADM

## 2020-01-29 RX ORDER — HEPARIN 100 UNIT/ML
500 SYRINGE INTRAVENOUS
Status: DISCONTINUED | OUTPATIENT
Start: 2020-01-29 | End: 2020-01-29 | Stop reason: HOSPADM

## 2020-01-29 RX ORDER — ACETAMINOPHEN 325 MG/1
650 TABLET ORAL
Status: CANCELLED | OUTPATIENT
Start: 2020-02-19

## 2020-01-29 RX ORDER — HEPARIN 100 UNIT/ML
500 SYRINGE INTRAVENOUS
Status: CANCELLED | OUTPATIENT
Start: 2020-01-29

## 2020-01-29 RX ORDER — HEPARIN 100 UNIT/ML
500 SYRINGE INTRAVENOUS
Status: CANCELLED | OUTPATIENT
Start: 2020-02-19

## 2020-01-29 RX ADMIN — SODIUM CHLORIDE: 9 INJECTION, SOLUTION INTRAVENOUS at 11:01

## 2020-01-29 RX ADMIN — GOLIMUMAB 162.5 MG: 50 SOLUTION INTRAVENOUS at 11:01

## 2020-01-29 RX ADMIN — HEPARIN 500 UNITS: 100 SYRINGE at 12:01

## 2020-01-29 RX ADMIN — Medication 10 ML: at 11:01

## 2020-01-29 RX ADMIN — ACETAMINOPHEN 650 MG: 325 TABLET, FILM COATED ORAL at 11:01

## 2020-01-29 RX ADMIN — Medication 10 ML: at 09:01

## 2020-01-29 NOTE — PLAN OF CARE
Trish presented to infusion suite for treatment.  Assessment completed and POC discussed  Verbally acknowledged understanding

## 2020-01-29 NOTE — PLAN OF CARE
Tolerated infusion well today Discharge to home with instructions  Verbally acknowledged understanding  Ambulated to private vehicle without difficulty  NAD

## 2020-01-30 ENCOUNTER — PATIENT MESSAGE (OUTPATIENT)
Dept: RHEUMATOLOGY | Facility: CLINIC | Age: 40
End: 2020-01-30

## 2020-01-31 ENCOUNTER — PATIENT MESSAGE (OUTPATIENT)
Dept: RHEUMATOLOGY | Facility: CLINIC | Age: 40
End: 2020-01-31

## 2020-01-31 ENCOUNTER — TELEPHONE (OUTPATIENT)
Dept: RHEUMATOLOGY | Facility: CLINIC | Age: 40
End: 2020-01-31

## 2020-01-31 ENCOUNTER — CLINICAL SUPPORT (OUTPATIENT)
Dept: RHEUMATOLOGY | Facility: CLINIC | Age: 40
End: 2020-01-31
Payer: COMMERCIAL

## 2020-01-31 VITALS — SYSTOLIC BLOOD PRESSURE: 155 MMHG | HEART RATE: 99 BPM | DIASTOLIC BLOOD PRESSURE: 110 MMHG

## 2020-01-31 DIAGNOSIS — M32.9 LUPUS: Primary | ICD-10-CM

## 2020-01-31 DIAGNOSIS — M45.9 ANKYLOSING SPONDYLITIS, UNSPECIFIED SITE OF SPINE: ICD-10-CM

## 2020-01-31 DIAGNOSIS — M45.5 ANKYLOSING SPONDYLITIS OF THORACOLUMBAR REGION: ICD-10-CM

## 2020-01-31 PROCEDURE — 96372 PR INJECTION,THERAP/PROPH/DIAG2ST, IM OR SUBCUT: ICD-10-PCS | Mod: S$GLB,,, | Performed by: INTERNAL MEDICINE

## 2020-01-31 PROCEDURE — 99999 PR PBB SHADOW E&M-EST. PATIENT-LVL V: ICD-10-PCS | Mod: PBBFAC,,,

## 2020-01-31 PROCEDURE — 99499 UNLISTED E&M SERVICE: CPT | Mod: S$GLB,,, | Performed by: INTERNAL MEDICINE

## 2020-01-31 PROCEDURE — 99999 PR PBB SHADOW E&M-EST. PATIENT-LVL V: CPT | Mod: PBBFAC,,,

## 2020-01-31 PROCEDURE — 96372 THER/PROPH/DIAG INJ SC/IM: CPT | Mod: S$GLB,,, | Performed by: INTERNAL MEDICINE

## 2020-01-31 PROCEDURE — 99499 NO LOS: ICD-10-PCS | Mod: S$GLB,,, | Performed by: INTERNAL MEDICINE

## 2020-01-31 RX ORDER — METHYLPREDNISOLONE ACETATE 80 MG/ML
160 INJECTION, SUSPENSION INTRA-ARTICULAR; INTRALESIONAL; INTRAMUSCULAR; SOFT TISSUE
Status: COMPLETED | OUTPATIENT
Start: 2020-01-31 | End: 2020-01-31

## 2020-01-31 RX ORDER — KETOROLAC TROMETHAMINE 30 MG/ML
60 INJECTION, SOLUTION INTRAMUSCULAR; INTRAVENOUS
Status: COMPLETED | OUTPATIENT
Start: 2020-01-31 | End: 2020-01-31

## 2020-01-31 RX ADMIN — METHYLPREDNISOLONE ACETATE 160 MG: 80 INJECTION, SUSPENSION INTRA-ARTICULAR; INTRALESIONAL; INTRAMUSCULAR; SOFT TISSUE at 03:01

## 2020-01-31 RX ADMIN — KETOROLAC TROMETHAMINE 60 MG: 30 INJECTION, SOLUTION INTRAMUSCULAR; INTRAVENOUS at 03:01

## 2020-01-31 NOTE — PROGRESS NOTES
Patient presented to clinic needing nurse injections. Complaining of 10 out of 10 pain to lower back. Discussed symptoms with Dr Sawant, labs reviewed, injections ordered. Advised to wait and recheck blood pressure before giving injections. Rechecked blood pressure after 15 minutes and still very high. Will wait a little while longer. Rechecked blood pressure informed Dr Sawant of reading advised to give injections the recheck blood pressure again. Rechecked blood pressure and remains high. Dr Sawant will send in 5 mg of norvasc to pharmacy. Nurse advised patient to take medication and recheck blood pressure when she gets home and send message via portal. Patient verbalized understanding.    Administered 2 cc ( 30 mg/ml ) of toradol to the right upper outer gluteal. Informed of s/s to report verbalized understanding. No adverse reactions noted.    Lot # [id number]  Expiration 1 aug 2020    Administered 2 cc ( 80 mg/ml ) of depomedrol to the right upper outer gluteal. Informed of s/s to report verbalized understanding. No adverse reactions noted.    Lot # [id number]  Expiration 03/2021

## 2020-01-31 NOTE — TELEPHONE ENCOUNTER
Dr. Sawant spoke directly with patient. Advised it will take norvasc a little bit to work. She advised if bottom number remains elevated above 100 and develops HA to report to ED. She also advised pt that infusion will take a while to work as she is s/p 3 days.

## 2020-01-31 NOTE — TELEPHONE ENCOUNTER
----- Message from Raad Amador sent at 1/31/2020  7:55 AM CST -----  Contact: same  Patient called in and stated Dr. Sawant called her last night & told her to call office today 1/31/2020 to get a nurse visit/injection appt today.    Call back number is [phone]

## 2020-01-31 NOTE — TELEPHONE ENCOUNTER
Attempted to return patient call regarding scheduling a nurse visit. Left a message to contact office.

## 2020-02-01 RX ORDER — AMLODIPINE BESYLATE 5 MG/1
5 TABLET ORAL DAILY
Qty: 30 TABLET | Refills: 11 | Status: SHIPPED | OUTPATIENT
Start: 2020-02-01 | End: 2020-02-04

## 2020-02-04 ENCOUNTER — PATIENT MESSAGE (OUTPATIENT)
Dept: RHEUMATOLOGY | Facility: CLINIC | Age: 40
End: 2020-02-04

## 2020-02-04 ENCOUNTER — HOSPITAL ENCOUNTER (OUTPATIENT)
Dept: RADIOLOGY | Facility: HOSPITAL | Age: 40
Discharge: HOME | End: 2020-02-04
Attending: FAMILY MEDICINE
Payer: COMMERCIAL

## 2020-02-04 ENCOUNTER — PATIENT MESSAGE (OUTPATIENT)
Dept: FAMILY MEDICINE | Facility: CLINIC | Age: 40
End: 2020-02-04

## 2020-02-04 ENCOUNTER — OFFICE VISIT (OUTPATIENT)
Dept: FAMILY MEDICINE | Facility: CLINIC | Age: 40
End: 2020-02-04
Payer: COMMERCIAL

## 2020-02-04 VITALS
BODY MASS INDEX: 32.02 KG/M2 | DIASTOLIC BLOOD PRESSURE: 80 MMHG | WEIGHT: 180.75 LBS | HEART RATE: 113 BPM | OXYGEN SATURATION: 99 % | SYSTOLIC BLOOD PRESSURE: 146 MMHG

## 2020-02-04 DIAGNOSIS — R31.29 OTHER MICROSCOPIC HEMATURIA: ICD-10-CM

## 2020-02-04 DIAGNOSIS — R91.1 LUNG NODULE: ICD-10-CM

## 2020-02-04 DIAGNOSIS — N20.0 KIDNEY STONE: Primary | ICD-10-CM

## 2020-02-04 DIAGNOSIS — R16.0 HEPATOMEGALY: ICD-10-CM

## 2020-02-04 DIAGNOSIS — I10 ESSENTIAL HYPERTENSION: ICD-10-CM

## 2020-02-04 DIAGNOSIS — F17.200 TOBACCO DEPENDENCE: ICD-10-CM

## 2020-02-04 PROCEDURE — 3079F DIAST BP 80-89 MM HG: CPT | Mod: CPTII,S$GLB,, | Performed by: FAMILY MEDICINE

## 2020-02-04 PROCEDURE — 71250 CT THORAX DX C-: CPT | Mod: 26,,, | Performed by: RADIOLOGY

## 2020-02-04 PROCEDURE — 99214 OFFICE O/P EST MOD 30 MIN: CPT | Mod: S$GLB,,, | Performed by: FAMILY MEDICINE

## 2020-02-04 PROCEDURE — 99999 PR PBB SHADOW E&M-EST. PATIENT-LVL IV: CPT | Mod: PBBFAC,,, | Performed by: FAMILY MEDICINE

## 2020-02-04 PROCEDURE — 99999 PR PBB SHADOW E&M-EST. PATIENT-LVL IV: ICD-10-PCS | Mod: PBBFAC,,, | Performed by: FAMILY MEDICINE

## 2020-02-04 PROCEDURE — 3077F SYST BP >= 140 MM HG: CPT | Mod: CPTII,S$GLB,, | Performed by: FAMILY MEDICINE

## 2020-02-04 PROCEDURE — 71250 CT CHEST WITHOUT CONTRAST: ICD-10-PCS | Mod: 26,,, | Performed by: RADIOLOGY

## 2020-02-04 PROCEDURE — 71250 CT THORAX DX C-: CPT | Mod: TC,PO

## 2020-02-04 PROCEDURE — 3079F PR MOST RECENT DIASTOLIC BLOOD PRESSURE 80-89 MM HG: ICD-10-PCS | Mod: CPTII,S$GLB,, | Performed by: FAMILY MEDICINE

## 2020-02-04 PROCEDURE — 3008F PR BODY MASS INDEX (BMI) DOCUMENTED: ICD-10-PCS | Mod: CPTII,S$GLB,, | Performed by: FAMILY MEDICINE

## 2020-02-04 PROCEDURE — 3008F BODY MASS INDEX DOCD: CPT | Mod: CPTII,S$GLB,, | Performed by: FAMILY MEDICINE

## 2020-02-04 PROCEDURE — 3077F PR MOST RECENT SYSTOLIC BLOOD PRESSURE >= 140 MM HG: ICD-10-PCS | Mod: CPTII,S$GLB,, | Performed by: FAMILY MEDICINE

## 2020-02-04 PROCEDURE — 99214 PR OFFICE/OUTPT VISIT, EST, LEVL IV, 30-39 MIN: ICD-10-PCS | Mod: S$GLB,,, | Performed by: FAMILY MEDICINE

## 2020-02-04 RX ORDER — VARENICLINE TARTRATE 0.5 (11)-1
KIT ORAL
Qty: 1 PACKAGE | Refills: 0 | Status: SHIPPED | OUTPATIENT
Start: 2020-02-04 | End: 2020-04-14

## 2020-02-04 RX ORDER — TAMSULOSIN HYDROCHLORIDE 0.4 MG/1
0.4 CAPSULE ORAL DAILY
Qty: 30 CAPSULE | Refills: 0 | Status: SHIPPED | OUTPATIENT
Start: 2020-02-04 | End: 2020-02-28

## 2020-02-04 NOTE — PATIENT INSTRUCTIONS
Eating Heart-Healthy Food: Using the DASH Plan    Eating for your heart doesnt have to be hard or boring. You just need to know how to make healthier choices. The DASH eating plan has been developed to help you do just that. DASH stands for Dietary Approaches to Stop Hypertension. It is a plan that has been proven to be healthier for your heart and to lower your risk for high blood pressure. It can also help lower your risk for cancer, heart disease, osteoporosis, and diabetes.  Choosing from each food group  Choose foods from each of the food groups below each day. Try to get the recommended number of servings for each food group. The serving numbers are based on a diet of 2,000 calories a day. Talk to your doctor if youre unsure about your calorie needs. Along with getting the correct servings, the DASH plan also recommends a sodium intake less than 2,300 mg per day.        Grains  Servings: 6 to 8 a day  A serving is:  · 1 slice bread  · 1 ounce dry cereal  · Half a cup cooked rice, pasta or cereal  Best choices: Whole grains and any grains high in fiber. Vegetables  Servings: 4 to 5 a day  A serving is:  · 1 cup raw leafy vegetable  · Half a cup cut-up raw or cooked vegetable  · Half a cup vegetable juice  Best choices: Fresh or frozen vegetables prepared without added salt or fat.   Fruits  Servings: 4 to 5 a day  A serving is:  · 1 medium fruit  · One-quarter cup dried fruit  · Half a cup fresh, frozen, or canned fruit  · Half a cup of 100% fruit juices  Best choices: A variety of fresh fruits of different colors. Whole fruits are a better choice than fruit juices. Low-fat or fat-free dairy  Servings: 2 to 3 a day  A serving is:  · 1 cup milk  · 1 cup yogurt  · One and a half ounces cheese  Best choices: Skim or 1% milk, low-fat or fat-free yogurt or buttermilk, and low-fat cheeses.         Lean meats, poultry, fish  Servings: 6 or fewer a day  A serving is:  · 1 ounce cooked meats, poultry, or fish  · 1  egg  Best choices: Lean poultry and fish. Trim away visible fat. Broil, grill, roast, or boil instead of frying. Remove skin from poultry before eating. Limit how much red meat you eat.  Nuts, seeds, beans  Servings: 4 to 5 a week  A serving is:  · One-third cup nuts (one and a half ounces)  · 2 tablespoons nut butter or seeds  · Half a cup cooked dry beans or legumes  Best choices: Dry roasted nuts with no salt added, lentils, kidney beans, garbanzo beans, and whole summers beans.   Fats and oils  Servings: 2 to 3 a day  A serving is:  · 1 teaspoon vegetable oil  · 1 teaspoon soft margarine  · 1 tablespoon mayonnaise  · 2 tablespoons salad dressing  Best choices: Nut and vegetable oils (nontropical vegetable oils), such as olive and canola oil. Sweets  Servings: 5 a week or fewer  A serving is:  · 1 tablespoon sugar, maple syrup, or honey  · 1 tablespoon jam or jelly  · 1 half-ounce jelly beans (about 15)  · 1 cup lemonade  Best choices: Dried fruit can be a satisfying sweet. Choose low-fat sweets. And watch your serving sizes!      For more on the DASH eating plan, visit:  www.nhlbi.nih.gov/health/health-topics/topics/dash   Date Last Reviewed: 6/1/2016  © 1655-4926 [employer]. [address] [place] [address], [place] [address]. All rights reserved. This information is not intended as a substitute for professional medical care. Always follow your healthcare professional's instructions.

## 2020-02-05 NOTE — PROGRESS NOTES
Subjective:       Patient ID: Breanna Sanchez is a 39 y.o. female.    Chief Complaint: Pain    HPI     Flank pain:  The patient complains of left flank pain, the symptoms are getting worse.  The patient was diagnosed with kidney stones, also has blood in the urine.  The patient is not currently taking any medications for this problem, she has been trying to drink more water.  The patient stated that the pain is severe.  She is currently under chronic pain management in taking opioids daily.    Hypertension:  The patient has been taking her blood pressure medication as directed, denies any side effects of the medication.  She has been monitoring her blood pressure in is been elevated.    Lung nodule:  Also has presence of pulmonary nodule, wants to know what is the next step as this was found on the abdominal CT.  The patient smokes cigarettes daily, wants to quit smoking.    Hepatomegaly:  Also abdominal CT scan showed presence of increased liver size.    Past medical history, past social history was reviewed and discussed with the patient.    Review of Systems   Constitutional: Positive for activity change. Negative for unexpected weight change.   HENT: Negative for hearing loss, rhinorrhea and trouble swallowing.    Eyes: Negative for discharge and visual disturbance.   Respiratory: Negative for chest tightness and wheezing.    Cardiovascular: Negative for chest pain and palpitations.   Gastrointestinal: Negative for blood in stool, constipation, diarrhea and vomiting.   Endocrine: Negative for polydipsia and polyuria.   Genitourinary: Positive for difficulty urinating and hematuria. Negative for dysuria and menstrual problem.   Musculoskeletal: Positive for arthralgias, joint swelling and neck pain.   Skin: Negative for color change and pallor.   Neurological: Positive for headaches. Negative for weakness.   Psychiatric/Behavioral: Negative for confusion and dysphoric mood.       Objective:      Physical Exam    Constitutional: She appears well-developed and well-nourished. She appears distressed.   HENT:   Head: Normocephalic and atraumatic.   Right Ear: External ear normal.   Left Ear: External ear normal.   Mouth/Throat: Oropharynx is clear and moist. No oropharyngeal exudate.   Eyes: Pupils are equal, round, and reactive to light. Conjunctivae are normal. Right eye exhibits no discharge. Left eye exhibits no discharge. No scleral icterus.   Neck: Neck supple. No thyromegaly present.   Cardiovascular: Normal rate, regular rhythm and normal heart sounds.   No murmur heard.  Pulmonary/Chest: Effort normal and breath sounds normal. No respiratory distress. She has no wheezes.   Abdominal: She exhibits distension. There is tenderness (Tenderness to palpation on the left upper quadrant and epigastrium.).   Musculoskeletal: She exhibits no tenderness or deformity.   Neurological: No cranial nerve deficit. Coordination normal.   Skin: No rash noted. She is not diaphoretic. No erythema. No pallor.   Psychiatric: She has a normal mood and affect. Her behavior is normal. Judgment and thought content normal.   Nursing note and vitals reviewed.      Assessment:       1. Kidney stone    2. Other microscopic hematuria    3. Essential hypertension    4. Pulmonary nodule    5. Hepatomegaly    6. Tobacco dependence    7. Lung nodule        Plan:       Kidney stone:  Worsening   -     Ambulatory referral/consult to Urology; Future; Expected date: 02/11/2020  -     tamsulosin (FLOMAX) 0.4 mg Cap; Take 1 capsule (0.4 mg total) by mouth once daily.  Dispense: 30 capsule; Refill: 0    Other microscopic hematuria:  Worsening  -     Ambulatory referral/consult to Urology; Future; Expected date: 02/11/2020    Essential hypertension:  Uncontrolled    Hepatomegaly:  Stable    Tobacco dependence:  Worsening  -     varenicline (CHANTIX STARTING MONTH BOX) 0.5 mg (11)- 1 mg (42) tablet; Take one 0.5mg tab by mouth once daily X3 days,then increase  to one 0.5mg tab twice daily X4 days,then increase to one 1mg tab twice daily  Dispense: 1 Package; Refill: 0    Lung nodule:  New problem workup needed  -     Cancel: CT Chest With Contrast; Future; Expected date: 02/04/2020    I explained 25 min in this encounter, from this time more than 50% of the time was spent in counseling and plan of care for this patient.  Will call the patient after have the results of the test, the patient was advised to drink plenty water at least 64 oz of water per day, will refer the patient to urologist for further assessment.  Healthy eating habits, avoid fried foods, red meat and processed starches.  The patient was strongly advised to quit tobacco, she is in contemplation phase.  Dash diet and heart healthy diet was recommended for the patient.  Patient was advised if the symptoms get worse to go to the emergency room immediately.  Patient agreed with assessment and plan. Patient verbalized understanding.

## 2020-02-27 DIAGNOSIS — N20.0 KIDNEY STONE: ICD-10-CM

## 2020-02-28 RX ORDER — TAMSULOSIN HYDROCHLORIDE 0.4 MG/1
CAPSULE ORAL
Qty: 30 CAPSULE | Refills: 0 | Status: SHIPPED | OUTPATIENT
Start: 2020-02-28 | End: 2021-04-12

## 2020-03-04 ENCOUNTER — PATIENT MESSAGE (OUTPATIENT)
Dept: RHEUMATOLOGY | Facility: CLINIC | Age: 40
End: 2020-03-04

## 2020-03-04 DIAGNOSIS — M45.9 ANKYLOSING SPONDYLITIS, UNSPECIFIED SITE OF SPINE: Primary | ICD-10-CM

## 2020-03-04 DIAGNOSIS — N20.0 KIDNEY STONE: ICD-10-CM

## 2020-03-06 DIAGNOSIS — F17.200 TOBACCO DEPENDENCE: ICD-10-CM

## 2020-03-06 RX ORDER — METHYLPREDNISOLONE 4 MG/1
TABLET ORAL
Qty: 1 PACKAGE | Refills: 0 | Status: SHIPPED | OUTPATIENT
Start: 2020-03-06 | End: 2020-03-25

## 2020-03-06 NOTE — PROGRESS NOTES
Quick DC. Duplicate Request  Refill Authorization Note     is requesting a refill authorization.    Brief assessment and rationale for refill: QUICK DC: duplicate request     Medication-related problems identified: Drug-drug interaction    Medication Therapy Plan: Duplicate request, quick dc; Handled in a previous encounter;  DDI(norvasc and nifedipine), pt. taking Norvasc for BP and Nifedipine for Sjogren syndrome                              Comments:   Last Prescribed Info:     Authorizing Provider: Radha Del Castillo PA-C RUI #:  [id number] NPI:  [id number]    Ordering User:  Radha Del Castillo PA-C            Diagnosis Association: Kidney stone (N20.0)      Original Order:  tamsulosin (FLOMAX) 0.4 mg Cap [[id number]]      Pharmacy:  [address], [place] - [address] [facility]        tamsulosin (FLOMAX) 0.4 mg Cap 30 capsule 0 2/28/2020     Sig: TAKE 1 CAPSULE BY MOUTH EVERY DAY    Sent to pharmacy as: tamsulosin (FLOMAX) 0.4 mg Cap    E-Prescribing Status: Receipt confirmed by pharmacy (2/28/2020  3:55 PM CST)           Note composed:10:19 AM 03/06/2020

## 2020-03-06 NOTE — TELEPHONE ENCOUNTER
Call Documentation    Person Called: Patient    Call Time: 10:26 AM   Spoke with: Patient 03/06/2020        Reason for call: Clarification- Norvasc or Nifedipine for BP    Patient stated: Norvasc for BP and Nifedipine for Sjogren's syndrome      Clarification details and Actions Taken: Keep both medications on med list        Current Medication Requested:   Requested Prescriptions     Pending Prescriptions Disp Refills    tamsulosin (FLOMAX) 0.4 mg Cap 30 capsule 0     Sig: Take 1 capsule (0.4 mg total) by mouth once daily.        Note composed: 03/06/2020 10:30 AM

## 2020-03-09 RX ORDER — TAMSULOSIN HYDROCHLORIDE 0.4 MG/1
1 CAPSULE ORAL DAILY
Qty: 30 CAPSULE | Refills: 0 | OUTPATIENT
Start: 2020-03-09

## 2020-03-09 RX ORDER — VARENICLINE TARTRATE 1 MG/1
1 TABLET, FILM COATED ORAL 2 TIMES DAILY
Qty: 60 TABLET | Refills: 1 | Status: SHIPPED | OUTPATIENT
Start: 2020-03-09 | End: 2020-04-14

## 2020-03-09 RX ORDER — VARENICLINE TARTRATE 0.5 (11)-1
KIT ORAL
Refills: 0 | OUTPATIENT
Start: 2020-03-09

## 2020-03-09 NOTE — TELEPHONE ENCOUNTER
Patient states she doesn't need the starting pack, she does need the continuing pack, patient states she has been doing really well with medication as well and has less cravings

## 2020-03-09 NOTE — PROGRESS NOTES
Refill Routing Note     Medication(s) are appropriate for refill:    Medication Outside of Protocol    Appointments  past 15m or future 3m with PCP    Date Provider   Last Visit   2/4/2020 Roxanna Borja MD   Next Visit   Visit date not found Roxanna Borja MD       Automatic Epic Protocol Generated Data:    Requested Prescriptions   Pending Prescriptions Disp Refills    varenicline (CHANTIX STARTING MONTH BOX) 0.5 mg (11)- 1 mg (42) tablet  0     Sig: TAKE AS DIRECTED ON PACKAGE       There is no refill protocol information for this order       varenicline (CHANTIX) 1 mg Tab 60 tablet 0     Sig: Take 1 tablet (1 mg total) by mouth 2 (two) times daily.       There is no refill protocol information for this order           Note created:8:24 AM 03/09/2020

## 2020-03-19 ENCOUNTER — PATIENT MESSAGE (OUTPATIENT)
Dept: FAMILY MEDICINE | Facility: CLINIC | Age: 40
End: 2020-03-19

## 2020-03-19 NOTE — TELEPHONE ENCOUNTER
She tried a virtual visit today and says she coiuld not log in.   Do you want to see her in office?

## 2020-03-19 NOTE — TELEPHONE ENCOUNTER
She is immunocompromised, if she is having any symptoms of fever, cough or shortness of breath she needs to go to the [place] urgent care to get test for Covid 19.  Thank you

## 2020-03-25 ENCOUNTER — INFUSION (OUTPATIENT)
Dept: INFUSION THERAPY | Facility: HOSPITAL | Age: 40
End: 2020-03-25
Attending: INTERNAL MEDICINE
Payer: COMMERCIAL

## 2020-03-25 VITALS
WEIGHT: 186.94 LBS | HEIGHT: 63 IN | OXYGEN SATURATION: 99 % | DIASTOLIC BLOOD PRESSURE: 83 MMHG | RESPIRATION RATE: 18 BRPM | HEART RATE: 85 BPM | TEMPERATURE: 98 F | SYSTOLIC BLOOD PRESSURE: 123 MMHG | BODY MASS INDEX: 33.12 KG/M2

## 2020-03-25 DIAGNOSIS — M45.5 ANKYLOSING SPONDYLITIS OF THORACOLUMBAR REGION: Primary | ICD-10-CM

## 2020-03-25 PROCEDURE — 96365 THER/PROPH/DIAG IV INF INIT: CPT | Mod: PN

## 2020-03-25 PROCEDURE — 96375 TX/PRO/DX INJ NEW DRUG ADDON: CPT | Mod: PN

## 2020-03-25 PROCEDURE — 25000003 PHARM REV CODE 250: Mod: PN | Performed by: INTERNAL MEDICINE

## 2020-03-25 PROCEDURE — 63600175 PHARM REV CODE 636 W HCPCS: Mod: PN | Performed by: INTERNAL MEDICINE

## 2020-03-25 RX ORDER — ACETAMINOPHEN 325 MG/1
650 TABLET ORAL
Status: DISCONTINUED | OUTPATIENT
Start: 2020-03-25 | End: 2020-03-25 | Stop reason: HOSPADM

## 2020-03-25 RX ORDER — ACETAMINOPHEN 325 MG/1
650 TABLET ORAL
Status: CANCELLED | OUTPATIENT
Start: 2020-04-01

## 2020-03-25 RX ORDER — HEPARIN 100 UNIT/ML
500 SYRINGE INTRAVENOUS
Status: DISCONTINUED | OUTPATIENT
Start: 2020-03-25 | End: 2020-03-25 | Stop reason: HOSPADM

## 2020-03-25 RX ORDER — SODIUM CHLORIDE 0.9 % (FLUSH) 0.9 %
10 SYRINGE (ML) INJECTION
Status: DISCONTINUED | OUTPATIENT
Start: 2020-03-25 | End: 2020-03-25 | Stop reason: HOSPADM

## 2020-03-25 RX ORDER — SODIUM CHLORIDE 0.9 % (FLUSH) 0.9 %
10 SYRINGE (ML) INJECTION
Status: CANCELLED | OUTPATIENT
Start: 2020-04-01

## 2020-03-25 RX ORDER — HEPARIN 100 UNIT/ML
500 SYRINGE INTRAVENOUS
Status: CANCELLED | OUTPATIENT
Start: 2020-04-01

## 2020-03-25 RX ORDER — DIPHENHYDRAMINE HYDROCHLORIDE 50 MG/ML
50 INJECTION INTRAMUSCULAR; INTRAVENOUS
Status: DISCONTINUED | OUTPATIENT
Start: 2020-03-25 | End: 2020-03-25 | Stop reason: HOSPADM

## 2020-03-25 RX ORDER — DIPHENHYDRAMINE HYDROCHLORIDE 50 MG/ML
50 INJECTION INTRAMUSCULAR; INTRAVENOUS
Status: CANCELLED | OUTPATIENT
Start: 2020-04-01

## 2020-03-25 RX ADMIN — GOLIMUMAB 175 MG: 50 SOLUTION INTRAVENOUS at 11:03

## 2020-03-25 RX ADMIN — HEPARIN 500 UNITS: 100 SYRINGE at 11:03

## 2020-03-25 RX ADMIN — DIPHENHYDRAMINE HYDROCHLORIDE 50 MG: 50 INJECTION INTRAMUSCULAR; INTRAVENOUS at 10:03

## 2020-03-25 RX ADMIN — SODIUM CHLORIDE: 9 INJECTION, SOLUTION INTRAVENOUS at 10:03

## 2020-03-25 RX ADMIN — ACETAMINOPHEN 650 MG: 325 TABLET, FILM COATED ORAL at 10:03

## 2020-03-25 NOTE — PLAN OF CARE
Problem: Adult Inpatient Plan of Care  Goal: Patient-Specific Goal (Individualization)  Outcome: Ongoing, Progressing  Flowsheets (Taken 3/25/2020 1045)  Individualized Care Needs: bed, pillow,snacks  Anxieties, Fears or Concerns: Not feeling well  Patient-Specific Goals (Include Timeframe): Infection prevention during tx     
  Problem: Adult Inpatient Plan of Care  Goal: Plan of Care Review  Outcome: Ongoing, Progressing  Flowsheets (Taken 3/25/2020 1152)  Plan of Care Reviewed With: patient    Pt tolerated infusion well, NAD. No new complaints voiced. Pt ambulated out of clinic without difficulty.      
  Problem: Pain Chronic (Persistent)  Goal: Acceptable Pain Control and Functional Ability  Intervention: Optimize Psychosocial Wellbeing  Flowsheets (Taken 3/25/2020 1049)  Supportive Measures: other (see comments) (lights out, noise minimized)     
To assess speech, language, and cognitive skills

## 2020-04-14 RX ORDER — VARENICLINE TARTRATE 1 MG/1
TABLET, FILM COATED ORAL
Qty: 60 TABLET | Refills: 1 | Status: SHIPPED | OUTPATIENT
Start: 2020-04-14 | End: 2020-04-20 | Stop reason: SDUPTHER

## 2020-04-14 NOTE — PROGRESS NOTES
Refill Routing Note     Medication(s) are not appropriate for processing by [facility]:    Medication Outside of Protocol    Appointments  past 12m or future 3m with PCP    Date Provider   Last Visit   2/4/2020 Roxanna Borja MD   Next Visit   Visit date not found Roxanna Borja MD           Automatic Epic Protocol Generated Data:    Requested Prescriptions   Pending Prescriptions Disp Refills    CHANTIX 1 mg Tab [Pharmacy Med Name: CHANTIX 1 MG TABLET] 60 tablet 1     Sig: TAKE 1 TABLET BY MOUTH TWICE A DAY       There is no refill protocol information for this order           Note composed:11:00 AM 04/14/2020

## 2020-04-16 ENCOUNTER — PATIENT OUTREACH (OUTPATIENT)
Dept: ADMINISTRATIVE | Facility: OTHER | Age: 40
End: 2020-04-16

## 2020-04-17 ENCOUNTER — OFFICE VISIT (OUTPATIENT)
Dept: RHEUMATOLOGY | Facility: CLINIC | Age: 40
End: 2020-04-17
Payer: COMMERCIAL

## 2020-04-17 VITALS — BODY MASS INDEX: 33.12 KG/M2 | HEIGHT: 63 IN | WEIGHT: 186.94 LBS

## 2020-04-17 DIAGNOSIS — D84.821 IMMUNOCOMPROMISED STATE DUE TO DRUG THERAPY: ICD-10-CM

## 2020-04-17 DIAGNOSIS — Z79.899 IMMUNOCOMPROMISED STATE DUE TO DRUG THERAPY: ICD-10-CM

## 2020-04-17 DIAGNOSIS — R11.2 NAUSEA AND VOMITING, INTRACTABILITY OF VOMITING NOT SPECIFIED, UNSPECIFIED VOMITING TYPE: ICD-10-CM

## 2020-04-17 DIAGNOSIS — D80.3 IGG2 SUBCLASS DEFICIENCY: ICD-10-CM

## 2020-04-17 DIAGNOSIS — M32.9 LUPUS: ICD-10-CM

## 2020-04-17 DIAGNOSIS — M45.9 ANKYLOSING SPONDYLITIS, UNSPECIFIED SITE OF SPINE: Primary | ICD-10-CM

## 2020-04-17 PROCEDURE — 3008F PR BODY MASS INDEX (BMI) DOCUMENTED: ICD-10-PCS | Mod: CPTII,,, | Performed by: INTERNAL MEDICINE

## 2020-04-17 PROCEDURE — 99215 OFFICE O/P EST HI 40 MIN: CPT | Mod: 95,,, | Performed by: INTERNAL MEDICINE

## 2020-04-17 PROCEDURE — 99215 PR OFFICE/OUTPT VISIT, EST, LEVL V, 40-54 MIN: ICD-10-PCS | Mod: 95,,, | Performed by: INTERNAL MEDICINE

## 2020-04-17 PROCEDURE — 3008F BODY MASS INDEX DOCD: CPT | Mod: CPTII,,, | Performed by: INTERNAL MEDICINE

## 2020-04-17 RX ORDER — PROMETHAZINE HYDROCHLORIDE 25 MG/1
25 TABLET ORAL EVERY 4 HOURS PRN
Qty: 45 TABLET | Refills: 3 | Status: SHIPPED | OUTPATIENT
Start: 2020-04-17 | End: 2020-05-17

## 2020-04-17 RX ORDER — ONDANSETRON HYDROCHLORIDE 8 MG/1
8 TABLET, FILM COATED ORAL EVERY 8 HOURS
Qty: 45 TABLET | Refills: 0 | Status: ON HOLD | OUTPATIENT
Start: 2020-04-17 | End: 2021-04-14

## 2020-04-17 NOTE — PROGRESS NOTES
Subjective:       Patient ID: Breanna Sanchez is a 39 y.o. female.    Chief Complaint: Follow-up    Follow up:39 year old female ankylosing spondylitis.started IVIG, she  Is unable to get zofran, Her last simponi aria infusion was 12/4/19 and this is working well for her arthritis. She complains of pain involving her wrists, elbows, and low back. Pain is constant and aching. She reports noticing increased pain 1 week prior to her next infusion. No s/e of infusions noted. She has suffered from UTI and sinusitis since her last visit. She has established with pain management Dr. Chery and is on butrans patch and percocet, which is working well. She is scheduled for SI joint procedure next week.     Review of Systems   Constitutional: Positive for activity change and fatigue. Negative for appetite change.   Eyes: Negative for visual disturbance.   Respiratory: Negative for shortness of breath.    Cardiovascular: Negative for palpitations and leg swelling.   Gastrointestinal: Negative for constipation and diarrhea.   Musculoskeletal: Positive for arthralgias, back pain, joint swelling and myalgias.   Allergic/Immunologic: Positive for immunocompromised state.   Neurological: Negative for dizziness and light-headedness.         Objective:     There were no vitals filed for this visit.    Past Medical History:   Diagnosis Date    Diarrhea     Fibromyalgia     History of acute bronchitis     History of kidney stones     History of UTI     IgG2 deficiency     Lupus     Rheumatoid arteritis     Sjoegren syndrome     Spondylitis     Spondylosis      Past Surgical History:   Procedure Laterality Date    APPENDECTOMY      BARTHOLIN GLAND CYST EXCISION      DILATION OF URETHRA      X 2    HYSTERECTOMY      INSERTION OF TUNNELED CENTRAL VENOUS CATHETER (CVC) WITH SUBCUTANEOUS PORT N/A 10/2/2019    Procedure: [id number];  Surgeon: Lenin Springer MD;  Location: [facility];  Service: General;  Laterality:  N/A;    KNEE ARTHROSCOPY Left     LAPAROSCOPIC LYSIS OF ADHESIONS N/A 7/22/2019    Procedure: LYSIS, ADHESIONS, LAPAROSCOPIC;  Surgeon: Clarence Adams MD;  Location: [facility] OR;  Service: OB/GYN;  Laterality: N/A;    LAPAROSCOPIC SALPINGO-OOPHORECTOMY Left 7/22/2019    Procedure: SALPINGO-OOPHORECTOMY, LAPAROSCOPIC- LEFT SIDE ONLY;  Surgeon: Clarence Adams MD;  Location: [facility] OR;  Service: OB/GYN;  Laterality: Left;    OVARIAN CYST REMOVAL Left     SALPINGOOPHORECTOMY Right     TONSILLECTOMY      TOTAL SHOULDER ARTHROPLASTY Right     TUBAL LIGATION            Physical Exam   Constitutional: She is oriented to person, place, and time and well-developed, well-nourished, and in no distress.   Neurological: She is alert and oriented to person, place, and time.   Psychiatric: Mood, affect and judgment normal.         Results for orders placed or performed during the hospital encounter of 02/03/20   CBC auto differential   Result Value Ref Range    WBC 14.36 (H) 3.90 - 12.70 K/uL    RBC 4.72 4.00 - 5.40 M/uL    Hemoglobin 13.7 12.0 - 16.0 g/dL    Hematocrit 41.8 37.0 - 48.5 %    Mean Corpuscular Volume 89 82 - 98 fL    Mean Corpuscular Hemoglobin 29.0 27.0 - 31.0 pg    Mean Corpuscular Hemoglobin Conc 32.8 32.0 - 36.0 g/dL    RDW 13.2 11.5 - 14.5 %    Platelets 296 150 - 350 K/uL    MPV 10.4 9.2 - 12.9 fL    Immature Granulocytes 0.3 0.0 - 0.5 %    Gran # (ANC) 9.9 (H) 1.8 - 7.7 K/uL    Immature Grans (Abs) 0.05 (H) 0.00 - 0.04 K/uL    Lymph # 3.3 1.0 - 4.8 K/uL    Mono # 0.9 0.3 - 1.0 K/uL    Eos # 0.1 0.0 - 0.5 K/uL    Baso # 0.05 0.00 - 0.20 K/uL    nRBC 0 0 /100 WBC    Gran% 69.0 38.0 - 73.0 %    Lymph% 23.3 18.0 - 48.0 %    Mono% 6.5 4.0 - 15.0 %    Eosinophil% 0.6 0.0 - 8.0 %    Basophil% 0.3 0.0 - 1.9 %    Differential Method Automated    Comprehensive metabolic panel   Result Value Ref Range    Sodium 141 136 - 145 mmol/L    Potassium 3.1 (L) 3.5 - 5.1 mmol/L    Chloride 107 95 - 110 mmol/L    CO2  27 22 - 31 mmol/L    Glucose 109 70 - 110 mg/dL    BUN, Bld 10 7 - 18 mg/dL    Creatinine 0.42 (L) 0.50 - 1.40 mg/dL    Calcium 8.6 8.4 - 10.2 mg/dL    Total Protein 6.9 6.0 - 8.4 g/dL    Albumin 4.1 3.5 - 5.2 g/dL    Total Bilirubin 0.7 0.2 - 1.3 mg/dL    Alkaline Phosphatase 93 38 - 145 U/L    AST 17 14 - 36 U/L    ALT 21 0 - 35 U/L    Anion Gap 7 (L) 8 - 16 mmol/L    eGFR if African American >60 >60 mL/min/1.73 m^2    eGFR if non African American >60 >60 mL/min/1.73 m^2   Urinalysis   Result Value Ref Range    Specimen UA Urine, Clean Catch     Color, UA Yellow Yellow, Straw, Janell    Appearance, UA Cloudy (A) Clear    pH, UA 5.5 5.0 - 8.0    Specific Gravity, UA 1.020 1.005 - 1.030    Protein, UA Negative Negative    Glucose, UA Negative Negative    Ketones, UA Negative Negative    Bilirubin (UA) Negative Negative    Occult Blood UA 2+ (A) Negative    Nitrite, UA Negative Negative    Urobilinogen, UA 1.0 <2.0 EU/dL    Leukocytes, UA Negative Negative   Lipase   Result Value Ref Range    Lipase Result 35 23 - 300 U/L   WBC, UA   Result Value Ref Range    WBC, UA 5 0 - 5 /hpf   RBC, UA   Result Value Ref Range    RBC, UA 18 (H) 0 - 4 /hpf   Hyaline Casts, UA   Result Value Ref Range    Hyaline Casts, UA 0 0 - 1 /lpf   Squamous Epithelial, UA   Result Value Ref Range    Squam Epithel, UA 12 /hpf   Bacteria, UA   Result Value Ref Range    Bacteria Negative None-Occ /hpf   Urinalysis Microscopic   Result Value Ref Range    RBC, UA 18 (H) 0 - 4 /hpf    WBC, UA 5 0 - 5 /hpf    Bacteria Negative None-Occ /hpf    Squam Epithel, UA 12 /hpf    Hyaline Casts, UA 0 0 - 1 /lpf    Microscopic Comment SEE COMMENT    D dimer, quantitative   Result Value Ref Range    D-Dimer <0.27 <0.50 mcg/mL       Assessment:       1. Ankylosing spondylitis, unspecified site of spine    2. Lupus    3. IgG2 subclass deficiency    4. Immunocompromised state due to drug therapy    5. Nausea and vomiting, intractability of vomiting not specified,  unspecified vomiting type            Plan:       Ankylosing spondylitis, unspecified site of spine  -     ondansetron (ZOFRAN) 8 MG tablet; Take 1 tablet (8 mg total) by mouth every 8 (eight) hours. IF NEEDED TAKE AM OF SURGERY WITH A SIP OF WATER  Dispense: 45 tablet; Refill: 0  -     CBC auto differential; Future; Expected date: 04/17/2020  -     Comprehensive metabolic panel; Future; Expected date: 04/17/2020  -     C-Reactive Protein; Future; Expected date: 04/17/2020  -     Sedimentation rate; Future; Expected date: 04/17/2020  -     Urinalysis; Future; Expected date: 04/17/2020  -     Urine culture; Future; Expected date: 04/17/2020  -     TSH; Future; Expected date: 04/17/2020  -     T4, free; Future; Expected date: 04/17/2020  -     T3, free; Future; Expected date: 04/17/2020    Lupus  -     ondansetron (ZOFRAN) 8 MG tablet; Take 1 tablet (8 mg total) by mouth every 8 (eight) hours. IF NEEDED TAKE AM OF SURGERY WITH A SIP OF WATER  Dispense: 45 tablet; Refill: 0  -     CBC auto differential; Future; Expected date: 04/17/2020  -     Comprehensive metabolic panel; Future; Expected date: 04/17/2020  -     C-Reactive Protein; Future; Expected date: 04/17/2020  -     Sedimentation rate; Future; Expected date: 04/17/2020  -     Urinalysis; Future; Expected date: 04/17/2020  -     Urine culture; Future; Expected date: 04/17/2020  -     TSH; Future; Expected date: 04/17/2020  -     T4, free; Future; Expected date: 04/17/2020  -     T3, free; Future; Expected date: 04/17/2020    IgG2 subclass deficiency  -     ondansetron (ZOFRAN) 8 MG tablet; Take 1 tablet (8 mg total) by mouth every 8 (eight) hours. IF NEEDED TAKE AM OF SURGERY WITH A SIP OF WATER  Dispense: 45 tablet; Refill: 0  -     CBC auto differential; Future; Expected date: 04/17/2020  -     Comprehensive metabolic panel; Future; Expected date: 04/17/2020  -     C-Reactive Protein; Future; Expected date: 04/17/2020  -     Sedimentation rate; Future; Expected  date: 04/17/2020  -     Urinalysis; Future; Expected date: 04/17/2020  -     Urine culture; Future; Expected date: 04/17/2020  -     TSH; Future; Expected date: 04/17/2020  -     T4, free; Future; Expected date: 04/17/2020  -     T3, free; Future; Expected date: 04/17/2020    Immunocompromised state due to drug therapy  -     ondansetron (ZOFRAN) 8 MG tablet; Take 1 tablet (8 mg total) by mouth every 8 (eight) hours. IF NEEDED TAKE AM OF SURGERY WITH A SIP OF WATER  Dispense: 45 tablet; Refill: 0  -     CBC auto differential; Future; Expected date: 04/17/2020  -     Comprehensive metabolic panel; Future; Expected date: 04/17/2020  -     C-Reactive Protein; Future; Expected date: 04/17/2020  -     Sedimentation rate; Future; Expected date: 04/17/2020  -     Urinalysis; Future; Expected date: 04/17/2020  -     Urine culture; Future; Expected date: 04/17/2020  -     TSH; Future; Expected date: 04/17/2020  -     T4, free; Future; Expected date: 04/17/2020  -     T3, free; Future; Expected date: 04/17/2020    Nausea and vomiting, intractability of vomiting not specified, unspecified vomiting type  -     ondansetron (ZOFRAN) 8 MG tablet; Take 1 tablet (8 mg total) by mouth every 8 (eight) hours. IF NEEDED TAKE AM OF SURGERY WITH A SIP OF WATER  Dispense: 45 tablet; Refill: 0  -     promethazine (PHENERGAN) 25 MG tablet; Take 1 tablet (25 mg total) by mouth every 4 (four) hours as needed for Nausea.  Dispense: 45 tablet; Refill: 3  -     CBC auto differential; Future; Expected date: 04/17/2020  -     Comprehensive metabolic panel; Future; Expected date: 04/17/2020  -     C-Reactive Protein; Future; Expected date: 04/17/2020  -     Sedimentation rate; Future; Expected date: 04/17/2020  -     Urinalysis; Future; Expected date: 04/17/2020  -     Urine culture; Future; Expected date: 04/17/2020  -     TSH; Future; Expected date: 04/17/2020  -     T4, free; Future; Expected date: 04/17/2020  -     T3, free; Future; Expected  date: 04/17/2020          The patient is aware that there is a COVID-19 pandemic and that the immunosuppressants being taken to treat arthritis can increased the  risk for infection/Viruses.  This patient understands  to hold (not to take) all DMARD/Immunsuppressants with the exception of Plaquenil,  if having fever chills, cough, shortness of breath loss of sense of smell and or myalgias.  It is understood that the patient is to call the office as well as call their primary care physician and observe  social isolation    The patient location is: home  The chief complaint leading to consultation is:  Ankylosing spondylitis CVID  Visit type: audiovisual  Total time spent with patient: 43 min  Each patient to whom he or she provides medical services by telemedicine is:  (1) informed of the relationship between the physician and patient and the respective role of any other health care provider with respect to management of the patient; and (2) notified that he or she may decline to receive medical services by telemedicine and may withdraw from such care at any time.

## 2020-04-20 RX ORDER — VARENICLINE TARTRATE 1 MG/1
1 TABLET, FILM COATED ORAL 2 TIMES DAILY
Qty: 60 TABLET | Refills: 0 | Status: SHIPPED | OUTPATIENT
Start: 2020-04-20 | End: 2021-01-20

## 2020-04-20 NOTE — PROGRESS NOTES
Refill Routing Note   Medication(s) are not appropriate for processing by [facility]:       Outside of protocol               Medication reconciliation completed: No        Appointments [id number] or future 3m with PCP    Date Provider   Last Visit   2/4/2020 Roxanna Borja MD   Next Visit   Visit date not found Roxanna Borja MD     Automatic Epic Protocol Generated Data:    Requested Prescriptions   Pending Prescriptions Disp Refills    varenicline (CHANTIX) 1 mg Tab 60 tablet 1     Sig: Take 1 tablet (1 mg total) by mouth 2 (two) times daily.       There is no refill protocol information for this order           Note composed:1:15 PM 04/20/2020

## 2020-05-06 ENCOUNTER — PATIENT MESSAGE (OUTPATIENT)
Dept: ADMINISTRATIVE | Facility: HOSPITAL | Age: 40
End: 2020-05-06

## 2020-05-11 DIAGNOSIS — Z03.818 ENCOUNTER FOR OBSERVATION FOR SUSPECTED EXPOSURE TO OTHER BIOLOGICAL AGENTS RULED OUT: Primary | ICD-10-CM

## 2020-05-11 DIAGNOSIS — Z79.899 IMMUNOCOMPROMISED STATE DUE TO DRUG THERAPY: ICD-10-CM

## 2020-05-11 DIAGNOSIS — M45.9 ANKYLOSING SPONDYLITIS, UNSPECIFIED SITE OF SPINE: ICD-10-CM

## 2020-05-11 DIAGNOSIS — D84.821 IMMUNOCOMPROMISED STATE DUE TO DRUG THERAPY: ICD-10-CM

## 2020-05-11 NOTE — PROGRESS NOTES
covid screening orders for Simponi aria infusion. Pt also due for annual TB testing d/t immunosupressant medication.

## 2020-05-14 ENCOUNTER — TELEPHONE (OUTPATIENT)
Dept: RHEUMATOLOGY | Facility: CLINIC | Age: 40
End: 2020-05-14

## 2020-05-17 ENCOUNTER — LAB VISIT (OUTPATIENT)
Dept: FAMILY MEDICINE | Facility: CLINIC | Age: 40
End: 2020-05-17
Payer: COMMERCIAL

## 2020-05-17 DIAGNOSIS — M45.9 ANKYLOSING SPONDYLITIS, UNSPECIFIED SITE OF SPINE: ICD-10-CM

## 2020-05-17 DIAGNOSIS — D84.821 IMMUNOCOMPROMISED STATE DUE TO DRUG THERAPY: ICD-10-CM

## 2020-05-17 DIAGNOSIS — Z03.818 ENCOUNTER FOR OBSERVATION FOR SUSPECTED EXPOSURE TO OTHER BIOLOGICAL AGENTS RULED OUT: ICD-10-CM

## 2020-05-17 DIAGNOSIS — Z79.899 IMMUNOCOMPROMISED STATE DUE TO DRUG THERAPY: ICD-10-CM

## 2020-05-17 PROCEDURE — U0003 INFECTIOUS AGENT DETECTION BY NUCLEIC ACID (DNA OR RNA); SEVERE ACUTE RESPIRATORY SYNDROME CORONAVIRUS 2 (SARS-COV-2) (CORONAVIRUS DISEASE [COVID-19]), AMPLIFIED PROBE TECHNIQUE, MAKING USE OF HIGH THROUGHPUT TECHNOLOGIES AS DESCRIBED BY CMS-2020-01-R: HCPCS

## 2020-05-18 LAB — SARS-COV-2 RNA RESP QL NAA+PROBE: NOT DETECTED

## 2020-05-20 ENCOUNTER — INFUSION (OUTPATIENT)
Dept: INFUSION THERAPY | Facility: HOSPITAL | Age: 40
End: 2020-05-20
Attending: INTERNAL MEDICINE
Payer: COMMERCIAL

## 2020-05-20 VITALS
SYSTOLIC BLOOD PRESSURE: 122 MMHG | DIASTOLIC BLOOD PRESSURE: 84 MMHG | HEIGHT: 63 IN | RESPIRATION RATE: 14 BRPM | BODY MASS INDEX: 33.28 KG/M2 | HEART RATE: 91 BPM | WEIGHT: 187.81 LBS | TEMPERATURE: 98 F

## 2020-05-20 DIAGNOSIS — Z79.899 IMMUNOCOMPROMISED STATE DUE TO DRUG THERAPY: Primary | ICD-10-CM

## 2020-05-20 DIAGNOSIS — D84.821 IMMUNOCOMPROMISED STATE DUE TO DRUG THERAPY: Primary | ICD-10-CM

## 2020-05-20 DIAGNOSIS — M45.5 ANKYLOSING SPONDYLITIS OF THORACOLUMBAR REGION: ICD-10-CM

## 2020-05-20 DIAGNOSIS — M45.9 ANKYLOSING SPONDYLITIS, UNSPECIFIED SITE OF SPINE: ICD-10-CM

## 2020-05-20 PROCEDURE — 25000003 PHARM REV CODE 250: Mod: PN | Performed by: INTERNAL MEDICINE

## 2020-05-20 PROCEDURE — 63600175 PHARM REV CODE 636 W HCPCS: Mod: PN | Performed by: INTERNAL MEDICINE

## 2020-05-20 PROCEDURE — 96365 THER/PROPH/DIAG IV INF INIT: CPT | Mod: PN

## 2020-05-20 PROCEDURE — 86480 TB TEST CELL IMMUN MEASURE: CPT

## 2020-05-20 PROCEDURE — A4216 STERILE WATER/SALINE, 10 ML: HCPCS | Mod: PN | Performed by: INTERNAL MEDICINE

## 2020-05-20 RX ORDER — SODIUM CHLORIDE 0.9 % (FLUSH) 0.9 %
10 SYRINGE (ML) INJECTION
Status: DISCONTINUED | OUTPATIENT
Start: 2020-05-20 | End: 2020-05-20 | Stop reason: HOSPADM

## 2020-05-20 RX ORDER — ACETAMINOPHEN 325 MG/1
650 TABLET ORAL
Status: CANCELLED | OUTPATIENT
Start: 2020-06-03

## 2020-05-20 RX ORDER — SODIUM CHLORIDE 0.9 % (FLUSH) 0.9 %
10 SYRINGE (ML) INJECTION
Status: CANCELLED | OUTPATIENT
Start: 2020-06-03

## 2020-05-20 RX ORDER — HEPARIN 100 UNIT/ML
500 SYRINGE INTRAVENOUS
Status: DISCONTINUED | OUTPATIENT
Start: 2020-05-20 | End: 2020-05-20 | Stop reason: HOSPADM

## 2020-05-20 RX ORDER — DIPHENHYDRAMINE HYDROCHLORIDE 50 MG/ML
50 INJECTION INTRAMUSCULAR; INTRAVENOUS
Status: CANCELLED | OUTPATIENT
Start: 2020-06-03

## 2020-05-20 RX ORDER — HEPARIN 100 UNIT/ML
500 SYRINGE INTRAVENOUS
Status: CANCELLED | OUTPATIENT
Start: 2020-06-03

## 2020-05-20 RX ORDER — ACETAMINOPHEN 325 MG/1
650 TABLET ORAL
Status: DISCONTINUED | OUTPATIENT
Start: 2020-05-20 | End: 2020-05-20 | Stop reason: HOSPADM

## 2020-05-20 RX ORDER — DIPHENHYDRAMINE HYDROCHLORIDE 50 MG/ML
50 INJECTION INTRAMUSCULAR; INTRAVENOUS
Status: DISCONTINUED | OUTPATIENT
Start: 2020-05-20 | End: 2020-05-20 | Stop reason: HOSPADM

## 2020-05-20 RX ADMIN — GOLIMUMAB 175 MG: 50 SOLUTION INTRAVENOUS at 11:05

## 2020-05-20 RX ADMIN — Medication 10 ML: at 11:05

## 2020-05-20 RX ADMIN — HEPARIN 500 UNITS: 100 SYRINGE at 11:05

## 2020-05-20 NOTE — PLAN OF CARE
Patient tolerated treatment without any complications. PAC hep locked and deaccessed. D/C VS taken and within normal range. Patient aware of next appt date and time. Patient leaving with ind gait.

## 2020-05-22 LAB
GAMMA INTERFERON BACKGROUND BLD IA-ACNC: 0.03 IU/ML
M TB IFN-G CD4+ BCKGRND COR BLD-ACNC: 0 IU/ML
MITOGEN IGNF BCKGRD COR BLD-ACNC: 7.4 IU/ML
TB GOLD PLUS: NEGATIVE
TB2 - NIL: 0 IU/ML

## 2020-05-26 RX ORDER — CYANOCOBALAMIN 1000 UG/ML
1000 INJECTION, SOLUTION INTRAMUSCULAR; SUBCUTANEOUS
Qty: 3 ML | Refills: 0 | Status: SHIPPED | OUTPATIENT
Start: 2020-05-26 | End: 2020-08-24

## 2020-05-26 NOTE — PROGRESS NOTES
Refill Routing Note    Medication(s) are not appropriate for processing by [facility]:       Outside of protocol           Medication reconciliation completed: No      Automatic Epic Protocol Generated Data:    Requested Prescriptions   Pending Prescriptions Disp Refills    cyanocobalamin 1,000 mcg/mL injection [Pharmacy Med Name: CYANOCOBALAMIN 1,000 MCG/ML] 3 mL 0     Sig: INJECT 1 ML (1,000 MCG TOTAL) INTO THE MUSCLE EVERY 28 DAYS.       Off-Protocol Failed - 5/26/2020 12:27 AM        Failed - Medication not assigned to a protocol, review manually.        Passed - Office visit in past 12 months or future 90 days.     Recent Outpatient Visits            1 month ago Ankylosing spondylitis, unspecified site of spine    [facility] Michael Sawant MD    3 months ago Kidney stone    [facility] Roxanna Borja MD    5 months ago Ankylosing spondylitis, unspecified site of spine    [facility] Margo Acuna PA-C    7 months ago Postop check    [facility] Lenin Springer MD    8 months ago Hypokalemia    [facility] Roxanna Borja MD          Future Appointments              In 1 month [address] [facility]    In 2 months Margo Acuna PA-C [facility]    In 6 months Michael Sawant MD [facility]                   Appointments  past 12m or future 3m with PCP    Date Provider   Last Visit   2/4/2020 Roxanna Borja MD   Next Visit   Visit date not found Rxoanna Borja MD   ED visits in past 90 days: 0     Note composed:10:38 AM 05/26/2020

## 2020-07-10 DIAGNOSIS — D84.821 IMMUNOCOMPROMISED STATE DUE TO DRUG THERAPY: Primary | ICD-10-CM

## 2020-07-10 DIAGNOSIS — Z79.899 IMMUNOCOMPROMISED STATE DUE TO DRUG THERAPY: Primary | ICD-10-CM

## 2020-07-15 ENCOUNTER — INFUSION (OUTPATIENT)
Dept: INFUSION THERAPY | Facility: HOSPITAL | Age: 40
End: 2020-07-15
Attending: INTERNAL MEDICINE
Payer: COMMERCIAL

## 2020-07-15 VITALS
DIASTOLIC BLOOD PRESSURE: 94 MMHG | HEART RATE: 107 BPM | TEMPERATURE: 99 F | SYSTOLIC BLOOD PRESSURE: 141 MMHG | WEIGHT: 193.56 LBS | RESPIRATION RATE: 16 BRPM | BODY MASS INDEX: 34.3 KG/M2 | HEIGHT: 63 IN

## 2020-07-15 DIAGNOSIS — M45.9 ANKYLOSING SPONDYLITIS, UNSPECIFIED SITE OF SPINE: Primary | ICD-10-CM

## 2020-07-15 DIAGNOSIS — D80.3 IGG2 SUBCLASS DEFICIENCY: ICD-10-CM

## 2020-07-15 DIAGNOSIS — Z79.899 IMMUNOCOMPROMISED STATE DUE TO DRUG THERAPY: ICD-10-CM

## 2020-07-15 DIAGNOSIS — M45.5 ANKYLOSING SPONDYLITIS OF THORACOLUMBAR REGION: ICD-10-CM

## 2020-07-15 DIAGNOSIS — M32.9 LUPUS: ICD-10-CM

## 2020-07-15 DIAGNOSIS — R11.2 NAUSEA AND VOMITING, INTRACTABILITY OF VOMITING NOT SPECIFIED, UNSPECIFIED VOMITING TYPE: ICD-10-CM

## 2020-07-15 DIAGNOSIS — D84.821 IMMUNOCOMPROMISED STATE DUE TO DRUG THERAPY: ICD-10-CM

## 2020-07-15 LAB
ALBUMIN SERPL BCP-MCNC: 4.3 G/DL (ref 3.5–5.2)
ALP SERPL-CCNC: 94 U/L (ref 38–145)
ALT SERPL W/O P-5'-P-CCNC: 33 U/L (ref 0–35)
ANION GAP SERPL CALC-SCNC: 5 MMOL/L (ref 8–16)
AST SERPL-CCNC: 32 U/L (ref 14–36)
BASOPHILS # BLD AUTO: 0.05 K/UL (ref 0–0.2)
BASOPHILS NFR BLD: 0.5 % (ref 0–1.9)
BILIRUB SERPL-MCNC: 0.3 MG/DL (ref 0.2–1.3)
BUN SERPL-MCNC: 8 MG/DL (ref 7–18)
CALCIUM SERPL-MCNC: 9.7 MG/DL (ref 8.4–10.2)
CHLORIDE SERPL-SCNC: 105 MMOL/L (ref 95–110)
CO2 SERPL-SCNC: 28 MMOL/L (ref 22–31)
CREAT SERPL-MCNC: 0.47 MG/DL (ref 0.5–1.4)
CRP SERPL-MCNC: 1.9 MG/DL (ref 0–0.9)
DIFFERENTIAL METHOD: NORMAL
EOSINOPHIL # BLD AUTO: 0.3 K/UL (ref 0–0.5)
EOSINOPHIL NFR BLD: 2.3 % (ref 0–8)
ERYTHROCYTE [DISTWIDTH] IN BLOOD BY AUTOMATED COUNT: 13.2 % (ref 11.5–14.5)
ERYTHROCYTE [SEDIMENTATION RATE] IN BLOOD BY PHOTOMETRIC METHOD: 27 MM/HR (ref 0–19)
EST. GFR  (AFRICAN AMERICAN): >60 ML/MIN/1.73 M^2
EST. GFR  (NON AFRICAN AMERICAN): >60 ML/MIN/1.73 M^2
GLUCOSE SERPL-MCNC: 116 MG/DL (ref 70–110)
HBV CORE IGM SERPL QL IA: NEGATIVE
HBV SURFACE AG SERPL QL IA: NEGATIVE
HCT VFR BLD AUTO: 45.8 % (ref 37–48.5)
HCV AB SERPL QL IA: NEGATIVE
HGB BLD-MCNC: 15.4 G/DL (ref 12–16)
IMM GRANULOCYTES # BLD AUTO: 0.04 K/UL (ref 0–0.04)
IMM GRANULOCYTES NFR BLD AUTO: 0.4 % (ref 0–0.5)
LYMPHOCYTES # BLD AUTO: 3.3 K/UL (ref 1–4.8)
LYMPHOCYTES NFR BLD: 30.8 % (ref 18–48)
MCH RBC QN AUTO: 29.7 PG (ref 27–31)
MCHC RBC AUTO-ENTMCNC: 33.6 G/DL (ref 32–36)
MCV RBC AUTO: 88 FL (ref 82–98)
MONOCYTES # BLD AUTO: 0.9 K/UL (ref 0.3–1)
MONOCYTES NFR BLD: 8.7 % (ref 4–15)
NEUTROPHILS # BLD AUTO: 6.2 K/UL (ref 1.8–7.7)
NEUTROPHILS NFR BLD: 57.3 % (ref 38–73)
NRBC BLD-RTO: 0 /100 WBC
PLATELET # BLD AUTO: 314 K/UL (ref 150–350)
PMV BLD AUTO: 10 FL (ref 9.2–12.9)
POTASSIUM SERPL-SCNC: 3.6 MMOL/L (ref 3.5–5.1)
PROT SERPL-MCNC: 7.2 G/DL (ref 6–8.4)
RBC # BLD AUTO: 5.18 M/UL (ref 4–5.4)
SODIUM SERPL-SCNC: 138 MMOL/L (ref 136–145)
T3FREE SP1 P CHAL SERPL-SCNC: 3.48 PG/ML (ref 2.77–5.27)
T4 FREE SP9 P CHAL SERPL-SCNC: 0.89 NG/DL (ref 0.78–2.19)
TSH SERPL DL<=0.005 MIU/L-ACNC: 0.36 UIU/ML (ref 0.4–4)
WBC # BLD AUTO: 10.78 K/UL (ref 3.9–12.7)

## 2020-07-15 PROCEDURE — 86140 C-REACTIVE PROTEIN: CPT

## 2020-07-15 PROCEDURE — 84481 FREE ASSAY (FT-3): CPT | Mod: PN

## 2020-07-15 PROCEDURE — 86803 HEPATITIS C AB TEST: CPT

## 2020-07-15 PROCEDURE — 85652 RBC SED RATE AUTOMATED: CPT

## 2020-07-15 PROCEDURE — 84439 ASSAY OF FREE THYROXINE: CPT

## 2020-07-15 PROCEDURE — 85025 COMPLETE CBC W/AUTO DIFF WBC: CPT

## 2020-07-15 PROCEDURE — 85025 COMPLETE CBC W/AUTO DIFF WBC: CPT | Mod: PN

## 2020-07-15 PROCEDURE — 86706 HEP B SURFACE ANTIBODY: CPT

## 2020-07-15 PROCEDURE — 87340 HEPATITIS B SURFACE AG IA: CPT | Mod: PN

## 2020-07-15 PROCEDURE — 86803 HEPATITIS C AB TEST: CPT | Mod: PN

## 2020-07-15 PROCEDURE — 84439 ASSAY OF FREE THYROXINE: CPT | Mod: PN

## 2020-07-15 PROCEDURE — 87340 HEPATITIS B SURFACE AG IA: CPT

## 2020-07-15 PROCEDURE — 84443 ASSAY THYROID STIM HORMONE: CPT | Mod: PN

## 2020-07-15 PROCEDURE — 86140 C-REACTIVE PROTEIN: CPT | Mod: PN

## 2020-07-15 PROCEDURE — 63600175 PHARM REV CODE 636 W HCPCS: Mod: PN | Performed by: INTERNAL MEDICINE

## 2020-07-15 PROCEDURE — 25000003 PHARM REV CODE 250: Mod: PN | Performed by: INTERNAL MEDICINE

## 2020-07-15 PROCEDURE — 96365 THER/PROPH/DIAG IV INF INIT: CPT | Mod: PN

## 2020-07-15 PROCEDURE — 87350 HEPATITIS BE AG IA: CPT

## 2020-07-15 PROCEDURE — 86705 HEP B CORE ANTIBODY IGM: CPT

## 2020-07-15 PROCEDURE — 80053 COMPREHEN METABOLIC PANEL: CPT | Mod: PN

## 2020-07-15 PROCEDURE — 84481 FREE ASSAY (FT-3): CPT

## 2020-07-15 PROCEDURE — 85652 RBC SED RATE AUTOMATED: CPT | Mod: PN

## 2020-07-15 PROCEDURE — 80053 COMPREHEN METABOLIC PANEL: CPT

## 2020-07-15 PROCEDURE — A4216 STERILE WATER/SALINE, 10 ML: HCPCS | Mod: PN | Performed by: INTERNAL MEDICINE

## 2020-07-15 PROCEDURE — 86705 HEP B CORE ANTIBODY IGM: CPT | Mod: PN

## 2020-07-15 PROCEDURE — 84443 ASSAY THYROID STIM HORMONE: CPT

## 2020-07-15 RX ORDER — ACETAMINOPHEN 325 MG/1
650 TABLET ORAL
Status: CANCELLED | OUTPATIENT
Start: 2020-08-05

## 2020-07-15 RX ORDER — HEPARIN 100 UNIT/ML
500 SYRINGE INTRAVENOUS
Status: DISCONTINUED | OUTPATIENT
Start: 2020-07-15 | End: 2020-07-15 | Stop reason: HOSPADM

## 2020-07-15 RX ORDER — BUPRENORPHINE 20 UG/H
PATCH TRANSDERMAL
COMMUNITY
Start: 2020-07-14 | End: 2020-12-14 | Stop reason: SDUPTHER

## 2020-07-15 RX ORDER — METHENAMINE, SODIUM PHOSPHATE, MONOBASIC, ANHYDROUS, PHENYL SALICYLATE, METHYLENE BLUE AND HYOSCYAMINE SULFATE 118; 40.8; 36; 10; .12 MG/1; MG/1; MG/1; MG/1; MG/1
1 CAPSULE ORAL DAILY
COMMUNITY
Start: 2020-06-23 | End: 2023-01-31

## 2020-07-15 RX ORDER — ONDANSETRON 8 MG/1
TABLET, ORALLY DISINTEGRATING ORAL
COMMUNITY
Start: 2020-04-17 | End: 2020-12-14 | Stop reason: SDUPTHER

## 2020-07-15 RX ORDER — SODIUM CHLORIDE 0.9 % (FLUSH) 0.9 %
10 SYRINGE (ML) INJECTION
Status: CANCELLED | OUTPATIENT
Start: 2020-08-05

## 2020-07-15 RX ORDER — PROMETHAZINE HYDROCHLORIDE 25 MG/1
TABLET ORAL
COMMUNITY
Start: 2020-06-23 | End: 2020-08-13 | Stop reason: SDUPTHER

## 2020-07-15 RX ORDER — DIPHENHYDRAMINE HYDROCHLORIDE 50 MG/ML
50 INJECTION INTRAMUSCULAR; INTRAVENOUS
Status: CANCELLED | OUTPATIENT
Start: 2020-08-05

## 2020-07-15 RX ORDER — SODIUM CHLORIDE 0.9 % (FLUSH) 0.9 %
10 SYRINGE (ML) INJECTION
Status: DISCONTINUED | OUTPATIENT
Start: 2020-07-15 | End: 2020-07-15 | Stop reason: HOSPADM

## 2020-07-15 RX ORDER — HEPARIN 100 UNIT/ML
500 SYRINGE INTRAVENOUS
Status: CANCELLED | OUTPATIENT
Start: 2020-08-05

## 2020-07-15 RX ADMIN — Medication 10 ML: at 11:07

## 2020-07-15 RX ADMIN — HEPARIN 500 UNITS: 100 SYRINGE at 11:07

## 2020-07-15 RX ADMIN — GOLIMUMAB 175 MG: 50 SOLUTION INTRAVENOUS at 10:07

## 2020-07-15 RX ADMIN — SODIUM CHLORIDE: 9 INJECTION, SOLUTION INTRAVENOUS at 10:07

## 2020-07-16 LAB — HBV SURFACE AB SER-ACNC: NEGATIVE M[IU]/ML

## 2020-07-17 DIAGNOSIS — Z71.89 COMPLEX CARE COORDINATION: ICD-10-CM

## 2020-07-21 LAB — HBV E AG SERPL QL IA: NORMAL

## 2020-07-22 ENCOUNTER — CLINICAL SUPPORT (OUTPATIENT)
Dept: RHEUMATOLOGY | Facility: CLINIC | Age: 40
End: 2020-07-22
Payer: COMMERCIAL

## 2020-07-22 VITALS — SYSTOLIC BLOOD PRESSURE: 148 MMHG | HEART RATE: 102 BPM | DIASTOLIC BLOOD PRESSURE: 99 MMHG

## 2020-07-22 DIAGNOSIS — M35.00 SJOGREN'S SYNDROME, WITH UNSPECIFIED ORGAN INVOLVEMENT: ICD-10-CM

## 2020-07-22 DIAGNOSIS — M45.5 ANKYLOSING SPONDYLITIS OF THORACOLUMBAR REGION: ICD-10-CM

## 2020-07-22 DIAGNOSIS — E53.8 B12 DEFICIENCY: ICD-10-CM

## 2020-07-22 DIAGNOSIS — D72.823 LEUKEMOID REACTION: Primary | ICD-10-CM

## 2020-07-22 DIAGNOSIS — M32.9 LUPUS: ICD-10-CM

## 2020-07-22 PROCEDURE — 96372 PR INJECTION,THERAP/PROPH/DIAG2ST, IM OR SUBCUT: ICD-10-PCS | Mod: S$GLB,,, | Performed by: INTERNAL MEDICINE

## 2020-07-22 PROCEDURE — 99999 PR PBB SHADOW E&M-EST. PATIENT-LVL II: CPT | Mod: PBBFAC,,,

## 2020-07-22 PROCEDURE — 99999 PR PBB SHADOW E&M-EST. PATIENT-LVL II: ICD-10-PCS | Mod: PBBFAC,,,

## 2020-07-22 PROCEDURE — 99499 UNLISTED E&M SERVICE: CPT | Mod: S$GLB,,, | Performed by: INTERNAL MEDICINE

## 2020-07-22 PROCEDURE — 99499 NO LOS: ICD-10-PCS | Mod: S$GLB,,, | Performed by: INTERNAL MEDICINE

## 2020-07-22 PROCEDURE — 96372 THER/PROPH/DIAG INJ SC/IM: CPT | Mod: S$GLB,,, | Performed by: INTERNAL MEDICINE

## 2020-07-22 RX ORDER — KETOROLAC TROMETHAMINE 30 MG/ML
60 INJECTION, SOLUTION INTRAMUSCULAR; INTRAVENOUS
Status: COMPLETED | OUTPATIENT
Start: 2020-07-22 | End: 2020-07-22

## 2020-07-22 RX ORDER — CYANOCOBALAMIN 1000 UG/ML
1000 INJECTION, SOLUTION INTRAMUSCULAR; SUBCUTANEOUS
Status: COMPLETED | OUTPATIENT
Start: 2020-07-22 | End: 2020-07-22

## 2020-07-22 RX ORDER — METHYLPREDNISOLONE ACETATE 80 MG/ML
160 INJECTION, SUSPENSION INTRA-ARTICULAR; INTRALESIONAL; INTRAMUSCULAR; SOFT TISSUE
Status: COMPLETED | OUTPATIENT
Start: 2020-07-22 | End: 2020-07-22

## 2020-07-22 RX ADMIN — KETOROLAC TROMETHAMINE 60 MG: 30 INJECTION, SOLUTION INTRAMUSCULAR; INTRAVENOUS at 01:07

## 2020-07-22 RX ADMIN — CYANOCOBALAMIN 1000 MCG: 1000 INJECTION, SOLUTION INTRAMUSCULAR; SUBCUTANEOUS at 01:07

## 2020-07-22 RX ADMIN — METHYLPREDNISOLONE ACETATE 160 MG: 80 INJECTION, SUSPENSION INTRA-ARTICULAR; INTRALESIONAL; INTRAMUSCULAR; SOFT TISSUE at 01:07

## 2020-07-22 NOTE — PROGRESS NOTES
Patient presented to clinic needing nurse injections. Complaining of level 5 out of 10 to back, neck and hips. Nurse observed patient having trouble ambulating, both eyes appeared red. Patient stated her eyes felt very dry. Stated she just had her infusion and normally feels a lot better afterwards. Stated this time the pain is so bad she can hardly stand it. Nurse asked Dr Sawant to evaluate patient regarding pain and red eyes. Dr Sawant reviewed labs and instructed to give injections.    Administered 1 cc ( 1000 mcg/ml ) of b12 to the right upper outer gluteal. Informed of s/s to report verbalized understanding. No adverse reactions noted.        Administered 2 cc ( 80 mg/ml ) of depomedrol to the right upper outer gluteal. Informed of s/s to report verbalized understanding. No adverse reactions noted.        Administered 2 cc ( 30 mg/ml ) of toradol to the right upper outer gluteal. Informed of s/s to report verbalized understanding. No adverse reactions noted.

## 2020-08-11 ENCOUNTER — PATIENT OUTREACH (OUTPATIENT)
Dept: ADMINISTRATIVE | Facility: OTHER | Age: 40
End: 2020-08-11

## 2020-08-11 NOTE — PROGRESS NOTES
Requested updates within Care Everywhere.  Patient's chart was reviewed for overdue DENZEL topics.  Triggered LINKS for  Immunizations to be reconciled. LINKS delayed.

## 2020-08-13 ENCOUNTER — OFFICE VISIT (OUTPATIENT)
Dept: RHEUMATOLOGY | Facility: CLINIC | Age: 40
End: 2020-08-13
Payer: COMMERCIAL

## 2020-08-13 ENCOUNTER — LAB VISIT (OUTPATIENT)
Dept: LAB | Facility: HOSPITAL | Age: 40
End: 2020-08-13
Attending: INTERNAL MEDICINE
Payer: COMMERCIAL

## 2020-08-13 VITALS
BODY MASS INDEX: 31.54 KG/M2 | DIASTOLIC BLOOD PRESSURE: 104 MMHG | HEIGHT: 63 IN | SYSTOLIC BLOOD PRESSURE: 155 MMHG | WEIGHT: 178 LBS | HEART RATE: 118 BPM

## 2020-08-13 DIAGNOSIS — R30.0 DYSURIA: ICD-10-CM

## 2020-08-13 DIAGNOSIS — B37.0 THRUSH: ICD-10-CM

## 2020-08-13 DIAGNOSIS — M35.00 SJOGREN'S SYNDROME, WITH UNSPECIFIED ORGAN INVOLVEMENT: ICD-10-CM

## 2020-08-13 DIAGNOSIS — Z72.0 TOBACCO USE: ICD-10-CM

## 2020-08-13 DIAGNOSIS — R11.0 NAUSEA: ICD-10-CM

## 2020-08-13 DIAGNOSIS — M45.5 ANKYLOSING SPONDYLITIS OF THORACOLUMBAR REGION: Primary | ICD-10-CM

## 2020-08-13 LAB
BACTERIA #/AREA URNS HPF: ABNORMAL /HPF
BILIRUB UR QL STRIP: NEGATIVE
CLARITY UR: CLEAR
COLOR UR: YELLOW
GLUCOSE UR QL STRIP: NEGATIVE
HGB UR QL STRIP: ABNORMAL
KETONES UR QL STRIP: NEGATIVE
LEUKOCYTE ESTERASE UR QL STRIP: NEGATIVE
MICROSCOPIC COMMENT: ABNORMAL
NITRITE UR QL STRIP: NEGATIVE
PH UR STRIP: 6 [PH] (ref 5–8)
PROT UR QL STRIP: ABNORMAL
RBC #/AREA URNS HPF: 1 /HPF (ref 0–4)
SP GR UR STRIP: <=1.005 (ref 1–1.03)
SQUAMOUS #/AREA URNS HPF: 2 /HPF
URN SPEC COLLECT METH UR: ABNORMAL
WBC #/AREA URNS HPF: 1 /HPF (ref 0–5)

## 2020-08-13 PROCEDURE — 87088 URINE BACTERIA CULTURE: CPT

## 2020-08-13 PROCEDURE — 3080F PR MOST RECENT DIASTOLIC BLOOD PRESSURE >= 90 MM HG: ICD-10-PCS | Mod: CPTII,S$GLB,, | Performed by: PHYSICIAN ASSISTANT

## 2020-08-13 PROCEDURE — 3077F PR MOST RECENT SYSTOLIC BLOOD PRESSURE >= 140 MM HG: ICD-10-PCS | Mod: CPTII,S$GLB,, | Performed by: PHYSICIAN ASSISTANT

## 2020-08-13 PROCEDURE — 99999 PR PBB SHADOW E&M-EST. PATIENT-LVL V: CPT | Mod: PBBFAC,,, | Performed by: PHYSICIAN ASSISTANT

## 2020-08-13 PROCEDURE — 99999 PR PBB SHADOW E&M-EST. PATIENT-LVL V: ICD-10-PCS | Mod: PBBFAC,,, | Performed by: PHYSICIAN ASSISTANT

## 2020-08-13 PROCEDURE — 3080F DIAST BP >= 90 MM HG: CPT | Mod: CPTII,S$GLB,, | Performed by: PHYSICIAN ASSISTANT

## 2020-08-13 PROCEDURE — 3008F BODY MASS INDEX DOCD: CPT | Mod: CPTII,S$GLB,, | Performed by: PHYSICIAN ASSISTANT

## 2020-08-13 PROCEDURE — 99214 OFFICE O/P EST MOD 30 MIN: CPT | Mod: S$GLB,,, | Performed by: PHYSICIAN ASSISTANT

## 2020-08-13 PROCEDURE — 99214 PR OFFICE/OUTPT VISIT, EST, LEVL IV, 30-39 MIN: ICD-10-PCS | Mod: S$GLB,,, | Performed by: PHYSICIAN ASSISTANT

## 2020-08-13 PROCEDURE — 87086 URINE CULTURE/COLONY COUNT: CPT

## 2020-08-13 PROCEDURE — 3008F PR BODY MASS INDEX (BMI) DOCUMENTED: ICD-10-PCS | Mod: CPTII,S$GLB,, | Performed by: PHYSICIAN ASSISTANT

## 2020-08-13 PROCEDURE — 3077F SYST BP >= 140 MM HG: CPT | Mod: CPTII,S$GLB,, | Performed by: PHYSICIAN ASSISTANT

## 2020-08-13 PROCEDURE — 87077 CULTURE AEROBIC IDENTIFY: CPT

## 2020-08-13 PROCEDURE — 81000 URINALYSIS NONAUTO W/SCOPE: CPT | Mod: PO

## 2020-08-13 PROCEDURE — 87186 SC STD MICRODIL/AGAR DIL: CPT

## 2020-08-13 RX ORDER — VARENICLINE TARTRATE 0.5 (11)-1
KIT ORAL
Qty: 1 PACKAGE | Refills: 0 | Status: SHIPPED | OUTPATIENT
Start: 2020-08-13 | End: 2021-01-20

## 2020-08-13 RX ORDER — NIFEDIPINE 60 MG/1
60 TABLET, EXTENDED RELEASE ORAL DAILY
Qty: 90 TABLET | Refills: 1 | Status: SHIPPED | OUTPATIENT
Start: 2020-08-13 | End: 2021-02-08 | Stop reason: SDUPTHER

## 2020-08-13 RX ORDER — PROMETHAZINE HYDROCHLORIDE 25 MG/1
25 TABLET ORAL
Qty: 45 TABLET | Refills: 3 | Status: ON HOLD | OUTPATIENT
Start: 2020-08-13 | End: 2021-04-15 | Stop reason: SDUPTHER

## 2020-08-13 RX ORDER — FLUCONAZOLE 150 MG/1
150 TABLET ORAL DAILY
Qty: 7 TABLET | Refills: 2 | Status: SHIPPED | OUTPATIENT
Start: 2020-08-13 | End: 2020-08-14

## 2020-08-13 RX ORDER — NIFEDIPINE 30 MG/1
30 TABLET, EXTENDED RELEASE ORAL NIGHTLY
Qty: 90 TABLET | Refills: 2 | Status: SHIPPED | OUTPATIENT
Start: 2020-08-13 | End: 2020-08-13

## 2020-08-13 RX ORDER — NYSTATIN 100000 [USP'U]/ML
4 SUSPENSION ORAL 4 TIMES DAILY
Qty: 240 ML | Refills: 1 | Status: SHIPPED | OUTPATIENT
Start: 2020-08-13 | End: 2020-08-23

## 2020-08-13 RX ORDER — PREDNISONE 5 MG/1
10 TABLET ORAL DAILY PRN
Qty: 180 TABLET | Refills: 1 | Status: ON HOLD | OUTPATIENT
Start: 2020-08-13 | End: 2021-04-22 | Stop reason: HOSPADM

## 2020-08-13 ASSESSMENT — ROUTINE ASSESSMENT OF PATIENT INDEX DATA (RAPID3)
PAIN SCORE: 5
FATIGUE SCORE: 1.1
TOTAL RAPID3 SCORE: 4.61
MDHAQ FUNCTION SCORE: 1.3
PATIENT GLOBAL ASSESSMENT SCORE: 4.5
PSYCHOLOGICAL DISTRESS SCORE: 2.2

## 2020-08-13 NOTE — PROGRESS NOTES
Subjective:       Patient ID: Breanna Sanchez is a 39 y.o. female.    Chief Complaint: Disease Management    Mrs. Sanchez is a 39 year old female who presents to clinic for follow up on ankylosing spondylitis. She complains of lateral L hip pain x 1 month worse with lying on her side and with ambulating- plans to f/u with pain management for bursa injection soon. She complains of arthritis flare involving her wrists, elbows, and low back. Nurse injections were helpful recently. She complains of oral thrush. We reviewed labs in detail. BP has been uncontrolled. She ran out of norvasc. She is also concerned she has UTI.     Current tx:  1. Simponi    Review of Systems   Constitutional: Positive for fatigue. Negative for activity change, appetite change, chills and fever.   Eyes: Negative for visual disturbance.   Respiratory: Negative for cough and shortness of breath.    Cardiovascular: Negative for chest pain, palpitations and leg swelling.   Gastrointestinal: Negative for abdominal pain, constipation, diarrhea, nausea and vomiting.   Musculoskeletal: Positive for arthralgias, back pain, joint swelling and myalgias.   Allergic/Immunologic: Positive for immunocompromised state.   Neurological: Negative for dizziness, weakness, light-headedness and headaches.         Objective:     Vitals:    08/13/20 1035   BP: (!) 155/104   Pulse: (!) 118       Past Medical History:   Diagnosis Date    Diarrhea     Fibromyalgia     History of acute bronchitis     History of kidney stones     History of UTI     IgG2 deficiency     Lupus     Rheumatoid arteritis     Sjoegren syndrome     Spondylitis     Spondylosis      Past Surgical History:   Procedure Laterality Date    APPENDECTOMY      BARTHOLIN GLAND CYST EXCISION      DILATION OF URETHRA      X 2    HYSTERECTOMY      INSERTION OF TUNNELED CENTRAL VENOUS CATHETER (CVC) WITH SUBCUTANEOUS PORT N/A 10/2/2019    Procedure: [id number];  Surgeon: Lenin UNDERWOOD  MD Gian;  Location: [facility] OR;  Service: General;  Laterality: N/A;    KNEE ARTHROSCOPY Left     LAPAROSCOPIC LYSIS OF ADHESIONS N/A 7/22/2019    Procedure: LYSIS, ADHESIONS, LAPAROSCOPIC;  Surgeon: Clarence Adams MD;  Location: [facility] OR;  Service: OB/GYN;  Laterality: N/A;    LAPAROSCOPIC SALPINGO-OOPHORECTOMY Left 7/22/2019    Procedure: SALPINGO-OOPHORECTOMY, LAPAROSCOPIC- LEFT SIDE ONLY;  Surgeon: Clarence Adams MD;  Location: [facility] OR;  Service: OB/GYN;  Laterality: Left;    OVARIAN CYST REMOVAL Left     SALPINGOOPHORECTOMY Right     TONSILLECTOMY      TOTAL SHOULDER ARTHROPLASTY Right     TUBAL LIGATION            Physical Exam   Constitutional: She is well-developed, well-nourished, and in no distress.   Eyes: Right conjunctiva is not injected. Left conjunctiva is not injected. Right eye exhibits normal extraocular motion. Left eye exhibits normal extraocular motion.   Neck: No JVD present. No thyromegaly present.   Cardiovascular: Normal rate and regular rhythm.  Exam reveals no decreased pulses.    Pulmonary/Chest: She has no wheezes. She has no rhonchi. She has no rales.       Right Side Rheumatological Exam     Examination finds the shoulder, knee, 1st PIP, 1st MCP, 2nd PIP, 2nd MCP, 3rd PIP, 3rd MCP, 4th PIP, 4th MCP, 5th PIP and 5th MCP normal.    The patient is tender to palpation of the elbow and wrist    She has swelling of the elbow and wrist    Left Side Rheumatological Exam     Examination finds the shoulder, knee, 1st PIP, 1st MCP, 2nd PIP, 2nd MCP, 3rd PIP, 3rd MCP, 4th PIP, 4th MCP, 5th PIP and 5th MCP normal.    The patient is tender to palpation of the wrist.    She has swelling of the elbow and wrist      Back/Neck Exam   Tenderness Right paramedian tenderness of the Lower L-Spine, SI Joint, Upper L-Spine and Lower T-Spine.Left paramedian tenderness of the Lower L-Spine, SI Joint, Lower T-Spine and Upper L-Spine.      Lymphadenopathy:     She has no cervical adenopathy.    Neurological: Gait normal.   Skin: There is no rash    Psychiatric: Mood and affect normal.         Recent labs reviewed:  Component      Latest Ref Rng & Units 7/15/2020   WBC      3.90 - 12.70 K/uL 10.78   RBC      4.00 - 5.40 M/uL 5.18   Hemoglobin      12.0 - 16.0 g/dL 15.4   Hematocrit      37.0 - 48.5 % 45.8   MCV      82 - 98 fL 88   MCH      27.0 - 31.0 pg 29.7   MCHC      32.0 - 36.0 g/dL 33.6   RDW      11.5 - 14.5 % 13.2   Platelets      150 - 350 K/uL 314   MPV      9.2 - 12.9 fL 10.0   Immature Granulocytes      0.0 - 0.5 % 0.4   Gran # (ANC)      1.8 - 7.7 K/uL 6.2   Immature Grans (Abs)      0.00 - 0.04 K/uL 0.04   Lymph #      1.0 - 4.8 K/uL 3.3   Mono #      0.3 - 1.0 K/uL 0.9   Eos #      0.0 - 0.5 K/uL 0.3   Baso #      0.00 - 0.20 K/uL 0.05   nRBC      0 /100 WBC 0   Gran%      38.0 - 73.0 % 57.3   Lymph%      18.0 - 48.0 % 30.8   Mono%      4.0 - 15.0 % 8.7   Eosinophil%      0.0 - 8.0 % 2.3   Basophil%      0.0 - 1.9 % 0.5   Differential Method       Automated   Sodium      136 - 145 mmol/L 138   Potassium      3.5 - 5.1 mmol/L 3.6   Chloride      95 - 110 mmol/L 105   CO2      22 - 31 mmol/L 28   Glucose      70 - 110 mg/dL 116 (H)   BUN, Bld      7 - 18 mg/dL 8   Creatinine      0.50 - 1.40 mg/dL 0.47 (L)   Calcium      8.4 - 10.2 mg/dL 9.7   PROTEIN TOTAL      6.0 - 8.4 g/dL 7.2   Albumin      3.5 - 5.2 g/dL 4.3   BILIRUBIN TOTAL      0.2 - 1.3 mg/dL 0.3   Alkaline Phosphatase      38 - 145 U/L 94   AST      14 - 36 U/L 32   ALT      0 - 35 U/L 33   Anion Gap      8 - 16 mmol/L 5 (L)   eGFR if African American      >60 mL/min/1.73 m:2 >60   eGFR if non African American      >60 mL/min/1.73 m:2 >60   CRP      0.00 - 0.90 mg/dL 1.90 (H)   Sed Rate      0 - 19 mm/Hr 27 (H)   TSH      0.400 - 4.000 uIU/mL 0.364 (L)   Free T4      0.78 - 2.19 ng/dL 0.89   T3, Free      2.77 - 5.27 pg/mL 3.48   Hep B C IgM       Negative   HBe Ag      Negative NEG   Hep B S Ab       Negative   Hepatitis B  Surface Ag       Negative   Hepatitis C Ab       Negative     Assessment:       1. Ankylosing spondylitis of thoracolumbar region    2. Sjogren's syndrome, with unspecified organ involvement    3. Thrush    4. Nausea    5. Tobacco use    6. Dysuria            Plan:       Ankylosing spondylitis of thoracolumbar region  -     CBC auto differential; Future; Expected date: 08/13/2020  -     Comprehensive metabolic panel; Future; Expected date: 08/13/2020  -     C-Reactive Protein; Future; Expected date: 08/13/2020  -     Sedimentation rate; Future; Expected date: 08/13/2020    Sjogren's syndrome, with unspecified organ involvement  -     predniSONE (DELTASONE) 5 MG tablet; Take 2 tablets (10 mg total) by mouth daily as needed.  Dispense: 180 tablet; Refill: 1  -     Discontinue: NIFEdipine (PROCARDIA-XL) 30 MG (OSM) 24 hr tablet; Take 1 tablet (30 mg total) by mouth every evening.  Dispense: 90 tablet; Refill: 2  -     NIFEdipine (PROCARDIA-XL) 60 MG (OSM) 24 hr tablet; Take 1 tablet (60 mg total) by mouth once daily.  Dispense: 90 tablet; Refill: 1  -     CBC auto differential; Future; Expected date: 08/13/2020  -     Comprehensive metabolic panel; Future; Expected date: 08/13/2020  -     C-Reactive Protein; Future; Expected date: 08/13/2020  -     Sedimentation rate; Future; Expected date: 08/13/2020    Thrush  -     fluconazole (DIFLUCAN) 150 MG Tab; Take 1 tablet (150 mg total) by mouth once daily. for 1 day  Dispense: 7 tablet; Refill: 2  -     nystatin (MYCOSTATIN) 100,000 unit/mL suspension; Take 4 mLs (400,000 Units total) by mouth 4 (four) times daily. for 10 days  Dispense: 240 mL; Refill: 1    Nausea  -     promethazine (PHENERGAN) 25 MG tablet; Take 1 tablet (25 mg total) by mouth every 4 to 6 hours as needed for Nausea.  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 Holden  --HTN  --chronic pain syndrome followed by Dr. Chery    Plan:  1. Check UA today  2. Increase nifedipine XL 60 mg daily. Monitor BP at home and keep a log. Notify clinic if BP is persistently >140/90.    3. Cont. simponi aria q 8 weeks, prednisone 5 mg daily PRN  4. Diflucan and nystatin ordered  5. Chantix starter pack    The patient is aware that there is a COVID-19 pandemic and that the immunosuppressants being taken to treat arthritis can increased the  risk for infection/Viruses.  This patient understands  to hold (not to take) all DMARD/Immunsuppressants with the exception of Plaquenil,  if having fever chills, cough, shortness of breath loss of sense of smell and or myalgias.  It is understood that the patient is to call the office as well as call their primary care physician and observe  social isolation      Follow up:  3 months Dr Wiseman

## 2020-08-14 ENCOUNTER — TELEPHONE (OUTPATIENT)
Dept: RHEUMATOLOGY | Facility: CLINIC | Age: 40
End: 2020-08-14

## 2020-08-14 NOTE — TELEPHONE ENCOUNTER
----- Message from Lauryn Do MA sent at 8/14/2020  4:24 PM CDT -----  Type: Needs Medical Advice  Who Called:  Breanna  Best Call Back Number: [phone]  Additional Information: patient is requesting an antibiotic for her UTI.  She states her symptoms are getting worse and she does not want to go the weekend without treatment. Please call patient to advise

## 2020-08-15 ENCOUNTER — NURSE TRIAGE (OUTPATIENT)
Dept: ADMINISTRATIVE | Facility: CLINIC | Age: 40
End: 2020-08-15

## 2020-08-15 ENCOUNTER — TELEPHONE (OUTPATIENT)
Dept: FAMILY MEDICINE | Facility: CLINIC | Age: 40
End: 2020-08-15

## 2020-08-15 DIAGNOSIS — N39.0 URINARY TRACT INFECTION WITHOUT HEMATURIA, SITE UNSPECIFIED: Primary | ICD-10-CM

## 2020-08-15 RX ORDER — CIPROFLOXACIN 250 MG/1
250 TABLET, FILM COATED ORAL 2 TIMES DAILY
Qty: 14 TABLET | Refills: 0 | Status: SHIPPED | OUTPATIENT
Start: 2020-08-15 | End: 2020-08-22

## 2020-08-15 NOTE — TELEPHONE ENCOUNTER
On-call note:  Received a message that patient is requesting an antibiotic for a UTI.  Patient had recent urinalysis and culture ordered by a different department but I was told no one is on-call for that department so they contacted me for assistance.  Preliminary urine culture is positive for gram neg rods.  I will empirically treat with Cipro.  The ordering provider will need to review the final results and determine whether this is appropriate or will need to be adjusted when final ID and sensitivities are resulted.

## 2020-08-15 NOTE — TELEPHONE ENCOUNTER
"    Reason for Disposition   [1] Request for URGENT new prescription or refill of "essential" medication (i.e., likelihood of harm to patient if not taken) AND [2] triager unable to fill per unit policy   Pain or burning with passing urine (urination)    Additional Information   Negative: Drug overdose and triager unable to answer question   Negative: Caller requesting information unrelated to medicine   Negative: Caller requesting a prescription for Strep throat and has a positive culture result   Negative: Rash while taking a medication or within 3 days of stopping it   Negative: Immunization reaction suspected   Negative: [1] Asthma and [2] having symptoms of asthma (cough, wheezing, etc.)   Negative: [1] Influenza symptoms AND [2] anti-viral med prescription request, such as Tamiflu   Negative: [1] Symptom of illness (e.g., headache, abdominal pain, earache, vomiting) AND [2] more than mild   Negative: MORE THAN A DOUBLE DOSE of a prescription or over-the-counter (OTC) drug   Negative: [1] DOUBLE DOSE (an extra dose or lesser amount) of over-the-counter (OTC) drug AND [2] any symptoms (e.g., dizziness, nausea, pain, sleepiness)   Negative: [1] DOUBLE DOSE (an extra dose or lesser amount) of prescription drug AND [2] any symptoms (e.g., dizziness, nausea, pain, sleepiness)   Negative: Took another person's prescription drug   Negative: [1] DOUBLE DOSE (an extra dose or lesser amount) of prescription drug AND [2] NO symptoms (Exception: a double dose of antibiotics)   Negative: Diabetes drug error or overdose (e.g., took wrong type of insulin or took extra dose)   Negative: Shock suspected (e.g., cold/pale/clammy skin, too weak to stand, low BP, rapid pulse)   Negative: Sounds like a life-threatening emergency to the triager   Negative: Passed out (i.e., lost consciousness, collapsed and was not responding)   Negative: Shock suspected (e.g., cold/pale/clammy skin, too weak to stand, low BP, " rapid pulse)   Negative: Difficult to awaken or acting confused (e.g., disoriented, slurred speech)   Negative: Sounds like a life-threatening emergency to the triager   Negative: Followed a major injury to the back (e.g., MVA, fall > 10 feet or 3 meters, penetrating injury, etc.)   Negative: Back pain or flank pain during pregnancy   Negative: Upper, mid or lower back pain that occurs mainly in the midline   Negative: [1] SEVERE pain (e.g., excruciating, scale 8-10) AND [2] present > 1 hour   Negative: [1] SEVERE pain (e.g., excruciating, scale 8-10) AND [2] not improved after pain medicine   Negative: [1] Sudden onset of severe flank pain AND [2] age > 60   Negative: [1] Abdominal pain AND [2] age > 60   Negative: [1] Unable to urinate (or only a few drops) > 4 hours AND     [2] bladder feels very full (e.g., palpable bladder or strong urge to urinate)   Negative: Vomiting   Negative: Weakness of a leg or foot (e.g., unable to bear weight, dragging foot)   Negative: Patient sounds very sick or weak to the triager   Negative: Fever > 100.4 F (38.0 C)    Protocols used: MEDICATION QUESTION CALL-A-AH, FLANK PAIN-A-AH, URINARY SYMPTOMS-A-AH    Pt stated she saw Dr. Sawant's PA IRINA Acuna for a UTI a few days ago. Stated she was supposed to be started on an antibiotic but was not. Per  Radha, no Provider is on call for Dr. Sawant.  stated she paged family medicine Provider On Call Dr. KATY Morgan. Secure chat message sent to Dr. Neal KIRBY stated he will send medication to Pt's pharmacy on file. Notified Pt.

## 2020-08-16 ENCOUNTER — PATIENT MESSAGE (OUTPATIENT)
Dept: RHEUMATOLOGY | Facility: CLINIC | Age: 40
End: 2020-08-16

## 2020-08-16 LAB — BACTERIA UR CULT: ABNORMAL

## 2020-08-17 NOTE — TELEPHONE ENCOUNTER
Contacted patient regarding recent call to nurse on call regarding UTI. Patient stated PCP sent in Cipro 250 mg twice daily. Patient stated did not  Augmentin. Patient will call office for a repeat urine once Cipro completed or if symptoms worsen.

## 2020-08-21 NOTE — PROGRESS NOTES
Refill Routing Note   Medication(s) are not appropriate for processing by [facility]:       - Outside of protocol           Medication reconciliation completed: No      Automatic Epic Generated Protocol Data:        Requested Prescriptions   Pending Prescriptions Disp Refills    cyanocobalamin 1,000 mcg/mL injection [Pharmacy Med Name: CYANOCOBALAMIN 1,000 MCG/ML] 3 mL 0     Sig: INJECT 1 ML (1,000 MCG TOTAL) INTO THE MUSCLE EVERY 28 DAYS.       Off-Protocol Failed - 8/21/2020  1:21 AM        Failed - Medication not assigned to a protocol, review manually.        Passed - Office visit in past 12 months or future 90 days.     Recent Outpatient Visits            1 week ago Ankylosing spondylitis of thoracolumbar region    [facility] Margo Acuna PA-C    4 months ago Ankylosing spondylitis, unspecified site of spine    [facility] Rheumatology Michael Sawant MD    6 months ago Kidney stone    [facility] Medicine Roxanna Borja MD    8 months ago Ankylosing spondylitis, unspecified site of spine    [facility] Rheumatology Margo Acuna PA-C    10 months ago Postop check    [facility] General Surgery Lenin Springer MD          Future Appointments              In 2 weeks [address] [facility], Infusion Services, [facility]    In 3 months Michael Sawant MD [facility]    In 7 months Margo Acuna PA-C [facility]               Off-Protocol Failed - 8/21/2020  1:21 AM        Failed - Medication not assigned to a protocol, review manually.        Passed - Office visit in past 12 months or future 90 days.     Recent Outpatient Visits            1 week ago Ankylosing spondylitis of thoracolumbar region    [facility] Margo Acuna PA-C    4 months ago Ankylosing spondylitis, unspecified site of spine    [facility] Rheumatology Michael Sawant MD    6 months ago Kidney stone    [facility]  Family Medicine Roxanna Borja MD    8 months ago Ankylosing spondylitis, unspecified site of spine    [place] - Rheumatology Margo Acuna PA-C    10 months ago Postop check    [place] - General Surgery Lenin Springer MD          Future Appointments              In 2 weeks [place] [id number], [facility], [facility]fusion Services, [facility]    In 3 months Michael Sawant MD [place] [facility], [place]    In 7 months Margo Acuna PA-C [place] [facility] [place]                      Appointments  past 12m or future 3m with PCP    Date Provider   Last Visit   2/4/2020 Roxanna Borja MD   Next Visit   Visit date not found Roxanna Borja MD   ED visits in past 90 days: 0     Note composed:6:30 AM 08/21/2020

## 2020-08-24 ENCOUNTER — TELEPHONE (OUTPATIENT)
Dept: RHEUMATOLOGY | Facility: CLINIC | Age: 40
End: 2020-08-24

## 2020-08-24 RX ORDER — CYANOCOBALAMIN 1000 UG/ML
1000 INJECTION, SOLUTION INTRAMUSCULAR; SUBCUTANEOUS
Qty: 3 ML | Refills: 0 | Status: SHIPPED | OUTPATIENT
Start: 2020-08-24 | End: 2020-11-18

## 2020-08-24 NOTE — TELEPHONE ENCOUNTER
----- Message from Loli Rivas sent at 8/24/2020  7:07 AM CDT -----  Regarding: advice  Contact: Patient spouse cecilio glover  Patient spouse cecilio glover # [phone], requesting a call from the nurse stated patient have fever and possible kidney infection.  Patient spouse seeking medical advice.  Please call

## 2020-08-24 NOTE — TELEPHONE ENCOUNTER
----- Message from Lauryn Do MA sent at 8/24/2020  8:11 AM CDT -----  Type: Needs Medical Advice  Who Called:  Cr  Symptoms (please be specific):  102.0 fever, flank pain  How long has patient had these symptoms:  yesterday, fever last night  Pharmacy name and phone #:    [id number] [place] - [address] [place] [address]  [address] [place] [address]  [place] [address]  Phone: [phone] Fax: [phone]  Best Call Back Number: [phone]  Additional Information:

## 2020-08-24 NOTE — TELEPHONE ENCOUNTER
Returned  call regarding patient. Office was informed that patient has been running a fever and feeling really bad.  said he just spoke to her and her fever finally broke. Nurse informed will call patient to check on her. Spoke with patient regarding symptoms. Patient stated she just completed a round of cipro. Nurse informed Augmentin was sent to pharmacy. Patient stated did not start that yet. Patient stated she feels as bad as she did when she had poly nephritis. Nurse advised to go to ER for evaluation. Patient will keep office updated through portal.

## 2020-08-31 ENCOUNTER — PATIENT MESSAGE (OUTPATIENT)
Dept: RHEUMATOLOGY | Facility: CLINIC | Age: 40
End: 2020-08-31

## 2020-09-01 NOTE — TELEPHONE ENCOUNTER
Patient went to the ER after sending this message according to chart review.    Please make sure she has a follow up visit with her primary care this week if possible. We may need to push back simponi infusion since this is scheduled for 9/9/2020.

## 2020-09-09 ENCOUNTER — INFUSION (OUTPATIENT)
Dept: INFUSION THERAPY | Facility: HOSPITAL | Age: 40
End: 2020-09-09
Attending: INTERNAL MEDICINE
Payer: COMMERCIAL

## 2020-09-09 VITALS
BODY MASS INDEX: 31.95 KG/M2 | TEMPERATURE: 98 F | DIASTOLIC BLOOD PRESSURE: 70 MMHG | WEIGHT: 180.31 LBS | RESPIRATION RATE: 16 BRPM | HEART RATE: 77 BPM | HEIGHT: 63 IN | SYSTOLIC BLOOD PRESSURE: 111 MMHG

## 2020-09-09 DIAGNOSIS — M45.5 ANKYLOSING SPONDYLITIS OF THORACOLUMBAR REGION: Primary | ICD-10-CM

## 2020-09-09 PROCEDURE — 63600175 PHARM REV CODE 636 W HCPCS: Mod: PN | Performed by: INTERNAL MEDICINE

## 2020-09-09 PROCEDURE — 96365 THER/PROPH/DIAG IV INF INIT: CPT | Mod: PN

## 2020-09-09 PROCEDURE — A4216 STERILE WATER/SALINE, 10 ML: HCPCS | Mod: PN | Performed by: INTERNAL MEDICINE

## 2020-09-09 PROCEDURE — 25000003 PHARM REV CODE 250: Mod: PN | Performed by: INTERNAL MEDICINE

## 2020-09-09 RX ORDER — ACETAMINOPHEN 325 MG/1
650 TABLET ORAL
Status: CANCELLED | OUTPATIENT
Start: 2020-09-16

## 2020-09-09 RX ORDER — SODIUM CHLORIDE 0.9 % (FLUSH) 0.9 %
10 SYRINGE (ML) INJECTION
Status: DISCONTINUED | OUTPATIENT
Start: 2020-09-09 | End: 2020-09-09 | Stop reason: HOSPADM

## 2020-09-09 RX ORDER — HEPARIN 100 UNIT/ML
500 SYRINGE INTRAVENOUS
Status: CANCELLED | OUTPATIENT
Start: 2020-09-16

## 2020-09-09 RX ORDER — SODIUM CHLORIDE 0.9 % (FLUSH) 0.9 %
10 SYRINGE (ML) INJECTION
Status: CANCELLED | OUTPATIENT
Start: 2020-09-16

## 2020-09-09 RX ORDER — ACETAMINOPHEN 325 MG/1
650 TABLET ORAL
Status: DISCONTINUED | OUTPATIENT
Start: 2020-09-09 | End: 2020-09-09 | Stop reason: HOSPADM

## 2020-09-09 RX ORDER — HEPARIN 100 UNIT/ML
500 SYRINGE INTRAVENOUS
Status: DISCONTINUED | OUTPATIENT
Start: 2020-09-09 | End: 2020-09-09 | Stop reason: HOSPADM

## 2020-09-09 RX ORDER — DIPHENHYDRAMINE HYDROCHLORIDE 50 MG/ML
50 INJECTION INTRAMUSCULAR; INTRAVENOUS
Status: CANCELLED | OUTPATIENT
Start: 2020-09-16

## 2020-09-09 RX ADMIN — HEPARIN SODIUM (PORCINE) LOCK FLUSH IV SOLN 100 UNIT/ML 500 UNITS: 100 SOLUTION at 11:09

## 2020-09-09 RX ADMIN — Medication 10 ML: at 10:09

## 2020-09-09 RX ADMIN — SODIUM CHLORIDE: 900 INJECTION, SOLUTION INTRAVENOUS at 10:09

## 2020-09-09 RX ADMIN — GOLIMUMAB 162.5 MG: 50 SOLUTION INTRAVENOUS at 10:09

## 2020-09-09 RX ADMIN — ACETAMINOPHEN 650 MG: 325 TABLET, FILM COATED ORAL at 10:09

## 2020-09-09 NOTE — PLAN OF CARE
Adequate for discharge.   Pt tolerated  infusion without noted distress.  Reviewed upcoming appointments.  All questions answered. Advised to call MD with any questions or concerns.   Ambulated from infusion.

## 2020-09-24 ENCOUNTER — PATIENT MESSAGE (OUTPATIENT)
Dept: RHEUMATOLOGY | Facility: CLINIC | Age: 40
End: 2020-09-24

## 2020-09-25 ENCOUNTER — CLINICAL SUPPORT (OUTPATIENT)
Dept: RHEUMATOLOGY | Facility: CLINIC | Age: 40
End: 2020-09-25
Payer: COMMERCIAL

## 2020-09-25 VITALS — DIASTOLIC BLOOD PRESSURE: 93 MMHG | SYSTOLIC BLOOD PRESSURE: 136 MMHG | HEART RATE: 107 BPM

## 2020-09-25 DIAGNOSIS — M79.7 FIBROMYALGIA: ICD-10-CM

## 2020-09-25 DIAGNOSIS — M32.9 LUPUS: Primary | ICD-10-CM

## 2020-09-25 DIAGNOSIS — M45.5 ANKYLOSING SPONDYLITIS OF THORACOLUMBAR REGION: ICD-10-CM

## 2020-09-25 DIAGNOSIS — M05.79 RHEUMATOID ARTHRITIS INVOLVING MULTIPLE SITES WITH POSITIVE RHEUMATOID FACTOR: ICD-10-CM

## 2020-09-25 DIAGNOSIS — E53.8 B12 DEFICIENCY: ICD-10-CM

## 2020-09-25 PROCEDURE — 96372 THER/PROPH/DIAG INJ SC/IM: CPT | Mod: S$GLB,,, | Performed by: INTERNAL MEDICINE

## 2020-09-25 PROCEDURE — 99999 PR PBB SHADOW E&M-EST. PATIENT-LVL IV: CPT | Mod: PBBFAC,,,

## 2020-09-25 PROCEDURE — 96372 PR INJECTION,THERAP/PROPH/DIAG2ST, IM OR SUBCUT: ICD-10-PCS | Mod: S$GLB,,, | Performed by: INTERNAL MEDICINE

## 2020-09-25 PROCEDURE — 99999 PR PBB SHADOW E&M-EST. PATIENT-LVL IV: ICD-10-PCS | Mod: PBBFAC,,,

## 2020-09-25 RX ORDER — METHYLPREDNISOLONE ACETATE 80 MG/ML
160 INJECTION, SUSPENSION INTRA-ARTICULAR; INTRALESIONAL; INTRAMUSCULAR; SOFT TISSUE
Status: COMPLETED | OUTPATIENT
Start: 2020-09-25 | End: 2020-09-25

## 2020-09-25 RX ORDER — CYANOCOBALAMIN 1000 UG/ML
1000 INJECTION, SOLUTION INTRAMUSCULAR; SUBCUTANEOUS
Status: COMPLETED | OUTPATIENT
Start: 2020-09-25 | End: 2020-09-25

## 2020-09-25 RX ORDER — KETOROLAC TROMETHAMINE 30 MG/ML
60 INJECTION, SOLUTION INTRAMUSCULAR; INTRAVENOUS
Status: COMPLETED | OUTPATIENT
Start: 2020-09-25 | End: 2020-09-25

## 2020-09-25 RX ADMIN — CYANOCOBALAMIN 1000 MCG: 1000 INJECTION, SOLUTION INTRAMUSCULAR; SUBCUTANEOUS at 03:09

## 2020-09-25 RX ADMIN — KETOROLAC TROMETHAMINE 60 MG: 30 INJECTION, SOLUTION INTRAMUSCULAR; INTRAVENOUS at 03:09

## 2020-09-25 RX ADMIN — METHYLPREDNISOLONE ACETATE 160 MG: 80 INJECTION, SUSPENSION INTRA-ARTICULAR; INTRALESIONAL; INTRAMUSCULAR; SOFT TISSUE at 03:09

## 2020-09-25 NOTE — PROGRESS NOTES
Patient presented to clinic needing nurse injections. Complaining of level 5 out of 10 all over. Discussed symptoms with DR Sawant labs reviewed, injections ordered.    Administered 1 cc ( 1000 mcg/ml ) of b12 to the right upper outer gluteal. Informed of s/s to report verbalized understanding. No adverse reactions noted.        Administered 2 cc ( 80 mg/ml ) of depomedrol to the right upper outer gluteal. Informed of s/s to report verbalized understanding. No adverse reactions noted.      Administered 2 cc ( 30 mg/ml ) of toradol to the left upper outer gluteal. Informed of s/s to report verbalized understanding. No adverse reactions noted.

## 2020-11-04 ENCOUNTER — INFUSION (OUTPATIENT)
Dept: INFUSION THERAPY | Facility: HOSPITAL | Age: 40
End: 2020-11-04
Attending: INTERNAL MEDICINE
Payer: COMMERCIAL

## 2020-11-04 VITALS
WEIGHT: 177.25 LBS | HEIGHT: 63 IN | OXYGEN SATURATION: 100 % | DIASTOLIC BLOOD PRESSURE: 85 MMHG | RESPIRATION RATE: 18 BRPM | TEMPERATURE: 98 F | SYSTOLIC BLOOD PRESSURE: 137 MMHG | HEART RATE: 110 BPM | BODY MASS INDEX: 31.41 KG/M2

## 2020-11-04 DIAGNOSIS — M45.5 ANKYLOSING SPONDYLITIS OF THORACOLUMBAR REGION: Primary | ICD-10-CM

## 2020-11-04 PROCEDURE — 96365 THER/PROPH/DIAG IV INF INIT: CPT | Mod: PN

## 2020-11-04 PROCEDURE — 25000003 PHARM REV CODE 250: Mod: PN | Performed by: INTERNAL MEDICINE

## 2020-11-04 PROCEDURE — A4216 STERILE WATER/SALINE, 10 ML: HCPCS | Mod: PN | Performed by: INTERNAL MEDICINE

## 2020-11-04 PROCEDURE — 63600175 PHARM REV CODE 636 W HCPCS: Mod: JG,PN | Performed by: INTERNAL MEDICINE

## 2020-11-04 RX ORDER — DIPHENHYDRAMINE HYDROCHLORIDE 50 MG/ML
50 INJECTION INTRAMUSCULAR; INTRAVENOUS
Status: CANCELLED | OUTPATIENT
Start: 2020-11-18

## 2020-11-04 RX ORDER — ACETAMINOPHEN 325 MG/1
650 TABLET ORAL
Status: DISCONTINUED | OUTPATIENT
Start: 2020-11-04 | End: 2020-11-04 | Stop reason: HOSPADM

## 2020-11-04 RX ORDER — SODIUM CHLORIDE 0.9 % (FLUSH) 0.9 %
10 SYRINGE (ML) INJECTION
Status: DISCONTINUED | OUTPATIENT
Start: 2020-11-04 | End: 2020-11-04 | Stop reason: HOSPADM

## 2020-11-04 RX ORDER — HEPARIN 100 UNIT/ML
500 SYRINGE INTRAVENOUS
Status: CANCELLED | OUTPATIENT
Start: 2020-11-18

## 2020-11-04 RX ORDER — ACETAMINOPHEN 325 MG/1
650 TABLET ORAL
Status: CANCELLED | OUTPATIENT
Start: 2020-11-18

## 2020-11-04 RX ORDER — SODIUM CHLORIDE 0.9 % (FLUSH) 0.9 %
10 SYRINGE (ML) INJECTION
Status: CANCELLED | OUTPATIENT
Start: 2020-11-18

## 2020-11-04 RX ORDER — HEPARIN 100 UNIT/ML
500 SYRINGE INTRAVENOUS
Status: DISCONTINUED | OUTPATIENT
Start: 2020-11-04 | End: 2020-11-04 | Stop reason: HOSPADM

## 2020-11-04 RX ADMIN — Medication 10 ML: at 11:11

## 2020-11-04 RX ADMIN — SODIUM CHLORIDE: 9 INJECTION, SOLUTION INTRAVENOUS at 10:11

## 2020-11-04 RX ADMIN — HEPARIN 500 UNITS: 100 SYRINGE at 11:11

## 2020-11-04 RX ADMIN — GOLIMUMAB 162.5 MG: 50 SOLUTION INTRAVENOUS at 11:11

## 2020-11-04 RX ADMIN — ACETAMINOPHEN 650 MG: 325 TABLET, FILM COATED ORAL at 11:11

## 2020-11-04 NOTE — PLAN OF CARE
Problem: Fatigue  Goal: Improved Activity Tolerance  Outcome: Ongoing, Progressing   Pt tolerated treatment well. D/C'd in stable condition.

## 2020-11-18 RX ORDER — CYANOCOBALAMIN 1000 UG/ML
1000 INJECTION, SOLUTION INTRAMUSCULAR; SUBCUTANEOUS
Qty: 3 ML | Refills: 0 | Status: SHIPPED | OUTPATIENT
Start: 2020-11-18 | End: 2021-02-17

## 2020-11-18 NOTE — PROGRESS NOTES
Refill Routing Note   Medication(s) are not appropriate for processing by [facility] for the following reason(s):     - Outside of protocol    [facility] actions taken in this encounter: Route          Medication reconciliation completed: No   Automatic Epic Generated Protocol Data:        Requested Prescriptions   Pending Prescriptions Disp Refills    cyanocobalamin 1,000 mcg/mL injection [Pharmacy Med Name: CYANOCOBALAMIN 1,000 MCG/ML] 3 mL 0     Sig: INJECT 1 ML (1,000 MCG TOTAL) INTO THE MUSCLE EVERY 28 DAYS.       There is no refill protocol information for this order           Appointments  past 12m or future 3m with PCP    Date Provider   Last Visit   2/4/2020 Roxanna Borja MD   Next Visit   Visit date not found Roxanna Borja MD   ED visits in past 90 days: 2        Note composed:6:35 AM 11/18/2020

## 2020-11-25 ENCOUNTER — TELEPHONE (OUTPATIENT)
Dept: RHEUMATOLOGY | Facility: CLINIC | Age: 40
End: 2020-11-25

## 2020-11-25 ENCOUNTER — PATIENT MESSAGE (OUTPATIENT)
Dept: RHEUMATOLOGY | Facility: CLINIC | Age: 40
End: 2020-11-25

## 2020-11-25 NOTE — TELEPHONE ENCOUNTER
Patient is wanting to come in and get injections due to increase pain. She wanted to come in on Friday, however she was informed that she would not be able to on this Friday and asked if next week will be fine. Message forwarded to Margo for advisement. Her last office visit was

## 2020-12-07 ENCOUNTER — HOSPITAL ENCOUNTER (OUTPATIENT)
Dept: RADIOLOGY | Facility: HOSPITAL | Age: 40
Discharge: HOME | End: 2020-12-07
Attending: PAIN MEDICINE
Payer: COMMERCIAL

## 2020-12-07 DIAGNOSIS — M45.2 ANKYLOSING SPONDYLITIS OF CERVICAL REGION: ICD-10-CM

## 2020-12-07 DIAGNOSIS — M46.1 SACROILIITIS, NOT ELSEWHERE CLASSIFIED: ICD-10-CM

## 2020-12-07 DIAGNOSIS — M70.62 TROCHANTERIC BURSITIS, LEFT HIP: ICD-10-CM

## 2020-12-07 DIAGNOSIS — M47.817 SPONDYLOSIS WITHOUT MYELOPATHY OR RADICULOPATHY, LUMBOSACRAL REGION: ICD-10-CM

## 2020-12-07 PROCEDURE — 73522 X-RAY EXAM HIPS BI 3-4 VIEWS: CPT | Mod: 26,,, | Performed by: RADIOLOGY

## 2020-12-07 PROCEDURE — 73522 XR HIP 3 OR 4 VIEWS BILATERAL: ICD-10-PCS | Mod: 26,,, | Performed by: RADIOLOGY

## 2020-12-07 PROCEDURE — 73522 X-RAY EXAM HIPS BI 3-4 VIEWS: CPT | Mod: TC,FY,PO

## 2020-12-10 ENCOUNTER — PATIENT OUTREACH (OUTPATIENT)
Dept: ADMINISTRATIVE | Facility: OTHER | Age: 40
End: 2020-12-10

## 2020-12-10 DIAGNOSIS — Z12.31 ENCOUNTER FOR SCREENING MAMMOGRAM FOR MALIGNANT NEOPLASM OF BREAST: Primary | ICD-10-CM

## 2020-12-11 NOTE — PROGRESS NOTES
Health Maintenance Due   Topic Date Due    HIV Screening  08/28/1995    TETANUS VACCINE  08/28/1998    Pneumococcal Vaccine (Highest Risk) (2 of 3 - PCV13) 07/05/2019    Mammogram  08/28/2020     Updates were requested from care everywhere.  Chart was reviewed for overdue Proactive [facility] En[facility]ters ([facility]) topics (CRS, Breast Cancer Screening, Eye exam)  Health Maintenance has been updated.  LINKS immunization registry triggered.  Immunizations were reconciled.  [employer] searched for mammogram. No record found.  Order for screening mammogram placed and tasked.

## 2020-12-14 ENCOUNTER — OFFICE VISIT (OUTPATIENT)
Dept: RHEUMATOLOGY | Facility: CLINIC | Age: 40
End: 2020-12-14
Payer: COMMERCIAL

## 2020-12-14 VITALS
SYSTOLIC BLOOD PRESSURE: 130 MMHG | WEIGHT: 185.63 LBS | BODY MASS INDEX: 32.89 KG/M2 | HEART RATE: 113 BPM | DIASTOLIC BLOOD PRESSURE: 82 MMHG | HEIGHT: 63 IN

## 2020-12-14 DIAGNOSIS — M45.5 ANKYLOSING SPONDYLITIS OF THORACOLUMBAR REGION: ICD-10-CM

## 2020-12-14 DIAGNOSIS — M05.79 RHEUMATOID ARTHRITIS INVOLVING MULTIPLE SITES WITH POSITIVE RHEUMATOID FACTOR: Primary | ICD-10-CM

## 2020-12-14 DIAGNOSIS — G43.009 MIGRAINE WITHOUT AURA AND WITHOUT STATUS MIGRAINOSUS, NOT INTRACTABLE: ICD-10-CM

## 2020-12-14 DIAGNOSIS — M79.7 FIBROMYALGIA: ICD-10-CM

## 2020-12-14 DIAGNOSIS — R11.2 NON-INTRACTABLE VOMITING WITH NAUSEA, UNSPECIFIED VOMITING TYPE: ICD-10-CM

## 2020-12-14 DIAGNOSIS — E53.8 B12 DEFICIENCY: ICD-10-CM

## 2020-12-14 DIAGNOSIS — M35.00 SJOGREN'S SYNDROME, WITH UNSPECIFIED ORGAN INVOLVEMENT: ICD-10-CM

## 2020-12-14 PROCEDURE — 99999 PR PBB SHADOW E&M-EST. PATIENT-LVL III: CPT | Mod: PBBFAC,,, | Performed by: INTERNAL MEDICINE

## 2020-12-14 PROCEDURE — 3079F PR MOST RECENT DIASTOLIC BLOOD PRESSURE 80-89 MM HG: ICD-10-PCS | Mod: CPTII,S$GLB,, | Performed by: INTERNAL MEDICINE

## 2020-12-14 PROCEDURE — 3008F PR BODY MASS INDEX (BMI) DOCUMENTED: ICD-10-PCS | Mod: CPTII,S$GLB,, | Performed by: INTERNAL MEDICINE

## 2020-12-14 PROCEDURE — 96372 THER/PROPH/DIAG INJ SC/IM: CPT | Mod: S$GLB,,, | Performed by: INTERNAL MEDICINE

## 2020-12-14 PROCEDURE — 99215 OFFICE O/P EST HI 40 MIN: CPT | Mod: 25,S$GLB,, | Performed by: INTERNAL MEDICINE

## 2020-12-14 PROCEDURE — 3075F SYST BP GE 130 - 139MM HG: CPT | Mod: CPTII,S$GLB,, | Performed by: INTERNAL MEDICINE

## 2020-12-14 PROCEDURE — 1125F PR PAIN SEVERITY QUANTIFIED, PAIN PRESENT: ICD-10-PCS | Mod: S$GLB,,, | Performed by: INTERNAL MEDICINE

## 2020-12-14 PROCEDURE — 3079F DIAST BP 80-89 MM HG: CPT | Mod: CPTII,S$GLB,, | Performed by: INTERNAL MEDICINE

## 2020-12-14 PROCEDURE — 3075F PR MOST RECENT SYSTOLIC BLOOD PRESS GE 130-139MM HG: ICD-10-PCS | Mod: CPTII,S$GLB,, | Performed by: INTERNAL MEDICINE

## 2020-12-14 PROCEDURE — 1125F AMNT PAIN NOTED PAIN PRSNT: CPT | Mod: S$GLB,,, | Performed by: INTERNAL MEDICINE

## 2020-12-14 PROCEDURE — 99215 PR OFFICE/OUTPT VISIT, EST, LEVL V, 40-54 MIN: ICD-10-PCS | Mod: 25,S$GLB,, | Performed by: INTERNAL MEDICINE

## 2020-12-14 PROCEDURE — 99999 PR PBB SHADOW E&M-EST. PATIENT-LVL III: ICD-10-PCS | Mod: PBBFAC,,, | Performed by: INTERNAL MEDICINE

## 2020-12-14 PROCEDURE — 96372 PR INJECTION,THERAP/PROPH/DIAG2ST, IM OR SUBCUT: ICD-10-PCS | Mod: S$GLB,,, | Performed by: INTERNAL MEDICINE

## 2020-12-14 PROCEDURE — 3008F BODY MASS INDEX DOCD: CPT | Mod: CPTII,S$GLB,, | Performed by: INTERNAL MEDICINE

## 2020-12-14 RX ORDER — KETOROLAC TROMETHAMINE 30 MG/ML
60 INJECTION, SOLUTION INTRAMUSCULAR; INTRAVENOUS
Status: COMPLETED | OUTPATIENT
Start: 2020-12-14 | End: 2020-12-14

## 2020-12-14 RX ORDER — LIDOCAINE AND PRILOCAINE 25; 25 MG/G; MG/G
CREAM TOPICAL
Qty: 30 G | Refills: 6 | Status: SHIPPED | OUTPATIENT
Start: 2020-12-14 | End: 2021-01-13

## 2020-12-14 RX ORDER — MUPIROCIN 20 MG/G
OINTMENT TOPICAL 3 TIMES DAILY
Qty: 22 G | Refills: 4 | Status: SHIPPED | OUTPATIENT
Start: 2020-12-14 | End: 2020-12-24

## 2020-12-14 RX ORDER — METHYLPREDNISOLONE ACETATE 80 MG/ML
160 INJECTION, SUSPENSION INTRA-ARTICULAR; INTRALESIONAL; INTRAMUSCULAR; SOFT TISSUE
Status: COMPLETED | OUTPATIENT
Start: 2020-12-14 | End: 2020-12-14

## 2020-12-14 RX ORDER — ONDANSETRON 4 MG/1
4 TABLET, ORALLY DISINTEGRATING ORAL EVERY 6 HOURS PRN
Qty: 60 TABLET | Refills: 0 | Status: SHIPPED | OUTPATIENT
Start: 2020-12-14 | End: 2020-12-29

## 2020-12-14 RX ORDER — CYANOCOBALAMIN 1000 UG/ML
1000 INJECTION, SOLUTION INTRAMUSCULAR; SUBCUTANEOUS
Status: COMPLETED | OUTPATIENT
Start: 2020-12-14 | End: 2020-12-14

## 2020-12-14 RX ADMIN — METHYLPREDNISOLONE ACETATE 160 MG: 80 INJECTION, SUSPENSION INTRA-ARTICULAR; INTRALESIONAL; INTRAMUSCULAR; SOFT TISSUE at 12:12

## 2020-12-14 RX ADMIN — KETOROLAC TROMETHAMINE 60 MG: 30 INJECTION, SOLUTION INTRAMUSCULAR; INTRAVENOUS at 12:12

## 2020-12-14 RX ADMIN — CYANOCOBALAMIN 1000 MCG: 1000 INJECTION, SOLUTION INTRAMUSCULAR; SUBCUTANEOUS at 12:12

## 2020-12-14 ASSESSMENT — ROUTINE ASSESSMENT OF PATIENT INDEX DATA (RAPID3)
TOTAL RAPID3 SCORE: 6.17
PATIENT GLOBAL ASSESSMENT SCORE: 7
FATIGUE SCORE: 1.1
MDHAQ FUNCTION SCORE: 1.2
PAIN SCORE: 7.5
PSYCHOLOGICAL DISTRESS SCORE: 2.2

## 2020-12-14 NOTE — PROGRESS NOTES
Subjective:       Patient ID: Breanna Sanchez is a 40 y.o. female.    Chief Complaint: No chief complaint on file.    Follow up: 39 year old female who presents to clinic for follow up on ankylosing spondylitis. She complains of lateral L hip pain x 1 month worse with lying on her side and with ambulating- plans to f/u with pain management for bursa injection soon. She complains of arthritis flare involving her wrists, elbows, and low back. Nurse injections were helpful recently. She complains of oral thrush. We reviewed labs in detail. BP has been uncontrolled. She ran out of norvasc. She is also concerned she has UTI.     Current tx:  1. Simponi      Review of Systems   Constitutional: Negative for activity change, appetite change, chills, diaphoresis, fatigue, fever and unexpected weight change.   HENT: Negative for congestion, dental problem, ear discharge, ear pain, facial swelling, mouth sores, nosebleeds, postnasal drip, rhinorrhea, sinus pressure, sneezing, sore throat, tinnitus, trouble swallowing and voice change.    Eyes: Negative for photophobia, pain, discharge, redness and itching.   Respiratory: Negative for apnea, cough, chest tightness, shortness of breath and wheezing.    Cardiovascular: Positive for leg swelling. Negative for chest pain and palpitations.   Gastrointestinal: Negative for abdominal distention, abdominal pain, constipation, diarrhea, nausea and vomiting.   Endocrine: Negative for cold intolerance, heat intolerance, polydipsia and polyuria.   Genitourinary: Negative for decreased urine volume, difficulty urinating, dysuria, flank pain, frequency, hematuria and urgency.   Musculoskeletal: Positive for arthralgias, back pain, gait problem, joint swelling, myalgias, neck pain and neck stiffness.   Skin: Negative for pallor, rash and wound.   Allergic/Immunologic: Negative for immunocompromised state.   Neurological: Negative for dizziness, tremors, weakness, numbness and headaches.  "  Hematological: Negative for adenopathy. Does not bruise/bleed easily.   Psychiatric/Behavioral: Negative for sleep disturbance. The patient is not nervous/anxious.          Objective:   /82 (BP Location: Right arm, Patient Position: Sitting, BP Method: Medium (Automatic))   Pulse (!) 113   Ht 5' 3" (1.6 m)   Wt 84.2 kg (185 lb 10 oz)   LMP 02/12/2017   BMI 32.88 kg/m²      Physical Exam   Nursing note and vitals reviewed.  Constitutional: She is oriented to person, place, and time and well-developed, well-nourished, and in no distress.   HENT:   Head: Normocephalic and atraumatic.   Mouth/Throat: Oropharynx is clear and moist.   Eyes: EOM are normal. Pupils are equal, round, and reactive to light.   Neck: Neck supple. No thyromegaly present.   Cardiovascular: Normal rate, regular rhythm and normal heart sounds.  Exam reveals no gallop and no friction rub.    No murmur heard.  Pulmonary/Chest: Breath sounds normal. She has no wheezes. She has no rales. She exhibits no tenderness.   Abdominal: There is no abdominal tenderness. There is no rebound and no guarding.       Right Side Rheumatological Exam     Examination finds the elbow normal.    The patient is tender to palpation of the shoulder, wrist, knee, 1st PIP, 1st MCP, 2nd PIP, 2nd MCP, 3rd PIP, 3rd MCP, 4th PIP, 4th MCP, 5th PIP and 5th MCP    She has swelling of the 1st PIP, 1st MCP, 2nd PIP, 2nd MCP, 3rd PIP, 3rd MCP, 4th PIP, 4th MCP, 5th PIP and 5th MCP    Shoulder Exam   Tenderness Location: no tenderness    Range of Motion   Active abduction: abnormal   Adduction: abnormal  Sensation: normal    Knee Exam   Patellofemoral Crepitus: positive  Effusion: negative  Sensation: normal    Hip Exam   Tenderness Location: posterior  Sensation: normal    Elbow/Wrist Exam   Tenderness Location: no tenderness  Sensation: normal    Left Side Rheumatological Exam     Examination finds the elbow normal.    The patient is tender to palpation of the shoulder, " wrist, knee, 1st PIP, 1st MCP, 2nd PIP, 2nd MCP, 3rd PIP, 3rd MCP, 4th PIP, 4th MCP, 5th PIP and 5th MCP.    She has swelling of the 1st PIP, 1st MCP, 2nd PIP, 2nd MCP, 3rd PIP, 3rd MCP, 4th PIP, 4th MCP, 5th PIP and 5th MCP    Shoulder Exam   Tenderness Location: no tenderness    Range of Motion   Active abduction: abnormal   Sensation: normal    Knee Exam     Patellofemoral Crepitus: positive  Effusion: negative  Sensation: normal    Hip Exam   Tenderness Location: posterior  Sensation: normal    Elbow/Wrist Exam   Sensation: normal      Back/Neck Exam   General Inspection   Gait: normal         Lymphadenopathy:     She has no cervical adenopathy.   Neurological: She is alert and oriented to person, place, and time. Gait normal.   Skin: No rash noted. No erythema. No pallor.     Psychiatric: Mood and affect normal.        Results for orders placed or performed during the hospital encounter of 08/31/20   CBC auto differential   Result Value Ref Range    WBC 14.70 (H) 3.90 - 12.70 K/uL    RBC 4.70 4.00 - 5.40 M/uL    Hemoglobin 14.0 12.0 - 16.0 g/dL    Hematocrit 41.7 37.0 - 48.5 %    MCV 89 82 - 98 fL    MCH 29.8 27.0 - 31.0 pg    MCHC 33.6 32.0 - 36.0 g/dL    RDW 13.6 11.5 - 14.5 %    Platelets 382 (H) 150 - 350 K/uL    MPV 9.8 9.2 - 12.9 fL    Immature Granulocytes CANCELED 0.0 - 0.5 %    Gran # (ANC) 8.7 (H) 1.8 - 7.7 K/uL    Immature Grans (Abs) CANCELED 0.00 - 0.04 K/uL    nRBC 0 0 /100 WBC    Gran % 59.0 38.0 - 73.0 %    Lymph % 36.0 18.0 - 48.0 %    Mono % 4.0 4.0 - 15.0 %    Eosinophil % 1.0 0.0 - 8.0 %    Basophil % 0.0 0.0 - 1.9 %    Platelet Estimate Increased (A)     Poik Slight     Stomatocytes Occasional     Differential Method Manual    Comprehensive metabolic panel   Result Value Ref Range    Sodium 139 136 - 145 mmol/L    Potassium 3.9 3.5 - 5.1 mmol/L    Chloride 106 95 - 110 mmol/L    CO2 30 22 - 31 mmol/L    Glucose 85 70 - 110 mg/dL    BUN 7 7 - 18 mg/dL    Creatinine 0.40 (L) 0.50 - 1.40 mg/dL     Calcium 9.5 8.4 - 10.2 mg/dL    Total Protein 7.0 6.0 - 8.4 g/dL    Albumin 4.1 3.5 - 5.2 g/dL    Total Bilirubin 0.2 0.2 - 1.3 mg/dL    Alkaline Phosphatase 118 38 - 145 U/L    AST 23 14 - 36 U/L    ALT 26 0 - 35 U/L    Anion Gap 3 (L) 8 - 16 mmol/L    eGFR if African American >60 >60 mL/min/1.73 m^2    eGFR if non African American >60 >60 mL/min/1.73 m^2   Urinalysis, Reflex to Urine Culture Urine, Clean Catch    Specimen: Urine   Result Value Ref Range    Specimen UA Urine, Clean Catch     Color, UA Yellow Yellow, Straw, Janell    Appearance, UA Clear Clear    pH, UA 6.5 5.0 - 8.0    Specific Gravity, UA 1.015 1.005 - 1.030    Protein, UA Negative Negative    Glucose, UA Negative Negative    Ketones, UA Negative Negative    Bilirubin (UA) Negative Negative    Occult Blood UA Negative Negative    Nitrite, UA Negative Negative    Urobilinogen, UA 0.2 <2.0 EU/dL    Leukocytes, UA Trace (A) Negative   WBC, UA   Result Value Ref Range    WBC, UA 2 0 - 5 /hpf   Squamous Epithelial, UA   Result Value Ref Range    Squam Epithel, UA 2 /hpf   RBC, UA   Result Value Ref Range    RBC, UA 4 0 - 4 /hpf   Bacteria, UA   Result Value Ref Range    Bacteria Few (A) Negative /hpf   Hyaline Casts, UA   Result Value Ref Range    Hyaline Casts, UA 0 0 - 1 /lpf   Urinalysis Microscopic   Result Value Ref Range    RBC, UA 4 0 - 4 /hpf    WBC, UA 2 0 - 5 /hpf    Bacteria Few (A) Negative /hpf    Squam Epithel, UA 2 /hpf    Hyaline Casts, UA 0 0 - 1 /lpf    Microscopic Comment SEE COMMENT         Assessment:       1. Rheumatoid arthritis involving multiple sites with positive rheumatoid factor    2. Ankylosing spondylitis of thoracolumbar region    3. B12 deficiency    4. Sjogren's syndrome, with unspecified organ involvement    5. Fibromyalgia    6. Non-intractable vomiting with nausea, unspecified vomiting type    7. Migraine without aura and without status migrainosus, not intractable            Plan:         Diagnoses and all orders  for this visit:    Rheumatoid arthritis involving multiple sites with positive rheumatoid factor  -     methylPREDNISolone acetate injection 160 mg  -     ketorolac injection 60 mg  -     cyanocobalamin injection 1,000 mcg  -     ondansetron (ZOFRAN-ODT) 4 MG TbDL; Take 1 tablet (4 mg total) by mouth every 6 (six) hours as needed.  -     AGAPITO Screen w/Reflex; Future  -     Anti Sm/RNP Antibody; Future  -     CBC Auto Differential; Future  -     Comprehensive Metabolic Panel; Future  -     C-Reactive Protein; Future  -     Sedimentation rate; Future  -     TSH; Future  -     T4, Free; Future  -     T3, Free; Future  -     Cyclic Citrullinated Peptide Antibody, IgG; Future  -     lidocaine-prilocaine (EMLA) cream; Apply topically as needed.  -     mupirocin (BACTROBAN) 2 % ointment; Apply topically 3 (three) times daily. for 10 days    Ankylosing spondylitis of thoracolumbar region  -     methylPREDNISolone acetate injection 160 mg  -     ketorolac injection 60 mg  -     cyanocobalamin injection 1,000 mcg  -     ondansetron (ZOFRAN-ODT) 4 MG TbDL; Take 1 tablet (4 mg total) by mouth every 6 (six) hours as needed.  -     AGAPITO Screen w/Reflex; Future  -     Anti Sm/RNP Antibody; Future  -     CBC Auto Differential; Future  -     Comprehensive Metabolic Panel; Future  -     C-Reactive Protein; Future  -     Sedimentation rate; Future  -     TSH; Future  -     T4, Free; Future  -     T3, Free; Future  -     Cyclic Citrullinated Peptide Antibody, IgG; Future  -     lidocaine-prilocaine (EMLA) cream; Apply topically as needed.  -     mupirocin (BACTROBAN) 2 % ointment; Apply topically 3 (three) times daily. for 10 days    B12 deficiency  -     methylPREDNISolone acetate injection 160 mg  -     ketorolac injection 60 mg  -     cyanocobalamin injection 1,000 mcg  -     ondansetron (ZOFRAN-ODT) 4 MG TbDL; Take 1 tablet (4 mg total) by mouth every 6 (six) hours as needed.  -     AGAPITO Screen w/Reflex; Future  -     Anti Sm/RNP  Antibody; Future  -     CBC Auto Differential; Future  -     Comprehensive Metabolic Panel; Future  -     C-Reactive Protein; Future  -     Sedimentation rate; Future  -     TSH; Future  -     T4, Free; Future  -     T3, Free; Future  -     Cyclic Citrullinated Peptide Antibody, IgG; Future  -     lidocaine-prilocaine (EMLA) cream; Apply topically as needed.  -     mupirocin (BACTROBAN) 2 % ointment; Apply topically 3 (three) times daily. for 10 days    Sjogren's syndrome, with unspecified organ involvement  -     methylPREDNISolone acetate injection 160 mg  -     ketorolac injection 60 mg  -     cyanocobalamin injection 1,000 mcg  -     ondansetron (ZOFRAN-ODT) 4 MG TbDL; Take 1 tablet (4 mg total) by mouth every 6 (six) hours as needed.  -     AGAPITO Screen w/Reflex; Future  -     Anti Sm/RNP Antibody; Future  -     CBC Auto Differential; Future  -     Comprehensive Metabolic Panel; Future  -     C-Reactive Protein; Future  -     Sedimentation rate; Future  -     TSH; Future  -     T4, Free; Future  -     T3, Free; Future  -     Cyclic Citrullinated Peptide Antibody, IgG; Future  -     lidocaine-prilocaine (EMLA) cream; Apply topically as needed.  -     mupirocin (BACTROBAN) 2 % ointment; Apply topically 3 (three) times daily. for 10 days    Fibromyalgia  -     methylPREDNISolone acetate injection 160 mg  -     ketorolac injection 60 mg  -     cyanocobalamin injection 1,000 mcg  -     ondansetron (ZOFRAN-ODT) 4 MG TbDL; Take 1 tablet (4 mg total) by mouth every 6 (six) hours as needed.  -     AGAPITO Screen w/Reflex; Future  -     Anti Sm/RNP Antibody; Future  -     CBC Auto Differential; Future  -     Comprehensive Metabolic Panel; Future  -     C-Reactive Protein; Future  -     Sedimentation rate; Future  -     TSH; Future  -     T4, Free; Future  -     T3, Free; Future  -     Cyclic Citrullinated Peptide Antibody, IgG; Future  -     lidocaine-prilocaine (EMLA) cream; Apply topically as needed.  -     mupirocin  (BACTROBAN) 2 % ointment; Apply topically 3 (three) times daily. for 10 days    Non-intractable vomiting with nausea, unspecified vomiting type  -     ondansetron (ZOFRAN-ODT) 4 MG TbDL; Take 1 tablet (4 mg total) by mouth every 6 (six) hours as needed.  -     AGAPITO Screen w/Reflex; Future  -     Anti Sm/RNP Antibody; Future  -     CBC Auto Differential; Future  -     Comprehensive Metabolic Panel; Future  -     C-Reactive Protein; Future  -     Sedimentation rate; Future  -     TSH; Future  -     T4, Free; Future  -     T3, Free; Future  -     Cyclic Citrullinated Peptide Antibody, IgG; Future  -     lidocaine-prilocaine (EMLA) cream; Apply topically as needed.  -     mupirocin (BACTROBAN) 2 % ointment; Apply topically 3 (three) times daily. for 10 days    Migraine without aura and without status migrainosus, not intractable  -     ondansetron (ZOFRAN-ODT) 4 MG TbDL; Take 1 tablet (4 mg total) by mouth every 6 (six) hours as needed.  -     Ambulatory referral/consult to Neurology; Future  -     AGAPITO Screen w/Reflex; Future  -     Anti Sm/RNP Antibody; Future  -     CBC Auto Differential; Future  -     Comprehensive Metabolic Panel; Future  -     C-Reactive Protein; Future  -     Sedimentation rate; Future  -     TSH; Future  -     T4, Free; Future  -     T3, Free; Future  -     Cyclic Citrullinated Peptide Antibody, IgG; Future  -     lidocaine-prilocaine (EMLA) cream; Apply topically as needed.  -     mupirocin (BACTROBAN) 2 % ointment; Apply topically 3 (three) times daily. for 10 days      At this time I do not recommend the covid 19 vaccine due to her immune deficiency on Hizentra and immunosuppressants simpobni aria pt is unable to function and perform her ADLs without treatment.    More than 50% of the  40 minute encounter was spent face to face counseling the patient regarding current status and future plan of care as well as side of the medications. All questions were answered to patient's satisfaction

## 2020-12-28 ENCOUNTER — LAB VISIT (OUTPATIENT)
Dept: LAB | Facility: HOSPITAL | Age: 40
End: 2020-12-28
Attending: INTERNAL MEDICINE
Payer: COMMERCIAL

## 2020-12-28 DIAGNOSIS — E53.8 B12 DEFICIENCY: ICD-10-CM

## 2020-12-28 DIAGNOSIS — M45.5 ANKYLOSING SPONDYLITIS OF THORACOLUMBAR REGION: ICD-10-CM

## 2020-12-28 DIAGNOSIS — R11.2 NON-INTRACTABLE VOMITING WITH NAUSEA, UNSPECIFIED VOMITING TYPE: ICD-10-CM

## 2020-12-28 DIAGNOSIS — M05.79 RHEUMATOID ARTHRITIS INVOLVING MULTIPLE SITES WITH POSITIVE RHEUMATOID FACTOR: ICD-10-CM

## 2020-12-28 DIAGNOSIS — M35.00 SJOGREN'S SYNDROME, WITH UNSPECIFIED ORGAN INVOLVEMENT: ICD-10-CM

## 2020-12-28 DIAGNOSIS — G43.009 MIGRAINE WITHOUT AURA AND WITHOUT STATUS MIGRAINOSUS, NOT INTRACTABLE: ICD-10-CM

## 2020-12-28 DIAGNOSIS — M79.7 FIBROMYALGIA: ICD-10-CM

## 2020-12-28 LAB
ALBUMIN SERPL BCP-MCNC: 4.2 G/DL (ref 3.5–5.2)
ALP SERPL-CCNC: 112 U/L (ref 55–135)
ALT SERPL W/O P-5'-P-CCNC: 24 U/L (ref 10–44)
ANION GAP SERPL CALC-SCNC: 10 MMOL/L (ref 8–16)
AST SERPL-CCNC: 15 U/L (ref 10–40)
BASOPHILS # BLD AUTO: 0.07 K/UL (ref 0–0.2)
BASOPHILS NFR BLD: 0.5 % (ref 0–1.9)
BILIRUB SERPL-MCNC: 0.3 MG/DL (ref 0.1–1)
BUN SERPL-MCNC: 12 MG/DL (ref 6–20)
CALCIUM SERPL-MCNC: 9.9 MG/DL (ref 8.7–10.5)
CCP AB SER IA-ACNC: <0.5 U/ML
CHLORIDE SERPL-SCNC: 104 MMOL/L (ref 95–110)
CO2 SERPL-SCNC: 28 MMOL/L (ref 23–29)
CREAT SERPL-MCNC: 0.7 MG/DL (ref 0.5–1.4)
CRP SERPL-MCNC: 3.5 MG/L (ref 0–8.2)
DIFFERENTIAL METHOD: ABNORMAL
EOSINOPHIL # BLD AUTO: 0.2 K/UL (ref 0–0.5)
EOSINOPHIL NFR BLD: 1.2 % (ref 0–8)
ERYTHROCYTE [DISTWIDTH] IN BLOOD BY AUTOMATED COUNT: 13.3 % (ref 11.5–14.5)
ERYTHROCYTE [SEDIMENTATION RATE] IN BLOOD BY WESTERGREN METHOD: 13 MM/HR (ref 0–20)
EST. GFR  (AFRICAN AMERICAN): >60 ML/MIN/1.73 M^2
EST. GFR  (NON AFRICAN AMERICAN): >60 ML/MIN/1.73 M^2
GLUCOSE SERPL-MCNC: 99 MG/DL (ref 70–110)
HCT VFR BLD AUTO: 46.1 % (ref 37–48.5)
HGB BLD-MCNC: 14.5 G/DL (ref 12–16)
IMM GRANULOCYTES # BLD AUTO: 0.06 K/UL (ref 0–0.04)
IMM GRANULOCYTES NFR BLD AUTO: 0.4 % (ref 0–0.5)
LYMPHOCYTES # BLD AUTO: 4.5 K/UL (ref 1–4.8)
LYMPHOCYTES NFR BLD: 31.1 % (ref 18–48)
MCH RBC QN AUTO: 29.7 PG (ref 27–31)
MCHC RBC AUTO-ENTMCNC: 31.5 G/DL (ref 32–36)
MCV RBC AUTO: 94 FL (ref 82–98)
MONOCYTES # BLD AUTO: 1.3 K/UL (ref 0.3–1)
MONOCYTES NFR BLD: 8.9 % (ref 4–15)
NEUTROPHILS # BLD AUTO: 8.4 K/UL (ref 1.8–7.7)
NEUTROPHILS NFR BLD: 57.9 % (ref 38–73)
NRBC BLD-RTO: 0 /100 WBC
PLATELET # BLD AUTO: 447 K/UL (ref 150–350)
PMV BLD AUTO: 10.9 FL (ref 9.2–12.9)
POTASSIUM SERPL-SCNC: 4.2 MMOL/L (ref 3.5–5.1)
PROT SERPL-MCNC: 7.7 G/DL (ref 6–8.4)
RBC # BLD AUTO: 4.89 M/UL (ref 4–5.4)
SODIUM SERPL-SCNC: 142 MMOL/L (ref 136–145)
T3FREE SERPL-MCNC: 2.4 PG/ML (ref 2.3–4.2)
T4 FREE SERPL-MCNC: 1.08 NG/DL (ref 0.71–1.51)
TSH SERPL DL<=0.005 MIU/L-ACNC: 0.39 UIU/ML (ref 0.4–4)
WBC # BLD AUTO: 14.49 K/UL (ref 3.9–12.7)

## 2020-12-28 PROCEDURE — 84443 ASSAY THYROID STIM HORMONE: CPT

## 2020-12-28 PROCEDURE — 86235 NUCLEAR ANTIGEN ANTIBODY: CPT

## 2020-12-28 PROCEDURE — 86140 C-REACTIVE PROTEIN: CPT

## 2020-12-28 PROCEDURE — 85651 RBC SED RATE NONAUTOMATED: CPT | Mod: PO

## 2020-12-28 PROCEDURE — 85025 COMPLETE CBC W/AUTO DIFF WBC: CPT

## 2020-12-28 PROCEDURE — 84481 FREE ASSAY (FT-3): CPT

## 2020-12-28 PROCEDURE — 36415 COLL VENOUS BLD VENIPUNCTURE: CPT | Mod: PO

## 2020-12-28 PROCEDURE — 86039 ANTINUCLEAR ANTIBODIES (ANA): CPT

## 2020-12-28 PROCEDURE — 86200 CCP ANTIBODY: CPT

## 2020-12-28 PROCEDURE — 80053 COMPREHEN METABOLIC PANEL: CPT

## 2020-12-28 PROCEDURE — 86235 NUCLEAR ANTIGEN ANTIBODY: CPT | Mod: 59

## 2020-12-28 PROCEDURE — 84439 ASSAY OF FREE THYROXINE: CPT

## 2020-12-28 PROCEDURE — 86038 ANTINUCLEAR ANTIBODIES: CPT

## 2020-12-29 ENCOUNTER — DOCUMENTATION ONLY (OUTPATIENT)
Dept: INFUSION THERAPY | Facility: HOSPITAL | Age: 40
End: 2020-12-29

## 2020-12-29 ENCOUNTER — PATIENT MESSAGE (OUTPATIENT)
Dept: RHEUMATOLOGY | Facility: CLINIC | Age: 40
End: 2020-12-29

## 2020-12-29 LAB
ANA PATTERN 1: NORMAL
ANA SER QL IF: POSITIVE
ANA TITR SER IF: NORMAL {TITER}
ANTI SM/RNP ANTIBODY: 0.07 RATIO (ref 0–0.99)
ANTI-SM/RNP INTERPRETATION: NEGATIVE

## 2020-12-29 NOTE — PROGRESS NOTES
12/29/2020    2:50 pm   Patient stated that she currently has a UTI , appointment has been R/S .

## 2020-12-31 ENCOUNTER — EXTERNAL CHRONIC CARE MANAGEMENT (OUTPATIENT)
Dept: PRIMARY CARE CLINIC | Facility: CLINIC | Age: 40
End: 2020-12-31
Payer: COMMERCIAL

## 2020-12-31 LAB
ANTI SM ANTIBODY: 0.05 RATIO (ref 0–0.99)
ANTI SM/RNP ANTIBODY: 0.07 RATIO (ref 0–0.99)
ANTI-SM INTERPRETATION: NEGATIVE
ANTI-SM/RNP INTERPRETATION: NEGATIVE
ANTI-SSA ANTIBODY: 0.05 RATIO (ref 0–0.99)
ANTI-SSA INTERPRETATION: NEGATIVE
ANTI-SSB ANTIBODY: 0.05 RATIO (ref 0–0.99)
ANTI-SSB INTERPRETATION: NEGATIVE
DSDNA AB SER-ACNC: NORMAL [IU]/ML

## 2020-12-31 PROCEDURE — 99490 CHRNC CARE MGMT STAFF 1ST 20: CPT | Mod: PBBFAC,PO | Performed by: FAMILY MEDICINE

## 2020-12-31 PROCEDURE — 99490 PR CHRONIC CARE MGMT, 1ST 20 MIN: ICD-10-PCS | Mod: S$PBB,,, | Performed by: FAMILY MEDICINE

## 2020-12-31 PROCEDURE — 99490 CHRNC CARE MGMT STAFF 1ST 20: CPT | Mod: S$PBB,,, | Performed by: FAMILY MEDICINE

## 2021-01-07 ENCOUNTER — INFUSION (OUTPATIENT)
Dept: INFUSION THERAPY | Facility: HOSPITAL | Age: 41
End: 2021-01-07
Attending: INTERNAL MEDICINE
Payer: COMMERCIAL

## 2021-01-07 VITALS
WEIGHT: 179.88 LBS | BODY MASS INDEX: 31.87 KG/M2 | TEMPERATURE: 99 F | RESPIRATION RATE: 16 BRPM | HEART RATE: 77 BPM | HEIGHT: 63 IN | SYSTOLIC BLOOD PRESSURE: 124 MMHG | DIASTOLIC BLOOD PRESSURE: 77 MMHG

## 2021-01-07 DIAGNOSIS — M45.5 ANKYLOSING SPONDYLITIS OF THORACOLUMBAR REGION: Primary | ICD-10-CM

## 2021-01-07 PROCEDURE — 96365 THER/PROPH/DIAG IV INF INIT: CPT | Mod: PN

## 2021-01-07 PROCEDURE — 63600175 PHARM REV CODE 636 W HCPCS: Mod: PN | Performed by: INTERNAL MEDICINE

## 2021-01-07 PROCEDURE — A4216 STERILE WATER/SALINE, 10 ML: HCPCS | Mod: PN | Performed by: INTERNAL MEDICINE

## 2021-01-07 PROCEDURE — 25000003 PHARM REV CODE 250: Mod: PN | Performed by: INTERNAL MEDICINE

## 2021-01-07 RX ORDER — SODIUM CHLORIDE 0.9 % (FLUSH) 0.9 %
10 SYRINGE (ML) INJECTION
Status: DISCONTINUED | OUTPATIENT
Start: 2021-01-07 | End: 2021-01-07 | Stop reason: HOSPADM

## 2021-01-07 RX ORDER — ACETAMINOPHEN 325 MG/1
650 TABLET ORAL
Status: DISCONTINUED | OUTPATIENT
Start: 2021-01-07 | End: 2021-01-07 | Stop reason: HOSPADM

## 2021-01-07 RX ORDER — HEPARIN 100 UNIT/ML
500 SYRINGE INTRAVENOUS
Status: DISCONTINUED | OUTPATIENT
Start: 2021-01-07 | End: 2021-01-07 | Stop reason: HOSPADM

## 2021-01-07 RX ORDER — HEPARIN 100 UNIT/ML
500 SYRINGE INTRAVENOUS
Status: CANCELLED | OUTPATIENT
Start: 2021-01-21

## 2021-01-07 RX ORDER — SODIUM CHLORIDE 0.9 % (FLUSH) 0.9 %
10 SYRINGE (ML) INJECTION
Status: CANCELLED | OUTPATIENT
Start: 2021-01-21

## 2021-01-07 RX ORDER — DIPHENHYDRAMINE HYDROCHLORIDE 50 MG/ML
50 INJECTION INTRAMUSCULAR; INTRAVENOUS
Status: DISCONTINUED | OUTPATIENT
Start: 2021-01-07 | End: 2021-01-07 | Stop reason: HOSPADM

## 2021-01-07 RX ORDER — DIPHENHYDRAMINE HYDROCHLORIDE 50 MG/ML
50 INJECTION INTRAMUSCULAR; INTRAVENOUS
Status: CANCELLED | OUTPATIENT
Start: 2021-01-21

## 2021-01-07 RX ORDER — ACETAMINOPHEN 325 MG/1
650 TABLET ORAL
Status: CANCELLED | OUTPATIENT
Start: 2021-01-21

## 2021-01-07 RX ADMIN — ACETAMINOPHEN 650 MG: 325 TABLET, FILM COATED ORAL at 12:01

## 2021-01-07 RX ADMIN — GOLIMUMAB 162.5 MG: 50 SOLUTION INTRAVENOUS at 12:01

## 2021-01-07 RX ADMIN — HEPARIN 500 UNITS: 100 SYRINGE at 12:01

## 2021-01-07 RX ADMIN — Medication 10 ML: at 12:01

## 2021-01-07 RX ADMIN — SODIUM CHLORIDE: 9 INJECTION, SOLUTION INTRAVENOUS at 12:01

## 2021-01-18 ENCOUNTER — PATIENT MESSAGE (OUTPATIENT)
Dept: RHEUMATOLOGY | Facility: CLINIC | Age: 41
End: 2021-01-18

## 2021-01-19 ENCOUNTER — PATIENT MESSAGE (OUTPATIENT)
Dept: FAMILY MEDICINE | Facility: CLINIC | Age: 41
End: 2021-01-19

## 2021-01-19 ENCOUNTER — TELEPHONE (OUTPATIENT)
Dept: RHEUMATOLOGY | Facility: CLINIC | Age: 41
End: 2021-01-19

## 2021-01-20 ENCOUNTER — OFFICE VISIT (OUTPATIENT)
Dept: FAMILY MEDICINE | Facility: CLINIC | Age: 41
End: 2021-01-20
Payer: COMMERCIAL

## 2021-01-20 DIAGNOSIS — B34.9 VIRAL SYNDROME: Primary | ICD-10-CM

## 2021-01-20 PROCEDURE — 99213 PR OFFICE/OUTPT VISIT, EST, LEVL III, 20-29 MIN: ICD-10-PCS | Mod: 95,,, | Performed by: PHYSICIAN ASSISTANT

## 2021-01-20 PROCEDURE — 99213 OFFICE O/P EST LOW 20 MIN: CPT | Mod: 95,,, | Performed by: PHYSICIAN ASSISTANT

## 2021-01-22 ENCOUNTER — CLINICAL SUPPORT (OUTPATIENT)
Dept: URGENT CARE | Facility: CLINIC | Age: 41
End: 2021-01-22
Payer: COMMERCIAL

## 2021-01-22 DIAGNOSIS — Z11.52 ENCOUNTER FOR SCREENING LABORATORY TESTING FOR COVID-19 VIRUS: Primary | ICD-10-CM

## 2021-01-22 LAB
CTP QC/QA: YES
SARS-COV-2 RDRP RESP QL NAA+PROBE: NEGATIVE

## 2021-01-22 PROCEDURE — U0002 COVID-19 LAB TEST NON-CDC: HCPCS | Mod: QW,S$GLB,, | Performed by: FAMILY MEDICINE

## 2021-01-22 PROCEDURE — U0002: ICD-10-PCS | Mod: QW,S$GLB,, | Performed by: FAMILY MEDICINE

## 2021-01-26 ENCOUNTER — TELEPHONE (OUTPATIENT)
Dept: RHEUMATOLOGY | Facility: CLINIC | Age: 41
End: 2021-01-26

## 2021-01-26 DIAGNOSIS — U07.1 COVID-19 VIRUS INFECTION: Primary | ICD-10-CM

## 2021-01-26 DIAGNOSIS — D84.9 IMMUNOCOMPROMISED: ICD-10-CM

## 2021-01-26 RX ORDER — AZITHROMYCIN 250 MG/1
250 TABLET, FILM COATED ORAL DAILY
Qty: 10 TABLET | Refills: 0 | Status: SHIPPED | OUTPATIENT
Start: 2021-01-26 | End: 2021-02-05

## 2021-01-26 RX ORDER — DEXAMETHASONE 1 MG/1
1 TABLET ORAL DAILY
Qty: 10 TABLET | Refills: 0 | Status: SHIPPED | OUTPATIENT
Start: 2021-01-26 | End: 2021-02-05

## 2021-01-31 ENCOUNTER — EXTERNAL CHRONIC CARE MANAGEMENT (OUTPATIENT)
Dept: PRIMARY CARE CLINIC | Facility: CLINIC | Age: 41
End: 2021-01-31
Payer: COMMERCIAL

## 2021-01-31 PROCEDURE — 99490 CHRNC CARE MGMT STAFF 1ST 20: CPT | Mod: PBBFAC,PO | Performed by: FAMILY MEDICINE

## 2021-01-31 PROCEDURE — 99490 CHRNC CARE MGMT STAFF 1ST 20: CPT | Mod: S$PBB,,, | Performed by: FAMILY MEDICINE

## 2021-01-31 PROCEDURE — 99490 PR CHRONIC CARE MGMT, 1ST 20 MIN: ICD-10-PCS | Mod: S$PBB,,, | Performed by: FAMILY MEDICINE

## 2021-02-08 DIAGNOSIS — M35.00 SJOGREN'S SYNDROME, WITH UNSPECIFIED ORGAN INVOLVEMENT: ICD-10-CM

## 2021-02-09 RX ORDER — NIFEDIPINE 60 MG/1
60 TABLET, EXTENDED RELEASE ORAL DAILY
Qty: 90 TABLET | Refills: 1 | Status: SHIPPED | OUTPATIENT
Start: 2021-02-09 | End: 2021-04-26 | Stop reason: SDUPTHER

## 2021-02-17 ENCOUNTER — PATIENT MESSAGE (OUTPATIENT)
Dept: RHEUMATOLOGY | Facility: CLINIC | Age: 41
End: 2021-02-17

## 2021-02-17 RX ORDER — CYANOCOBALAMIN 1000 UG/ML
INJECTION, SOLUTION INTRAMUSCULAR; SUBCUTANEOUS
Qty: 3 ML | Refills: 0 | Status: SHIPPED | OUTPATIENT
Start: 2021-02-17 | End: 2021-06-29 | Stop reason: SDUPTHER

## 2021-02-18 ENCOUNTER — TELEPHONE (OUTPATIENT)
Dept: RHEUMATOLOGY | Facility: CLINIC | Age: 41
End: 2021-02-18

## 2021-02-19 ENCOUNTER — PATIENT MESSAGE (OUTPATIENT)
Dept: RHEUMATOLOGY | Facility: CLINIC | Age: 41
End: 2021-02-19

## 2021-02-22 ENCOUNTER — CLINICAL SUPPORT (OUTPATIENT)
Dept: RHEUMATOLOGY | Facility: CLINIC | Age: 41
End: 2021-02-22
Payer: COMMERCIAL

## 2021-02-22 DIAGNOSIS — E53.8 B12 DEFICIENCY: ICD-10-CM

## 2021-02-22 DIAGNOSIS — M32.9 LUPUS: ICD-10-CM

## 2021-02-22 DIAGNOSIS — M05.79 RHEUMATOID ARTHRITIS INVOLVING MULTIPLE SITES WITH POSITIVE RHEUMATOID FACTOR: Primary | ICD-10-CM

## 2021-02-22 DIAGNOSIS — M35.00 SJOGREN'S SYNDROME, WITH UNSPECIFIED ORGAN INVOLVEMENT: ICD-10-CM

## 2021-02-22 DIAGNOSIS — M45.5 ANKYLOSING SPONDYLITIS OF THORACOLUMBAR REGION: ICD-10-CM

## 2021-02-22 PROCEDURE — 96372 THER/PROPH/DIAG INJ SC/IM: CPT | Mod: S$GLB,,, | Performed by: INTERNAL MEDICINE

## 2021-02-22 PROCEDURE — 99999 PR PBB SHADOW E&M-EST. PATIENT-LVL I: CPT | Mod: PBBFAC,,,

## 2021-02-22 PROCEDURE — 99999 PR PBB SHADOW E&M-EST. PATIENT-LVL I: ICD-10-PCS | Mod: PBBFAC,,,

## 2021-02-22 PROCEDURE — 96372 PR INJECTION,THERAP/PROPH/DIAG2ST, IM OR SUBCUT: ICD-10-PCS | Mod: S$GLB,,, | Performed by: INTERNAL MEDICINE

## 2021-02-22 RX ORDER — KETOROLAC TROMETHAMINE 30 MG/ML
60 INJECTION, SOLUTION INTRAMUSCULAR; INTRAVENOUS
Status: COMPLETED | OUTPATIENT
Start: 2021-02-22 | End: 2021-02-22

## 2021-02-22 RX ORDER — METHYLPREDNISOLONE ACETATE 80 MG/ML
160 INJECTION, SUSPENSION INTRA-ARTICULAR; INTRALESIONAL; INTRAMUSCULAR; SOFT TISSUE
Status: COMPLETED | OUTPATIENT
Start: 2021-02-22 | End: 2021-02-22

## 2021-02-22 RX ORDER — CYANOCOBALAMIN 1000 UG/ML
1000 INJECTION, SOLUTION INTRAMUSCULAR; SUBCUTANEOUS
Status: COMPLETED | OUTPATIENT
Start: 2021-02-22 | End: 2021-02-22

## 2021-02-22 RX ADMIN — CYANOCOBALAMIN 1000 MCG: 1000 INJECTION, SOLUTION INTRAMUSCULAR; SUBCUTANEOUS at 11:02

## 2021-02-22 RX ADMIN — KETOROLAC TROMETHAMINE 60 MG: 30 INJECTION, SOLUTION INTRAMUSCULAR; INTRAVENOUS at 11:02

## 2021-02-22 RX ADMIN — METHYLPREDNISOLONE ACETATE 160 MG: 80 INJECTION, SUSPENSION INTRA-ARTICULAR; INTRALESIONAL; INTRAMUSCULAR; SOFT TISSUE at 11:02

## 2021-02-28 ENCOUNTER — EXTERNAL CHRONIC CARE MANAGEMENT (OUTPATIENT)
Dept: PRIMARY CARE CLINIC | Facility: CLINIC | Age: 41
End: 2021-02-28
Payer: COMMERCIAL

## 2021-02-28 PROCEDURE — 99490 PR CHRONIC CARE MGMT, 1ST 20 MIN: ICD-10-PCS | Mod: S$GLB,,, | Performed by: FAMILY MEDICINE

## 2021-02-28 PROCEDURE — 99490 CHRNC CARE MGMT STAFF 1ST 20: CPT | Mod: S$GLB,,, | Performed by: FAMILY MEDICINE

## 2021-03-04 ENCOUNTER — INFUSION (OUTPATIENT)
Dept: INFUSION THERAPY | Facility: HOSPITAL | Age: 41
End: 2021-03-04
Attending: INTERNAL MEDICINE
Payer: COMMERCIAL

## 2021-03-04 ENCOUNTER — PATIENT MESSAGE (OUTPATIENT)
Dept: INFUSION THERAPY | Facility: HOSPITAL | Age: 41
End: 2021-03-04

## 2021-03-04 VITALS
DIASTOLIC BLOOD PRESSURE: 85 MMHG | BODY MASS INDEX: 33.51 KG/M2 | WEIGHT: 189.13 LBS | TEMPERATURE: 98 F | SYSTOLIC BLOOD PRESSURE: 138 MMHG | RESPIRATION RATE: 18 BRPM | HEIGHT: 63 IN | HEART RATE: 81 BPM

## 2021-03-04 DIAGNOSIS — M45.5 ANKYLOSING SPONDYLITIS OF THORACOLUMBAR REGION: Primary | ICD-10-CM

## 2021-03-04 PROCEDURE — 96365 THER/PROPH/DIAG IV INF INIT: CPT | Mod: PN

## 2021-03-04 PROCEDURE — 25000003 PHARM REV CODE 250: Mod: PN | Performed by: INTERNAL MEDICINE

## 2021-03-04 PROCEDURE — 63600175 PHARM REV CODE 636 W HCPCS: Mod: JG,PN | Performed by: INTERNAL MEDICINE

## 2021-03-04 PROCEDURE — A4216 STERILE WATER/SALINE, 10 ML: HCPCS | Mod: PN | Performed by: INTERNAL MEDICINE

## 2021-03-04 RX ORDER — SODIUM CHLORIDE 0.9 % (FLUSH) 0.9 %
10 SYRINGE (ML) INJECTION
Status: DISCONTINUED | OUTPATIENT
Start: 2021-03-04 | End: 2021-03-04 | Stop reason: HOSPADM

## 2021-03-04 RX ORDER — HEPARIN 100 UNIT/ML
500 SYRINGE INTRAVENOUS
Status: DISCONTINUED | OUTPATIENT
Start: 2021-03-04 | End: 2021-03-04 | Stop reason: HOSPADM

## 2021-03-04 RX ORDER — HEPARIN 100 UNIT/ML
500 SYRINGE INTRAVENOUS
Status: CANCELLED | OUTPATIENT
Start: 2021-03-25

## 2021-03-04 RX ORDER — DIPHENHYDRAMINE HYDROCHLORIDE 50 MG/ML
50 INJECTION INTRAMUSCULAR; INTRAVENOUS
Status: CANCELLED | OUTPATIENT
Start: 2021-03-25

## 2021-03-04 RX ORDER — SODIUM CHLORIDE 0.9 % (FLUSH) 0.9 %
10 SYRINGE (ML) INJECTION
Status: CANCELLED | OUTPATIENT
Start: 2021-03-25

## 2021-03-04 RX ORDER — ACETAMINOPHEN 325 MG/1
650 TABLET ORAL
Status: CANCELLED | OUTPATIENT
Start: 2021-03-25

## 2021-03-04 RX ORDER — ACETAMINOPHEN 325 MG/1
650 TABLET ORAL
Status: DISCONTINUED | OUTPATIENT
Start: 2021-03-04 | End: 2021-03-04 | Stop reason: HOSPADM

## 2021-03-04 RX ORDER — DIPHENHYDRAMINE HYDROCHLORIDE 50 MG/ML
50 INJECTION INTRAMUSCULAR; INTRAVENOUS
Status: DISCONTINUED | OUTPATIENT
Start: 2021-03-04 | End: 2021-03-04 | Stop reason: HOSPADM

## 2021-03-04 RX ADMIN — GOLIMUMAB 175 MG: 50 SOLUTION INTRAVENOUS at 11:03

## 2021-03-04 RX ADMIN — Medication 10 ML: at 11:03

## 2021-03-04 RX ADMIN — SODIUM CHLORIDE: 9 INJECTION, SOLUTION INTRAVENOUS at 10:03

## 2021-03-04 RX ADMIN — HEPARIN 500 UNITS: 100 SYRINGE at 11:03

## 2021-03-04 RX ADMIN — ACETAMINOPHEN 325 MG: 325 TABLET, FILM COATED ORAL at 10:03

## 2021-03-11 ENCOUNTER — CLINICAL SUPPORT (OUTPATIENT)
Dept: URGENT CARE | Facility: CLINIC | Age: 41
End: 2021-03-11
Payer: COMMERCIAL

## 2021-03-11 DIAGNOSIS — Z11.52 ENCOUNTER FOR SCREENING LABORATORY TESTING FOR COVID-19 VIRUS: Primary | ICD-10-CM

## 2021-03-11 LAB
CTP QC/QA: YES
SARS-COV-2 RDRP RESP QL NAA+PROBE: NEGATIVE

## 2021-03-11 PROCEDURE — U0002 COVID-19 LAB TEST NON-CDC: HCPCS | Mod: QW,S$GLB,, | Performed by: FAMILY MEDICINE

## 2021-03-11 PROCEDURE — 99211 PR OFFICE/OUTPT VISIT, EST, LEVL I: ICD-10-PCS | Mod: S$GLB,CS,, | Performed by: FAMILY MEDICINE

## 2021-03-11 PROCEDURE — U0002: ICD-10-PCS | Mod: QW,S$GLB,, | Performed by: FAMILY MEDICINE

## 2021-03-11 PROCEDURE — 99211 OFF/OP EST MAY X REQ PHY/QHP: CPT | Mod: S$GLB,CS,, | Performed by: FAMILY MEDICINE

## 2021-03-15 ENCOUNTER — TELEPHONE (OUTPATIENT)
Dept: FAMILY MEDICINE | Facility: CLINIC | Age: 41
End: 2021-03-15

## 2021-03-16 ENCOUNTER — TELEPHONE (OUTPATIENT)
Dept: FAMILY MEDICINE | Facility: CLINIC | Age: 41
End: 2021-03-16

## 2021-03-16 ENCOUNTER — LAB VISIT (OUTPATIENT)
Dept: FAMILY MEDICINE | Facility: CLINIC | Age: 41
End: 2021-03-16
Payer: COMMERCIAL

## 2021-03-16 ENCOUNTER — OFFICE VISIT (OUTPATIENT)
Dept: FAMILY MEDICINE | Facility: CLINIC | Age: 41
End: 2021-03-16
Payer: COMMERCIAL

## 2021-03-16 DIAGNOSIS — M79.10 MYALGIA: ICD-10-CM

## 2021-03-16 DIAGNOSIS — M79.10 MYALGIA: Primary | ICD-10-CM

## 2021-03-16 LAB
INFLUENZA A, MOLECULAR: NEGATIVE
INFLUENZA B, MOLECULAR: NEGATIVE
SPECIMEN SOURCE: NORMAL

## 2021-03-16 PROCEDURE — U0005 INFEC AGEN DETEC AMPLI PROBE: HCPCS | Performed by: PHYSICIAN ASSISTANT

## 2021-03-16 PROCEDURE — 87502 INFLUENZA DNA AMP PROBE: CPT | Mod: PO | Performed by: PHYSICIAN ASSISTANT

## 2021-03-16 PROCEDURE — U0003 INFECTIOUS AGENT DETECTION BY NUCLEIC ACID (DNA OR RNA); SEVERE ACUTE RESPIRATORY SYNDROME CORONAVIRUS 2 (SARS-COV-2) (CORONAVIRUS DISEASE [COVID-19]), AMPLIFIED PROBE TECHNIQUE, MAKING USE OF HIGH THROUGHPUT TECHNOLOGIES AS DESCRIBED BY CMS-2020-01-R: HCPCS | Performed by: PHYSICIAN ASSISTANT

## 2021-03-16 PROCEDURE — 99214 OFFICE O/P EST MOD 30 MIN: CPT | Mod: 95,CS,, | Performed by: PHYSICIAN ASSISTANT

## 2021-03-16 PROCEDURE — 99214 PR OFFICE/OUTPT VISIT, EST, LEVL IV, 30-39 MIN: ICD-10-PCS | Mod: 95,CS,, | Performed by: PHYSICIAN ASSISTANT

## 2021-03-17 ENCOUNTER — PATIENT MESSAGE (OUTPATIENT)
Dept: FAMILY MEDICINE | Facility: CLINIC | Age: 41
End: 2021-03-17

## 2021-03-17 ENCOUNTER — PATIENT MESSAGE (OUTPATIENT)
Dept: RHEUMATOLOGY | Facility: CLINIC | Age: 41
End: 2021-03-17

## 2021-03-17 LAB — SARS-COV-2 RNA RESP QL NAA+PROBE: NOT DETECTED

## 2021-03-18 ENCOUNTER — LAB VISIT (OUTPATIENT)
Dept: LAB | Facility: HOSPITAL | Age: 41
End: 2021-03-18
Attending: PHYSICIAN ASSISTANT
Payer: COMMERCIAL

## 2021-03-18 ENCOUNTER — TELEPHONE (OUTPATIENT)
Dept: RHEUMATOLOGY | Facility: CLINIC | Age: 41
End: 2021-03-18

## 2021-03-18 DIAGNOSIS — D84.9 IMMUNOCOMPROMISED: ICD-10-CM

## 2021-03-18 DIAGNOSIS — M45.5 ANKYLOSING SPONDYLITIS OF THORACOLUMBAR REGION: Primary | ICD-10-CM

## 2021-03-18 DIAGNOSIS — M79.10 MYALGIA: ICD-10-CM

## 2021-03-18 LAB
ALBUMIN SERPL BCP-MCNC: 4.3 G/DL (ref 3.5–5.2)
ALP SERPL-CCNC: 133 U/L (ref 55–135)
ALT SERPL W/O P-5'-P-CCNC: 22 U/L (ref 10–44)
ANION GAP SERPL CALC-SCNC: 10 MMOL/L (ref 8–16)
AST SERPL-CCNC: 12 U/L (ref 10–40)
BASOPHILS # BLD AUTO: 0.06 K/UL (ref 0–0.2)
BASOPHILS NFR BLD: 0.4 % (ref 0–1.9)
BILIRUB SERPL-MCNC: 0.5 MG/DL (ref 0.1–1)
BUN SERPL-MCNC: 9 MG/DL (ref 6–20)
CALCIUM SERPL-MCNC: 9.9 MG/DL (ref 8.7–10.5)
CHLORIDE SERPL-SCNC: 103 MMOL/L (ref 95–110)
CO2 SERPL-SCNC: 28 MMOL/L (ref 23–29)
CREAT SERPL-MCNC: 0.7 MG/DL (ref 0.5–1.4)
DIFFERENTIAL METHOD: ABNORMAL
EOSINOPHIL # BLD AUTO: 0.2 K/UL (ref 0–0.5)
EOSINOPHIL NFR BLD: 1 % (ref 0–8)
ERYTHROCYTE [DISTWIDTH] IN BLOOD BY AUTOMATED COUNT: 13.5 % (ref 11.5–14.5)
EST. GFR  (AFRICAN AMERICAN): >60 ML/MIN/1.73 M^2
EST. GFR  (NON AFRICAN AMERICAN): >60 ML/MIN/1.73 M^2
GLUCOSE SERPL-MCNC: 104 MG/DL (ref 70–110)
HCT VFR BLD AUTO: 49.4 % (ref 37–48.5)
HGB BLD-MCNC: 16 G/DL (ref 12–16)
IMM GRANULOCYTES # BLD AUTO: 0.09 K/UL (ref 0–0.04)
IMM GRANULOCYTES NFR BLD AUTO: 0.6 % (ref 0–0.5)
LYMPHOCYTES # BLD AUTO: 4.6 K/UL (ref 1–4.8)
LYMPHOCYTES NFR BLD: 29 % (ref 18–48)
MCH RBC QN AUTO: 29.3 PG (ref 27–31)
MCHC RBC AUTO-ENTMCNC: 32.4 G/DL (ref 32–36)
MCV RBC AUTO: 91 FL (ref 82–98)
MONOCYTES # BLD AUTO: 1.2 K/UL (ref 0.3–1)
MONOCYTES NFR BLD: 7.6 % (ref 4–15)
NEUTROPHILS # BLD AUTO: 9.7 K/UL (ref 1.8–7.7)
NEUTROPHILS NFR BLD: 61.4 % (ref 38–73)
NRBC BLD-RTO: 0 /100 WBC
PLATELET # BLD AUTO: 367 K/UL (ref 150–350)
PMV BLD AUTO: 10.7 FL (ref 9.2–12.9)
POTASSIUM SERPL-SCNC: 3.3 MMOL/L (ref 3.5–5.1)
PROT SERPL-MCNC: 8 G/DL (ref 6–8.4)
RBC # BLD AUTO: 5.46 M/UL (ref 4–5.4)
SODIUM SERPL-SCNC: 141 MMOL/L (ref 136–145)
WBC # BLD AUTO: 15.74 K/UL (ref 3.9–12.7)

## 2021-03-18 PROCEDURE — 80053 COMPREHEN METABOLIC PANEL: CPT | Performed by: PHYSICIAN ASSISTANT

## 2021-03-18 PROCEDURE — 85025 COMPLETE CBC W/AUTO DIFF WBC: CPT | Performed by: PHYSICIAN ASSISTANT

## 2021-03-18 PROCEDURE — 36415 COLL VENOUS BLD VENIPUNCTURE: CPT | Mod: PO | Performed by: PHYSICIAN ASSISTANT

## 2021-03-18 RX ORDER — AMOXICILLIN AND CLAVULANATE POTASSIUM 875; 125 MG/1; MG/1
1 TABLET, FILM COATED ORAL 2 TIMES DAILY
Qty: 20 TABLET | Refills: 0 | Status: SHIPPED | OUTPATIENT
Start: 2021-03-18 | End: 2021-03-28

## 2021-03-19 ENCOUNTER — PATIENT MESSAGE (OUTPATIENT)
Dept: FAMILY MEDICINE | Facility: CLINIC | Age: 41
End: 2021-03-19

## 2021-03-22 ENCOUNTER — PATIENT MESSAGE (OUTPATIENT)
Dept: RHEUMATOLOGY | Facility: CLINIC | Age: 41
End: 2021-03-22

## 2021-03-23 ENCOUNTER — OFFICE VISIT (OUTPATIENT)
Dept: PSYCHIATRY | Facility: CLINIC | Age: 41
End: 2021-03-23
Payer: COMMERCIAL

## 2021-03-23 DIAGNOSIS — F41.1 GENERALIZED ANXIETY DISORDER WITH PANIC ATTACKS: ICD-10-CM

## 2021-03-23 DIAGNOSIS — F33.1 MODERATE EPISODE OF RECURRENT MAJOR DEPRESSIVE DISORDER: Primary | ICD-10-CM

## 2021-03-23 DIAGNOSIS — F41.0 GENERALIZED ANXIETY DISORDER WITH PANIC ATTACKS: ICD-10-CM

## 2021-03-23 PROCEDURE — 90791 PR PSYCHIATRIC DIAGNOSTIC EVALUATION: ICD-10-PCS | Mod: S$GLB,,, | Performed by: PSYCHOLOGIST

## 2021-03-23 PROCEDURE — 90791 PSYCH DIAGNOSTIC EVALUATION: CPT | Mod: S$GLB,,, | Performed by: PSYCHOLOGIST

## 2021-03-26 ENCOUNTER — PES CALL (OUTPATIENT)
Dept: ADMINISTRATIVE | Facility: CLINIC | Age: 41
End: 2021-03-26

## 2021-03-31 ENCOUNTER — EXTERNAL CHRONIC CARE MANAGEMENT (OUTPATIENT)
Dept: PRIMARY CARE CLINIC | Facility: CLINIC | Age: 41
End: 2021-03-31
Payer: COMMERCIAL

## 2021-03-31 PROCEDURE — 99490 PR CHRONIC CARE MGMT, 1ST 20 MIN: ICD-10-PCS | Mod: S$GLB,,, | Performed by: FAMILY MEDICINE

## 2021-03-31 PROCEDURE — 99490 CHRNC CARE MGMT STAFF 1ST 20: CPT | Mod: S$GLB,,, | Performed by: FAMILY MEDICINE

## 2021-04-06 ENCOUNTER — PATIENT MESSAGE (OUTPATIENT)
Dept: ADMINISTRATIVE | Facility: HOSPITAL | Age: 41
End: 2021-04-06

## 2021-04-06 ENCOUNTER — TELEPHONE (OUTPATIENT)
Dept: PSYCHIATRY | Facility: CLINIC | Age: 41
End: 2021-04-06

## 2021-04-12 PROBLEM — R11.10 INTRACTABLE VOMITING: Status: ACTIVE | Noted: 2021-04-12

## 2021-04-13 ENCOUNTER — PATIENT OUTREACH (OUTPATIENT)
Dept: ADMINISTRATIVE | Facility: OTHER | Age: 41
End: 2021-04-13

## 2021-04-13 PROBLEM — R50.9 FEVER: Status: ACTIVE | Noted: 2021-04-13

## 2021-04-13 PROBLEM — Z16.12 UTI DUE TO EXTENDED-SPECTRUM BETA LACTAMASE (ESBL) PRODUCING ESCHERICHIA COLI: Status: ACTIVE | Noted: 2021-04-13

## 2021-04-13 PROBLEM — N39.0 UTI DUE TO EXTENDED-SPECTRUM BETA LACTAMASE (ESBL) PRODUCING ESCHERICHIA COLI: Status: ACTIVE | Noted: 2021-04-13

## 2021-04-13 PROBLEM — D72.829 LEUKOCYTOSIS: Status: ACTIVE | Noted: 2021-04-13

## 2021-04-13 PROBLEM — B96.29 UTI DUE TO EXTENDED-SPECTRUM BETA LACTAMASE (ESBL) PRODUCING ESCHERICHIA COLI: Status: ACTIVE | Noted: 2021-04-13

## 2021-04-14 PROBLEM — A41.9 SEPSIS: Status: ACTIVE | Noted: 2021-04-13

## 2021-04-16 ENCOUNTER — TELEPHONE (OUTPATIENT)
Dept: INFECTIOUS DISEASES | Facility: CLINIC | Age: 41
End: 2021-04-16

## 2021-04-19 ENCOUNTER — TELEPHONE (OUTPATIENT)
Dept: RHEUMATOLOGY | Facility: CLINIC | Age: 41
End: 2021-04-19

## 2021-04-19 ENCOUNTER — TELEPHONE (OUTPATIENT)
Dept: INFECTIOUS DISEASES | Facility: CLINIC | Age: 41
End: 2021-04-19

## 2021-04-20 PROBLEM — E87.20 LACTIC ACIDOSIS: Status: RESOLVED | Noted: 2021-04-20 | Resolved: 2021-04-20

## 2021-04-20 PROBLEM — F32.A DEPRESSION: Status: ACTIVE | Noted: 2021-04-20

## 2021-04-20 PROBLEM — F41.9 ANXIETY: Status: ACTIVE | Noted: 2021-04-20

## 2021-04-20 PROBLEM — M45.9 ANKYLOSING SPONDYLITIS: Status: ACTIVE | Noted: 2018-03-22

## 2021-04-20 PROBLEM — R79.89 ABNORMAL LFTS: Status: RESOLVED | Noted: 2021-04-20 | Resolved: 2021-04-20

## 2021-04-20 PROBLEM — E87.20 LACTIC ACIDOSIS: Status: ACTIVE | Noted: 2021-04-20

## 2021-04-20 PROBLEM — R79.89 ABNORMAL LFTS: Status: ACTIVE | Noted: 2021-04-20

## 2021-04-20 PROBLEM — D84.89 PRIMARY IMMUNODEFICIENCY DISORDER: Status: ACTIVE | Noted: 2021-04-20

## 2021-04-21 PROBLEM — M35.00 SJOGREN'S SYNDROME: Status: RESOLVED | Noted: 2017-02-14 | Resolved: 2021-04-21

## 2021-04-24 ENCOUNTER — TELEPHONE (OUTPATIENT)
Dept: INFECTIOUS DISEASES | Facility: CLINIC | Age: 41
End: 2021-04-24

## 2021-04-26 ENCOUNTER — TELEPHONE (OUTPATIENT)
Dept: INFECTIOUS DISEASES | Facility: CLINIC | Age: 41
End: 2021-04-26

## 2021-04-26 ENCOUNTER — OFFICE VISIT (OUTPATIENT)
Dept: RHEUMATOLOGY | Facility: CLINIC | Age: 41
End: 2021-04-26
Payer: COMMERCIAL

## 2021-04-26 VITALS — WEIGHT: 163 LBS | BODY MASS INDEX: 28.88 KG/M2 | HEIGHT: 63 IN

## 2021-04-26 DIAGNOSIS — M35.00 SJOGREN'S SYNDROME, WITH UNSPECIFIED ORGAN INVOLVEMENT: ICD-10-CM

## 2021-04-26 DIAGNOSIS — M05.79 RHEUMATOID ARTHRITIS INVOLVING MULTIPLE SITES WITH POSITIVE RHEUMATOID FACTOR: ICD-10-CM

## 2021-04-26 DIAGNOSIS — M45.5 ANKYLOSING SPONDYLITIS OF THORACOLUMBAR REGION: Primary | ICD-10-CM

## 2021-04-26 PROCEDURE — 99999 PR PBB SHADOW E&M-EST. PATIENT-LVL V: CPT | Mod: PBBFAC,,, | Performed by: PHYSICIAN ASSISTANT

## 2021-04-26 PROCEDURE — 99214 PR OFFICE/OUTPT VISIT, EST, LEVL IV, 30-39 MIN: ICD-10-PCS | Mod: 25,S$GLB,, | Performed by: PHYSICIAN ASSISTANT

## 2021-04-26 PROCEDURE — 96372 THER/PROPH/DIAG INJ SC/IM: CPT | Mod: S$GLB,,, | Performed by: PHYSICIAN ASSISTANT

## 2021-04-26 PROCEDURE — 99999 PR PBB SHADOW E&M-EST. PATIENT-LVL V: ICD-10-PCS | Mod: PBBFAC,,, | Performed by: PHYSICIAN ASSISTANT

## 2021-04-26 PROCEDURE — 1125F PR PAIN SEVERITY QUANTIFIED, PAIN PRESENT: ICD-10-PCS | Mod: S$GLB,,, | Performed by: PHYSICIAN ASSISTANT

## 2021-04-26 PROCEDURE — 99214 OFFICE O/P EST MOD 30 MIN: CPT | Mod: 25,S$GLB,, | Performed by: PHYSICIAN ASSISTANT

## 2021-04-26 PROCEDURE — 3008F BODY MASS INDEX DOCD: CPT | Mod: CPTII,S$GLB,, | Performed by: PHYSICIAN ASSISTANT

## 2021-04-26 PROCEDURE — 1125F AMNT PAIN NOTED PAIN PRSNT: CPT | Mod: S$GLB,,, | Performed by: PHYSICIAN ASSISTANT

## 2021-04-26 PROCEDURE — 96372 PR INJECTION,THERAP/PROPH/DIAG2ST, IM OR SUBCUT: ICD-10-PCS | Mod: S$GLB,,, | Performed by: PHYSICIAN ASSISTANT

## 2021-04-26 PROCEDURE — 3008F PR BODY MASS INDEX (BMI) DOCUMENTED: ICD-10-PCS | Mod: CPTII,S$GLB,, | Performed by: PHYSICIAN ASSISTANT

## 2021-04-26 RX ORDER — CYANOCOBALAMIN 1000 UG/ML
1000 INJECTION, SOLUTION INTRAMUSCULAR; SUBCUTANEOUS
Status: COMPLETED | OUTPATIENT
Start: 2021-04-26 | End: 2021-04-26

## 2021-04-26 RX ORDER — NIFEDIPINE 60 MG/1
60 TABLET, EXTENDED RELEASE ORAL DAILY
Qty: 90 TABLET | Refills: 1 | Status: SHIPPED | OUTPATIENT
Start: 2021-04-26 | End: 2021-08-10 | Stop reason: SDUPTHER

## 2021-04-26 RX ORDER — TIZANIDINE 4 MG/1
4 TABLET ORAL EVERY 8 HOURS
Qty: 90 TABLET | Refills: 3 | Status: SHIPPED | OUTPATIENT
Start: 2021-04-26 | End: 2022-12-15 | Stop reason: SDUPTHER

## 2021-04-26 RX ORDER — NITROFURANTOIN 25; 75 MG/1; MG/1
100 CAPSULE ORAL 2 TIMES DAILY
COMMUNITY
End: 2021-06-29

## 2021-04-26 RX ORDER — PREDNISONE 5 MG/1
10 TABLET ORAL DAILY PRN
Qty: 60 TABLET | Refills: 3 | Status: SHIPPED | OUTPATIENT
Start: 2021-04-26 | End: 2021-05-26

## 2021-04-26 RX ORDER — KETOROLAC TROMETHAMINE 30 MG/ML
60 INJECTION, SOLUTION INTRAMUSCULAR; INTRAVENOUS
Status: COMPLETED | OUTPATIENT
Start: 2021-04-26 | End: 2021-04-26

## 2021-04-26 RX ADMIN — KETOROLAC TROMETHAMINE 60 MG: 30 INJECTION, SOLUTION INTRAMUSCULAR; INTRAVENOUS at 01:04

## 2021-04-26 RX ADMIN — CYANOCOBALAMIN 1000 MCG: 1000 INJECTION, SOLUTION INTRAMUSCULAR; SUBCUTANEOUS at 01:04

## 2021-04-26 ASSESSMENT — ROUTINE ASSESSMENT OF PATIENT INDEX DATA (RAPID3)
FATIGUE SCORE: 2.2
TOTAL RAPID3 SCORE: 4.78
PSYCHOLOGICAL DISTRESS SCORE: 2.2
PAIN SCORE: 4
PATIENT GLOBAL ASSESSMENT SCORE: 6
MDHAQ FUNCTION SCORE: 1.3

## 2021-04-30 ENCOUNTER — EXTERNAL CHRONIC CARE MANAGEMENT (OUTPATIENT)
Dept: PRIMARY CARE CLINIC | Facility: CLINIC | Age: 41
End: 2021-04-30
Payer: COMMERCIAL

## 2021-04-30 PROCEDURE — 99490 CHRNC CARE MGMT STAFF 1ST 20: CPT | Mod: S$GLB,,, | Performed by: FAMILY MEDICINE

## 2021-04-30 PROCEDURE — 99490 PR CHRONIC CARE MGMT, 1ST 20 MIN: ICD-10-PCS | Mod: S$GLB,,, | Performed by: FAMILY MEDICINE

## 2021-05-03 ENCOUNTER — PATIENT MESSAGE (OUTPATIENT)
Dept: RHEUMATOLOGY | Facility: CLINIC | Age: 41
End: 2021-05-03

## 2021-05-05 ENCOUNTER — OFFICE VISIT (OUTPATIENT)
Dept: INFECTIOUS DISEASES | Facility: CLINIC | Age: 41
End: 2021-05-05
Payer: COMMERCIAL

## 2021-05-05 VITALS
HEART RATE: 91 BPM | BODY MASS INDEX: 28.87 KG/M2 | TEMPERATURE: 98 F | RESPIRATION RATE: 20 BRPM | WEIGHT: 162.94 LBS | DIASTOLIC BLOOD PRESSURE: 105 MMHG | SYSTOLIC BLOOD PRESSURE: 154 MMHG | HEIGHT: 63 IN

## 2021-05-05 DIAGNOSIS — N39.0 UTI DUE TO EXTENDED-SPECTRUM BETA LACTAMASE (ESBL) PRODUCING ESCHERICHIA COLI: Primary | ICD-10-CM

## 2021-05-05 DIAGNOSIS — B96.29 UTI DUE TO EXTENDED-SPECTRUM BETA LACTAMASE (ESBL) PRODUCING ESCHERICHIA COLI: Primary | ICD-10-CM

## 2021-05-05 DIAGNOSIS — M45.5 ANKYLOSING SPONDYLITIS OF THORACOLUMBAR REGION: Primary | ICD-10-CM

## 2021-05-05 DIAGNOSIS — Z16.12 UTI DUE TO EXTENDED-SPECTRUM BETA LACTAMASE (ESBL) PRODUCING ESCHERICHIA COLI: Primary | ICD-10-CM

## 2021-05-05 PROCEDURE — 3008F BODY MASS INDEX DOCD: CPT | Mod: CPTII,S$GLB,, | Performed by: HOSPITALIST

## 2021-05-05 PROCEDURE — 3008F PR BODY MASS INDEX (BMI) DOCUMENTED: ICD-10-PCS | Mod: CPTII,S$GLB,, | Performed by: HOSPITALIST

## 2021-05-05 PROCEDURE — 1126F AMNT PAIN NOTED NONE PRSNT: CPT | Mod: S$GLB,,, | Performed by: HOSPITALIST

## 2021-05-05 PROCEDURE — 99213 PR OFFICE/OUTPT VISIT, EST, LEVL III, 20-29 MIN: ICD-10-PCS | Mod: S$GLB,,, | Performed by: HOSPITALIST

## 2021-05-05 PROCEDURE — 99999 PR PBB SHADOW E&M-EST. PATIENT-LVL III: ICD-10-PCS | Mod: PBBFAC,,, | Performed by: HOSPITALIST

## 2021-05-05 PROCEDURE — 99999 PR PBB SHADOW E&M-EST. PATIENT-LVL III: CPT | Mod: PBBFAC,,, | Performed by: HOSPITALIST

## 2021-05-05 PROCEDURE — 1126F PR PAIN SEVERITY QUANTIFIED, NO PAIN PRESENT: ICD-10-PCS | Mod: S$GLB,,, | Performed by: HOSPITALIST

## 2021-05-05 PROCEDURE — 99213 OFFICE O/P EST LOW 20 MIN: CPT | Mod: S$GLB,,, | Performed by: HOSPITALIST

## 2021-05-05 RX ORDER — SECUKINUMAB 150 MG/ML
150 INJECTION SUBCUTANEOUS
Qty: 1 ML | Refills: 5 | Status: SHIPPED | OUTPATIENT
Start: 2021-05-05 | End: 2022-12-15 | Stop reason: SDUPTHER

## 2021-05-10 ENCOUNTER — SPECIALTY PHARMACY (OUTPATIENT)
Dept: PHARMACY | Facility: CLINIC | Age: 41
End: 2021-05-10

## 2021-05-11 ENCOUNTER — CLINICAL SUPPORT (OUTPATIENT)
Dept: RHEUMATOLOGY | Facility: CLINIC | Age: 41
End: 2021-05-11
Payer: COMMERCIAL

## 2021-05-11 VITALS — DIASTOLIC BLOOD PRESSURE: 88 MMHG | HEART RATE: 118 BPM | SYSTOLIC BLOOD PRESSURE: 133 MMHG

## 2021-05-11 DIAGNOSIS — M35.00 SJOGREN'S SYNDROME, WITH UNSPECIFIED ORGAN INVOLVEMENT: ICD-10-CM

## 2021-05-11 DIAGNOSIS — M05.79 RHEUMATOID ARTHRITIS INVOLVING MULTIPLE SITES WITH POSITIVE RHEUMATOID FACTOR: ICD-10-CM

## 2021-05-11 DIAGNOSIS — M45.5 ANKYLOSING SPONDYLITIS OF THORACOLUMBAR REGION: Primary | ICD-10-CM

## 2021-05-11 PROCEDURE — 99999 PR PBB SHADOW E&M-EST. PATIENT-LVL II: ICD-10-PCS | Mod: PBBFAC,,,

## 2021-05-11 PROCEDURE — 99999 PR PBB SHADOW E&M-EST. PATIENT-LVL II: CPT | Mod: PBBFAC,,,

## 2021-05-11 PROCEDURE — 96372 THER/PROPH/DIAG INJ SC/IM: CPT | Mod: S$GLB,,, | Performed by: PHYSICIAN ASSISTANT

## 2021-05-11 PROCEDURE — 96372 PR INJECTION,THERAP/PROPH/DIAG2ST, IM OR SUBCUT: ICD-10-PCS | Mod: S$GLB,,, | Performed by: PHYSICIAN ASSISTANT

## 2021-05-11 RX ORDER — METHYLPREDNISOLONE ACETATE 80 MG/ML
160 INJECTION, SUSPENSION INTRA-ARTICULAR; INTRALESIONAL; INTRAMUSCULAR; SOFT TISSUE
Status: COMPLETED | OUTPATIENT
Start: 2021-05-11 | End: 2021-05-11

## 2021-05-11 RX ADMIN — METHYLPREDNISOLONE ACETATE 160 MG: 80 INJECTION, SUSPENSION INTRA-ARTICULAR; INTRALESIONAL; INTRAMUSCULAR; SOFT TISSUE at 11:05

## 2021-05-14 ENCOUNTER — TELEPHONE (OUTPATIENT)
Dept: FAMILY MEDICINE | Facility: CLINIC | Age: 41
End: 2021-05-14

## 2021-05-16 ENCOUNTER — OFFICE VISIT (OUTPATIENT)
Dept: URGENT CARE | Facility: CLINIC | Age: 41
End: 2021-05-16
Payer: COMMERCIAL

## 2021-05-16 VITALS
HEART RATE: 94 BPM | HEIGHT: 64 IN | WEIGHT: 163 LBS | DIASTOLIC BLOOD PRESSURE: 105 MMHG | BODY MASS INDEX: 27.83 KG/M2 | OXYGEN SATURATION: 99 % | SYSTOLIC BLOOD PRESSURE: 152 MMHG | TEMPERATURE: 98 F

## 2021-05-16 DIAGNOSIS — N30.00 ACUTE CYSTITIS WITHOUT HEMATURIA: ICD-10-CM

## 2021-05-16 DIAGNOSIS — R30.0 DYSURIA: Primary | ICD-10-CM

## 2021-05-16 PROBLEM — N39.0 UTI (URINARY TRACT INFECTION): Status: ACTIVE | Noted: 2021-05-16

## 2021-05-16 LAB
BILIRUB UR QL STRIP: NEGATIVE
COLOR UR: ABNORMAL
GLUCOSE UR QL STRIP: NEGATIVE
KETONES UR QL STRIP: POSITIVE
LEUKOCYTE ESTERASE UR QL STRIP: NEGATIVE
PH, POC UA: 5.5 (ref 5–8)
POC BLOOD, URINE: POSITIVE
POC NITRATES, URINE: NEGATIVE
PROT UR QL STRIP: POSITIVE
SP GR UR STRIP: 1.02 (ref 1–1.03)
UROBILINOGEN UR STRIP-ACNC: POSITIVE (ref 0.1–1.1)

## 2021-05-16 PROCEDURE — 81003 POCT URINALYSIS, DIPSTICK, AUTOMATED, W/O SCOPE: ICD-10-PCS | Mod: QW,S$GLB,, | Performed by: INTERNAL MEDICINE

## 2021-05-16 PROCEDURE — 81003 URINALYSIS AUTO W/O SCOPE: CPT | Mod: QW,S$GLB,, | Performed by: INTERNAL MEDICINE

## 2021-05-16 PROCEDURE — 3008F PR BODY MASS INDEX (BMI) DOCUMENTED: ICD-10-PCS | Mod: CPTII,S$GLB,, | Performed by: INTERNAL MEDICINE

## 2021-05-16 PROCEDURE — 99214 OFFICE O/P EST MOD 30 MIN: CPT | Mod: 25,S$GLB,, | Performed by: INTERNAL MEDICINE

## 2021-05-16 PROCEDURE — 3008F BODY MASS INDEX DOCD: CPT | Mod: CPTII,S$GLB,, | Performed by: INTERNAL MEDICINE

## 2021-05-16 PROCEDURE — 99214 PR OFFICE/OUTPT VISIT, EST, LEVL IV, 30-39 MIN: ICD-10-PCS | Mod: 25,S$GLB,, | Performed by: INTERNAL MEDICINE

## 2021-05-16 RX ORDER — CIPROFLOXACIN 500 MG/1
500 TABLET ORAL EVERY 12 HOURS
Qty: 14 TABLET | Refills: 0 | Status: SHIPPED | OUTPATIENT
Start: 2021-05-16 | End: 2021-05-23

## 2021-05-17 ENCOUNTER — PATIENT MESSAGE (OUTPATIENT)
Dept: RHEUMATOLOGY | Facility: CLINIC | Age: 41
End: 2021-05-17

## 2021-05-18 RX ORDER — CYANOCOBALAMIN 1000 UG/ML
INJECTION, SOLUTION INTRAMUSCULAR; SUBCUTANEOUS
Qty: 3 ML | Refills: 0 | OUTPATIENT
Start: 2021-05-18

## 2021-05-19 ENCOUNTER — TELEPHONE (OUTPATIENT)
Dept: URGENT CARE | Facility: CLINIC | Age: 41
End: 2021-05-19

## 2021-05-22 ENCOUNTER — LAB VISIT (OUTPATIENT)
Dept: LAB | Facility: HOSPITAL | Age: 41
End: 2021-05-22
Attending: PHYSICIAN ASSISTANT
Payer: COMMERCIAL

## 2021-05-22 DIAGNOSIS — M35.00 SJOGREN'S SYNDROME, WITH UNSPECIFIED ORGAN INVOLVEMENT: ICD-10-CM

## 2021-05-22 DIAGNOSIS — M05.79 RHEUMATOID ARTHRITIS INVOLVING MULTIPLE SITES WITH POSITIVE RHEUMATOID FACTOR: ICD-10-CM

## 2021-05-22 DIAGNOSIS — N39.0 URINARY TRACT INFECTION WITHOUT HEMATURIA, SITE UNSPECIFIED: ICD-10-CM

## 2021-05-22 DIAGNOSIS — M45.5 ANKYLOSING SPONDYLITIS OF THORACOLUMBAR REGION: ICD-10-CM

## 2021-05-22 LAB
BILIRUB UR QL STRIP: NEGATIVE
BILIRUB UR QL STRIP: NEGATIVE
CLARITY UR: CLEAR
CLARITY UR: CLEAR
COLOR UR: ABNORMAL
COLOR UR: ABNORMAL
GLUCOSE UR QL STRIP: NEGATIVE
GLUCOSE UR QL STRIP: NEGATIVE
HGB UR QL STRIP: NEGATIVE
HGB UR QL STRIP: NEGATIVE
KETONES UR QL STRIP: NEGATIVE
KETONES UR QL STRIP: NEGATIVE
LEUKOCYTE ESTERASE UR QL STRIP: NEGATIVE
LEUKOCYTE ESTERASE UR QL STRIP: NEGATIVE
NITRITE UR QL STRIP: NEGATIVE
NITRITE UR QL STRIP: NEGATIVE
PH UR STRIP: 6 [PH] (ref 5–8)
PH UR STRIP: 6 [PH] (ref 5–8)
PROT UR QL STRIP: NEGATIVE
PROT UR QL STRIP: NEGATIVE
SP GR UR STRIP: 1.02 (ref 1–1.03)
SP GR UR STRIP: 1.02 (ref 1–1.03)
URN SPEC COLLECT METH UR: ABNORMAL
URN SPEC COLLECT METH UR: ABNORMAL

## 2021-05-22 PROCEDURE — 87086 URINE CULTURE/COLONY COUNT: CPT | Performed by: PHYSICIAN ASSISTANT

## 2021-05-22 PROCEDURE — 81003 URINALYSIS AUTO W/O SCOPE: CPT | Mod: PO | Performed by: PHYSICIAN ASSISTANT

## 2021-05-23 LAB
BACTERIA UR CULT: NORMAL
BACTERIA UR CULT: NORMAL

## 2021-05-31 ENCOUNTER — EXTERNAL CHRONIC CARE MANAGEMENT (OUTPATIENT)
Dept: PRIMARY CARE CLINIC | Facility: CLINIC | Age: 41
End: 2021-05-31
Payer: COMMERCIAL

## 2021-05-31 PROCEDURE — 99490 CHRNC CARE MGMT STAFF 1ST 20: CPT | Mod: S$GLB,,, | Performed by: FAMILY MEDICINE

## 2021-05-31 PROCEDURE — 99490 PR CHRONIC CARE MGMT, 1ST 20 MIN: ICD-10-PCS | Mod: S$GLB,,, | Performed by: FAMILY MEDICINE

## 2021-06-15 ENCOUNTER — HOSPITAL ENCOUNTER (OUTPATIENT)
Dept: RADIOLOGY | Facility: HOSPITAL | Age: 41
Discharge: HOME | End: 2021-06-15
Payer: COMMERCIAL

## 2021-06-15 VITALS — HEIGHT: 64 IN | BODY MASS INDEX: 27.83 KG/M2 | WEIGHT: 163 LBS

## 2021-06-15 DIAGNOSIS — Z12.31 ENCOUNTER FOR SCREENING MAMMOGRAM FOR MALIGNANT NEOPLASM OF BREAST: ICD-10-CM

## 2021-06-15 PROCEDURE — 77063 MAMMO DIGITAL SCREENING BILAT WITH TOMO: ICD-10-PCS | Mod: 26,,, | Performed by: RADIOLOGY

## 2021-06-15 PROCEDURE — 77067 MAMMO DIGITAL SCREENING BILAT WITH TOMO: ICD-10-PCS | Mod: 26,,, | Performed by: RADIOLOGY

## 2021-06-15 PROCEDURE — 77067 SCR MAMMO BI INCL CAD: CPT | Mod: TC,PO

## 2021-06-15 PROCEDURE — 77063 BREAST TOMOSYNTHESIS BI: CPT | Mod: 26,,, | Performed by: RADIOLOGY

## 2021-06-15 PROCEDURE — 77067 SCR MAMMO BI INCL CAD: CPT | Mod: 26,,, | Performed by: RADIOLOGY

## 2021-06-30 ENCOUNTER — EXTERNAL CHRONIC CARE MANAGEMENT (OUTPATIENT)
Dept: PRIMARY CARE CLINIC | Facility: CLINIC | Age: 41
End: 2021-06-30
Payer: COMMERCIAL

## 2021-06-30 PROCEDURE — 99490 CHRNC CARE MGMT STAFF 1ST 20: CPT | Mod: S$GLB,,, | Performed by: FAMILY MEDICINE

## 2021-06-30 PROCEDURE — 99490 PR CHRONIC CARE MGMT, 1ST 20 MIN: ICD-10-PCS | Mod: S$GLB,,, | Performed by: FAMILY MEDICINE

## 2021-07-19 ENCOUNTER — PATIENT MESSAGE (OUTPATIENT)
Dept: RHEUMATOLOGY | Facility: CLINIC | Age: 41
End: 2021-07-19

## 2021-07-21 ENCOUNTER — TELEPHONE (OUTPATIENT)
Dept: RHEUMATOLOGY | Facility: CLINIC | Age: 41
End: 2021-07-21

## 2021-07-22 ENCOUNTER — CLINICAL SUPPORT (OUTPATIENT)
Dept: RHEUMATOLOGY | Facility: CLINIC | Age: 41
End: 2021-07-22
Payer: COMMERCIAL

## 2021-07-22 DIAGNOSIS — M45.5 ANKYLOSING SPONDYLITIS OF THORACOLUMBAR REGION: Primary | ICD-10-CM

## 2021-07-22 DIAGNOSIS — E53.8 B12 DEFICIENCY: ICD-10-CM

## 2021-07-22 DIAGNOSIS — M05.79 RHEUMATOID ARTHRITIS INVOLVING MULTIPLE SITES WITH POSITIVE RHEUMATOID FACTOR: ICD-10-CM

## 2021-07-22 PROCEDURE — 99999 PR PBB SHADOW E&M-EST. PATIENT-LVL I: ICD-10-PCS | Mod: PBBFAC,,,

## 2021-07-22 PROCEDURE — 96372 THER/PROPH/DIAG INJ SC/IM: CPT | Mod: S$GLB,,, | Performed by: PHYSICIAN ASSISTANT

## 2021-07-22 PROCEDURE — 96372 PR INJECTION,THERAP/PROPH/DIAG2ST, IM OR SUBCUT: ICD-10-PCS | Mod: S$GLB,,, | Performed by: PHYSICIAN ASSISTANT

## 2021-07-22 PROCEDURE — 99999 PR PBB SHADOW E&M-EST. PATIENT-LVL I: CPT | Mod: PBBFAC,,,

## 2021-07-22 RX ORDER — METHYLPREDNISOLONE ACETATE 80 MG/ML
160 INJECTION, SUSPENSION INTRA-ARTICULAR; INTRALESIONAL; INTRAMUSCULAR; SOFT TISSUE
Status: COMPLETED | OUTPATIENT
Start: 2021-07-22 | End: 2021-07-22

## 2021-07-22 RX ORDER — CYANOCOBALAMIN 1000 UG/ML
1000 INJECTION, SOLUTION INTRAMUSCULAR; SUBCUTANEOUS
Status: COMPLETED | OUTPATIENT
Start: 2021-07-22 | End: 2021-07-22

## 2021-07-22 RX ORDER — KETOROLAC TROMETHAMINE 30 MG/ML
60 INJECTION, SOLUTION INTRAMUSCULAR; INTRAVENOUS
Status: COMPLETED | OUTPATIENT
Start: 2021-07-22 | End: 2021-07-22

## 2021-07-22 RX ADMIN — METHYLPREDNISOLONE ACETATE 160 MG: 80 INJECTION, SUSPENSION INTRA-ARTICULAR; INTRALESIONAL; INTRAMUSCULAR; SOFT TISSUE at 11:07

## 2021-07-22 RX ADMIN — CYANOCOBALAMIN 1000 MCG: 1000 INJECTION, SOLUTION INTRAMUSCULAR; SUBCUTANEOUS at 11:07

## 2021-07-22 RX ADMIN — KETOROLAC TROMETHAMINE 60 MG: 30 INJECTION, SOLUTION INTRAMUSCULAR; INTRAVENOUS at 11:07

## 2021-07-28 ENCOUNTER — LAB VISIT (OUTPATIENT)
Dept: LAB | Facility: HOSPITAL | Age: 41
End: 2021-07-28
Payer: COMMERCIAL

## 2021-07-28 DIAGNOSIS — M45.5 ANKYLOSING SPONDYLITIS OF THORACOLUMBAR REGION: ICD-10-CM

## 2021-07-28 DIAGNOSIS — M35.00 SJOGREN'S SYNDROME, WITH UNSPECIFIED ORGAN INVOLVEMENT: ICD-10-CM

## 2021-07-28 DIAGNOSIS — I10 ESSENTIAL HYPERTENSION: ICD-10-CM

## 2021-07-28 DIAGNOSIS — M05.79 RHEUMATOID ARTHRITIS INVOLVING MULTIPLE SITES WITH POSITIVE RHEUMATOID FACTOR: ICD-10-CM

## 2021-07-28 LAB
BASOPHILS # BLD AUTO: 0.07 K/UL (ref 0–0.2)
BASOPHILS NFR BLD: 0.6 % (ref 0–1.9)
DIFFERENTIAL METHOD: ABNORMAL
EOSINOPHIL # BLD AUTO: 0.3 K/UL (ref 0–0.5)
EOSINOPHIL NFR BLD: 2.2 % (ref 0–8)
ERYTHROCYTE [DISTWIDTH] IN BLOOD BY AUTOMATED COUNT: 13.7 % (ref 11.5–14.5)
ERYTHROCYTE [SEDIMENTATION RATE] IN BLOOD BY WESTERGREN METHOD: 6 MM/HR (ref 0–20)
HCT VFR BLD AUTO: 50.1 % (ref 37–48.5)
HGB BLD-MCNC: 15.7 G/DL (ref 12–16)
IMM GRANULOCYTES # BLD AUTO: 0.04 K/UL (ref 0–0.04)
IMM GRANULOCYTES NFR BLD AUTO: 0.3 % (ref 0–0.5)
LYMPHOCYTES # BLD AUTO: 5 K/UL (ref 1–4.8)
LYMPHOCYTES NFR BLD: 43.3 % (ref 18–48)
MCH RBC QN AUTO: 28.8 PG (ref 27–31)
MCHC RBC AUTO-ENTMCNC: 31.3 G/DL (ref 32–36)
MCV RBC AUTO: 92 FL (ref 82–98)
MONOCYTES # BLD AUTO: 0.9 K/UL (ref 0.3–1)
MONOCYTES NFR BLD: 7.3 % (ref 4–15)
NEUTROPHILS # BLD AUTO: 5.4 K/UL (ref 1.8–7.7)
NEUTROPHILS NFR BLD: 46.3 % (ref 38–73)
NRBC BLD-RTO: 0 /100 WBC
PLATELET # BLD AUTO: 340 K/UL (ref 150–450)
PMV BLD AUTO: 11.6 FL (ref 9.2–12.9)
RBC # BLD AUTO: 5.46 M/UL (ref 4–5.4)
WBC # BLD AUTO: 11.63 K/UL (ref 3.9–12.7)

## 2021-07-28 PROCEDURE — 80053 COMPREHEN METABOLIC PANEL: CPT | Performed by: PHYSICIAN ASSISTANT

## 2021-07-28 PROCEDURE — 86225 DNA ANTIBODY NATIVE: CPT | Mod: 59 | Performed by: PHYSICIAN ASSISTANT

## 2021-07-28 PROCEDURE — 86431 RHEUMATOID FACTOR QUANT: CPT | Performed by: PHYSICIAN ASSISTANT

## 2021-07-28 PROCEDURE — 36415 COLL VENOUS BLD VENIPUNCTURE: CPT | Mod: PO | Performed by: PHYSICIAN ASSISTANT

## 2021-07-28 PROCEDURE — 86140 C-REACTIVE PROTEIN: CPT | Performed by: PHYSICIAN ASSISTANT

## 2021-07-28 PROCEDURE — 86225 DNA ANTIBODY NATIVE: CPT | Performed by: PHYSICIAN ASSISTANT

## 2021-07-28 PROCEDURE — 86038 ANTINUCLEAR ANTIBODIES: CPT | Performed by: PHYSICIAN ASSISTANT

## 2021-07-28 PROCEDURE — 85651 RBC SED RATE NONAUTOMATED: CPT | Mod: PO | Performed by: PHYSICIAN ASSISTANT

## 2021-07-28 PROCEDURE — 85025 COMPLETE CBC W/AUTO DIFF WBC: CPT | Performed by: PHYSICIAN ASSISTANT

## 2021-07-28 PROCEDURE — 86235 NUCLEAR ANTIGEN ANTIBODY: CPT | Mod: 59 | Performed by: PHYSICIAN ASSISTANT

## 2021-07-28 PROCEDURE — 86039 ANTINUCLEAR ANTIBODIES (ANA): CPT | Performed by: PHYSICIAN ASSISTANT

## 2021-07-28 PROCEDURE — 86200 CCP ANTIBODY: CPT | Performed by: PHYSICIAN ASSISTANT

## 2021-07-28 PROCEDURE — 80061 LIPID PANEL: CPT | Performed by: FAMILY MEDICINE

## 2021-07-29 LAB
ALBUMIN SERPL BCP-MCNC: 4.2 G/DL (ref 3.5–5.2)
ALP SERPL-CCNC: 121 U/L (ref 55–135)
ALT SERPL W/O P-5'-P-CCNC: 42 U/L (ref 10–44)
ANA PATTERN 1: NORMAL
ANA PATTERN 2: NORMAL
ANA SER QL IF: POSITIVE
ANA TITER 2: NORMAL
ANA TITR SER IF: NORMAL {TITER}
ANION GAP SERPL CALC-SCNC: 9 MMOL/L (ref 8–16)
AST SERPL-CCNC: 24 U/L (ref 10–40)
BILIRUB SERPL-MCNC: 0.3 MG/DL (ref 0.1–1)
BUN SERPL-MCNC: 11 MG/DL (ref 6–20)
CALCIUM SERPL-MCNC: 10.1 MG/DL (ref 8.7–10.5)
CCP AB SER IA-ACNC: <0.5 U/ML
CHLORIDE SERPL-SCNC: 108 MMOL/L (ref 95–110)
CHOLEST SERPL-MCNC: 206 MG/DL (ref 120–199)
CHOLEST/HDLC SERPL: 3.6 {RATIO} (ref 2–5)
CO2 SERPL-SCNC: 22 MMOL/L (ref 23–29)
CREAT SERPL-MCNC: 0.7 MG/DL (ref 0.5–1.4)
CRP SERPL-MCNC: 6.5 MG/L (ref 0–8.2)
EST. GFR  (AFRICAN AMERICAN): >60 ML/MIN/1.73 M^2
EST. GFR  (NON AFRICAN AMERICAN): >60 ML/MIN/1.73 M^2
GLUCOSE SERPL-MCNC: 97 MG/DL (ref 70–110)
HDLC SERPL-MCNC: 57 MG/DL (ref 40–75)
HDLC SERPL: 27.7 % (ref 20–50)
LDLC SERPL CALC-MCNC: 128.2 MG/DL (ref 63–159)
NONHDLC SERPL-MCNC: 149 MG/DL
POTASSIUM SERPL-SCNC: 3.8 MMOL/L (ref 3.5–5.1)
PROT SERPL-MCNC: 7.9 G/DL (ref 6–8.4)
RHEUMATOID FACT SERPL-ACNC: 16 IU/ML (ref 0–15)
SODIUM SERPL-SCNC: 139 MMOL/L (ref 136–145)
TRIGL SERPL-MCNC: 104 MG/DL (ref 30–150)

## 2021-07-30 LAB
DNA TITER: NORMAL
DSDNA AB SER-ACNC: NORMAL [IU]/ML

## 2021-07-31 ENCOUNTER — EXTERNAL CHRONIC CARE MANAGEMENT (OUTPATIENT)
Dept: PRIMARY CARE CLINIC | Facility: CLINIC | Age: 41
End: 2021-07-31
Payer: COMMERCIAL

## 2021-07-31 PROCEDURE — 99490 PR CHRONIC CARE MGMT, 1ST 20 MIN: ICD-10-PCS | Mod: S$GLB,,, | Performed by: FAMILY MEDICINE

## 2021-07-31 PROCEDURE — 99490 CHRNC CARE MGMT STAFF 1ST 20: CPT | Mod: S$GLB,,, | Performed by: FAMILY MEDICINE

## 2021-08-02 LAB
ANTI SM ANTIBODY: 0.06 RATIO (ref 0–0.99)
ANTI SM/RNP ANTIBODY: 0.14 RATIO (ref 0–0.99)
ANTI-SM INTERPRETATION: NEGATIVE
ANTI-SM/RNP INTERPRETATION: NEGATIVE
ANTI-SSA ANTIBODY: 0.06 RATIO (ref 0–0.99)
ANTI-SSA INTERPRETATION: NEGATIVE
ANTI-SSB ANTIBODY: 0.06 RATIO (ref 0–0.99)
ANTI-SSB INTERPRETATION: NEGATIVE
DSDNA AB SER-ACNC: NORMAL [IU]/ML

## 2021-08-03 ENCOUNTER — PATIENT MESSAGE (OUTPATIENT)
Dept: RHEUMATOLOGY | Facility: CLINIC | Age: 41
End: 2021-08-03

## 2021-08-05 ENCOUNTER — TELEPHONE (OUTPATIENT)
Dept: RHEUMATOLOGY | Facility: CLINIC | Age: 41
End: 2021-08-05

## 2021-08-10 DIAGNOSIS — M35.00 SJOGREN'S SYNDROME, WITH UNSPECIFIED ORGAN INVOLVEMENT: ICD-10-CM

## 2021-08-10 RX ORDER — NIFEDIPINE 60 MG/1
60 TABLET, EXTENDED RELEASE ORAL DAILY
Qty: 90 TABLET | Refills: 1 | Status: SHIPPED | OUTPATIENT
Start: 2021-08-10 | End: 2021-10-12 | Stop reason: SDUPTHER

## 2021-08-13 ENCOUNTER — OFFICE VISIT (OUTPATIENT)
Dept: RHEUMATOLOGY | Facility: CLINIC | Age: 41
End: 2021-08-13
Payer: COMMERCIAL

## 2021-08-13 DIAGNOSIS — M45.5 ANKYLOSING SPONDYLITIS OF THORACOLUMBAR REGION: Primary | ICD-10-CM

## 2021-08-13 DIAGNOSIS — N39.0 RECURRENT UTI: ICD-10-CM

## 2021-08-13 DIAGNOSIS — D84.9 IMMUNE DEFICIENCY DISORDER: ICD-10-CM

## 2021-08-13 DIAGNOSIS — D84.9 IMMUNOCOMPROMISED: ICD-10-CM

## 2021-08-13 PROCEDURE — 1160F RVW MEDS BY RX/DR IN RCRD: CPT | Mod: CPTII,,, | Performed by: PHYSICIAN ASSISTANT

## 2021-08-13 PROCEDURE — 1160F PR REVIEW ALL MEDS BY PRESCRIBER/CLIN PHARMACIST DOCUMENTED: ICD-10-PCS | Mod: CPTII,,, | Performed by: PHYSICIAN ASSISTANT

## 2021-08-13 PROCEDURE — 1159F MED LIST DOCD IN RCRD: CPT | Mod: CPTII,,, | Performed by: PHYSICIAN ASSISTANT

## 2021-08-13 PROCEDURE — 1159F PR MEDICATION LIST DOCUMENTED IN MEDICAL RECORD: ICD-10-PCS | Mod: CPTII,,, | Performed by: PHYSICIAN ASSISTANT

## 2021-08-13 PROCEDURE — 99214 PR OFFICE/OUTPT VISIT, EST, LEVL IV, 30-39 MIN: ICD-10-PCS | Mod: 95,,, | Performed by: PHYSICIAN ASSISTANT

## 2021-08-13 PROCEDURE — 99214 OFFICE O/P EST MOD 30 MIN: CPT | Mod: 95,,, | Performed by: PHYSICIAN ASSISTANT

## 2021-08-16 ENCOUNTER — TELEPHONE (OUTPATIENT)
Dept: RHEUMATOLOGY | Facility: CLINIC | Age: 41
End: 2021-08-16

## 2021-08-19 RX ORDER — PREDNISONE 5 MG/1
TABLET ORAL
Qty: 60 TABLET | Refills: 3 | Status: SHIPPED | OUTPATIENT
Start: 2021-08-19 | End: 2022-04-22 | Stop reason: SDUPTHER

## 2021-08-21 ENCOUNTER — HOSPITAL ENCOUNTER (OUTPATIENT)
Dept: RADIOLOGY | Facility: HOSPITAL | Age: 41
Discharge: HOME | End: 2021-08-21
Attending: PAIN MEDICINE
Payer: COMMERCIAL

## 2021-08-21 DIAGNOSIS — M47.812 CERVICAL SPONDYLOSIS WITHOUT MYELOPATHY: ICD-10-CM

## 2021-08-21 PROCEDURE — 72050 XR CERVICAL SPINE AP LAT WITH FLEX EXTEN: ICD-10-PCS | Mod: 26,,, | Performed by: RADIOLOGY

## 2021-08-21 PROCEDURE — 72141 MRI NECK SPINE W/O DYE: CPT | Mod: 26,,, | Performed by: RADIOLOGY

## 2021-08-21 PROCEDURE — 72141 MRI CERVICAL SPINE WITHOUT CONTRAST: ICD-10-PCS | Mod: 26,,, | Performed by: RADIOLOGY

## 2021-08-21 PROCEDURE — 72141 MRI NECK SPINE W/O DYE: CPT | Mod: TC,PO

## 2021-08-21 PROCEDURE — 72050 X-RAY EXAM NECK SPINE 4/5VWS: CPT | Mod: TC,FY,PO

## 2021-08-21 PROCEDURE — 72050 X-RAY EXAM NECK SPINE 4/5VWS: CPT | Mod: 26,,, | Performed by: RADIOLOGY

## 2021-08-31 ENCOUNTER — EXTERNAL CHRONIC CARE MANAGEMENT (OUTPATIENT)
Dept: PRIMARY CARE CLINIC | Facility: CLINIC | Age: 41
End: 2021-08-31
Payer: COMMERCIAL

## 2021-08-31 PROCEDURE — 99490 PR CHRONIC CARE MGMT, 1ST 20 MIN: ICD-10-PCS | Mod: S$GLB,,, | Performed by: FAMILY MEDICINE

## 2021-08-31 PROCEDURE — 99490 CHRNC CARE MGMT STAFF 1ST 20: CPT | Mod: S$GLB,,, | Performed by: FAMILY MEDICINE

## 2021-09-20 ENCOUNTER — PATIENT MESSAGE (OUTPATIENT)
Dept: RHEUMATOLOGY | Facility: CLINIC | Age: 41
End: 2021-09-20

## 2021-09-23 ENCOUNTER — CLINICAL SUPPORT (OUTPATIENT)
Dept: RHEUMATOLOGY | Facility: CLINIC | Age: 41
End: 2021-09-23
Payer: COMMERCIAL

## 2021-09-23 VITALS — DIASTOLIC BLOOD PRESSURE: 88 MMHG | SYSTOLIC BLOOD PRESSURE: 125 MMHG | HEART RATE: 88 BPM

## 2021-09-23 DIAGNOSIS — M05.79 RHEUMATOID ARTHRITIS INVOLVING MULTIPLE SITES WITH POSITIVE RHEUMATOID FACTOR: ICD-10-CM

## 2021-09-23 DIAGNOSIS — M35.00 SJOGREN'S SYNDROME, WITH UNSPECIFIED ORGAN INVOLVEMENT: ICD-10-CM

## 2021-09-23 DIAGNOSIS — M45.5 ANKYLOSING SPONDYLITIS OF THORACOLUMBAR REGION: Primary | ICD-10-CM

## 2021-09-23 DIAGNOSIS — D84.9 IMMUNE DEFICIENCY DISORDER: ICD-10-CM

## 2021-09-23 DIAGNOSIS — E53.8 B12 DEFICIENCY: ICD-10-CM

## 2021-09-23 PROCEDURE — 99999 PR PBB SHADOW E&M-EST. PATIENT-LVL II: CPT | Mod: PBBFAC,,,

## 2021-09-23 PROCEDURE — 96372 PR INJECTION,THERAP/PROPH/DIAG2ST, IM OR SUBCUT: ICD-10-PCS | Mod: S$GLB,,, | Performed by: PHYSICIAN ASSISTANT

## 2021-09-23 PROCEDURE — 99999 PR PBB SHADOW E&M-EST. PATIENT-LVL II: ICD-10-PCS | Mod: PBBFAC,,,

## 2021-09-23 PROCEDURE — 96372 THER/PROPH/DIAG INJ SC/IM: CPT | Mod: S$GLB,,, | Performed by: PHYSICIAN ASSISTANT

## 2021-09-23 RX ORDER — METHYLPREDNISOLONE ACETATE 80 MG/ML
160 INJECTION, SUSPENSION INTRA-ARTICULAR; INTRALESIONAL; INTRAMUSCULAR; SOFT TISSUE
Status: COMPLETED | OUTPATIENT
Start: 2021-09-23 | End: 2021-09-23

## 2021-09-23 RX ORDER — KETOROLAC TROMETHAMINE 30 MG/ML
60 INJECTION, SOLUTION INTRAMUSCULAR; INTRAVENOUS
Status: COMPLETED | OUTPATIENT
Start: 2021-09-23 | End: 2021-09-23

## 2021-09-23 RX ORDER — CYANOCOBALAMIN 1000 UG/ML
1000 INJECTION, SOLUTION INTRAMUSCULAR; SUBCUTANEOUS
Status: COMPLETED | OUTPATIENT
Start: 2021-09-23 | End: 2021-09-23

## 2021-09-23 RX ADMIN — KETOROLAC TROMETHAMINE 60 MG: 30 INJECTION, SOLUTION INTRAMUSCULAR; INTRAVENOUS at 01:09

## 2021-09-23 RX ADMIN — CYANOCOBALAMIN 1000 MCG: 1000 INJECTION, SOLUTION INTRAMUSCULAR; SUBCUTANEOUS at 01:09

## 2021-09-23 RX ADMIN — METHYLPREDNISOLONE ACETATE 160 MG: 80 INJECTION, SUSPENSION INTRA-ARTICULAR; INTRALESIONAL; INTRAMUSCULAR; SOFT TISSUE at 01:09

## 2021-09-29 ENCOUNTER — IMMUNIZATION (OUTPATIENT)
Dept: FAMILY MEDICINE | Facility: CLINIC | Age: 41
End: 2021-09-29
Payer: COMMERCIAL

## 2021-09-29 DIAGNOSIS — Z23 NEED FOR VACCINATION: Primary | ICD-10-CM

## 2021-09-29 PROCEDURE — 91300 COVID-19, MRNA, LNP-S, PF, 30 MCG/0.3 ML DOSE VACCINE: CPT | Mod: PBBFAC | Performed by: FAMILY MEDICINE

## 2021-09-29 PROCEDURE — 0003A COVID-19, MRNA, LNP-S, PF, 30 MCG/0.3 ML DOSE VACCINE: CPT | Mod: PBBFAC | Performed by: FAMILY MEDICINE

## 2021-10-12 DIAGNOSIS — M35.00 SJOGREN'S SYNDROME, WITH UNSPECIFIED ORGAN INVOLVEMENT: ICD-10-CM

## 2021-10-12 PROBLEM — R82.71 ASYMPTOMATIC BACTERIURIA: Status: ACTIVE | Noted: 2021-10-12

## 2021-10-14 RX ORDER — NIFEDIPINE 60 MG/1
60 TABLET, EXTENDED RELEASE ORAL DAILY
Qty: 90 TABLET | Refills: 1 | Status: SHIPPED | OUTPATIENT
Start: 2021-10-14 | End: 2022-12-15 | Stop reason: SDUPTHER

## 2021-10-28 ENCOUNTER — PATIENT MESSAGE (OUTPATIENT)
Dept: RHEUMATOLOGY | Facility: CLINIC | Age: 41
End: 2021-10-28
Payer: COMMERCIAL

## 2021-10-29 ENCOUNTER — CLINICAL SUPPORT (OUTPATIENT)
Dept: RHEUMATOLOGY | Facility: CLINIC | Age: 41
End: 2021-10-29
Payer: COMMERCIAL

## 2021-10-29 VITALS — SYSTOLIC BLOOD PRESSURE: 142 MMHG | HEART RATE: 111 BPM | DIASTOLIC BLOOD PRESSURE: 87 MMHG

## 2021-10-29 DIAGNOSIS — E53.8 B12 DEFICIENCY: ICD-10-CM

## 2021-10-29 DIAGNOSIS — M35.00 SJOGREN'S SYNDROME, WITH UNSPECIFIED ORGAN INVOLVEMENT: ICD-10-CM

## 2021-10-29 DIAGNOSIS — M05.79 RHEUMATOID ARTHRITIS INVOLVING MULTIPLE SITES WITH POSITIVE RHEUMATOID FACTOR: Primary | ICD-10-CM

## 2021-10-29 DIAGNOSIS — M45.5 ANKYLOSING SPONDYLITIS OF THORACOLUMBAR REGION: ICD-10-CM

## 2021-10-29 PROCEDURE — 99999 PR PBB SHADOW E&M-EST. PATIENT-LVL III: ICD-10-PCS | Mod: PBBFAC,,,

## 2021-10-29 PROCEDURE — 99999 PR PBB SHADOW E&M-EST. PATIENT-LVL III: CPT | Mod: PBBFAC,,,

## 2021-10-29 PROCEDURE — 96372 PR INJECTION,THERAP/PROPH/DIAG2ST, IM OR SUBCUT: ICD-10-PCS | Mod: S$GLB,,, | Performed by: PHYSICIAN ASSISTANT

## 2021-10-29 PROCEDURE — 96372 THER/PROPH/DIAG INJ SC/IM: CPT | Mod: S$GLB,,, | Performed by: PHYSICIAN ASSISTANT

## 2021-10-29 RX ORDER — KETOROLAC TROMETHAMINE 30 MG/ML
60 INJECTION, SOLUTION INTRAMUSCULAR; INTRAVENOUS
Status: COMPLETED | OUTPATIENT
Start: 2021-10-29 | End: 2021-10-29

## 2021-10-29 RX ORDER — CYANOCOBALAMIN 1000 UG/ML
1000 INJECTION, SOLUTION INTRAMUSCULAR; SUBCUTANEOUS ONCE
Status: COMPLETED | OUTPATIENT
Start: 2021-10-29 | End: 2021-10-29

## 2021-10-29 RX ORDER — METHYLPREDNISOLONE ACETATE 80 MG/ML
80 INJECTION, SUSPENSION INTRA-ARTICULAR; INTRALESIONAL; INTRAMUSCULAR; SOFT TISSUE
Status: COMPLETED | OUTPATIENT
Start: 2021-10-29 | End: 2021-10-29

## 2021-10-29 RX ADMIN — METHYLPREDNISOLONE ACETATE 80 MG: 80 INJECTION, SUSPENSION INTRA-ARTICULAR; INTRALESIONAL; INTRAMUSCULAR; SOFT TISSUE at 11:10

## 2021-10-29 RX ADMIN — KETOROLAC TROMETHAMINE 60 MG: 30 INJECTION, SOLUTION INTRAMUSCULAR; INTRAVENOUS at 11:10

## 2021-10-29 RX ADMIN — CYANOCOBALAMIN 1000 MCG: 1000 INJECTION, SOLUTION INTRAMUSCULAR; SUBCUTANEOUS at 11:10

## 2021-12-09 ENCOUNTER — LAB VISIT (OUTPATIENT)
Dept: LAB | Facility: HOSPITAL | Age: 41
End: 2021-12-09
Payer: COMMERCIAL

## 2021-12-09 DIAGNOSIS — M45.5 ANKYLOSING SPONDYLITIS OF THORACOLUMBAR REGION: ICD-10-CM

## 2021-12-09 LAB
ALBUMIN SERPL BCP-MCNC: 4.1 G/DL (ref 3.5–5.2)
ALP SERPL-CCNC: 113 U/L (ref 55–135)
ALT SERPL W/O P-5'-P-CCNC: 26 U/L (ref 10–44)
ANION GAP SERPL CALC-SCNC: 14 MMOL/L (ref 8–16)
AST SERPL-CCNC: 16 U/L (ref 10–40)
BASOPHILS # BLD AUTO: 0.04 K/UL (ref 0–0.2)
BASOPHILS NFR BLD: 0.3 % (ref 0–1.9)
BILIRUB SERPL-MCNC: 0.3 MG/DL (ref 0.1–1)
BUN SERPL-MCNC: 6 MG/DL (ref 6–20)
CALCIUM SERPL-MCNC: 10.1 MG/DL (ref 8.7–10.5)
CHLORIDE SERPL-SCNC: 111 MMOL/L (ref 95–110)
CO2 SERPL-SCNC: 17 MMOL/L (ref 23–29)
CREAT SERPL-MCNC: 0.7 MG/DL (ref 0.5–1.4)
CRP SERPL-MCNC: 6.8 MG/L (ref 0–8.2)
DIFFERENTIAL METHOD: ABNORMAL
EOSINOPHIL # BLD AUTO: 0 K/UL (ref 0–0.5)
EOSINOPHIL NFR BLD: 0.2 % (ref 0–8)
ERYTHROCYTE [DISTWIDTH] IN BLOOD BY AUTOMATED COUNT: 13.2 % (ref 11.5–14.5)
ERYTHROCYTE [SEDIMENTATION RATE] IN BLOOD BY WESTERGREN METHOD: 13 MM/HR (ref 0–20)
EST. GFR  (AFRICAN AMERICAN): >60 ML/MIN/1.73 M^2
EST. GFR  (NON AFRICAN AMERICAN): >60 ML/MIN/1.73 M^2
GLUCOSE SERPL-MCNC: 197 MG/DL (ref 70–110)
HCT VFR BLD AUTO: 47.2 % (ref 37–48.5)
HGB BLD-MCNC: 14.8 G/DL (ref 12–16)
IMM GRANULOCYTES # BLD AUTO: 0.05 K/UL (ref 0–0.04)
IMM GRANULOCYTES NFR BLD AUTO: 0.4 % (ref 0–0.5)
LYMPHOCYTES # BLD AUTO: 2.9 K/UL (ref 1–4.8)
LYMPHOCYTES NFR BLD: 23.4 % (ref 18–48)
MCH RBC QN AUTO: 29.5 PG (ref 27–31)
MCHC RBC AUTO-ENTMCNC: 31.4 G/DL (ref 32–36)
MCV RBC AUTO: 94 FL (ref 82–98)
MONOCYTES # BLD AUTO: 0.3 K/UL (ref 0.3–1)
MONOCYTES NFR BLD: 2 % (ref 4–15)
NEUTROPHILS # BLD AUTO: 9.1 K/UL (ref 1.8–7.7)
NEUTROPHILS NFR BLD: 73.7 % (ref 38–73)
NRBC BLD-RTO: 0 /100 WBC
PLATELET # BLD AUTO: 356 K/UL (ref 150–450)
PMV BLD AUTO: 11.2 FL (ref 9.2–12.9)
POTASSIUM SERPL-SCNC: 3.6 MMOL/L (ref 3.5–5.1)
PROT SERPL-MCNC: 7.4 G/DL (ref 6–8.4)
RBC # BLD AUTO: 5.02 M/UL (ref 4–5.4)
SODIUM SERPL-SCNC: 142 MMOL/L (ref 136–145)
WBC # BLD AUTO: 12.36 K/UL (ref 3.9–12.7)

## 2021-12-09 PROCEDURE — 85025 COMPLETE CBC W/AUTO DIFF WBC: CPT | Performed by: PHYSICIAN ASSISTANT

## 2021-12-09 PROCEDURE — 36415 COLL VENOUS BLD VENIPUNCTURE: CPT | Mod: PO | Performed by: PHYSICIAN ASSISTANT

## 2021-12-09 PROCEDURE — 80053 COMPREHEN METABOLIC PANEL: CPT | Performed by: PHYSICIAN ASSISTANT

## 2021-12-09 PROCEDURE — 85651 RBC SED RATE NONAUTOMATED: CPT | Mod: PO | Performed by: PHYSICIAN ASSISTANT

## 2021-12-09 PROCEDURE — 86140 C-REACTIVE PROTEIN: CPT | Performed by: PHYSICIAN ASSISTANT

## 2021-12-16 ENCOUNTER — OFFICE VISIT (OUTPATIENT)
Dept: RHEUMATOLOGY | Facility: CLINIC | Age: 41
End: 2021-12-16
Payer: COMMERCIAL

## 2021-12-16 VITALS
HEART RATE: 114 BPM | SYSTOLIC BLOOD PRESSURE: 146 MMHG | DIASTOLIC BLOOD PRESSURE: 88 MMHG | HEIGHT: 63 IN | WEIGHT: 183 LBS | BODY MASS INDEX: 32.43 KG/M2

## 2021-12-16 DIAGNOSIS — M35.00 SJOGREN'S SYNDROME, WITH UNSPECIFIED ORGAN INVOLVEMENT: ICD-10-CM

## 2021-12-16 DIAGNOSIS — N39.0 RECURRENT UTI: ICD-10-CM

## 2021-12-16 DIAGNOSIS — M45.5 ANKYLOSING SPONDYLITIS OF THORACOLUMBAR REGION: ICD-10-CM

## 2021-12-16 DIAGNOSIS — N39.0 URINARY TRACT INFECTION WITHOUT HEMATURIA, SITE UNSPECIFIED: ICD-10-CM

## 2021-12-16 DIAGNOSIS — N30.10 INTERSTITIAL CYSTITIS (CHRONIC) WITHOUT HEMATURIA: ICD-10-CM

## 2021-12-16 DIAGNOSIS — G43.919 INTRACTABLE MIGRAINE WITHOUT STATUS MIGRAINOSUS, UNSPECIFIED MIGRAINE TYPE: Primary | ICD-10-CM

## 2021-12-16 DIAGNOSIS — D84.9 IMMUNE DEFICIENCY DISORDER: ICD-10-CM

## 2021-12-16 DIAGNOSIS — N20.0 KIDNEY STONES: ICD-10-CM

## 2021-12-16 PROCEDURE — 3079F DIAST BP 80-89 MM HG: CPT | Mod: CPTII,S$GLB,, | Performed by: INTERNAL MEDICINE

## 2021-12-16 PROCEDURE — 3079F PR MOST RECENT DIASTOLIC BLOOD PRESSURE 80-89 MM HG: ICD-10-PCS | Mod: CPTII,S$GLB,, | Performed by: INTERNAL MEDICINE

## 2021-12-16 PROCEDURE — 96372 PR INJECTION,THERAP/PROPH/DIAG2ST, IM OR SUBCUT: ICD-10-PCS | Mod: S$GLB,,, | Performed by: INTERNAL MEDICINE

## 2021-12-16 PROCEDURE — 1159F MED LIST DOCD IN RCRD: CPT | Mod: CPTII,S$GLB,, | Performed by: INTERNAL MEDICINE

## 2021-12-16 PROCEDURE — 3077F PR MOST RECENT SYSTOLIC BLOOD PRESSURE >= 140 MM HG: ICD-10-PCS | Mod: CPTII,S$GLB,, | Performed by: INTERNAL MEDICINE

## 2021-12-16 PROCEDURE — 3008F PR BODY MASS INDEX (BMI) DOCUMENTED: ICD-10-PCS | Mod: CPTII,S$GLB,, | Performed by: INTERNAL MEDICINE

## 2021-12-16 PROCEDURE — 96372 THER/PROPH/DIAG INJ SC/IM: CPT | Mod: S$GLB,,, | Performed by: INTERNAL MEDICINE

## 2021-12-16 PROCEDURE — 3008F BODY MASS INDEX DOCD: CPT | Mod: CPTII,S$GLB,, | Performed by: INTERNAL MEDICINE

## 2021-12-16 PROCEDURE — 1159F PR MEDICATION LIST DOCUMENTED IN MEDICAL RECORD: ICD-10-PCS | Mod: CPTII,S$GLB,, | Performed by: INTERNAL MEDICINE

## 2021-12-16 PROCEDURE — 99999 PR PBB SHADOW E&M-EST. PATIENT-LVL III: CPT | Mod: PBBFAC,,, | Performed by: INTERNAL MEDICINE

## 2021-12-16 PROCEDURE — 99999 PR PBB SHADOW E&M-EST. PATIENT-LVL III: ICD-10-PCS | Mod: PBBFAC,,, | Performed by: INTERNAL MEDICINE

## 2021-12-16 PROCEDURE — 99215 PR OFFICE/OUTPT VISIT, EST, LEVL V, 40-54 MIN: ICD-10-PCS | Mod: 25,S$GLB,, | Performed by: INTERNAL MEDICINE

## 2021-12-16 PROCEDURE — 99215 OFFICE O/P EST HI 40 MIN: CPT | Mod: 25,S$GLB,, | Performed by: INTERNAL MEDICINE

## 2021-12-16 PROCEDURE — 3077F SYST BP >= 140 MM HG: CPT | Mod: CPTII,S$GLB,, | Performed by: INTERNAL MEDICINE

## 2021-12-16 RX ORDER — CYANOCOBALAMIN 1000 UG/ML
1000 INJECTION, SOLUTION INTRAMUSCULAR; SUBCUTANEOUS
Status: COMPLETED | OUTPATIENT
Start: 2021-12-16 | End: 2021-12-16

## 2021-12-16 RX ORDER — KETOROLAC TROMETHAMINE 30 MG/ML
60 INJECTION, SOLUTION INTRAMUSCULAR; INTRAVENOUS
Status: COMPLETED | OUTPATIENT
Start: 2021-12-16 | End: 2021-12-16

## 2021-12-16 RX ORDER — DEXAMETHASONE SODIUM PHOSPHATE 4 MG/ML
8 INJECTION, SOLUTION INTRA-ARTICULAR; INTRALESIONAL; INTRAMUSCULAR; INTRAVENOUS; SOFT TISSUE
Status: COMPLETED | OUTPATIENT
Start: 2021-12-16 | End: 2021-12-16

## 2021-12-16 RX ORDER — FLUCONAZOLE 150 MG/1
150 TABLET ORAL ONCE AS NEEDED
COMMUNITY
Start: 2021-08-10 | End: 2021-12-16 | Stop reason: SDUPTHER

## 2021-12-16 RX ORDER — FLUCONAZOLE 150 MG/1
150 TABLET ORAL DAILY
Qty: 10 TABLET | Refills: 1 | Status: SHIPPED | OUTPATIENT
Start: 2021-12-16 | End: 2021-12-26

## 2021-12-16 RX ADMIN — CYANOCOBALAMIN 1000 MCG: 1000 INJECTION, SOLUTION INTRAMUSCULAR; SUBCUTANEOUS at 09:12

## 2021-12-16 RX ADMIN — KETOROLAC TROMETHAMINE 60 MG: 30 INJECTION, SOLUTION INTRAMUSCULAR; INTRAVENOUS at 09:12

## 2021-12-16 RX ADMIN — DEXAMETHASONE SODIUM PHOSPHATE 8 MG: 4 INJECTION, SOLUTION INTRA-ARTICULAR; INTRALESIONAL; INTRAMUSCULAR; INTRAVENOUS; SOFT TISSUE at 09:12

## 2021-12-28 ENCOUNTER — PATIENT MESSAGE (OUTPATIENT)
Dept: RHEUMATOLOGY | Facility: CLINIC | Age: 41
End: 2021-12-28
Payer: COMMERCIAL

## 2022-01-24 ENCOUNTER — PES CALL (OUTPATIENT)
Dept: ADMINISTRATIVE | Facility: CLINIC | Age: 42
End: 2022-01-24
Payer: COMMERCIAL

## 2022-01-25 ENCOUNTER — PATIENT MESSAGE (OUTPATIENT)
Dept: RHEUMATOLOGY | Facility: CLINIC | Age: 42
End: 2022-01-25
Payer: COMMERCIAL

## 2022-01-31 ENCOUNTER — TELEPHONE (OUTPATIENT)
Dept: RHEUMATOLOGY | Facility: CLINIC | Age: 42
End: 2022-01-31
Payer: COMMERCIAL

## 2022-01-31 NOTE — TELEPHONE ENCOUNTER
"Called patient and she said she is interested in a nurse visit Stated, "My big joins hurt. Shoulder, back, hips" None of her pain meds or steroids are working. Patient had COVID on 1/7/22. Stated she was prescribed Azithromycin and Dexamethasone for COVID as a precaution from her daughter having it. Pt states she has a "pocket of fluid" between ankle and bottom of foot. A nurse visit had been scheduled for 2/1 @1:30pm  "

## 2022-02-01 ENCOUNTER — CLINICAL SUPPORT (OUTPATIENT)
Dept: RHEUMATOLOGY | Facility: CLINIC | Age: 42
End: 2022-02-01
Payer: COMMERCIAL

## 2022-02-01 VITALS — SYSTOLIC BLOOD PRESSURE: 145 MMHG | DIASTOLIC BLOOD PRESSURE: 94 MMHG | HEART RATE: 91 BPM

## 2022-02-01 DIAGNOSIS — M25.572 PAIN IN JOINTS OF BOTH FEET: ICD-10-CM

## 2022-02-01 DIAGNOSIS — M25.571 PAIN IN JOINTS OF BOTH FEET: ICD-10-CM

## 2022-02-01 DIAGNOSIS — M25.511 PAIN OF BOTH SHOULDER JOINTS: ICD-10-CM

## 2022-02-01 DIAGNOSIS — M25.541 JOINT PAIN IN BOTH HANDS: Primary | ICD-10-CM

## 2022-02-01 DIAGNOSIS — G89.29 CHRONIC BILATERAL LOW BACK PAIN, UNSPECIFIED WHETHER SCIATICA PRESENT: ICD-10-CM

## 2022-02-01 DIAGNOSIS — M25.542 JOINT PAIN IN BOTH HANDS: Primary | ICD-10-CM

## 2022-02-01 DIAGNOSIS — M25.512 PAIN OF BOTH SHOULDER JOINTS: ICD-10-CM

## 2022-02-01 DIAGNOSIS — M54.50 CHRONIC BILATERAL LOW BACK PAIN, UNSPECIFIED WHETHER SCIATICA PRESENT: ICD-10-CM

## 2022-02-01 PROCEDURE — 99999 PR PBB SHADOW E&M-EST. PATIENT-LVL II: ICD-10-PCS | Mod: PBBFAC,,,

## 2022-02-01 PROCEDURE — 99999 PR PBB SHADOW E&M-EST. PATIENT-LVL II: CPT | Mod: PBBFAC,,,

## 2022-02-01 RX ORDER — KETOROLAC TROMETHAMINE 30 MG/ML
60 INJECTION, SOLUTION INTRAMUSCULAR; INTRAVENOUS
Status: COMPLETED | OUTPATIENT
Start: 2022-02-01 | End: 2022-02-01

## 2022-02-01 RX ORDER — DEXAMETHASONE SODIUM PHOSPHATE 4 MG/ML
8 INJECTION, SOLUTION INTRA-ARTICULAR; INTRALESIONAL; INTRAMUSCULAR; INTRAVENOUS; SOFT TISSUE
Status: COMPLETED | OUTPATIENT
Start: 2022-02-01 | End: 2022-02-01

## 2022-02-01 RX ORDER — CYANOCOBALAMIN 1000 UG/ML
1000 INJECTION, SOLUTION INTRAMUSCULAR; SUBCUTANEOUS
Status: COMPLETED | OUTPATIENT
Start: 2022-02-01 | End: 2022-02-01

## 2022-02-01 RX ADMIN — DEXAMETHASONE SODIUM PHOSPHATE 8 MG: 4 INJECTION, SOLUTION INTRA-ARTICULAR; INTRALESIONAL; INTRAMUSCULAR; INTRAVENOUS; SOFT TISSUE at 12:02

## 2022-02-01 RX ADMIN — KETOROLAC TROMETHAMINE 60 MG: 30 INJECTION, SOLUTION INTRAMUSCULAR; INTRAVENOUS at 12:02

## 2022-02-01 RX ADMIN — CYANOCOBALAMIN 1000 MCG: 1000 INJECTION, SOLUTION INTRAMUSCULAR; SUBCUTANEOUS at 12:02

## 2022-02-01 NOTE — PROGRESS NOTES
"  Two patient identifiers were used. Pt appeared to have a steady gait, not limping or slow walking. Pt was easily able to verbalize pain. 4 to 6 out of 10 at present ,while is still in COVID recovery. Patient stated, " I have pain in my big joints" shoulders, both hands, lower back and ankles". Pt has visible fluid build up, bilaterally in ankles. BP was checked twice bc it was too high at first to administer shots. Pt was asking about allergies and had taken the medications before that were given today. Administered Decadron 8 mg, 2cc's and B 12, 1000mcg, 1cc in Right Upper Gluteal muscle and Ketorolac 60 mg, 2cc's in Left Upper Gluteal muscle per Margo Acuna PA-C. Patient was hot to the touch ,especially in areas of pain. Pt tolerated well. Waited time allotted to observe any allergic reactions, none were present.  "

## 2022-02-15 ENCOUNTER — OFFICE VISIT (OUTPATIENT)
Dept: NEUROLOGY | Facility: CLINIC | Age: 42
End: 2022-02-15
Payer: COMMERCIAL

## 2022-02-15 VITALS
DIASTOLIC BLOOD PRESSURE: 97 MMHG | WEIGHT: 186 LBS | SYSTOLIC BLOOD PRESSURE: 142 MMHG | HEIGHT: 63 IN | RESPIRATION RATE: 17 BRPM | BODY MASS INDEX: 32.96 KG/M2 | HEART RATE: 108 BPM | TEMPERATURE: 99 F

## 2022-02-15 DIAGNOSIS — I10 ESSENTIAL HYPERTENSION: ICD-10-CM

## 2022-02-15 DIAGNOSIS — M54.50 CHRONIC MIDLINE LOW BACK PAIN WITHOUT SCIATICA: ICD-10-CM

## 2022-02-15 DIAGNOSIS — R73.09 ABNORMAL GLUCOSE: ICD-10-CM

## 2022-02-15 DIAGNOSIS — M79.7 FIBROMYALGIA: ICD-10-CM

## 2022-02-15 DIAGNOSIS — F41.9 ANXIETY: ICD-10-CM

## 2022-02-15 DIAGNOSIS — D72.823 LEUKEMOID REACTION: ICD-10-CM

## 2022-02-15 DIAGNOSIS — M53.86 DECREASED ROM OF LUMBAR SPINE: ICD-10-CM

## 2022-02-15 DIAGNOSIS — R94.6 ABNORMAL THYROID FUNCTION TEST: ICD-10-CM

## 2022-02-15 DIAGNOSIS — G43.919 INTRACTABLE MIGRAINE WITHOUT STATUS MIGRAINOSUS, UNSPECIFIED MIGRAINE TYPE: ICD-10-CM

## 2022-02-15 DIAGNOSIS — E66.9 CLASS 1 OBESITY: ICD-10-CM

## 2022-02-15 DIAGNOSIS — G43.719 INTRACTABLE CHRONIC MIGRAINE WITHOUT AURA AND WITHOUT STATUS MIGRAINOSUS: Primary | ICD-10-CM

## 2022-02-15 DIAGNOSIS — G89.29 CHRONIC MIDLINE LOW BACK PAIN WITHOUT SCIATICA: ICD-10-CM

## 2022-02-15 DIAGNOSIS — M45.9 ANKYLOSING SPONDYLITIS, UNSPECIFIED SITE OF SPINE: ICD-10-CM

## 2022-02-15 PROCEDURE — 3008F BODY MASS INDEX DOCD: CPT | Mod: CPTII,S$GLB,, | Performed by: PSYCHIATRY & NEUROLOGY

## 2022-02-15 PROCEDURE — 1159F PR MEDICATION LIST DOCUMENTED IN MEDICAL RECORD: ICD-10-PCS | Mod: CPTII,S$GLB,, | Performed by: PSYCHIATRY & NEUROLOGY

## 2022-02-15 PROCEDURE — 3080F PR MOST RECENT DIASTOLIC BLOOD PRESSURE >= 90 MM HG: ICD-10-PCS | Mod: CPTII,S$GLB,, | Performed by: PSYCHIATRY & NEUROLOGY

## 2022-02-15 PROCEDURE — 99999 PR PBB SHADOW E&M-EST. PATIENT-LVL V: CPT | Mod: PBBFAC,,, | Performed by: PSYCHIATRY & NEUROLOGY

## 2022-02-15 PROCEDURE — 99205 OFFICE O/P NEW HI 60 MIN: CPT | Mod: S$GLB,,, | Performed by: PSYCHIATRY & NEUROLOGY

## 2022-02-15 PROCEDURE — 3077F PR MOST RECENT SYSTOLIC BLOOD PRESSURE >= 140 MM HG: ICD-10-PCS | Mod: CPTII,S$GLB,, | Performed by: PSYCHIATRY & NEUROLOGY

## 2022-02-15 PROCEDURE — 3080F DIAST BP >= 90 MM HG: CPT | Mod: CPTII,S$GLB,, | Performed by: PSYCHIATRY & NEUROLOGY

## 2022-02-15 PROCEDURE — 99205 PR OFFICE/OUTPT VISIT, NEW, LEVL V, 60-74 MIN: ICD-10-PCS | Mod: S$GLB,,, | Performed by: PSYCHIATRY & NEUROLOGY

## 2022-02-15 PROCEDURE — 3008F PR BODY MASS INDEX (BMI) DOCUMENTED: ICD-10-PCS | Mod: CPTII,S$GLB,, | Performed by: PSYCHIATRY & NEUROLOGY

## 2022-02-15 PROCEDURE — 1159F MED LIST DOCD IN RCRD: CPT | Mod: CPTII,S$GLB,, | Performed by: PSYCHIATRY & NEUROLOGY

## 2022-02-15 PROCEDURE — 3077F SYST BP >= 140 MM HG: CPT | Mod: CPTII,S$GLB,, | Performed by: PSYCHIATRY & NEUROLOGY

## 2022-02-15 PROCEDURE — 99999 PR PBB SHADOW E&M-EST. PATIENT-LVL V: ICD-10-PCS | Mod: PBBFAC,,, | Performed by: PSYCHIATRY & NEUROLOGY

## 2022-02-15 RX ORDER — TOPIRAMATE 25 MG/1
TABLET ORAL
Qty: 42 TABLET | Refills: 0 | Status: SHIPPED | OUTPATIENT
Start: 2022-02-15 | End: 2022-07-01

## 2022-02-15 RX ORDER — UBROGEPANT 100 MG/1
100 TABLET ORAL ONCE AS NEEDED
Qty: 10 TABLET | Refills: 6 | Status: SHIPPED | OUTPATIENT
Start: 2022-02-15 | End: 2022-03-31

## 2022-02-15 NOTE — PROGRESS NOTES
Headache questionnaire    1. When did your Headaches start?    Years ago      2. Where are your headaches located?   All over      3. Your headache's characteristics:   Excruciating,Throbbing, Pounding, Stabbing    4. How long does the headache last?   Hours, days      5. How often does the headache occur?   daily      6. Are your headaches preceded or accompanied by other symptoms? yes   If yes, please describe.  Nausea,vomiting, light sensitivity      7. Does the headache awaken you at night? no   If so, how often? na        8. Please oksana the word that best describes your headache's intensity:    severe      9. Using a scale of 1 through 10, with 0 = no pain and 10 = the worst pain:   What score is your headache now? 3   What score is your headache at its worst? 8   What score is your headache at its best? 0        10. Possible associated headache symptoms:  [x]  Sensitivity to light  [] Dizziness  [] Nasal or sinus pressure/ pain   [x] Sensitivity to noise  [x] Vertigo  [x] Problems with concentration  [] Sensitivity to smells  [] Ringing in ears  [x] Problems with memory    [] Blurred vision  [x] Irritability  [x] Problems with task completion   [] Double vision  [] Anger  [x]  Problems with relaxation  [] Loss of appetite  [] Anxiety  [x] Neck tightness, Neck pain  [x] Nausea   [] Nasal congestion  [x] Vomiting         11. Headache improving factors:  [] Sleep  [] Heat  [x] Darkness  [] Ice  [] Local pressure [] Menses (period)  [x] Massage   [x] Medications:        12. Headache worsening factors:   [x] Fatigue [] Sneezing  [x] Changes in Weather  [x] Light [x] Bending Over [x] Stress  [x] Noise [] Ovulation  [] Multiple Sclerosis Flare-Up  [x] Smells  [] Menses  [] Food   [] Coughing [] Alcohol      13. Number of caffeinated drinks per day: 2 cups of caffeine    14. Number of diet drinks per day:  0      15. Have you seen any other [facility]ists within the last 3 years?  No    Please Check any  Medications or Procedures tried/failed for Headache    AED Neuromodulators  MAOIs  Ergot Alkaloids    Acetazolamide (Diamox) [] Phenelzine (Nardil) [] Dihydroergotamine (Migranal) []   Carbamazepine (Tegretol) [] Tranylcypromine (Parnate) [] Ergotamine (Ergomar) []   Gabapentin (Neurontin) [] Antihistamine/Serotonergic  Triptans    Lacosamide (Vimpat) [] Cyproheptadine (Periactin) [] Almotriptan (Axert) []   Lamotrigine (Lamictal) [] Antihypertensives  Eletriptan (Relpax) []   Levatiracetam (Keppra) [] Atenolol (Tenormin) [] Frovatriptan (Frova) []   Oxcarbazepine (Trileptal) [] Bisoprostol (Zebeta) [] Naratriptan (Amerge) []   Phenobarbital [] Candesartan (Atacand) [] Rizatriptan (Maxalt) []     Nebivolol (Bystolic)  Sumatriptan (Imitrex) []   Levetiracetam (Keppra)  Cardeilol (Coreg) [] Zolmitriptan (Zomig) []   Phenytoin (Dilantin) [] Diltiazem (Cardizem) []     Pregabalin (Lyrica) [] Lisinopril (Prinivil, Zestril) [] Combo Abortives    Primidone (Mysoline) [] Metoprolol (Toprol) [] BC Powder []   Tiagabine (Gabatril) [] Nadolol (Corgard) [] Butalbital and Acetaminophen (Bupap) []   Topiramate (Topamax)  (Trokendi) [x] Nicardipine (Cardene) []     Vigabatrin (Sabril) [] Nimodipine (Nimotop) [] Butalbital, Acetaminophen, and caffeine (Fioricet) []   Valproic Acid (Depakote) (Divalproex Sodium) [] Propranolol (Inderal) []     Zonisamide (Zonegran) [] Telmisartan (Micardis) [] Butalbital, Aspirin, and caffeine (Fiorinal) []   Benzodiazepines  Timolol (Blocadren) []     Alprazolam (Xanax) [] Verapamil (Calan, Verelan) [] Butalbital, Caffeine, Acetaminophen, and Codeine (Fioricet with Codeine) []   Diazepam (Valium) [] NSAIDs      Lorazepam (Ativan) [] Acetaminophen (Tylenol) [x]     Clonazepam (Klonopin) [] Acetylsalicylic Acid (Aspirin) [] Butalbital, Caffeine, Aspirin, and Codeine  (Fiorinal with Codeine) []   Antidepressants  Diclofenac (Cambia) []     Amitriptyline (Elavil) [] Ibuprofen (Motrin) [x]      Desipramine (Norpramin) [] Indomethacin (Indocin) [] Aspirin, Caffeine, and Acetaminophen (Excedrin) (Goodys) []   Doxepin (Sinequan) [] Ketoprofen (Orudis) []     Fluoxetine (Prozac) [] Ketorolac (Toradol) [] Acetaminophen, Dichloralphenazone, and Isometheptene (Midrin) []   Imipramine (Tofranil) [] Naproxen (Anaprox) (Aleve) []     Nortriptyline (Pamelor) [] Meclofenamic Acid (Meclomen) []     Venlafaxine (Effexor) [] Meloxicam (Mobic) [] Procedures    Desvenlafazine (Pristiq) [] Monoclonals  Greater occipital nerve block []   Duloxetine (Cymbalta) [] Eptinezumab [] Cervical, Thoracic, Lumbar radiofrequency ablation []   Trazadone [] Erenumab-aooe (Aimovig) [] Spenopalatine ganglion block []   Wellbutrin [] Galcanezumab (Emgality) [] Occipital neuro stimulation []   Protriptyline (Vivactil) [] Fremanazumab-vfrm (Ajovy)  Cervical, Thoracic, Lumbar, Caudal Epidural steroid injection []   Escitalopram (Lexapro) [] Other [] Sacroiliac joint steroid injection []   Celexa [] Memantine (Namenda) [] Transforaminal epidural steroid injection []     Botox [] Facet joint injections []     Baclofen (Lioresal) [] Cervical, Thoracic, Lumbar medial branch blocks []       Cefaly []       Gamma Core []       Iovera []       Transcranial Magnetic stimulation []

## 2022-02-15 NOTE — PROGRESS NOTES
Subjective:       Patient ID: Breanan Sanchez is a 41 y.o. female.    Chief Complaint: Headache    HPI   The patient is a pleasant 42 y/o female presenting with chief complaint of headache. She has positive FHx of migraine affecting her late mother and her sister. SHe has 36 problems noted in the problem list. She describes onset of headache for many years. She cannot recall when was the last time when she had a headache free day. She has seen Dr. Keaton Shook, neurologist, in [place]. She is on Topamax 100 mg daily, on it for over 10 years. This is causing frequent UTI's, she is to take Doxycyclin for UTI prevention. She admits to inconsistent use. Her headache is mild to moderate but it can become severe but not often. She is on Butrans and prn Percocet for her non-headache pain escalations. She is allowed up to 4 per day, but almost never uses that many. Work up included an MRI of the brain under Dr. Wallace. It apparently revealed white matter lesions consistent with migraine. I have reviewed the MRI of the cervical spine from 8/21/21 which was normal. Please see details of headache characteristics below.  Headache questionnaire    1. When did your Headaches start?    Years ago      2. Where are your headaches located?   All over      3. Your headache's characteristics:   Excruciating,Throbbing, Pounding, Stabbing    4. How long does the headache last?   Hours, days      5. How often does the headache occur?   daily      6. Are your headaches preceded or accompanied by other symptoms? yes   If yes, please describe.  Nausea,vomiting, light sensitivity      7. Does the headache awaken you at night? no   If so, how often? na        8. Please oksana the word that best describes your headache's intensity:    severe      9. Using a scale of 1 through 10, with 0 = no pain and 10 = the worst pain:   What score is your headache now? 3   What score is your headache at its worst? 8   What score is your headache at its best?  0        10. Possible associated headache symptoms:  [x]  Sensitivity to light  [] Dizziness  [] Nasal or sinus pressure/ pain   [x] Sensitivity to noise  [x] Vertigo  [x] Problems with concentration  [] Sensitivity to smells  [] Ringing in ears  [x] Problems with memory    [] Blurred vision  [x] Irritability  [x] Problems with task completion   [] Double vision  [] Anger  [x]  Problems with relaxation  [] Loss of appetite  [] Anxiety  [x] Neck tightness, Neck pain  [x] Nausea   [] Nasal congestion  [x] Vomiting         11. Headache improving factors:  [] Sleep  [] Heat  [x] Darkness  [] Ice  [] Local pressure [] Menses (period)  [x] Massage   [x] Medications:        12. Headache worsening factors:   [x] Fatigue [] Sneezing  [x] Changes in Weather  [x] Light [x] Bending Over [x] Stress  [x] Noise [] Ovulation  [] Multiple Sclerosis Flare-Up  [x] Smells  [] Menses  [] Food   [] Coughing [] Alcohol      13. Number of caffeinated drinks per day: 2 cups of caffeine    14. Number of diet drinks per day:  0    Please Check any Medications or Procedures tried/failed for Headache    AED Neuromodulators  MAOIs  Ergot Alkaloids    Acetazolamide (Diamox) [] Phenelzine (Nardil) [] Dihydroergotamine (Migranal) []   Carbamazepine (Tegretol) [] Tranylcypromine (Parnate) [] Ergotamine (Ergomar) []   Gabapentin (Neurontin) [x] Antihistamine/Serotonergic  Triptans    Lacosamide (Vimpat) [] Cyproheptadine (Periactin) [] Almotriptan (Axert) []   Lamotrigine (Lamictal) [] Antihypertensives  Eletriptan (Relpax) []   Levatiracetam (Keppra) [] Atenolol (Tenormin) [] Frovatriptan (Frova) []   Oxcarbazepine (Trileptal) [] Bisoprostol (Zebeta) [] Naratriptan (Amerge) []   Phenobarbital [] Candesartan (Atacand) [] Rizatriptan (Maxalt) []     Nebivolol (Bystolic)  Sumatriptan (Imitrex) []   Levetiracetam (Keppra)  Cardeilol (Coreg) [] Zolmitriptan (Zomig) []   Phenytoin (Dilantin) [] Diltiazem (Cardizem) []     Pregabalin (Lyrica) [x]  Lisinopril (Prinivil, Zestril) [] Combo Abortives    Primidone (Mysoline) [] Metoprolol (Toprol) [] BC Powder []   Tiagabine (Gabatril) [] Nadolol (Corgard) [] Butalbital and Acetaminophen (Bupap) []   Topiramate (Topamax)  (Trokendi) [x] Nicardipine (Cardene) []     Vigabatrin (Sabril) [] Nifedipine (Procardia) [x] Butalbital, Acetaminophen, and caffeine (Fioricet) []   Valproic Acid (Depakote) (Divalproex Sodium) [] Propranolol (Inderal) []     Zonisamide (Zonegran) [] Telmisartan (Micardis) [] Butalbital, Aspirin, and caffeine (Fiorinal) []   Benzodiazepines  Timolol (Blocadren) []     Alprazolam (Xanax) [] Verapamil (Calan, Verelan) [] Butalbital, Caffeine, Acetaminophen, and Codeine (Fioricet with Codeine) []   Diazepam (Valium) [] NSAIDs      Lorazepam (Ativan) [] Acetaminophen (Tylenol) [x]     Clonazepam (Klonopin) [] Acetylsalicylic Acid (Aspirin) [] Butalbital, Caffeine, Aspirin, and Codeine  (Fiorinal with Codeine) []   Antidepressants  Diclofenac (Cambia) []     Amitriptyline (Elavil) [] Ibuprofen (Motrin) [x]     Desipramine (Norpramin) [] Indomethacin (Indocin) [] Aspirin, Caffeine, and Acetaminophen (Excedrin) (Goodys) []   Doxepin (Sinequan) [x] Ketoprofen (Orudis) []     Fluoxetine (Prozac) [] Ketorolac (Toradol) [] Acetaminophen, Dichloralphenazone, and Isometheptene (Midrin) []   Imipramine (Tofranil) [] Naproxen (Anaprox) (Aleve) []     Nortriptyline (Pamelor) [] Meclofenamic Acid (Meclomen) []     Venlafaxine (Effexor) [] Meloxicam (Mobic) [] Procedures    Desvenlafazine (Pristiq) [] Monoclonals  Greater occipital nerve block []   Duloxetine (Cymbalta) [x] Eptinezumab [] Cervical, Thoracic, Lumbar radiofrequency ablation []   Trazadone [] Erenumab-aooe (Aimovig) [] Spenopalatine ganglion block []   Wellbutrin [] Galcanezumab (Emgality) [] Occipital neuro stimulation []   Protriptyline (Vivactil) [] Fremanazumab-vfrm (Ajovy)  Cervical, Thoracic, Lumbar, Caudal Epidural steroid injection []    Escitalopram (Lexapro) [] Other [] Sacroiliac joint steroid injection []   Celexa [] Memantine (Namenda) [] Transforaminal epidural steroid injection []     Botox [] Facet joint injections []     Baclofen (Lioresal) [] Cervical, Thoracic, Lumbar medial branch blocks []       Cefaly []       Gamma Core []       Iovera []       Transcranial Magnetic stimulation []                        Review of Systems   Constitutional: Positive for fatigue. Negative for activity change, appetite change and fever.   HENT: Negative for congestion, dental problem, hearing loss, sinus pressure, tinnitus, trouble swallowing and voice change.    Eyes: Negative for photophobia, pain, redness and visual disturbance.   Respiratory: Negative for cough, chest tightness and shortness of breath.    Cardiovascular: Negative for chest pain, palpitations and leg swelling.   Gastrointestinal: Negative for abdominal pain, blood in stool, nausea and vomiting.   Endocrine: Negative for cold intolerance and heat intolerance.   Genitourinary: Negative for difficulty urinating, frequency, menstrual problem and urgency.   Musculoskeletal: Positive for arthralgias and myalgias. Negative for back pain, gait problem, joint swelling, neck pain and neck stiffness.   Skin: Negative.    Neurological: Negative for dizziness, tremors, seizures, syncope, facial asymmetry, speech difficulty, weakness, light-headedness, numbness and headaches.   Hematological: Negative for adenopathy. Does not bruise/bleed easily.   Psychiatric/Behavioral: Positive for dysphoric mood. Negative for agitation, behavioral problems, confusion, decreased concentration, self-injury, sleep disturbance and suicidal ideas. The patient is nervous/anxious. The patient is not hyperactive.                Past Medical History:   Diagnosis Date    ADHD     Ankylosing spondylitis     Anxiety     Depression     Essential hypertension     Fibromyalgia     History of kidney stones     Lupus      Migraine headache     Port-A-Cath in place     Primary immune deficiency disorder     Recurrent UTI     Rheumatoid arteritis     Sjoegren syndrome      Past Surgical History:   Procedure Laterality Date    APPENDECTOMY      BARTHOLIN GLAND CYST EXCISION      DILATION OF URETHRA      X 2    HYSTERECTOMY      INSERTION OF TUNNELED CENTRAL VENOUS CATHETER (CVC) WITH SUBCUTANEOUS PORT N/A 10/2/2019    Procedure: ALIFCMXGE-ISSX-A-CATH;  Surgeon: Lenin Springer MD;  Location: [facility] OR;  Service: General;  Laterality: N/A;    KNEE ARTHROSCOPY Left     LAPAROSCOPIC LYSIS OF ADHESIONS N/A 7/22/2019    Procedure: LYSIS, ADHESIONS, LAPAROSCOPIC;  Surgeon: Clarence Adams MD;  Location: [facility] OR;  Service: OB/GYN;  Laterality: N/A;    LAPAROSCOPIC SALPINGO-OOPHORECTOMY Left 7/22/2019    Procedure: SALPINGO-OOPHORECTOMY, LAPAROSCOPIC- LEFT SIDE ONLY;  Surgeon: Clarence Adams MD;  Location: [facility];  Service: OB/GYN;  Laterality: Left;    OVARIAN CYST REMOVAL Left     SALPINGOOPHORECTOMY Right     TONSILLECTOMY      TOTAL SHOULDER ARTHROPLASTY Right     TUBAL LIGATION       Family History   Problem Relation Age of Onset    Diabetes Mother         type 1    Stroke Mother     Heart disease Mother     Rheum arthritis Mother     Cancer Father         prostate    Alzheimer's disease Father     Cancer Sister         uterine    Ovarian cancer Sister     Diabetes Brother         type 2    Rheum arthritis Maternal Grandmother      Social History     Socioeconomic History    Marital status:    Tobacco Use    Smoking status: Current Every Day Smoker     Packs/day: 0.50     Years: 23.00     Pack years: 11.50     Types: Cigarettes     Start date: 2/10/1995    Smokeless tobacco: Never Used   Substance and Sexual Activity    Alcohol use: Yes     Alcohol/week: 3.0 standard drinks     Types: 3 Glasses of wine per week     Comment: sometimes monthly    Drug use: No    Sexual activity: Yes      Partners: Male     Social Determinants of Health     Financial Resource Strain: Low Risk     Difficulty of Paying Living Expenses: Not hard at all   Food Insecurity: No Food Insecurity    Worried About Running Out of Food in the Last Year: Never true    Ran Out of Food in the Last Year: Never true   Transportation Needs: No Transportation Needs    Lack of Transportation (Medical): No    Lack of Transportation (Non-Medical): No   Physical Activity: Unknown    Days of Exercise per Week: Patient refused   Stress: Stress Concern Present    Feeling of Stress : To some extent   Social Connections: Unknown    Frequency of Communication with Friends and Family: Three times a week    Frequency of Social Gatherings with Friends and Family: Once a week    Active Member of Clubs or Organizations: Yes    Attends Club or Organization Meetings: More than 4 times per year    Marital Status:      Review of patient's allergies indicates:   Allergen Reactions    Seroquel [quetiapine] Anaphylaxis    Aleve [naproxen sodium]     Sulfa (sulfonamide antibiotics)     Tramadol      seizure    Levaquin [levofloxacin] Other (See Comments)     Tendon tear    Miralax [polyethylene glycol 3350] Rash       Current Outpatient Medications:     buprenorphine (BUTRANS) 20 mcg/hour PTWK, Place 1 patch onto the skin every Tuesday. , Disp: , Rfl: 0    COVID-19 vacc,mRNA,Pfizer,,PF, (COMIRNATY, PF,) 30 mcg/0.3 mL SusR, Inject 0.3 mLs into the muscle., Disp: , Rfl:     cyanocobalamin 1,000 mcg/mL injection, INJECT 1 ML INTO THE MUSCLE EVERY 28 DAYS., Disp: 3 mL, Rfl: 0    desvenlafaxine succinate (PRISTIQ) 50 MG Tb24, Take 100 mg by mouth once daily., Disp: , Rfl:     dextroamphetamine-amphetamine (ADDERALL) 20 mg tablet, Take 1 tablet by mouth 2 (two) times daily., Disp: , Rfl:     doxycycline monohydrate 100 mg Tab, Take 100 mg by mouth., Disp: , Rfl:     epinephrine (EPIPEN INJ), Inject 1 application as directed  continuous prn (anaphylaxis)., Disp: , Rfl:     ergocalciferol (ERGOCALCIFEROL) 50,000 unit Cap, Take 1 capsule (50,000 Units total) by mouth every 7 days. (Patient taking differently: Take 50,000 Units by mouth Every Friday.), Disp: 12 capsule, Rfl: 0    ibuprofen-famotidine (DUEXIS) 800-26.6 mg Tab, Take 1 tablet by mouth every 8 (eight) hours as needed (pain 4-6)., Disp: , Rfl:     L.acidophil,parac-S.therm-Bif. (RISAQUAD) Cap capsule, Take 1 capsule by mouth once daily., Disp: 30 capsule, Rfl: 0    levocetirizine (XYZAL) 5 MG tablet, Take 5 mg by mouth every evening., Disp: , Rfl:     lidocaine-prilocaine (EMLA) cream, Apply topically as needed. (Patient taking differently: Apply 1 each topically as needed (PAC access).), Disp: 30 g, Rfl: 3    NARCAN 4 mg/actuation Spry, 1 spray by Nasal route once. , Disp: , Rfl:     NIFEdipine (PROCARDIA-XL) 60 MG (OSM) 24 hr tablet, Take 1 tablet (60 mg total) by mouth once daily., Disp: 90 tablet, Rfl: 1    oxyCODONE-acetaminophen (PERCOCET)  mg per tablet, Take 1 tablet by mouth 3 (three) times daily as needed for Pain (7-10)., Disp: , Rfl:     predniSONE (DELTASONE) 5 MG tablet, Take 2 tablets by mouth daily as needed for arthritis flare, Disp: 60 tablet, Rfl: 3    promethazine (PHENERGAN) 25 MG tablet, Take 1 tablet (25 mg total) by mouth every 6 (six) hours as needed for Nausea., Disp: 45 tablet, Rfl: 3    secukinumab (COSENTYX PEN) 150 mg/mL PnIj, Inject 150 mg into the skin every 30 days., Disp: 1 mL, Rfl: 5    sodium chloride 0.9% SolP 100 mL with golimumab 12.5 mg/mL Soln 2 mg/kg, Inject 2 mg/kg into the vein every 8 weeks., Disp: , Rfl:     tiZANidine (ZANAFLEX) 4 MG tablet, Take 1 tablet (4 mg total) by mouth every 8 (eight) hours., Disp: 90 tablet, Rfl: 3    URO--10-40.8-36 mg Cap, Take 1 capsule by mouth once daily. , Disp: , Rfl:     varenicline (CHANTIX STARTING MONTH BOX) 0.5 mg (11)- 1 mg (42) tablet, Take one 0.5mg tab by mouth  once daily X3 days,then increase to one 0.5mg tab twice daily X4 days,then increase to one 1mg tab twice daily, Disp: 1 Package, Rfl: 0    varenicline (CHANTIX) 1 mg Tab, Take 1 tablet (1 mg total) by mouth 2 (two) times daily., Disp: 180 tablet, Rfl: 0    topiramate (TOPAMAX) 25 MG tablet, Taper by taking 75 mg daily for 1 wk, then 50 mg daily for 1 wk, then 25 mg daily for 1 wk, then stop, Disp: 42 tablet, Rfl: 0    ubrogepant (UBROGEPANT) 100 mg tablet, Take 1 tablet (100 mg total) by mouth once as needed., Disp: 10 tablet, Rfl: 6    Current Facility-Administered Medications:     [START ON 3/8/2022] onabotulinumtoxina injection 200 Units, 200 Units, Intramuscular, Q90 Days, Yuli Winston MD    Facility-Administered Medications Ordered in Other Visits:     acetaminophen tablet 650 mg, 650 mg, Oral, PRN, Michael Sawant MD    acetaminophen tablet 650 mg, 650 mg, Oral, PRN, Michael Sawant MD    sodium chloride 0.9% 100 mL flush bag, , Intravenous, PRN, Michael Sawant MD    sodium chloride 0.9% flush 10 mL, 10 mL, Intravenous, PRN, Michael Sawant MD    sodium chloride 0.9% flush 10 mL, 10 mL, Intravenous, PRN, Michael Sawant MD    sodium chloride 0.9% flush 10 mL, 10 mL, Intravenous, PRN, Michael Sawant MD      Objective:      Vitals:    02/15/22 0833   BP: (!) 142/97   Pulse: 108   Resp: 17   Temp: 98.6 °F (37 °C)     Body mass index is 32.95 kg/m².    Physical Exam    Constitutional:   She appears well-developed and well-nourished. She is well groomed    HENT:    Head: Atraumatic, oral and nasal mucosa intact  Eyes: Conjunctivae and EOM are normal. Pupils are equal, round, and reactive to light OU  Neck: Neck supple. No thyromegaly present  Cardiovascular: Normal rate and normal heart sounds  No murmur heard  Pulmonary/Chest: Effort normal and breath sounds normal  Musculoskeletal: Normal range of motion. No joint stiffness. No vertebral point tenderness  Skin: Skin is warm and  dry  Psychiatric: Depressed mood and flat affect     Neuro exam:    Mental status:  Awake, attentive, Alert, oriented to self, place, year and month  Language function is intact    Cranial Nerves:  Smell was not formally evaluated  Cranial Nerves II - XII: intact  Pursuits were smooth, normal saccades, no nystagmus OU  Funduscopic exam - disc were flat and pink, no exudates or hemorrhages OU  Motor - facial movement was symmetrical and normal     Palate moved well and was symmetrical with normal palatal and oral sensation  Tongue movements were full    Coordination:     Rapid alternating movements and rapid finger tapping - normal bilaterally  Finger to nose - normal and symmetric bilaterally   Heel to shin test - normal and symmetric bilaterally   Arm roll - smooth and symmetric   No intentional or positional tremor.     Motor:  Normal muscle bulk and symmetry. No fasciculations were noted    No pronator drift  Strength 5/5 bilaterally     Reflexes:  Tendon reflexes were 2 + at biceps, triceps, brachioradialis, patellar, and Achilles bilaterally  On plantar stimulation toes were down going bilaterally  No clonus was noted     Sensory: Intact to primary modalitiesin all extremities.     Gait: Romberg absent. Normal gait. Normal arm swing and turns. Normal postural reflexes.     Review of Data:  Lab Results   Component Value Date    BNP <10 01/23/2019     12/09/2021    K 3.6 12/09/2021    MG 2.2 04/19/2021     (H) 12/09/2021    CO2 17 (L) 12/09/2021    BUN 6 12/09/2021    CREATININE 0.7 12/09/2021     (H) 12/09/2021    HGBA1C 5.3 01/23/2019    AST 16 12/09/2021    ALT 26 12/09/2021    ALBUMIN 4.1 12/09/2021    PROT 7.4 12/09/2021    BILITOT 0.3 12/09/2021    CHOL 206 (H) 07/28/2021    HDL 57 07/28/2021    LDLCALC 128.2 07/28/2021    TRIG 104 07/28/2021       Lab Results   Component Value Date    WBC 12.36 12/09/2021    HGB 14.8 12/09/2021    HCT 47.2 12/09/2021    MCV 94 12/09/2021      12/09/2021       Lab Results   Component Value Date    TSH 0.385 (L) 12/28/2020             Assessment and Plan   BOTOX  The patient has chronic migraines ( G43.719) and suffers from headaches more than 3 months, more than 15 days of headache days per month lasting more than 4 hours with at least 8 attacks that meet criteria for migraine. She has tried multiple medications including but not limited to Topamax, Gabapentin, Lyrica, Doxepin, Cymbalta, Procardia. The patient is an ideal candidate for Botox. After treatment, I expect 50%  improvement in the patient's symptoms. A reduction of at least 7 days per month and the number of cumulative hours suffering with headaches as well as at least 100 total hours affected with migraine per month. After obtaining informed consent and under aseptic technique, a total of 155 units of botulinum toxin type A will be injected in the following muscles: Procerus 5 units,  5 units bilaterally, frontalis 20 units, temporalis 20 units bilaterally, occipitalis 15 units, upper cervical paraspinals 10 units bilaterally and trapezius 15 units bilaterally. The patient will receive a total of 155 units in 31 sites. Frequency of treatment is every 3 months unless no response to the treatments, at which time we will discontinue the injections.  Intractable chronic migraine without aura and without status migrainosus  -    Taper off topiramate (TOPAMAX) 25 MG tablet; Taper by taking 75 mg daily for 1 wk, then 50 mg daily for 1 wk, then 25 mg daily for 1 wk, then stop  Dispense: 42 tablet; Refill: 0. This is causing chronic urinary problems and is not helping her migraines. In addition, Doxycycline given for UTI prevention can be associated with headache. Advised to stop  -     ubrogepant (UBROGEPANT) 100 mg tablet; Take 1 tablet (100 mg total) by mouth once as needed.  Dispense: 10 tablet; Refill: 6. Patient not a candidate for triptans due to Raynaud's disease which contraindicates  its use.    Multiple medical problems as noted in the problem list. This complicates and limits our treatment options    I have discussed the side effects of the medications prescribed and the patient acknowledges understanding    I spent 60 minutes on the day of this encounter preparing, treating, and managing this patient with intractable migraine headaches.        Casie Winston M.D  Medical Director, Headache and Facial Pain  [facility]

## 2022-03-03 ENCOUNTER — PATIENT MESSAGE (OUTPATIENT)
Dept: NEUROLOGY | Facility: CLINIC | Age: 42
End: 2022-03-03
Payer: COMMERCIAL

## 2022-03-03 RX ORDER — BUTALBITAL, ACETAMINOPHEN AND CAFFEINE 50; 325; 40 MG/1; MG/1; MG/1
TABLET ORAL
Qty: 14 TABLET | Refills: 0 | Status: SHIPPED | OUTPATIENT
Start: 2022-03-03 | End: 2022-03-03

## 2022-03-08 ENCOUNTER — PATIENT MESSAGE (OUTPATIENT)
Dept: RHEUMATOLOGY | Facility: CLINIC | Age: 42
End: 2022-03-08
Payer: COMMERCIAL

## 2022-03-08 DIAGNOSIS — M45.5 ANKYLOSING SPONDYLITIS OF THORACOLUMBAR REGION: Primary | ICD-10-CM

## 2022-03-09 ENCOUNTER — TELEPHONE (OUTPATIENT)
Dept: NEUROLOGY | Facility: CLINIC | Age: 42
End: 2022-03-09
Payer: COMMERCIAL

## 2022-03-09 RX ORDER — SECUKINUMAB 150 MG/ML
150 INJECTION SUBCUTANEOUS
Qty: 1 ML | Refills: 6 | Status: SHIPPED | OUTPATIENT
Start: 2022-03-09 | End: 2022-12-15

## 2022-03-10 ENCOUNTER — PATIENT MESSAGE (OUTPATIENT)
Dept: RHEUMATOLOGY | Facility: CLINIC | Age: 42
End: 2022-03-10
Payer: COMMERCIAL

## 2022-03-18 ENCOUNTER — PROCEDURE VISIT (OUTPATIENT)
Dept: NEUROLOGY | Facility: CLINIC | Age: 42
End: 2022-03-18
Payer: COMMERCIAL

## 2022-03-18 VITALS
HEART RATE: 105 BPM | TEMPERATURE: 99 F | WEIGHT: 191.13 LBS | SYSTOLIC BLOOD PRESSURE: 149 MMHG | RESPIRATION RATE: 17 BRPM | BODY MASS INDEX: 33.87 KG/M2 | HEIGHT: 63 IN | DIASTOLIC BLOOD PRESSURE: 103 MMHG

## 2022-03-18 DIAGNOSIS — G43.719 INTRACTABLE CHRONIC MIGRAINE WITHOUT AURA AND WITHOUT STATUS MIGRAINOSUS: Primary | ICD-10-CM

## 2022-03-18 PROCEDURE — 64615 CHEMODENERV MUSC MIGRAINE: CPT | Mod: S$GLB,,, | Performed by: PSYCHIATRY & NEUROLOGY

## 2022-03-18 PROCEDURE — 64615 PR CHEMODENERVATION OF MUSCLE FOR CHRONIC MIGRAINE: ICD-10-PCS | Mod: S$GLB,,, | Performed by: PSYCHIATRY & NEUROLOGY

## 2022-03-18 NOTE — PROCEDURES
Procedures   BOTOX PROCEDURE    PROCEDURE PERFORMED: Botulinum toxin injection (41493)    CLINICAL INDICATION: G43.719    A time out was conducted just before the start of the procedure to verify the correct patient and procedure, procedure location, and all relevant critical information.     Conventional methods of treatment but the patient has been unresponsive and refractory.The patient meets criteria for chronic headaches according to the ICHD-II, the patient has more than 15 headaches a month which last for more than 4 hours a day.    This is the first Botox injections and I am aiming for at least 50%  improvement in the patient's symptoms. Frequency of treatment is every 3 months unless no response to the treatments, at which time we will discontinue the injections.     DESCRIPTION OF PROCEDURE: After obtaining informed consent and under aseptic technique, a total of 155 units of botulinum toxin type A were injected in the following muscles: Procerus 5 units,  5 units bilaterally, frontalis 20 units, temporalis 20 units bilaterally,   occipitalis 15 units, upper cervical paraspinals 10 units bilaterally and trapezius 15 units bilaterally. The patient was given a total of 155 units in 31 sites.The patient tolerated the procedure well. There were no complications. The patient was given a prescription for repeat treatment in 3 months.     Unavoidable waste 45 units          Casie Winston M.D  Medical Director, Headache and Facial Pain  [facility]

## 2022-03-23 ENCOUNTER — PATIENT MESSAGE (OUTPATIENT)
Dept: RHEUMATOLOGY | Facility: CLINIC | Age: 42
End: 2022-03-23
Payer: COMMERCIAL

## 2022-03-23 NOTE — TELEPHONE ENCOUNTER
1 We have cosentyx sample if she needs to come pick it up.    2. Please call [facility] to find out status of her cosentyx approval and make sure it is being worked on.    3. Ok toradol 60, dexa 8, b12

## 2022-03-24 ENCOUNTER — CLINICAL SUPPORT (OUTPATIENT)
Dept: RHEUMATOLOGY | Facility: CLINIC | Age: 42
End: 2022-03-24
Payer: COMMERCIAL

## 2022-03-24 VITALS
OXYGEN SATURATION: 99 % | BODY MASS INDEX: 32.6 KG/M2 | WEIGHT: 184 LBS | SYSTOLIC BLOOD PRESSURE: 161 MMHG | HEART RATE: 112 BPM | DIASTOLIC BLOOD PRESSURE: 100 MMHG | HEIGHT: 63 IN

## 2022-03-24 PROCEDURE — 99999 PR PBB SHADOW E&M-EST. PATIENT-LVL III: CPT | Mod: PBBFAC,,,

## 2022-03-24 PROCEDURE — 99999 PR PBB SHADOW E&M-EST. PATIENT-LVL III: ICD-10-PCS | Mod: PBBFAC,,,

## 2022-03-24 NOTE — PROGRESS NOTES
Patient came in for nurse visit , immediately it was noticed that her eyes were blood shot red. Gait was slow. Vitals taken, initital BP was 163/113, P-120 Sp02- 99%. Second vitals- BP   161/100, P-112.  Patient stated the last time she came for a nurse visit for injections, the same thing happened. Patient stated she contacted PCP and they instructed her to take BP everyday and record it in a log for submission to PCP. Patient stated that is on Butrans patch and has anxiety attacks and the is the cause of her HBP. A nurse judgement call was made by myself not to administer shots for this nurse visit, due to patient's BP and heart rate being too high. Advised patient to immediately call PCP to get evaluated for HBP medicine, if possible.

## 2022-03-25 ENCOUNTER — SPECIALTY PHARMACY (OUTPATIENT)
Dept: PHARMACY | Facility: CLINIC | Age: 42
End: 2022-03-25
Payer: COMMERCIAL

## 2022-03-25 NOTE — TELEPHONE ENCOUNTER
Patient reports improvement in functional status including better ability to complete activities of daily living on cosentyx.

## 2022-03-25 NOTE — TELEPHONE ENCOUNTER
Diamond, this is Dianelys Valle with [facility].  We are working on your prescription that your doctor has sent us. We will be working with your insurance to get this approved for you. We will be calling you along the way with updates on your medication.  If you have any questions, you can reach us at [phone].    Welcome call outcome: Patient/caregiver reached

## 2022-03-28 NOTE — TELEPHONE ENCOUNTER
Cosentyx PA approved  3/26/2022-3/26/2022    Test claim- [facility].      Benefits:   Rep was unable to give estimated accumulators/copay.    Pt must fill at [facility].    FA:  Patient confirmed she has Cosentyx copay card.    She agreed to [facility] route Rx to [facility].  She will call us back if any trouble there.

## 2022-03-31 ENCOUNTER — PATIENT MESSAGE (OUTPATIENT)
Dept: RHEUMATOLOGY | Facility: CLINIC | Age: 42
End: 2022-03-31
Payer: COMMERCIAL

## 2022-04-14 ENCOUNTER — LAB VISIT (OUTPATIENT)
Dept: LAB | Facility: HOSPITAL | Age: 42
End: 2022-04-14
Attending: INTERNAL MEDICINE
Payer: COMMERCIAL

## 2022-04-14 DIAGNOSIS — G43.919 INTRACTABLE MIGRAINE WITHOUT STATUS MIGRAINOSUS, UNSPECIFIED MIGRAINE TYPE: ICD-10-CM

## 2022-04-14 DIAGNOSIS — N30.10 INTERSTITIAL CYSTITIS (CHRONIC) WITHOUT HEMATURIA: ICD-10-CM

## 2022-04-14 DIAGNOSIS — M45.5 ANKYLOSING SPONDYLITIS OF THORACOLUMBAR REGION: ICD-10-CM

## 2022-04-14 DIAGNOSIS — M35.00 SJOGREN'S SYNDROME, WITH UNSPECIFIED ORGAN INVOLVEMENT: ICD-10-CM

## 2022-04-14 DIAGNOSIS — N39.0 RECURRENT UTI: ICD-10-CM

## 2022-04-14 DIAGNOSIS — N20.0 KIDNEY STONES: ICD-10-CM

## 2022-04-14 DIAGNOSIS — N39.0 URINARY TRACT INFECTION WITHOUT HEMATURIA, SITE UNSPECIFIED: ICD-10-CM

## 2022-04-14 DIAGNOSIS — D84.9 IMMUNE DEFICIENCY DISORDER: ICD-10-CM

## 2022-04-14 LAB
25(OH)D3+25(OH)D2 SERPL-MCNC: 11 NG/ML (ref 30–96)
ALBUMIN SERPL BCP-MCNC: 3.9 G/DL (ref 3.5–5.2)
ALP SERPL-CCNC: 107 U/L (ref 55–135)
ALT SERPL W/O P-5'-P-CCNC: 37 U/L (ref 10–44)
ANION GAP SERPL CALC-SCNC: 9 MMOL/L (ref 8–16)
AST SERPL-CCNC: 22 U/L (ref 10–40)
BASOPHILS # BLD AUTO: 0.06 K/UL (ref 0–0.2)
BASOPHILS NFR BLD: 0.5 % (ref 0–1.9)
BILIRUB SERPL-MCNC: 0.3 MG/DL (ref 0.1–1)
BUN SERPL-MCNC: 7 MG/DL (ref 6–20)
CALCIUM SERPL-MCNC: 9.6 MG/DL (ref 8.7–10.5)
CHLORIDE SERPL-SCNC: 102 MMOL/L (ref 95–110)
CO2 SERPL-SCNC: 27 MMOL/L (ref 23–29)
CREAT SERPL-MCNC: 0.6 MG/DL (ref 0.5–1.4)
CRP SERPL-MCNC: 6.7 MG/L (ref 0–8.2)
DIFFERENTIAL METHOD: NORMAL
EOSINOPHIL # BLD AUTO: 0.3 K/UL (ref 0–0.5)
EOSINOPHIL NFR BLD: 2.2 % (ref 0–8)
ERYTHROCYTE [DISTWIDTH] IN BLOOD BY AUTOMATED COUNT: 12.6 % (ref 11.5–14.5)
ERYTHROCYTE [SEDIMENTATION RATE] IN BLOOD BY WESTERGREN METHOD: 7 MM/HR (ref 0–36)
EST. GFR  (AFRICAN AMERICAN): >60 ML/MIN/1.73 M^2
EST. GFR  (NON AFRICAN AMERICAN): >60 ML/MIN/1.73 M^2
GLUCOSE SERPL-MCNC: 121 MG/DL (ref 70–110)
HCT VFR BLD AUTO: 45.9 % (ref 37–48.5)
HGB BLD-MCNC: 14.9 G/DL (ref 12–16)
IGA SERPL-MCNC: 175 MG/DL (ref 40–350)
IGG SERPL-MCNC: 602 MG/DL (ref 650–1600)
IGM SERPL-MCNC: 152 MG/DL (ref 50–300)
IMM GRANULOCYTES # BLD AUTO: 0.03 K/UL (ref 0–0.04)
IMM GRANULOCYTES NFR BLD AUTO: 0.3 % (ref 0–0.5)
LYMPHOCYTES # BLD AUTO: 4.7 K/UL (ref 1–4.8)
LYMPHOCYTES NFR BLD: 42 % (ref 18–48)
MCH RBC QN AUTO: 28.7 PG (ref 27–31)
MCHC RBC AUTO-ENTMCNC: 32.5 G/DL (ref 32–36)
MCV RBC AUTO: 88 FL (ref 82–98)
MONOCYTES # BLD AUTO: 0.9 K/UL (ref 0.3–1)
MONOCYTES NFR BLD: 7.6 % (ref 4–15)
NEUTROPHILS # BLD AUTO: 5.3 K/UL (ref 1.8–7.7)
NEUTROPHILS NFR BLD: 47.4 % (ref 38–73)
NRBC BLD-RTO: 0 /100 WBC
PLATELET # BLD AUTO: 335 K/UL (ref 150–450)
PMV BLD AUTO: 11.2 FL (ref 9.2–12.9)
POTASSIUM SERPL-SCNC: 3.7 MMOL/L (ref 3.5–5.1)
PROT SERPL-MCNC: 7 G/DL (ref 6–8.4)
RBC # BLD AUTO: 5.19 M/UL (ref 4–5.4)
SODIUM SERPL-SCNC: 138 MMOL/L (ref 136–145)
WBC # BLD AUTO: 11.25 K/UL (ref 3.9–12.7)

## 2022-04-14 PROCEDURE — 82306 VITAMIN D 25 HYDROXY: CPT | Performed by: INTERNAL MEDICINE

## 2022-04-14 PROCEDURE — 82784 ASSAY IGA/IGD/IGG/IGM EACH: CPT | Performed by: INTERNAL MEDICINE

## 2022-04-14 PROCEDURE — 82784 ASSAY IGA/IGD/IGG/IGM EACH: CPT | Mod: 59 | Performed by: INTERNAL MEDICINE

## 2022-04-14 PROCEDURE — 85025 COMPLETE CBC W/AUTO DIFF WBC: CPT | Performed by: INTERNAL MEDICINE

## 2022-04-14 PROCEDURE — 82787 IGG 1 2 3 OR 4 EACH: CPT | Performed by: INTERNAL MEDICINE

## 2022-04-14 PROCEDURE — 80053 COMPREHEN METABOLIC PANEL: CPT | Performed by: INTERNAL MEDICINE

## 2022-04-14 PROCEDURE — 86140 C-REACTIVE PROTEIN: CPT | Performed by: INTERNAL MEDICINE

## 2022-04-14 PROCEDURE — 85652 RBC SED RATE AUTOMATED: CPT | Performed by: INTERNAL MEDICINE

## 2022-04-14 PROCEDURE — 36415 COLL VENOUS BLD VENIPUNCTURE: CPT | Mod: PO | Performed by: INTERNAL MEDICINE

## 2022-04-18 LAB
IGG1 SER-MCNC: 408 MG/DL (ref 382–929)
IGG2 SER-MCNC: 110 MG/DL (ref 242–700)
IGG3 SER-MCNC: 32 MG/DL (ref 22–176)
IGG4 SER-MCNC: 3 MG/DL (ref 4–86)

## 2022-04-22 ENCOUNTER — TELEPHONE (OUTPATIENT)
Dept: UROLOGY | Facility: CLINIC | Age: 42
End: 2022-04-22
Payer: COMMERCIAL

## 2022-04-22 ENCOUNTER — OFFICE VISIT (OUTPATIENT)
Dept: RHEUMATOLOGY | Facility: CLINIC | Age: 42
End: 2022-04-22
Payer: COMMERCIAL

## 2022-04-22 ENCOUNTER — TELEPHONE (OUTPATIENT)
Dept: INFECTIOUS DISEASES | Facility: CLINIC | Age: 42
End: 2022-04-22
Payer: COMMERCIAL

## 2022-04-22 VITALS
BODY MASS INDEX: 31.89 KG/M2 | WEIGHT: 180 LBS | SYSTOLIC BLOOD PRESSURE: 127 MMHG | HEIGHT: 63 IN | DIASTOLIC BLOOD PRESSURE: 86 MMHG | HEART RATE: 101 BPM

## 2022-04-22 DIAGNOSIS — D84.9 IMMUNODEFICIENCY DISORDER: ICD-10-CM

## 2022-04-22 DIAGNOSIS — M45.5 ANKYLOSING SPONDYLITIS OF THORACOLUMBAR REGION: Primary | ICD-10-CM

## 2022-04-22 DIAGNOSIS — E55.9 VITAMIN D DEFICIENCY: ICD-10-CM

## 2022-04-22 DIAGNOSIS — N39.0 RECURRENT UTI: ICD-10-CM

## 2022-04-22 PROCEDURE — 99999 PR PBB SHADOW E&M-EST. PATIENT-LVL III: CPT | Mod: PBBFAC,,, | Performed by: PHYSICIAN ASSISTANT

## 2022-04-22 PROCEDURE — 3079F PR MOST RECENT DIASTOLIC BLOOD PRESSURE 80-89 MM HG: ICD-10-PCS | Mod: CPTII,S$GLB,, | Performed by: PHYSICIAN ASSISTANT

## 2022-04-22 PROCEDURE — 96372 PR INJECTION,THERAP/PROPH/DIAG2ST, IM OR SUBCUT: ICD-10-PCS | Mod: S$GLB,,, | Performed by: PHYSICIAN ASSISTANT

## 2022-04-22 PROCEDURE — 3008F BODY MASS INDEX DOCD: CPT | Mod: CPTII,S$GLB,, | Performed by: PHYSICIAN ASSISTANT

## 2022-04-22 PROCEDURE — 99214 OFFICE O/P EST MOD 30 MIN: CPT | Mod: 25,S$GLB,, | Performed by: PHYSICIAN ASSISTANT

## 2022-04-22 PROCEDURE — 99999 PR PBB SHADOW E&M-EST. PATIENT-LVL III: ICD-10-PCS | Mod: PBBFAC,,, | Performed by: PHYSICIAN ASSISTANT

## 2022-04-22 PROCEDURE — 1159F PR MEDICATION LIST DOCUMENTED IN MEDICAL RECORD: ICD-10-PCS | Mod: CPTII,S$GLB,, | Performed by: PHYSICIAN ASSISTANT

## 2022-04-22 PROCEDURE — 99214 PR OFFICE/OUTPT VISIT, EST, LEVL IV, 30-39 MIN: ICD-10-PCS | Mod: 25,S$GLB,, | Performed by: PHYSICIAN ASSISTANT

## 2022-04-22 PROCEDURE — 1159F MED LIST DOCD IN RCRD: CPT | Mod: CPTII,S$GLB,, | Performed by: PHYSICIAN ASSISTANT

## 2022-04-22 PROCEDURE — 96372 THER/PROPH/DIAG INJ SC/IM: CPT | Mod: S$GLB,,, | Performed by: PHYSICIAN ASSISTANT

## 2022-04-22 PROCEDURE — 3008F PR BODY MASS INDEX (BMI) DOCUMENTED: ICD-10-PCS | Mod: CPTII,S$GLB,, | Performed by: PHYSICIAN ASSISTANT

## 2022-04-22 PROCEDURE — 3074F SYST BP LT 130 MM HG: CPT | Mod: CPTII,S$GLB,, | Performed by: PHYSICIAN ASSISTANT

## 2022-04-22 PROCEDURE — 3074F PR MOST RECENT SYSTOLIC BLOOD PRESSURE < 130 MM HG: ICD-10-PCS | Mod: CPTII,S$GLB,, | Performed by: PHYSICIAN ASSISTANT

## 2022-04-22 PROCEDURE — 3079F DIAST BP 80-89 MM HG: CPT | Mod: CPTII,S$GLB,, | Performed by: PHYSICIAN ASSISTANT

## 2022-04-22 RX ORDER — DEXAMETHASONE SODIUM PHOSPHATE 4 MG/ML
8 INJECTION, SOLUTION INTRA-ARTICULAR; INTRALESIONAL; INTRAMUSCULAR; INTRAVENOUS; SOFT TISSUE
Status: COMPLETED | OUTPATIENT
Start: 2022-04-22 | End: 2022-04-22

## 2022-04-22 RX ORDER — UBROGEPANT 100 MG/1
100 TABLET ORAL
COMMUNITY
Start: 2022-03-29 | End: 2022-12-15

## 2022-04-22 RX ORDER — KETOROLAC TROMETHAMINE 30 MG/ML
60 INJECTION, SOLUTION INTRAMUSCULAR; INTRAVENOUS
Status: COMPLETED | OUTPATIENT
Start: 2022-04-22 | End: 2022-04-22

## 2022-04-22 RX ORDER — PIROXICAM 20 MG/1
20 CAPSULE ORAL DAILY
Qty: 30 CAPSULE | Refills: 5 | Status: SHIPPED | OUTPATIENT
Start: 2022-04-22 | End: 2022-10-17 | Stop reason: SDUPTHER

## 2022-04-22 RX ORDER — MORPHINE SULFATE 15 MG/1
15 TABLET, FILM COATED, EXTENDED RELEASE ORAL 2 TIMES DAILY
COMMUNITY
Start: 2022-03-25 | End: 2022-06-17 | Stop reason: DRUGHIGH

## 2022-04-22 RX ORDER — SODIUM CHLORIDE 0.9 % (FLUSH) 0.9 %
10 SYRINGE (ML) INJECTION
Status: CANCELLED | OUTPATIENT
Start: 2022-04-22

## 2022-04-22 RX ORDER — CYANOCOBALAMIN 1000 UG/ML
1000 INJECTION, SOLUTION INTRAMUSCULAR; SUBCUTANEOUS
Status: COMPLETED | OUTPATIENT
Start: 2022-04-22 | End: 2022-04-22

## 2022-04-22 RX ORDER — ERGOCALCIFEROL 1.25 MG/1
50000 CAPSULE ORAL
Qty: 12 CAPSULE | Refills: 3 | Status: SHIPPED | OUTPATIENT
Start: 2022-04-22 | End: 2023-07-24

## 2022-04-22 RX ORDER — HEPARIN 100 UNIT/ML
500 SYRINGE INTRAVENOUS
Status: CANCELLED | OUTPATIENT
Start: 2022-04-22

## 2022-04-22 RX ORDER — DOXYCYCLINE 100 MG/1
100 CAPSULE ORAL EVERY 12 HOURS
Qty: 14 CAPSULE | Refills: 3 | Status: SHIPPED | OUTPATIENT
Start: 2022-04-22 | End: 2022-04-29

## 2022-04-22 RX ORDER — PREDNISONE 5 MG/1
TABLET ORAL
Qty: 60 TABLET | Refills: 3 | Status: SHIPPED | OUTPATIENT
Start: 2022-04-22 | End: 2022-08-19 | Stop reason: SDUPTHER

## 2022-04-22 RX ADMIN — KETOROLAC TROMETHAMINE 60 MG: 30 INJECTION, SOLUTION INTRAMUSCULAR; INTRAVENOUS at 09:04

## 2022-04-22 RX ADMIN — CYANOCOBALAMIN 1000 MCG: 1000 INJECTION, SOLUTION INTRAMUSCULAR; SUBCUTANEOUS at 09:04

## 2022-04-22 RX ADMIN — DEXAMETHASONE SODIUM PHOSPHATE 8 MG: 4 INJECTION, SOLUTION INTRA-ARTICULAR; INTRALESIONAL; INTRAMUSCULAR; INTRAVENOUS; SOFT TISSUE at 09:04

## 2022-04-22 ASSESSMENT — ROUTINE ASSESSMENT OF PATIENT INDEX DATA (RAPID3)
PAIN SCORE: 6
TOTAL RAPID3 SCORE: 5.11
MDHAQ FUNCTION SCORE: 1
PSYCHOLOGICAL DISTRESS SCORE: 0
PATIENT GLOBAL ASSESSMENT SCORE: 6
FATIGUE SCORE: 2.2

## 2022-04-22 NOTE — TELEPHONE ENCOUNTER
----- Message from Sophy Quinonez LPN sent at 4/22/2022 10:48 AM CDT -----  Regarding: FW: referral    ----- Message -----  From: Tricia Hodges LPN  Sent: 4/22/2022  10:20 AM CDT  To: Cherri Lei LPN, #  Subject: referral                                         Good morning, Margo WADE has placed a referral for Ms Laura, can you please reach out to patient to schedule.?   Thanks, Tricia CHRISTIAN LPN

## 2022-04-22 NOTE — PROGRESS NOTES
Med and dose verified, 2 pt ID used, allergies reviewed, patient tolerated well. Tricia CHRISTIAN LPN

## 2022-04-22 NOTE — TELEPHONE ENCOUNTER
----- Message from Tricia Hodges LPN sent at 4/22/2022 10:17 AM CDT -----  Regarding: referral  Good morning, Margo WADE has placed a referral for Ms Laura, can you please reach out to patient to schedule.?   Thanks, Tricia CHRISTIAN LPN

## 2022-04-22 NOTE — PROGRESS NOTES
"Subjective:       Patient ID: Breanna Sanchez is a 41 y.o. female.    Chief Complaint: Disease Management    Mrs. Sanchez is a 41 year old female who presents to clinic for follow up on AS/RA. She was treated for EBSL UTI 3/11/22 in the ER and admitted to [facility] 4/9/22 with pyelonephritis/sepsis. She was evaluated by Urology in October of last year and prophylactic antibiotics recommended 3 days a week, but she was concerned about this and never started. CT stone protocol suggested as well to see if this is increasing her risk of bacteria in the urine--also not completed yet.    She has not been able to take cosentyx consistently due to infections. Last injection was given in February. She continues to have joint pain in her hands, wrists, shoulders, neck, back, and SI joints. Pain is constant and aching. Worse at night and in the morning. She is not taking nsaid routinely but will take ibuprofen 800 PRN. Allergy to naproxen--history of kidney failure as 14 year old? No anaphylaxis or other allergic response but told to avoid.    Labs show IgG total, IgG subclass 2 and 4 are lower than prior testing. She tried SC IG in the past and did not tolerate this well. She is open to reconsidering that given recurrent infections and hospitalizations.    Port has not been accessed in 1 year.    Current tx:  1. Cosentyx    Last Urology documentation 10/21: "She has multiple risk factors for recurrent urinary tract infections including chronic immunologic medication for her rheumatoid arthritis and ankylosing spondylitis which could be increasing her risk for chronic bacteriuria and recurrent urinary tract infections. She is emptying out her bladder adequately. She has kidney stones noted on her recent renal ultrasound, I recommended a formal CT stone protocol for further evaluation to see if those could be increasing her risk of chronic bacteria in the urine and if they would need to be removed.  We will call with results " "on them  In the meantime she seems to be at high risk for significant urinary tract infections which can cause hospitalizations secondary to sepsis and/or bacteremia. Because she is on the chronic immunologic medication, I recommended using low-dose antibiotic suppression to reduce her risk of severe urinary tract infections. Based on her last positive culture I would recommend doxycycline 100 mg 3 times a week for 3 months. I discussed risks including side effects and reactions as well as development of multidrug-resistant bacteria, although this is already present. Also discussed effect on intestinal bacterial content and she will maintain daily probiotics."        Review of Systems   Constitutional: Positive for activity change. Negative for appetite change, chills, fatigue and fever.   Eyes: Negative for visual disturbance.   Respiratory: Negative for cough and shortness of breath.    Cardiovascular: Negative for chest pain, palpitations and leg swelling.   Gastrointestinal: Negative for abdominal pain, constipation, diarrhea, nausea and vomiting.   Musculoskeletal: Positive for arthralgias, back pain, joint swelling, myalgias, neck pain and neck stiffness.   Allergic/Immunologic: Positive for immunocompromised state.   Neurological: Negative for dizziness, weakness, light-headedness and headaches.   Psychiatric/Behavioral: The patient is not nervous/anxious.          Objective:     Vitals:    04/22/22 0810   BP: 127/86   Pulse: 101       Past Medical History:   Diagnosis Date    ADHD     Ankylosing spondylitis     Anxiety     Depression     Essential hypertension     Fibromyalgia     History of kidney stones     Lupus     Migraine headache     Port-A-Cath in place     Primary immune deficiency disorder     Recurrent UTI     Rheumatoid arteritis     Sjoegren syndrome      Past Surgical History:   Procedure Laterality Date    APPENDECTOMY      BARTHOLIN GLAND CYST EXCISION      DILATION OF " URETHRA      X 2    HYSTERECTOMY      INSERTION OF TUNNELED CENTRAL VENOUS CATHETER (CVC) WITH SUBCUTANEOUS PORT N/A 10/2/2019    Procedure: [id number];  Surgeon: Lenin Springer MD;  Location: [facility] OR;  Service: General;  Laterality: N/A;    KNEE ARTHROSCOPY Left     LAPAROSCOPIC LYSIS OF ADHESIONS N/A 7/22/2019    Procedure: LYSIS, ADHESIONS, LAPAROSCOPIC;  Surgeon: Clarence Adams MD;  Location: [facility] OR;  Service: OB/GYN;  Laterality: N/A;    LAPAROSCOPIC SALPINGO-OOPHORECTOMY Left 7/22/2019    Procedure: SALPINGO-OOPHORECTOMY, LAPAROSCOPIC- LEFT SIDE ONLY;  Surgeon: Clarence Adams MD;  Location: [facility] OR;  Service: OB/GYN;  Laterality: Left;    OVARIAN CYST REMOVAL Left     SALPINGOOPHORECTOMY Right     TONSILLECTOMY      TOTAL SHOULDER ARTHROPLASTY Right     TUBAL LIGATION            Physical Exam   Eyes: Right conjunctiva is not injected. Left conjunctiva is not injected.   Neck: No JVD present. No thyromegaly present.   Cardiovascular: Normal rate and regular rhythm. Exam reveals no decreased pulses.   Pulmonary/Chest: Effort normal.   Musculoskeletal:      Right shoulder: Tenderness present.      Left shoulder: Tenderness present.      Right elbow: Normal.      Left elbow: Normal.      Right wrist: Tenderness present.      Left wrist: Swelling and tenderness present.      Right knee: Normal.      Left knee: Normal.   Lymphadenopathy:     She has no cervical adenopathy.   Neurological: Gait normal.   Skin: No rash noted.   Psychiatric: Mood and affect normal.       Right Side Rheumatological Exam     Examination finds the elbow, knee, 1st PIP, 1st MCP, 2nd PIP, 2nd MCP, 3rd PIP, 3rd MCP, 4th PIP, 4th MCP, 5th PIP and 5th MCP normal.    The patient is tender to palpation of the shoulder and wrist    Left Side Rheumatological Exam     Examination finds the elbow, knee, 1st PIP, 2nd PIP, 3rd PIP, 4th PIP, 4th MCP, 5th PIP and 5th MCP normal.    The patient is tender to palpation of  the shoulder, wrist, 1st MCP, 2nd MCP and 3rd MCP.    She has swelling of the wrist, 1st MCP, 2nd MCP and 3rd MCP      Back/Neck Exam   Tenderness Right paramedian tenderness of the Upper C-Spine, Lower C-Spine and Upper T-Spine.Left paramedian tenderness of the Upper C-Spine, Lower C-Spine and Upper T-Spine.        Labs:  Component      Latest Ref Rng & Units 4/14/2022   WBC      3.90 - 12.70 K/uL 11.25   RBC      4.00 - 5.40 M/uL 5.19   Hemoglobin      12.0 - 16.0 g/dL 14.9   Hematocrit      37.0 - 48.5 % 45.9   MCV      82 - 98 fL 88   MCH      27.0 - 31.0 pg 28.7   MCHC      32.0 - 36.0 g/dL 32.5   RDW      11.5 - 14.5 % 12.6   Platelets      150 - 450 K/uL 335   MPV      9.2 - 12.9 fL 11.2   Immature Granulocytes      0.0 - 0.5 % 0.3   Gran # (ANC)      1.8 - 7.7 K/uL 5.3   Immature Grans (Abs)      0.00 - 0.04 K/uL 0.03   Lymph #      1.0 - 4.8 K/uL 4.7   Mono #      0.3 - 1.0 K/uL 0.9   Eos #      0.0 - 0.5 K/uL 0.3   Baso #      0.00 - 0.20 K/uL 0.06   nRBC      0 /100 WBC 0   Gran %      38.0 - 73.0 % 47.4   Lymph %      18.0 - 48.0 % 42.0   Mono %      4.0 - 15.0 % 7.6   Eosinophil %      0.0 - 8.0 % 2.2   Basophil %      0.0 - 1.9 % 0.5   Differential Method       Automated   Sodium      136 - 145 mmol/L 138   Potassium      3.5 - 5.1 mmol/L 3.7   Chloride      95 - 110 mmol/L 102   CO2      23 - 29 mmol/L 27   Glucose      70 - 110 mg/dL 121 (H)   BUN      6 - 20 mg/dL 7   Creatinine      0.5 - 1.4 mg/dL 0.6   Calcium      8.7 - 10.5 mg/dL 9.6   PROTEIN TOTAL      6.0 - 8.4 g/dL 7.0   Albumin      3.5 - 5.2 g/dL 3.9   BILIRUBIN TOTAL      0.1 - 1.0 mg/dL 0.3   Alkaline Phosphatase      55 - 135 U/L 107   AST      10 - 40 U/L 22   ALT      10 - 44 U/L 37   Anion Gap      8 - 16 mmol/L 9   eGFR if African American      >60 mL/min/1.73 m:2 >60.0   eGFR if non African American      >60 mL/min/1.73 m:2 >60.0   IgG 1      382 - 929 mg/dL 408   IgG 2      242 - 700 mg/dL 110 (L)   IgG 3      22 - 176 mg/dL 32    IgG 4      4 - 86 mg/dL 3 (L)   IgA      40 - 350 mg/dL 175   IgG      650 - 1600 mg/dL 602 (L)   IgM      50 - 300 mg/dL 152   CRP      0.0 - 8.2 mg/L 6.7   Sed Rate      0 - 36 mm/Hr 7   Vit D, 25-Hydroxy      30 - 96 ng/mL 11 (L)        Assessment:       1. Ankylosing spondylitis of thoracolumbar region    2. Recurrent UTI    3. Vitamin D deficiency    4. Immunodeficiency disorder            Plan:       Ankylosing spondylitis of thoracolumbar region  -     predniSONE (DELTASONE) 5 MG tablet; Take 2 tablets by mouth daily as needed for arthritis flare  Dispense: 60 tablet; Refill: 3  -     piroxicam (FELDENE) 20 MG capsule; Take 1 capsule (20 mg total) by mouth once daily.  Dispense: 30 capsule; Refill: 5  -     dexamethasone injection 8 mg  -     ketorolac injection 60 mg  -     cyanocobalamin injection 1,000 mcg  -     CBC Auto Differential; Future; Expected date: 04/22/2022  -     Comprehensive Metabolic Panel; Future; Expected date: 04/22/2022  -     C-Reactive Protein; Future; Expected date: 04/22/2022  -     Sedimentation rate; Future; Expected date: 04/22/2022    Recurrent UTI  -     doxycycline (VIBRAMYCIN) 100 MG Cap; Take 1 capsule (100 mg total) by mouth every 12 (twelve) hours. for 7 days  Dispense: 14 capsule; Refill: 3  -     Ambulatory referral/consult to Urology; Future; Expected date: 04/29/2022    Vitamin D deficiency  -     ergocalciferol (ERGOCALCIFEROL) 50,000 unit Cap; Take 1 capsule (50,000 Units total) by mouth every 7 days.  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 Chery     Plan:  1. Urology referral   2. Immunology  follow up to discuss IVIG   3. Cosentyx 150 mg monthly when free of infection  4. Doxcycline sent if needed for UTI until she can est with Urology to discuss prophylaxis  5. Port flush order  6. Restart ergo 50,000 once weekly  7. Add feldene 20 mg daily   8. toradol 60, dexa 8 mg, b12 given toady    Follow up:  4 months Dr Wiseman w/labs prior

## 2022-04-25 ENCOUNTER — TELEPHONE (OUTPATIENT)
Dept: PHYSICAL MEDICINE AND REHAB | Facility: CLINIC | Age: 42
End: 2022-04-25
Payer: COMMERCIAL

## 2022-04-25 ENCOUNTER — TELEPHONE (OUTPATIENT)
Dept: UROLOGY | Facility: CLINIC | Age: 42
End: 2022-04-25
Payer: COMMERCIAL

## 2022-04-25 ENCOUNTER — INFUSION (OUTPATIENT)
Dept: INFUSION THERAPY | Facility: HOSPITAL | Age: 42
End: 2022-04-25
Attending: PHYSICIAN ASSISTANT
Payer: COMMERCIAL

## 2022-04-25 DIAGNOSIS — D84.89 PRIMARY IMMUNODEFICIENCY DISORDER: Primary | ICD-10-CM

## 2022-04-25 PROCEDURE — 25000003 PHARM REV CODE 250: Mod: PN | Performed by: PHYSICIAN ASSISTANT

## 2022-04-25 PROCEDURE — 63600175 PHARM REV CODE 636 W HCPCS: Mod: PN | Performed by: PHYSICIAN ASSISTANT

## 2022-04-25 PROCEDURE — 96523 IRRIG DRUG DELIVERY DEVICE: CPT | Mod: PN

## 2022-04-25 PROCEDURE — A4216 STERILE WATER/SALINE, 10 ML: HCPCS | Mod: PN | Performed by: PHYSICIAN ASSISTANT

## 2022-04-25 RX ORDER — SODIUM CHLORIDE 0.9 % (FLUSH) 0.9 %
10 SYRINGE (ML) INJECTION
Status: DISCONTINUED | OUTPATIENT
Start: 2022-04-25 | End: 2022-04-25 | Stop reason: HOSPADM

## 2022-04-25 RX ORDER — HEPARIN 100 UNIT/ML
500 SYRINGE INTRAVENOUS
Status: CANCELLED | OUTPATIENT
Start: 2022-04-25

## 2022-04-25 RX ORDER — SODIUM CHLORIDE 0.9 % (FLUSH) 0.9 %
10 SYRINGE (ML) INJECTION
Status: CANCELLED | OUTPATIENT
Start: 2022-04-25

## 2022-04-25 RX ORDER — HEPARIN 100 UNIT/ML
500 SYRINGE INTRAVENOUS
Status: DISCONTINUED | OUTPATIENT
Start: 2022-04-25 | End: 2022-04-25 | Stop reason: HOSPADM

## 2022-04-25 RX ADMIN — Medication 10 ML: at 11:04

## 2022-04-25 RX ADMIN — Medication 500 UNITS: at 11:04

## 2022-04-25 NOTE — TELEPHONE ENCOUNTER
----- Message from Kwesi Galloway sent at 4/25/2022  8:50 AM CDT -----  Regarding: FW: referral  Sorry Mr. Guerin, this was for Urology.     Not Ortho.   ----- Message -----  From: Kwesi Galloway  Sent: 4/25/2022   8:49 AM CDT  To: Truong Irwin  Subject: FW: referral                                     Mr. Guerin, can you please reach out and schedule.     Thanks,   ----- Message -----  From: Tricia Hodges LPN  Sent: 4/22/2022  10:20 AM CDT  To: Cherri Lei LPN, #  Subject: referral                                         Good morning, Margo WADE has placed a referral for Ms Sanchez, can you please reach out to patient to schedule.?   Thanks, Tricia CHRISTIAN LPN

## 2022-04-25 NOTE — PLAN OF CARE
Problem: Adult Inpatient Plan of Care  Goal: Patient-Specific Goal (Individualized)  Outcome: Ongoing, Progressing     Problem: Fatigue  Goal: Improved Activity Tolerance  Outcome: Ongoing, Progressing

## 2022-04-26 RX ORDER — DOXYCYCLINE 100 MG/1
100 CAPSULE ORAL 2 TIMES DAILY
COMMUNITY
Start: 2022-03-11 | End: 2023-01-11

## 2022-04-29 ENCOUNTER — TELEPHONE (OUTPATIENT)
Dept: NEUROLOGY | Facility: CLINIC | Age: 42
End: 2022-04-29
Payer: COMMERCIAL

## 2022-05-06 ENCOUNTER — TELEPHONE (OUTPATIENT)
Dept: UROLOGY | Facility: CLINIC | Age: 42
End: 2022-05-06
Payer: COMMERCIAL

## 2022-05-25 ENCOUNTER — PATIENT MESSAGE (OUTPATIENT)
Dept: RHEUMATOLOGY | Facility: CLINIC | Age: 42
End: 2022-05-25
Payer: COMMERCIAL

## 2022-05-27 ENCOUNTER — PATIENT MESSAGE (OUTPATIENT)
Dept: RHEUMATOLOGY | Facility: CLINIC | Age: 42
End: 2022-05-27
Payer: COMMERCIAL

## 2022-05-30 ENCOUNTER — CLINICAL SUPPORT (OUTPATIENT)
Dept: RHEUMATOLOGY | Facility: CLINIC | Age: 42
End: 2022-05-30
Payer: COMMERCIAL

## 2022-05-30 DIAGNOSIS — M45.5 ANKYLOSING SPONDYLITIS OF THORACOLUMBAR REGION: Primary | ICD-10-CM

## 2022-05-30 DIAGNOSIS — E53.8 B12 DEFICIENCY: ICD-10-CM

## 2022-05-30 DIAGNOSIS — M32.9 LUPUS: ICD-10-CM

## 2022-05-30 PROCEDURE — 96372 THER/PROPH/DIAG INJ SC/IM: CPT | Mod: S$GLB,,, | Performed by: PHYSICIAN ASSISTANT

## 2022-05-30 PROCEDURE — 96372 PR INJECTION,THERAP/PROPH/DIAG2ST, IM OR SUBCUT: ICD-10-PCS | Mod: S$GLB,,, | Performed by: PHYSICIAN ASSISTANT

## 2022-05-30 PROCEDURE — 99999 PR PBB SHADOW E&M-EST. PATIENT-LVL I: ICD-10-PCS | Mod: PBBFAC,,,

## 2022-05-30 PROCEDURE — 99999 PR PBB SHADOW E&M-EST. PATIENT-LVL I: CPT | Mod: PBBFAC,,,

## 2022-05-30 RX ORDER — CYANOCOBALAMIN 1000 UG/ML
1000 INJECTION, SOLUTION INTRAMUSCULAR; SUBCUTANEOUS
Status: COMPLETED | OUTPATIENT
Start: 2022-05-30 | End: 2022-05-30

## 2022-05-30 RX ORDER — KETOROLAC TROMETHAMINE 30 MG/ML
60 INJECTION, SOLUTION INTRAMUSCULAR; INTRAVENOUS ONCE
Status: COMPLETED | OUTPATIENT
Start: 2022-05-30 | End: 2022-05-30

## 2022-05-30 RX ORDER — DEXAMETHASONE SODIUM PHOSPHATE 4 MG/ML
8 INJECTION, SOLUTION INTRA-ARTICULAR; INTRALESIONAL; INTRAMUSCULAR; INTRAVENOUS; SOFT TISSUE
Status: COMPLETED | OUTPATIENT
Start: 2022-05-30 | End: 2022-05-30

## 2022-05-30 RX ADMIN — CYANOCOBALAMIN 1000 MCG: 1000 INJECTION, SOLUTION INTRAMUSCULAR; SUBCUTANEOUS at 11:05

## 2022-05-30 RX ADMIN — KETOROLAC TROMETHAMINE 60 MG: 30 INJECTION, SOLUTION INTRAMUSCULAR; INTRAVENOUS at 11:05

## 2022-05-30 RX ADMIN — DEXAMETHASONE SODIUM PHOSPHATE 8 MG: 4 INJECTION, SOLUTION INTRA-ARTICULAR; INTRALESIONAL; INTRAMUSCULAR; INTRAVENOUS; SOFT TISSUE at 11:05

## 2022-06-06 ENCOUNTER — INFUSION (OUTPATIENT)
Dept: INFUSION THERAPY | Facility: HOSPITAL | Age: 42
End: 2022-06-06
Attending: PHYSICIAN ASSISTANT
Payer: COMMERCIAL

## 2022-06-06 DIAGNOSIS — D84.89 PRIMARY IMMUNODEFICIENCY DISORDER: Primary | ICD-10-CM

## 2022-06-06 PROCEDURE — 63600175 PHARM REV CODE 636 W HCPCS: Mod: PN | Performed by: PHYSICIAN ASSISTANT

## 2022-06-06 PROCEDURE — 25000003 PHARM REV CODE 250: Mod: PN | Performed by: PHYSICIAN ASSISTANT

## 2022-06-06 PROCEDURE — A4216 STERILE WATER/SALINE, 10 ML: HCPCS | Mod: PN | Performed by: PHYSICIAN ASSISTANT

## 2022-06-06 PROCEDURE — 96523 IRRIG DRUG DELIVERY DEVICE: CPT | Mod: PN

## 2022-06-06 RX ORDER — HEPARIN 100 UNIT/ML
500 SYRINGE INTRAVENOUS
Status: DISCONTINUED | OUTPATIENT
Start: 2022-06-06 | End: 2022-06-06 | Stop reason: HOSPADM

## 2022-06-06 RX ORDER — SODIUM CHLORIDE 0.9 % (FLUSH) 0.9 %
10 SYRINGE (ML) INJECTION
Status: DISCONTINUED | OUTPATIENT
Start: 2022-06-06 | End: 2022-06-06 | Stop reason: HOSPADM

## 2022-06-06 RX ORDER — SODIUM CHLORIDE 0.9 % (FLUSH) 0.9 %
10 SYRINGE (ML) INJECTION
OUTPATIENT
Start: 2022-06-06

## 2022-06-06 RX ORDER — HEPARIN 100 UNIT/ML
500 SYRINGE INTRAVENOUS
OUTPATIENT
Start: 2022-06-06

## 2022-06-06 RX ADMIN — Medication 10 ML: at 11:06

## 2022-06-06 RX ADMIN — Medication 500 UNITS: at 11:06

## 2022-06-09 ENCOUNTER — TELEPHONE (OUTPATIENT)
Dept: NEUROLOGY | Facility: CLINIC | Age: 42
End: 2022-06-09
Payer: COMMERCIAL

## 2022-06-09 NOTE — TELEPHONE ENCOUNTER
Cancer Risk Management  Program Locations    [facility]  [facility]  [facility]  [facility]  [facility]  Mailing Address  Cancer Risk Management Program  [facility]  [address]  [place] [address]    New patient appointments  [phone]  February 24, 2022    Isabel Reeder  [address]  [facility] [address]    Dear Isabel,    It was a pleasure meeting with you on 2/24/2022. Here is a copy of the progress note from your recent genetic counseling visit to the Cancer Risk Management Program. If you have any additional questions, please feel free to call.    Referring Provider: CT Suresh    Presenting Information:   I met with Isabel Reeder today for her video genetic counseling visit through the Cancer Risk Management Program to discuss her family history of colon and prostate cancer. She is here today to review this history, cancer screening recommendations, and available genetic testing options.    Personal History:  Isabel is a 37 year old female. She does not have any personal history of cancer. In her hormonal-based medical history, she had her first menstrual period at age 12, her first child at age 23, and is premenopausal. Isabel has her ovaries, fallopian tubes and uterus in place, and reports no ovarian cancer screening to date. She reports that she has no history of hormone replacement therapy.  She reports oral contraceptive use for approximately 6 years. Isabel reports having a colonoscopy perfromed 1-2 years ago which was normal. Follow-up was recommended in 5 years. Isabel has not had a mammogram. She denies any history of dermatological exams. She does not regularly do any other cancer screening at this time. Isabel reported no history of smoking and no current alcohol use.    Family History: (Please see scanned pedigree  Botox appt, rescheduled confirmed date and time   for detailed family history information)    Isabel's mother was diagnosed with colon cancer at age 51. Her mother has not had genetic testing and Isabel does not know if her mother would be interested in genetic testing. She is currently 55.      Maternal grandfather was diagnosed with prostate cancer in his 70's and passed away in his 70's.     There is no other reported family history of cancer on either her maternal or paternal side of the family.     Her maternal and paternal ethnicity is [place]. There is no known Ashkenazi Latter-day ancestry on either side of her family. There is no reported consanguinity.    Discussion:    Based upon Isabel's current personal and family history, Isabel is likely at low risk for a hereditary cancer syndrome.    We reviewed the features of sporadic, familial, and hereditary cancers and discussed the features commonly associated with hereditary cancer syndromes. A handout regarding this information will be sent to Isabel and can be found in the after visit summary.    As discussed in our session, her family history is absent for the following features typically seen in high risk families:     Cancers diagnosed under age 50    Multiple relatives with similar cancers on the same side of the family    Cancers in multiple generations    Relatives with certain rare cancers (i.e., male breast cancer)    Because of the absence of these features, it is unlikely that there is a currently identifiable hereditary cancer syndrome present in Isabel's family.      We discussed that insurance coverage for genetic testing is dependent upon personal and family history being suggestive of a hereditary cancer syndrome.    After our discussion, Isabel was not interested in pursuing genetic testing at this time. She plans to discuss genetic testing with her mother since she was diagnosed with cancer and is the most ideal person in the family for testing.     We discussed the importance of Isabel  contacting me if her personal or family history changes. This may modify our assessment regarding risk for a hereditary cancer syndrome and/or genetic testing options.     Screening recommendations based upon personal/family history:    Isabel should continue with follow-up colonoscopy screening recommendations as made by her GI providers.     Per National Comprehensive Cancer Network (NCCN) guidelines, individuals with a first degree relative with colorectal cancer diagnosed at any age should begin colonoscopy at age 40, or 10 years before the earliest diagnosis of colorectal cancer, whichever is first.  In this family, colonoscopy should start at age 40. Colonoscopy should be repeated every 5 years, or based on colonoscopy findings. This would apply to Isabel and her siblings. These recommendations may change based on personal and family history as well as personal preference, and should be discussed with an individuals physician.        Other population cancer screening options, such as those recommended by the American Cancer Society and the National Comprehensive Cancer Network (NCCN), are also appropriate for Isabel and her family. These screening recommendations may change if there are changes to Isabel's personal and/or family history of cancer.     Final screening recommendations should be made by each individual's managing physician.    Of note, these screening recommendations may change should Isabel elect to pursue genetic testing in the future.    Plan:  1) Isabel elected not to have genetic testing at this time. Therefore, no genetic testing was ordered today.   2) Information regarding recommended screening, based upon the family history, was reviewed for Isabel.  3) Isabel will contact me regularly and/or if the family or personal history changes. My contact information was provided.     Isabel is a 37 year old who is being evaluated via a billable video visit.    Face to face time over video: 33  minutes    Marilynn Schwarz MS  Genetic Counseling Intern  (P): [phone]    Attestation: Patient seen, evaluated and discussed with the Genetic Counseling Intern. I have verified the content of the note, which accurately reflects my assessment of the patient and the plan of care.     Supervising Genetic Counselor  Andrew Ponce MS, [facility]  Licensed, Certified Genetic Counselor  Phone: [phone]

## 2022-06-09 NOTE — TELEPHONE ENCOUNTER
----- Message from Clarke Harris sent at 6/9/2022  1:06 PM CDT -----  Regarding: reschedule  Type:  Sooner Appointment Request    Caller is requesting a sooner appointment.  Caller declined first available appointment listed below.  Caller will not accept being placed on the waitlist and is requesting a message be sent to doctor.    Name of Caller:  Breanna    When is the first available appointment?  Dept book    Symptoms:  botox    Best Call Back Number:  [phone]     Additional Information:  pt is ill likey CV19 pos b/c  is and she is feeling bad.

## 2022-06-14 ENCOUNTER — TELEPHONE (OUTPATIENT)
Dept: FAMILY MEDICINE | Facility: CLINIC | Age: 42
End: 2022-06-14
Payer: COMMERCIAL

## 2022-06-17 ENCOUNTER — PROCEDURE VISIT (OUTPATIENT)
Dept: NEUROLOGY | Facility: CLINIC | Age: 42
End: 2022-06-17
Payer: COMMERCIAL

## 2022-06-17 VITALS
SYSTOLIC BLOOD PRESSURE: 137 MMHG | WEIGHT: 190.81 LBS | DIASTOLIC BLOOD PRESSURE: 97 MMHG | RESPIRATION RATE: 17 BRPM | BODY MASS INDEX: 33.81 KG/M2 | TEMPERATURE: 98 F | HEIGHT: 63 IN | HEART RATE: 121 BPM

## 2022-06-17 DIAGNOSIS — G43.719 INTRACTABLE CHRONIC MIGRAINE WITHOUT AURA AND WITHOUT STATUS MIGRAINOSUS: Primary | ICD-10-CM

## 2022-06-17 PROCEDURE — 64615 PR CHEMODENERVATION OF MUSCLE FOR CHRONIC MIGRAINE: ICD-10-PCS | Mod: S$GLB,,, | Performed by: PSYCHIATRY & NEUROLOGY

## 2022-06-17 PROCEDURE — 64615 CHEMODENERV MUSC MIGRAINE: CPT | Mod: S$GLB,,, | Performed by: PSYCHIATRY & NEUROLOGY

## 2022-06-17 RX ORDER — MORPHINE SULFATE 30 MG/1
30 TABLET, FILM COATED, EXTENDED RELEASE ORAL 2 TIMES DAILY
COMMUNITY
Start: 2022-05-24 | End: 2022-12-15

## 2022-06-17 NOTE — PROCEDURES
Procedures   BOTOX PROCEDURE    PROCEDURE PERFORMED: Botulinum toxin injection (33560)    CLINICAL INDICATION: G43.719    A time out was conducted just before the start of the procedure to verify the correct patient and procedure, procedure location, and all relevant critical information.     Conventional methods of treatment but the patient has been unresponsive and refractory.The patient meets criteria for chronic headaches according to the ICHD-II, the patient has more than 15 headaches a month which last for more than 4 hours a day.    The Botox injections have achieved well over 50%  improvement in the patient's symptoms. Migraines have been reduced at least 7 days per month and the number of cumulative hours suffering with headaches has been reduced at least 100 total hours per month. Today she does have a headache indicating that the Botox has worn off. Frequency of treatment is every 3 months unless no response to the treatments, at which time we will discontinue the injections.    DESCRIPTION OF PROCEDURE: After obtaining informed consent and under aseptic technique, a total of 155 units of botulinum toxin type A were injected in the following muscles: Procerus 5 units,  5 units bilaterally, frontalis 20 units, temporalis 20 units bilaterally,   occipitalis 15 units, upper cervical paraspinals 10 units bilaterally and trapezius 15 units bilaterally. The patient was given a total of 155 units in 31 sites.The patient tolerated the procedure well. There were no complications. The patient was given a prescription for repeat treatment in 3 months.     Unavoidable waste 45 units          Casie Winston M.D  Medical Director, Headache and Facial Pain  [facility]

## 2022-06-29 ENCOUNTER — OFFICE VISIT (OUTPATIENT)
Dept: GASTROENTEROLOGY | Facility: CLINIC | Age: 42
End: 2022-06-29
Payer: COMMERCIAL

## 2022-06-29 VITALS — BODY MASS INDEX: 33.43 KG/M2 | HEIGHT: 63 IN | WEIGHT: 188.69 LBS

## 2022-06-29 DIAGNOSIS — R19.5 MUCUS IN STOOL: ICD-10-CM

## 2022-06-29 DIAGNOSIS — R12 HEARTBURN: ICD-10-CM

## 2022-06-29 DIAGNOSIS — K62.5 RECTAL BLEEDING: ICD-10-CM

## 2022-06-29 DIAGNOSIS — K52.9 CHRONIC DIARRHEA: ICD-10-CM

## 2022-06-29 DIAGNOSIS — R19.8 IRREGULAR BOWEL HABITS: ICD-10-CM

## 2022-06-29 DIAGNOSIS — R10.84 GENERALIZED ABDOMINAL PAIN: ICD-10-CM

## 2022-06-29 DIAGNOSIS — R93.5 ABNORMAL CT OF THE ABDOMEN: Primary | ICD-10-CM

## 2022-06-29 DIAGNOSIS — Z86.010 HISTORY OF COLON POLYPS: ICD-10-CM

## 2022-06-29 DIAGNOSIS — K52.9 ENTERITIS: ICD-10-CM

## 2022-06-29 PROCEDURE — 1159F MED LIST DOCD IN RCRD: CPT | Mod: CPTII,S$GLB,, | Performed by: NURSE PRACTITIONER

## 2022-06-29 PROCEDURE — 3008F PR BODY MASS INDEX (BMI) DOCUMENTED: ICD-10-PCS | Mod: CPTII,S$GLB,, | Performed by: NURSE PRACTITIONER

## 2022-06-29 PROCEDURE — 3008F BODY MASS INDEX DOCD: CPT | Mod: CPTII,S$GLB,, | Performed by: NURSE PRACTITIONER

## 2022-06-29 PROCEDURE — 1160F RVW MEDS BY RX/DR IN RCRD: CPT | Mod: CPTII,S$GLB,, | Performed by: NURSE PRACTITIONER

## 2022-06-29 PROCEDURE — 99999 PR PBB SHADOW E&M-EST. PATIENT-LVL V: CPT | Mod: PBBFAC,,, | Performed by: NURSE PRACTITIONER

## 2022-06-29 PROCEDURE — 1160F PR REVIEW ALL MEDS BY PRESCRIBER/CLIN PHARMACIST DOCUMENTED: ICD-10-PCS | Mod: CPTII,S$GLB,, | Performed by: NURSE PRACTITIONER

## 2022-06-29 PROCEDURE — 99204 OFFICE O/P NEW MOD 45 MIN: CPT | Mod: S$GLB,,, | Performed by: NURSE PRACTITIONER

## 2022-06-29 PROCEDURE — 99204 PR OFFICE/OUTPT VISIT, NEW, LEVL IV, 45-59 MIN: ICD-10-PCS | Mod: S$GLB,,, | Performed by: NURSE PRACTITIONER

## 2022-06-29 PROCEDURE — 99999 PR PBB SHADOW E&M-EST. PATIENT-LVL V: ICD-10-PCS | Mod: PBBFAC,,, | Performed by: NURSE PRACTITIONER

## 2022-06-29 PROCEDURE — 1159F PR MEDICATION LIST DOCUMENTED IN MEDICAL RECORD: ICD-10-PCS | Mod: CPTII,S$GLB,, | Performed by: NURSE PRACTITIONER

## 2022-06-29 NOTE — PROGRESS NOTES
Subjective:       Patient ID: Breanna Sanchez is a 41 y.o. female, Body mass index is 33.43 kg/m².    Chief Complaint: Enteritis      Patient is new to me. Referred by Dr. Dacosta for enteritis.     Abdominal Pain  This is a recurrent problem. The current episode started in the past 7 days (Recurred on 6/24/22; similar episode in 4/2022). The onset quality is sudden. The problem occurs constantly (fluctuates in severity). The problem has been gradually improving. The pain is located in the generalized abdominal region (epigastric, LUQ and LLQ regions worse). The pain is severe. The quality of the pain is aching and sharp (describes as bloating). The abdominal pain does not radiate. Associated symptoms include anorexia, diarrhea (Improving), hematochezia (reports maroon colored blood in toilet bowl after bowel movements; denies bleeding in between bowel movements) and nausea. Pertinent negatives include no belching, constipation, dysuria, fever, flatus, frequency, melena, vomiting or weight loss. Associated symptoms comments: Associated symptoms also include heartburn and mucus in stool. The pain is aggravated by eating and palpation. The pain is relieved by nothing. She has tried proton pump inhibitors (Past: Levsin- caused constipation; Currently: Protonix 40 mg once daily, Bentyl 20 mg QID PRN, and Prednisone- symptoms improving) for the symptoms. Prior diagnostic workup includes GI consult and CT scan (CT scan showed enteritis). Her past medical history is significant for abdominal surgery (Hx of appendectomy and hysterectomy). There is no history of colon cancer, Crohn's disease, gallstones, GERD, irritable bowel syndrome, pancreatitis, PUD or ulcerative colitis (Hx of non specific colitis).     Review of Systems   Constitutional: Negative for appetite change, fever, unexpected weight change and weight loss.   HENT: Negative for trouble swallowing.    Respiratory: Negative for cough and shortness of breath.     Cardiovascular: Negative for chest pain.   Gastrointestinal: Positive for abdominal pain, anal bleeding, anorexia, blood in stool, diarrhea (Improving), hematochezia (reports maroon colored blood in toilet bowl after bowel movements; denies bleeding in between bowel movements) and nausea. Negative for abdominal distention, constipation, flatus, melena, rectal pain and vomiting.   Genitourinary: Negative for difficulty urinating, dysuria and frequency.   Musculoskeletal: Negative for gait problem.   Skin: Negative for rash.   Neurological: Negative for speech difficulty.   Psychiatric/Behavioral: Negative for confusion.       Past Medical History:   Diagnosis Date    ADHD     Ankylosing spondylitis     Anxiety     Colitis     Colon polyp     Depression     Essential hypertension     Fibromyalgia     History of kidney stones     Lupus     Migraine headache     Port-A-Cath in place     Primary immune deficiency disorder     Recurrent UTI     Rheumatoid arteritis     Sjoegren syndrome       Past Surgical History:   Procedure Laterality Date    APPENDECTOMY      BARTHOLIN GLAND CYST EXCISION      COLONOSCOPY  2011    DILATION OF URETHRA      X 2    HYSTERECTOMY      INSERTION OF TUNNELED CENTRAL VENOUS CATHETER (CVC) WITH SUBCUTANEOUS PORT N/A 10/2/2019    Procedure: [id number]-[id number];  Surgeon: Lenin Springer MD;  Location: [facility] OR;  Service: General;  Laterality: N/A;    KNEE ARTHROSCOPY Left     LAPAROSCOPIC LYSIS OF ADHESIONS N/A 7/22/2019    Procedure: LYSIS, ADHESIONS, LAPAROSCOPIC;  Surgeon: Clarence Adams MD;  Location: [facility] OR;  Service: OB/GYN;  Laterality: N/A;    LAPAROSCOPIC SALPINGO-OOPHORECTOMY Left 7/22/2019    Procedure: SALPINGO-OOPHORECTOMY, LAPAROSCOPIC- LEFT SIDE ONLY;  Surgeon: Clarence Adams MD;  Location: [facility] OR;  Service: OB/GYN;  Laterality: Left;    OVARIAN CYST REMOVAL Left     SALPINGOOPHORECTOMY Right     TONSILLECTOMY      TOTAL SHOULDER  ARTHROPLASTY Right     TUBAL LIGATION        Family History   Problem Relation Age of Onset    Diabetes Mother         type 1    Stroke Mother     Heart disease Mother     Rheum arthritis Mother     Cancer Father         prostate    Alzheimer's disease Father     Cancer Sister         uterine    Ovarian cancer Sister     Diabetes Brother         type 2    Rheum arthritis Maternal Grandmother     Crohn's disease Neg Hx     Ulcerative colitis Neg Hx       Wt Readings from Last 10 Encounters:   06/29/22 85.6 kg (188 lb 11.4 oz)   06/28/22 86 kg (189 lb 8 oz)   06/24/22 85.6 kg (188 lb 12.8 oz)   06/17/22 86.5 kg (190 lb 12.9 oz)   04/22/22 81.6 kg (180 lb)   03/25/22 84.7 kg (186 lb 12.8 oz)   03/24/22 83.5 kg (184 lb)   03/18/22 86.7 kg (191 lb 2.2 oz)   03/11/22 84.4 kg (186 lb 1.1 oz)   02/15/22 84.4 kg (186 lb)     Lab Results   Component Value Date    WBC 11.25 04/14/2022    HGB 14.9 04/14/2022    HCT 45.9 04/14/2022    MCV 88 04/14/2022     04/14/2022     CMP  Sodium   Date Value Ref Range Status   04/14/2022 138 136 - 145 mmol/L Final     Potassium   Date Value Ref Range Status   04/14/2022 3.7 3.5 - 5.1 mmol/L Final     Chloride   Date Value Ref Range Status   04/14/2022 102 95 - 110 mmol/L Final     CO2   Date Value Ref Range Status   04/14/2022 27 23 - 29 mmol/L Final     Glucose   Date Value Ref Range Status   04/14/2022 121 (H) 70 - 110 mg/dL Final     BUN   Date Value Ref Range Status   04/14/2022 7 6 - 20 mg/dL Final     Creatinine   Date Value Ref Range Status   04/14/2022 0.6 0.5 - 1.4 mg/dL Final     Calcium   Date Value Ref Range Status   04/14/2022 9.6 8.7 - 10.5 mg/dL Final     Total Protein   Date Value Ref Range Status   04/14/2022 7.0 6.0 - 8.4 g/dL Final     Albumin   Date Value Ref Range Status   04/14/2022 3.9 3.5 - 5.2 g/dL Final     Total Bilirubin   Date Value Ref Range Status   04/14/2022 0.3 0.1 - 1.0 mg/dL Final     Comment:     For infants and newborns,  interpretation of results should be based  on gestational age, weight and in agreement with clinical  observations.    Premature Infant recommended reference ranges:  Up to 24 hours.............<8.0 mg/dL  Up to 48 hours............<12.0 mg/dL  3-5 days..................<15.0 mg/dL  6-29 days.................<15.0 mg/dL       Alkaline Phosphatase   Date Value Ref Range Status   04/14/2022 107 55 - 135 U/L Final     AST   Date Value Ref Range Status   04/14/2022 22 10 - 40 U/L Final     ALT   Date Value Ref Range Status   04/14/2022 37 10 - 44 U/L Final     Anion Gap   Date Value Ref Range Status   04/14/2022 9 8 - 16 mmol/L Final     eGFR if    Date Value Ref Range Status   04/14/2022 >60.0 >60 mL/min/1.73 m^2 Final     eGFR if non    Date Value Ref Range Status   04/14/2022 >60.0 >60 mL/min/1.73 m^2 Final     Comment:     Calculation used to obtain the estimated glomerular filtration  rate (eGFR) is the CKD-EPI equation.          Lab Results   Component Value Date    LIPASERES 117 03/11/2022             Reviewed prior medical records including radiology report of CT of abdomen and pelvis 6/24/22 (in care everywhere) & endoscopy history (see surgical history).     Objective:      Physical Exam  Constitutional:       General: She is not in acute distress.     Appearance: She is well-developed.   HENT:      Head: Normocephalic.      Right Ear: Hearing normal.      Left Ear: Hearing normal.      Nose: Nose normal.      Mouth/Throat:      Mouth: No oral lesions.      Pharynx: Uvula midline.   Eyes:      General: Lids are normal.      Conjunctiva/sclera: Conjunctivae normal.      Pupils: Pupils are equal, round, and reactive to light.   Neck:      Trachea: Trachea normal.   Cardiovascular:      Rate and Rhythm: Normal rate and regular rhythm.      Heart sounds: Normal heart sounds. No murmur heard.  Pulmonary:      Effort: Pulmonary effort is normal. No respiratory distress.      Breath  sounds: Normal breath sounds. No stridor. No wheezing.   Abdominal:      General: Bowel sounds are normal. There is no distension.      Palpations: Abdomen is soft. There is no mass.      Tenderness: There is abdominal tenderness (mild) in the right upper quadrant, epigastric area, periumbilical area and left upper quadrant. There is no guarding or rebound.   Musculoskeletal:         General: Normal range of motion.      Cervical back: Normal range of motion.   Skin:     General: Skin is warm and dry.      Findings: No rash.      Comments: Non jaundiced   Neurological:      Mental Status: She is alert and oriented to person, place, and time.   Psychiatric:         Speech: Speech normal.         Behavior: Behavior normal. Behavior is cooperative.           Assessment:       1. Abnormal CT of the abdomen    2. Enteritis    3. Generalized abdominal pain    4. Chronic diarrhea    5. Irregular bowel habits    6. Rectal bleeding    7. Mucus in stool    8. Heartburn    9. History of colon polyps           Plan:   All diagnoses and orders for this visit:    Abnormal CT of the abdomen, Enteritis, Generalized abdominal pain, Chronic diarrhea, Irregular bowel habits & Mucus in stool  - Stool Exam-Ova,Cysts,Parasites; Future; Expected date: 06/29/2022  - Giardia / Cryptosporidum, EIA; Future; Expected date: 06/29/2022  - Stool culture; Future; Expected date: 06/29/2022  - WBC, Stool; Future; Expected date: 06/29/2022  - Occult blood x 1, stool; Future; Expected date: 06/29/2022  - pH, stool; Future; Expected date: 06/29/2022  - Calprotectin, Stool; Future; Expected date: 06/29/2022  - Clostridium difficile EIA; Future; Expected date: 06/29/2022  - Recommended increase fiber in diet, especially soluble fiber since this can help bulk up the stool consistency and may help to slow down how fast the stool goes through the colon and can prevent diarrhea  - Continue Bentyl 20 mg QID PRN  - Continue Prednisone until completion  -  Schedule Colonoscopy    Rectal bleeding  - Discussed about different etiologies that can cause rectal bleeding, such as but not limited to diverticulosis, polyps, colon mass, colon inflammation or infection, anal fissure or hemorrhoids.   - Schedule Colonoscopy, discussed procedure with the patient, verbalized understanding         - Avoid/minimize aspirin and anti-inflammatory drugs such as ibuprofen (Advil, Motrin) and naproxen (Aleve and Naprosyn).    Heartburn   - Continue Protonix 40 mg once daily   - Take PPI in the morning 30 minutes before breakfast   - Recommend to avoid large meals, avoid eating within 3 hours of bedtime, elevate head of bed if nocturnal symptoms are present, smoking cessation (if current smoker), & weight loss (if overweight).    - Recommend minimize/avoid high-fat foods, chocolate, caffeine, citrus, alcohol, & tomato products.   - Advised to avoid/limit use of NSAID's, since they can cause GI upset, bleeding, and/or ulcers. If needed, take with food.     - Consider EGD if symptoms persist    History of colon polyps   - Schedule Colonoscopy    If no improvement in symptoms or symptoms worsen, call/follow-up at clinic or go to ER

## 2022-07-07 ENCOUNTER — ANESTHESIA (OUTPATIENT)
Dept: ENDOSCOPY | Facility: HOSPITAL | Age: 42
End: 2022-07-07
Payer: COMMERCIAL

## 2022-07-07 ENCOUNTER — ANESTHESIA EVENT (OUTPATIENT)
Dept: ENDOSCOPY | Facility: HOSPITAL | Age: 42
End: 2022-07-07
Payer: COMMERCIAL

## 2022-07-07 ENCOUNTER — HOSPITAL ENCOUNTER (OUTPATIENT)
Facility: HOSPITAL | Age: 42
Discharge: HOME | End: 2022-07-07
Attending: INTERNAL MEDICINE | Admitting: INTERNAL MEDICINE
Payer: COMMERCIAL

## 2022-07-07 DIAGNOSIS — R93.3 ABNORMAL CT SCAN, GASTROINTESTINAL TRACT: Primary | ICD-10-CM

## 2022-07-07 DIAGNOSIS — R10.84 GENERALIZED ABDOMINAL PAIN: ICD-10-CM

## 2022-07-07 DIAGNOSIS — K52.9 ENTERITIS: ICD-10-CM

## 2022-07-07 DIAGNOSIS — R19.7 DIARRHEA, UNSPECIFIED TYPE: ICD-10-CM

## 2022-07-07 PROCEDURE — 27201089 HC SNARE, DISP (ANY): Mod: PO | Performed by: INTERNAL MEDICINE

## 2022-07-07 PROCEDURE — 25000003 PHARM REV CODE 250: Mod: PO | Performed by: NURSE ANESTHETIST, CERTIFIED REGISTERED

## 2022-07-07 PROCEDURE — 45385 COLONOSCOPY W/LESION REMOVAL: CPT | Mod: ,,, | Performed by: INTERNAL MEDICINE

## 2022-07-07 PROCEDURE — D9220A PRA ANESTHESIA: Mod: CRNA,,, | Performed by: NURSE ANESTHETIST, CERTIFIED REGISTERED

## 2022-07-07 PROCEDURE — 37000008 HC ANESTHESIA 1ST 15 MINUTES: Mod: PO | Performed by: INTERNAL MEDICINE

## 2022-07-07 PROCEDURE — 45380 PR COLONOSCOPY,BIOPSY: ICD-10-PCS | Mod: 59,,, | Performed by: INTERNAL MEDICINE

## 2022-07-07 PROCEDURE — 88305 TISSUE EXAM BY PATHOLOGIST: ICD-10-PCS | Mod: 26,,, | Performed by: PATHOLOGY

## 2022-07-07 PROCEDURE — 45380 COLONOSCOPY AND BIOPSY: CPT | Mod: 59,,, | Performed by: INTERNAL MEDICINE

## 2022-07-07 PROCEDURE — 88305 TISSUE EXAM BY PATHOLOGIST: CPT | Performed by: PATHOLOGY

## 2022-07-07 PROCEDURE — 37000009 HC ANESTHESIA EA ADD 15 MINS: Mod: PO | Performed by: INTERNAL MEDICINE

## 2022-07-07 PROCEDURE — D9220A PRA ANESTHESIA: Mod: ANES,,, | Performed by: ANESTHESIOLOGY

## 2022-07-07 PROCEDURE — 63600175 PHARM REV CODE 636 W HCPCS: Mod: PO | Performed by: NURSE ANESTHETIST, CERTIFIED REGISTERED

## 2022-07-07 PROCEDURE — 45385 PR COLONOSCOPY,REMV LESN,SNARE: ICD-10-PCS | Mod: ,,, | Performed by: INTERNAL MEDICINE

## 2022-07-07 PROCEDURE — D9220A PRA ANESTHESIA: ICD-10-PCS | Mod: ANES,,, | Performed by: ANESTHESIOLOGY

## 2022-07-07 PROCEDURE — 45380 COLONOSCOPY AND BIOPSY: CPT | Mod: 59,PO | Performed by: INTERNAL MEDICINE

## 2022-07-07 PROCEDURE — 45385 COLONOSCOPY W/LESION REMOVAL: CPT | Mod: PO | Performed by: INTERNAL MEDICINE

## 2022-07-07 PROCEDURE — 88305 TISSUE EXAM BY PATHOLOGIST: CPT | Mod: 26,,, | Performed by: PATHOLOGY

## 2022-07-07 PROCEDURE — 27201012 HC FORCEPS, HOT/COLD, DISP: Mod: PO | Performed by: INTERNAL MEDICINE

## 2022-07-07 PROCEDURE — 63600175 PHARM REV CODE 636 W HCPCS: Mod: PO | Performed by: INTERNAL MEDICINE

## 2022-07-07 PROCEDURE — D9220A PRA ANESTHESIA: ICD-10-PCS | Mod: CRNA,,, | Performed by: NURSE ANESTHETIST, CERTIFIED REGISTERED

## 2022-07-07 RX ORDER — LIDOCAINE HYDROCHLORIDE 20 MG/ML
INJECTION INTRAVENOUS
Status: DISCONTINUED | OUTPATIENT
Start: 2022-07-07 | End: 2022-07-07

## 2022-07-07 RX ORDER — PROPOFOL 10 MG/ML
VIAL (ML) INTRAVENOUS
Status: DISCONTINUED | OUTPATIENT
Start: 2022-07-07 | End: 2022-07-07

## 2022-07-07 RX ORDER — SODIUM CHLORIDE, SODIUM LACTATE, POTASSIUM CHLORIDE, CALCIUM CHLORIDE 600; 310; 30; 20 MG/100ML; MG/100ML; MG/100ML; MG/100ML
INJECTION, SOLUTION INTRAVENOUS CONTINUOUS
Status: DISPENSED | OUTPATIENT
Start: 2022-07-07

## 2022-07-07 RX ORDER — SODIUM CHLORIDE 0.9 % (FLUSH) 0.9 %
10 SYRINGE (ML) INJECTION EVERY 6 HOURS PRN
Status: DISPENSED | OUTPATIENT
Start: 2022-07-07

## 2022-07-07 RX ADMIN — PROPOFOL 40 MG: 10 INJECTION, EMULSION INTRAVENOUS at 10:07

## 2022-07-07 RX ADMIN — PROPOFOL 130 MG: 10 INJECTION, EMULSION INTRAVENOUS at 09:07

## 2022-07-07 RX ADMIN — LIDOCAINE HYDROCHLORIDE 50 MG: 20 INJECTION, SOLUTION INTRAVENOUS at 09:07

## 2022-07-07 RX ADMIN — SODIUM CHLORIDE, SODIUM LACTATE, POTASSIUM CHLORIDE, AND CALCIUM CHLORIDE: .6; .31; .03; .02 INJECTION, SOLUTION INTRAVENOUS at 08:07

## 2022-07-07 NOTE — ANESTHESIA POSTPROCEDURE EVALUATION
Anesthesia Post Evaluation    Patient: Breanna Sanchez    Procedure(s) Performed: Procedure(s) (LRB):  COLONOSCOPY (N/A)    Final Anesthesia Type: general      Patient location during evaluation: PACU  Patient participation: Yes- Able to Participate  Level of consciousness: awake and alert and oriented  Post-procedure vital signs: reviewed and stable  Pain management: adequate  Airway patency: patent    PONV status at discharge: No PONV  Anesthetic complications: no      Cardiovascular status: blood pressure returned to baseline and stable  Respiratory status: unassisted and spontaneous ventilation  Hydration status: euvolemic  Follow-up not needed.          Vitals Value Taken Time   /82 07/07/22 1038   Temp 36.4 °C (97.5 °F) 07/07/22 1024   Pulse 88 07/07/22 1038   Resp 18 07/07/22 1038   SpO2 98 % 07/07/22 1038         Event Time   Out of Recovery 10:53:57         Pain/Lynn Score: Lynn Score: 9 (7/7/2022 10:24 AM)

## 2022-07-07 NOTE — TRANSFER OF CARE
"Anesthesia Transfer of Care Note    Patient: Breanna Sanchez    Procedure(s) Performed: Procedure(s) (LRB):  COLONOSCOPY (N/A)    Patient location: GI    Anesthesia Type: general    Transport from OR: Transported from OR on 2-3 L/min O2 by NC with adequate spontaneous ventilation    Post pain: adequate analgesia    Post assessment: no apparent anesthetic complications    Post vital signs: stable    Level of consciousness: awake    Nausea/Vomiting: no nausea/vomiting    Complications: none    Transfer of care protocol was followed      Last vitals:   Visit Vitals  BP (!) 137/93   Pulse (!) 115   Temp 36.6 °C (97.9 °F)   Resp 18   Ht 5' 3" (1.6 m)   Wt 85.3 kg (188 lb)   LMP 02/12/2017 (Exact Date)   SpO2 96%   Breastfeeding No   BMI 33.30 kg/m²     "

## 2022-07-07 NOTE — PROVATION PATIENT INSTRUCTIONS
Discharge Summary/Instructions after an Endoscopic Procedure  Patient Name: Breanna Sanchez  Patient MRN: [id number]  Patient YOB: 1980 Thursday, July 7, 2022  Shaggy Mae MD  Dear patient,  As a result of recent federal legislation (The Federal Cures Act), you may   receive lab or pathology results from your procedure in your [facility]   account before your physician is able to contact you. Your physician or   their representative will relay the results to you with their   recommendations at their soonest availability.  Thank you,  RESTRICTIONS:  During your procedure today, you received medications for sedation.  These   medications may affect your judgment, balance and coordination.  Therefore,   for 24 hours, you have the following restrictions:   - DO NOT drive a car, operate machinery, make legal/financial decisions,   sign important papers or drink alcohol.    ACTIVITY:  Today: no heavy lifting, straining or running due to procedural   sedation/anesthesia.  The following day: return to full activity including work.  DIET:  Eat and drink normally unless instructed otherwise.     TREATMENT FOR COMMON SIDE EFFECTS:  - Mild abdominal pain, nausea, belching, bloating or excessive gas:  rest,   eat lightly and use a heating pad.  - Sore Throat: treat with throat lozenges and/or gargle with warm salt   water.  - Because air was used during the procedure, expelling large amounts of air   from your rectum or belching is normal.  - If a bowel prep was taken, you may not have a bowel movement for 1-3 days.    This is normal.  SYMPTOMS TO WATCH FOR AND REPORT TO YOUR PHYSICIAN:  1. Abdominal pain or bloating, other than gas cramps.  2. Chest pain.  3. Back pain.  4. Signs of infection such as: chills or fever occurring within 24 hours   after the procedure.  5. Rectal bleeding, which would show as bright red, maroon, or black stools.   (A tablespoon of blood from the rectum is not serious, especially  if   hemorrhoids are present.)  6. Vomiting.  7. Weakness or dizziness.  GO DIRECTLY TO THE NEAREST EMERGENCY ROOM IF YOU HAVE ANY OF THE FOLLOWING:      Difficulty breathing              Chills and/or fever over 101 F   Persistent vomiting and/or vomiting blood   Severe abdominal pain   Severe chest pain   Black, tarry stools   Bleeding- more than one tablespoon   Any other symptom or condition that you feel may need urgent attention  Your doctor recommends these additional instructions:  If any biopsies were taken, your doctors clinic will contact you in 1 to 2   weeks with any results.  You are being discharged to home.   Advance your diet as tolerated.   Continue your present medications.   We are waiting for your pathology results.   Written discharge instructions were provided to you.   Your physician has recommended a repeat colonoscopy in three years for   surveillance.  For questions, problems or results please call your physician - Shaggy Mae MD at Work:  [phone].  EMERGENCY PHONE NUMBER: [phone], LAB RESULTS: [phone]  IF A COMPLICATION OR EMERGENCY SITUATION ARISES AND YOU ARE UNABLE TO REACH   YOUR PHYSICIAN - GO DIRECTLY TO THE EMERGENCY ROOM.  ___________________________________________  Nurse Signature  ___________________________________________  Patient/Designated Responsible Party Signature  Shaggy Mae MD  7/7/2022 10:27:28 AM  This report has been verified and signed electronically.  Dear patient,  As a result of recent federal legislation (The Federal Cures Act), you may   receive lab or pathology results from your procedure in your [facility]   account before your physician is able to contact you. Your physician or   their representative will relay the results to you with their   recommendations at their soonest availability.  Thank you.  PROVATION

## 2022-07-07 NOTE — H&P
History & Physical - Short Stay  Gastroenterology    SUBJECTIVE:     Procedure: Colonoscopy    Chief Complaint/Indication for Procedure: Abnormal CT, enteritis, diarrhea, abdominal pain    Facility-Administered Medications Prior to Admission   Medication    onabotulinumtoxina injection 200 Units    onabotulinumtoxina injection 200 Units    onabotulinumtoxina injection 200 Units     PTA Medications   Medication Sig    amoxicillin-clavulanate 875-125mg (AUGMENTIN) 875-125 mg per tablet Take 1 tablet by mouth 2 (two) times daily.    cyanocobalamin 1,000 mcg/mL injection INJECT 1 ML INTO THE MUSCLE EVERY 28 DAYS.    desvenlafaxine succinate (PRISTIQ) 50 MG Tb24 Take 100 mg by mouth once daily.    dextroamphetamine-amphetamine (ADDERALL) 20 mg tablet Take 1 tablet by mouth 2 (two) times daily.    dicyclomine (BENTYL) 20 mg tablet Take 1 tablet (20 mg total) by mouth every 6 (six) hours.    doxycycline (MONODOX) 100 MG capsule Take 100 mg by mouth 2 (two) times daily.    ergocalciferol (ERGOCALCIFEROL) 50,000 unit Cap Take 1 capsule (50,000 Units total) by mouth every 7 days.    L.acidophil,parac-S.therm-Bif. (RISAQUAD) Cap capsule Take 1 capsule by mouth once daily.    lidocaine-prilocaine (EMLA) cream Apply topically as needed. (Patient taking differently: Apply 1 each topically as needed (PAC access).)    morphine (MS CONTIN) 30 MG 12 hr tablet Take 30 mg by mouth 2 (two) times daily.    NIFEdipine (PROCARDIA-XL) 60 MG (OSM) 24 hr tablet Take 1 tablet (60 mg total) by mouth once daily.    oxyCODONE-acetaminophen (PERCOCET)  mg per tablet Take 1 tablet by mouth 3 (three) times daily as needed for Pain (7-10).    pantoprazole (PROTONIX) 40 MG tablet Take 1 tablet (40 mg total) by mouth once daily.    piroxicam (FELDENE) 20 MG capsule Take 1 capsule (20 mg total) by mouth once daily.    predniSONE (DELTASONE) 5 MG tablet Take 2 tablets by mouth daily as needed for arthritis flare    promethazine  (PHENERGAN) 25 MG tablet Take 1 tablet (25 mg total) by mouth every 6 (six) hours as needed for Nausea.    secukinumab (COSENTYX PEN) 150 mg/mL PnIj Inject 150 mg into the skin every 30 days.    secukinumab (COSENTYX PEN) 150 mg/mL PnIj Inject 150 mg into the skin every 30 days.    tiZANidine (ZANAFLEX) 4 MG tablet Take 1 tablet (4 mg total) by mouth every 8 (eight) hours.    epinephrine (EPIPEN INJ) Inject 1 application as directed continuous prn (anaphylaxis).    levocetirizine (XYZAL) 5 MG tablet Take 5 mg by mouth every evening.    NARCAN 4 mg/actuation Spry 1 spray by Nasal route once.     ubrogepant (UBROGEPANT) 100 mg tablet Take 100 mg by mouth as needed.    URO--10-40.8-36 mg Cap Take 1 capsule by mouth once daily.        Review of patient's allergies indicates:   Allergen Reactions    Seroquel [quetiapine] Anaphylaxis    Aleve [naproxen sodium]     Sulfa (sulfonamide antibiotics)     Tramadol      seizure    Levaquin [levofloxacin] Other (See Comments)     Tendon tear    Miralax [polyethylene glycol 3350] Rash      Past Medical History:   Diagnosis Date    ADHD     Ankylosing spondylitis     Anxiety     Colitis     Colon polyp     Depression     Essential hypertension     Fibromyalgia     History of kidney stones     Lupus     Migraine headache     Port-A-Cath in place     Primary immune deficiency disorder     Recurrent UTI     Rheumatoid arteritis     Sjoegren syndrome      Past Surgical History:   Procedure Laterality Date    APPENDECTOMY      BARTHOLIN GLAND CYST EXCISION      COLONOSCOPY  2011    DILATION OF URETHRA      X 2    HYSTERECTOMY      INSERTION OF TUNNELED CENTRAL VENOUS CATHETER (CVC) WITH SUBCUTANEOUS PORT N/A 10/2/2019    Procedure: [id number];  Surgeon: Lenin Springer MD;  Location[facility];  Service: General;  Laterality: N/A;    KNEE ARTHROSCOPY Left     LAPAROSCOPIC LYSIS OF ADHESIONS N/A 7/22/2019    Procedure: LYSIS,  ADHESIONS, LAPAROSCOPIC;  Surgeon: Clarence Adams MD;  Location: [facility] OR;  Service: OB/GYN;  Laterality: N/A;    LAPAROSCOPIC SALPINGO-OOPHORECTOMY Left 7/22/2019    Procedure: SALPINGO-OOPHORECTOMY, LAPAROSCOPIC- LEFT SIDE ONLY;  Surgeon: Clarence Adams MD;  Location: [facility] OR;  Service: OB/GYN;  Laterality: Left;    OVARIAN CYST REMOVAL Left     SALPINGOOPHORECTOMY Right     TONSILLECTOMY      TOTAL SHOULDER ARTHROPLASTY Right     TUBAL LIGATION       Family History   Problem Relation Age of Onset    Diabetes Mother         type 1    Stroke Mother     Heart disease Mother     Rheum arthritis Mother     Cancer Father         prostate    Alzheimer's disease Father     Cancer Sister         uterine    Ovarian cancer Sister     Diabetes Brother         type 2    Rheum arthritis Maternal Grandmother     Crohn's disease Neg Hx     Ulcerative colitis Neg Hx      Social History     Tobacco Use    Smoking status: Current Every Day Smoker     Packs/day: 0.50     Years: 23.00     Pack years: 11.50     Types: Cigarettes     Start date: 2/10/1995    Smokeless tobacco: Never Used   Substance Use Topics    Alcohol use: Not Currently     Comment: rarely    Drug use: No     OBJECTIVE:     Vital Signs (Most Recent)  Temp: 97.9 °F (36.6 °C) (07/07/22 0856)  Pulse: (!) 115 (07/07/22 0856)  Resp: 18 (07/07/22 0856)  BP: (!) 137/93 (07/07/22 0856)  SpO2: 96 % (07/07/22 0856)    Physical Exam:                                                       GENERAL:  Comfortable, in no acute distress.                                 HEENT EXAM:  Nonicteric.  No adenopathy.  Oropharynx is clear.               NECK:  Supple.                                                               LUNGS:  Clear.                                                               CARDIAC:  Regular rate and rhythm.  S1, S2.  No murmur.                      ABDOMEN:  Soft, positive bowel sounds, nontender.  No hepatosplenomegaly or masses.   No rebound or guarding.                                             EXTREMITIES:  No edema.     MENTAL STATUS:  Normal, alert and oriented.    ASSESSMENT/PLAN:     Assessment: Abnormal CT, enteritis, diarrhea, abdominal pain    Plan: Colonoscopy

## 2022-07-07 NOTE — ANESTHESIA PREPROCEDURE EVALUATION
07/07/2022  Breanna Sanchez is a 41 y.o., female.      Pre-op Assessment    I have reviewed the Patient Summary Reports.     I have reviewed the Nursing Notes. I have reviewed the NPO Status.   I have reviewed the Medications.     Review of Systems  Anesthesia Hx:  No problems with previous Anesthesia    Social:  Smoker    Hematology/Oncology:        Hematology Comments: Lupus     Cardiovascular:   Hypertension, well controlled    Pulmonary:   Shortness of breath    Renal/:   Chronic Renal Disease renal calculi    Hepatic/GI:   Colitis   Musculoskeletal:   Ankylosing spondylitis   Neurological:   Neuromuscular Disease, Headaches Sjogren's Dz   Endocrine:  Endocrine Normal    Psych:   Psychiatric History (ADHD) anxiety depression          Physical Exam  General: Well nourished, Cooperative, Alert and Oriented    Airway:  Mallampati: I   Mouth Opening: Normal  Neck ROM: Normal ROM        Anesthesia Plan  Type of Anesthesia, risks & benefits discussed:    Anesthesia Type: Gen ETT, Gen Supraglottic Airway, Gen Natural Airway, MAC  Intra-op Monitoring Plan: Standard ASA Monitors  Post Op Pain Control Plan: multimodal analgesia  Induction:  IV  Airway Plan: Direct, Video and Fiberoptic, Post-Induction  Informed Consent: Informed consent signed with the Patient and all parties understand the risks and agree with anesthesia plan.  All questions answered.   ASA Score: 3    Ready For Surgery From Anesthesia Perspective.     .

## 2022-07-08 VITALS
OXYGEN SATURATION: 98 % | SYSTOLIC BLOOD PRESSURE: 140 MMHG | BODY MASS INDEX: 33.31 KG/M2 | TEMPERATURE: 98 F | HEART RATE: 88 BPM | RESPIRATION RATE: 18 BRPM | DIASTOLIC BLOOD PRESSURE: 82 MMHG | HEIGHT: 63 IN | WEIGHT: 188 LBS

## 2022-07-14 LAB
FINAL PATHOLOGIC DIAGNOSIS: NORMAL
Lab: NORMAL

## 2022-07-18 ENCOUNTER — TELEPHONE (OUTPATIENT)
Dept: GASTROENTEROLOGY | Facility: CLINIC | Age: 42
End: 2022-07-18
Payer: COMMERCIAL

## 2022-07-18 NOTE — TELEPHONE ENCOUNTER
----- Message from Nallely Martinez NP sent at 7/17/2022 11:21 AM CDT -----  Let patient know her stool studies showed leukocytes in stool (suggests inflammation); otherwise, negative/normal results. Continue with previous recommendations including colonoscopy.

## 2022-07-20 ENCOUNTER — PATIENT MESSAGE (OUTPATIENT)
Dept: RHEUMATOLOGY | Facility: CLINIC | Age: 42
End: 2022-07-20
Payer: COMMERCIAL

## 2022-07-20 ENCOUNTER — TELEPHONE (OUTPATIENT)
Dept: RHEUMATOLOGY | Facility: CLINIC | Age: 42
End: 2022-07-20

## 2022-07-21 ENCOUNTER — HOSPITAL ENCOUNTER (OUTPATIENT)
Dept: RADIOLOGY | Facility: HOSPITAL | Age: 42
Discharge: HOME | End: 2022-07-21
Payer: COMMERCIAL

## 2022-07-21 ENCOUNTER — OFFICE VISIT (OUTPATIENT)
Dept: UROLOGY | Facility: CLINIC | Age: 42
End: 2022-07-21
Payer: COMMERCIAL

## 2022-07-21 VITALS — WEIGHT: 186.94 LBS | HEIGHT: 63 IN | BODY MASS INDEX: 33.12 KG/M2

## 2022-07-21 DIAGNOSIS — N30.00 ACUTE CYSTITIS WITHOUT HEMATURIA: ICD-10-CM

## 2022-07-21 DIAGNOSIS — A49.9 ESBL (EXTENDED SPECTRUM BETA-LACTAMASE) PRODUCING BACTERIA INFECTION: Primary | ICD-10-CM

## 2022-07-21 DIAGNOSIS — Z16.12 ESBL (EXTENDED SPECTRUM BETA-LACTAMASE) PRODUCING BACTERIA INFECTION: Primary | ICD-10-CM

## 2022-07-21 PROCEDURE — 99999 PR PBB SHADOW E&M-EST. PATIENT-LVL V: CPT | Mod: PBBFAC,,,

## 2022-07-21 PROCEDURE — 74178 CT UROGRAM ABD PELVIS W WO: ICD-10-PCS | Mod: 26,,, | Performed by: RADIOLOGY

## 2022-07-21 PROCEDURE — 25500020 PHARM REV CODE 255: Mod: PO

## 2022-07-21 PROCEDURE — 3008F PR BODY MASS INDEX (BMI) DOCUMENTED: ICD-10-PCS | Mod: CPTII,S$GLB,,

## 2022-07-21 PROCEDURE — 99214 OFFICE O/P EST MOD 30 MIN: CPT | Mod: S$GLB,,,

## 2022-07-21 PROCEDURE — 3008F BODY MASS INDEX DOCD: CPT | Mod: CPTII,S$GLB,,

## 2022-07-21 PROCEDURE — 1159F PR MEDICATION LIST DOCUMENTED IN MEDICAL RECORD: ICD-10-PCS | Mod: CPTII,S$GLB,,

## 2022-07-21 PROCEDURE — 87086 URINE CULTURE/COLONY COUNT: CPT

## 2022-07-21 PROCEDURE — 1160F RVW MEDS BY RX/DR IN RCRD: CPT | Mod: CPTII,S$GLB,,

## 2022-07-21 PROCEDURE — 74178 CT ABD&PLV WO CNTR FLWD CNTR: CPT | Mod: TC,PO

## 2022-07-21 PROCEDURE — 99999 PR PBB SHADOW E&M-EST. PATIENT-LVL V: ICD-10-PCS | Mod: PBBFAC,,,

## 2022-07-21 PROCEDURE — 74178 CT ABD&PLV WO CNTR FLWD CNTR: CPT | Mod: 26,,, | Performed by: RADIOLOGY

## 2022-07-21 PROCEDURE — 1159F MED LIST DOCD IN RCRD: CPT | Mod: CPTII,S$GLB,,

## 2022-07-21 PROCEDURE — 99214 PR OFFICE/OUTPT VISIT, EST, LEVL IV, 30-39 MIN: ICD-10-PCS | Mod: S$GLB,,,

## 2022-07-21 PROCEDURE — 1160F PR REVIEW ALL MEDS BY PRESCRIBER/CLIN PHARMACIST DOCUMENTED: ICD-10-PCS | Mod: CPTII,S$GLB,,

## 2022-07-21 RX ORDER — GRANULES FOR ORAL 3 G/1
3 POWDER ORAL ONCE
Qty: 3 G | Refills: 0 | Status: SHIPPED | OUTPATIENT
Start: 2022-07-21 | End: 2022-07-21

## 2022-07-21 RX ADMIN — IOHEXOL 125 ML: 350 INJECTION, SOLUTION INTRAVENOUS at 02:07

## 2022-07-21 NOTE — PROGRESS NOTES
[facility] Note  Staff: Nyasia Chandler FNP-C    PCP: MD Praneeth    Chief Complaint: Recurrent UTIs    Subjective:        HPI: Breanna Sanchez is a 41 y.o. female NEW PATIENT presents today for evaluation of recurrent UTIs. She has been followed by urology in the past for this. She has undergone CT RSS and cystoscopies all of which were negative but she does have bilateral punctate renal stones. She also suffers with chronic immunologic diseases and is on medication. Prior urologist has her on low dose doxy 3x/week for months. She is also currently taking augmentin. She is currently running a low grade fever while on Augmentin. We discussed trying fosfomycin as pt does not wish to be set up for IV antibiotics at this time. We discussed if fever persists, will need to go to ED.     Questions asked pt during office visit today:  Urgency:No, incontinence with urgency? No;   Dysuria Yes  Gross HematuriaNo  History of UTI: Yes - e coli ESBL    History of Kidney Stones?:  Yes    Constipation issues?:  No    REVIEW OF SYSTEMS:  Review of Systems   Constitutional: Positive for fever. Negative for chills.   HENT: Negative.    Eyes: Negative.    Respiratory: Negative.    Cardiovascular: Negative.    Gastrointestinal: Negative.  Negative for abdominal pain, nausea and vomiting.   Genitourinary: Positive for dysuria. Negative for flank pain, frequency, hematuria and urgency.   Musculoskeletal: Negative.  Negative for back pain.   Skin: Negative.    Neurological: Negative.    Endo/Heme/Allergies: Negative.    Psychiatric/Behavioral: Negative.        PMHx:  Past Medical History:   Diagnosis Date    ADHD     Ankylosing spondylitis     Anxiety     Colitis     Colon polyp     Depression     Essential hypertension     Fibromyalgia     History of kidney stones     Lupus     Migraine headache     Port-A-Cath in place     Primary immune deficiency disorder     Recurrent UTI     Rheumatoid arteritis      Sjoegren syndrome        PSHx:  Past Surgical History:   Procedure Laterality Date    APPENDECTOMY      BARTHOLIN GLAND CYST EXCISION      COLONOSCOPY  2011    COLONOSCOPY N/A 7/7/2022    Procedure: COLONOSCOPY;  Surgeon: Shaggy Mae MD;  Location: [facility] ENDO;  Service: Endoscopy;  Laterality: N/A;    DILATION OF URETHRA      X 2    HYSTERECTOMY      INSERTION OF TUNNELED CENTRAL VENOUS CATHETER (CVC) WITH SUBCUTANEOUS PORT N/A 10/2/2019    Procedure: [id number];  Surgeon: Lenin Springer MD;  Location: [facility] OR;  Service: General;  Laterality: N/A;    KNEE ARTHROSCOPY Left     LAPAROSCOPIC LYSIS OF ADHESIONS N/A 7/22/2019    Procedure: LYSIS, ADHESIONS, LAPAROSCOPIC;  Surgeon: Clarence Adams MD;  Location: [facility] OR;  Service: OB/GYN;  Laterality: N/A;    LAPAROSCOPIC SALPINGO-OOPHORECTOMY Left 7/22/2019    Procedure: SALPINGO-OOPHORECTOMY, LAPAROSCOPIC- LEFT SIDE ONLY;  Surgeon: Clarence Adams MD;  Location: [facility] OR;  Service: OB/GYN;  Laterality: Left;    OVARIAN CYST REMOVAL Left     SALPINGOOPHORECTOMY Right     TONSILLECTOMY      TOTAL SHOULDER ARTHROPLASTY Right     TUBAL LIGATION         Fam Hx:   malignancies: No , gyn malignancies: No   kidney stones: No     Soc Hx:  , lives in [place]    Allergies:  Seroquel [quetiapine], Aleve [naproxen sodium], Sulfa (sulfonamide antibiotics), Tramadol, Levaquin [levofloxacin], and Miralax [polyethylene glycol 3350]    Medications: reviewed     Objective:   There were no vitals filed for this visit.    Physical Exam  Constitutional:       Appearance: Normal appearance.   HENT:      Head: Normocephalic.      Mouth/Throat:      Mouth: Mucous membranes are moist.   Eyes:      Conjunctiva/sclera: Conjunctivae normal.   Pulmonary:      Effort: Pulmonary effort is normal.   Abdominal:      General: There is no distension.      Palpations: Abdomen is soft.      Tenderness: There is no abdominal tenderness. There is no  right CVA tenderness or left CVA tenderness.   Musculoskeletal:         General: Normal range of motion.      Cervical back: Normal range of motion.   Skin:     General: Skin is warm.   Neurological:      Mental Status: She is alert and oriented to person, place, and time.   Psychiatric:         Behavior: Behavior normal.           LABS REVIEW:  UA today:   Color:Clear, Yellow  Spec. Grav.  <1.005  PH  6.0  Negative for leukocytes, nitrates, protein, glucose, ketones, urobili, bili  Trace blood    Assessment:       1. ESBL (extended spectrum beta-lactamase) producing bacteria infection    2. Acute cystitis without hematuria          Plan:     1.  urine sent for culture  2. Fosfomycin 3GM prescribed today  3. CT Urogram ordered and scheduled    F/u As Needed per Treatment Plan    [facility]: Active    BALJEET Arguelles

## 2022-07-22 ENCOUNTER — PATIENT MESSAGE (OUTPATIENT)
Dept: UROLOGY | Facility: CLINIC | Age: 42
End: 2022-07-22
Payer: COMMERCIAL

## 2022-07-22 ENCOUNTER — PATIENT MESSAGE (OUTPATIENT)
Dept: RHEUMATOLOGY | Facility: CLINIC | Age: 42
End: 2022-07-22
Payer: COMMERCIAL

## 2022-07-23 LAB — BACTERIA UR CULT: NO GROWTH

## 2022-07-26 ENCOUNTER — CLINICAL SUPPORT (OUTPATIENT)
Dept: RHEUMATOLOGY | Facility: CLINIC | Age: 42
End: 2022-07-26
Payer: COMMERCIAL

## 2022-07-26 VITALS
BODY MASS INDEX: 33.13 KG/M2 | DIASTOLIC BLOOD PRESSURE: 92 MMHG | SYSTOLIC BLOOD PRESSURE: 143 MMHG | WEIGHT: 187 LBS | OXYGEN SATURATION: 98 % | HEART RATE: 111 BPM

## 2022-07-26 DIAGNOSIS — M54.50 LOW BACK PAIN, UNSPECIFIED BACK PAIN LATERALITY, UNSPECIFIED CHRONICITY, UNSPECIFIED WHETHER SCIATICA PRESENT: ICD-10-CM

## 2022-07-26 DIAGNOSIS — M25.542 JOINT PAIN IN BOTH HANDS: Primary | ICD-10-CM

## 2022-07-26 DIAGNOSIS — M25.50 GENERALIZED JOINT PAIN: ICD-10-CM

## 2022-07-26 DIAGNOSIS — M25.541 JOINT PAIN IN BOTH HANDS: Primary | ICD-10-CM

## 2022-07-26 PROCEDURE — 99999 PR PBB SHADOW E&M-EST. PATIENT-LVL III: CPT | Mod: PBBFAC,,,

## 2022-07-26 PROCEDURE — 96372 PR INJECTION,THERAP/PROPH/DIAG2ST, IM OR SUBCUT: ICD-10-PCS | Mod: S$GLB,,, | Performed by: PHYSICIAN ASSISTANT

## 2022-07-26 PROCEDURE — 99999 PR PBB SHADOW E&M-EST. PATIENT-LVL III: ICD-10-PCS | Mod: PBBFAC,,,

## 2022-07-26 PROCEDURE — 96372 THER/PROPH/DIAG INJ SC/IM: CPT | Mod: S$GLB,,, | Performed by: PHYSICIAN ASSISTANT

## 2022-07-26 RX ORDER — DEXAMETHASONE SODIUM PHOSPHATE 4 MG/ML
8 INJECTION, SOLUTION INTRA-ARTICULAR; INTRALESIONAL; INTRAMUSCULAR; INTRAVENOUS; SOFT TISSUE
Status: COMPLETED | OUTPATIENT
Start: 2022-07-26 | End: 2022-07-26

## 2022-07-26 RX ADMIN — KETOROLAC TROMETHAMINE 60 MG: 30 INJECTION, SOLUTION INTRAMUSCULAR; INTRAVENOUS at 09:07

## 2022-07-26 RX ADMIN — DEXAMETHASONE SODIUM PHOSPHATE 8 MG: 4 INJECTION, SOLUTION INTRA-ARTICULAR; INTRALESIONAL; INTRAMUSCULAR; INTRAVENOUS; SOFT TISSUE at 09:07

## 2022-07-26 NOTE — PROGRESS NOTES
Patient came in with a  Steady gait, pleasant demeanor.2 Patient ID's were verified and allergies reviewed Patient's pulse was 113. Gave patient a bottle of water and 10 mins to relax. This is standard for this patient.  When patient got settled, she stated that her pain was mostly in her lower back. Generalized joint pain in elbows, hands and knees were also expressed. Level of pain 5/10.  Administered Toradol 60mg/mL, cc's in the Left Uppper Gluteal Muscle and Decadron 8mg/mL, cc's in Right Upper Gluteal Muscle. Injections were per Margo Aucna Pa-C     Patient tolerated injections well and left facility in stable condition.

## 2022-07-27 ENCOUNTER — OFFICE VISIT (OUTPATIENT)
Dept: GASTROENTEROLOGY | Facility: CLINIC | Age: 42
End: 2022-07-27
Payer: COMMERCIAL

## 2022-07-27 VITALS — HEIGHT: 63 IN | BODY MASS INDEX: 33.68 KG/M2 | WEIGHT: 190.06 LBS

## 2022-07-27 DIAGNOSIS — K52.9 ENTERITIS: ICD-10-CM

## 2022-07-27 DIAGNOSIS — K58.0 IRRITABLE BOWEL SYNDROME WITH DIARRHEA: ICD-10-CM

## 2022-07-27 DIAGNOSIS — Z87.898 HISTORY OF ABDOMINAL PAIN: ICD-10-CM

## 2022-07-27 DIAGNOSIS — Z86.010 HISTORY OF COLON POLYPS: ICD-10-CM

## 2022-07-27 DIAGNOSIS — Z98.890 HISTORY OF COLONOSCOPY: Primary | ICD-10-CM

## 2022-07-27 PROCEDURE — 1159F PR MEDICATION LIST DOCUMENTED IN MEDICAL RECORD: ICD-10-PCS | Mod: CPTII,S$GLB,, | Performed by: NURSE PRACTITIONER

## 2022-07-27 PROCEDURE — 99999 PR PBB SHADOW E&M-EST. PATIENT-LVL V: ICD-10-PCS | Mod: PBBFAC,,, | Performed by: NURSE PRACTITIONER

## 2022-07-27 PROCEDURE — 99214 OFFICE O/P EST MOD 30 MIN: CPT | Mod: S$GLB,,, | Performed by: NURSE PRACTITIONER

## 2022-07-27 PROCEDURE — 3008F PR BODY MASS INDEX (BMI) DOCUMENTED: ICD-10-PCS | Mod: CPTII,S$GLB,, | Performed by: NURSE PRACTITIONER

## 2022-07-27 PROCEDURE — 1160F PR REVIEW ALL MEDS BY PRESCRIBER/CLIN PHARMACIST DOCUMENTED: ICD-10-PCS | Mod: CPTII,S$GLB,, | Performed by: NURSE PRACTITIONER

## 2022-07-27 PROCEDURE — 1160F RVW MEDS BY RX/DR IN RCRD: CPT | Mod: CPTII,S$GLB,, | Performed by: NURSE PRACTITIONER

## 2022-07-27 PROCEDURE — 3008F BODY MASS INDEX DOCD: CPT | Mod: CPTII,S$GLB,, | Performed by: NURSE PRACTITIONER

## 2022-07-27 PROCEDURE — 1159F MED LIST DOCD IN RCRD: CPT | Mod: CPTII,S$GLB,, | Performed by: NURSE PRACTITIONER

## 2022-07-27 PROCEDURE — 99214 PR OFFICE/OUTPT VISIT, EST, LEVL IV, 30-39 MIN: ICD-10-PCS | Mod: S$GLB,,, | Performed by: NURSE PRACTITIONER

## 2022-07-27 PROCEDURE — 99999 PR PBB SHADOW E&M-EST. PATIENT-LVL V: CPT | Mod: PBBFAC,,, | Performed by: NURSE PRACTITIONER

## 2022-07-27 RX ORDER — PANTOPRAZOLE SODIUM 40 MG/1
40 TABLET, DELAYED RELEASE ORAL DAILY
Qty: 30 TABLET | Refills: 2 | Status: SHIPPED | OUTPATIENT
Start: 2022-07-27 | End: 2022-11-02

## 2022-07-27 RX ORDER — GRANULES FOR ORAL 3 G/1
3 POWDER ORAL
COMMUNITY
Start: 2022-07-28 | End: 2022-07-28

## 2022-07-27 NOTE — PROGRESS NOTES
Subjective:       Patient ID: Breanna Sanchez is a 41 y.o. female, Body mass index is 33.66 kg/m².    Chief Complaint: Follow-up      Established patient of Dr. Mae & myself.     Abdominal Pain  This is a recurrent problem. The current episode started more than 1 month ago (Recurred 6/24/22). The onset quality is sudden. The problem occurs constantly. The problem has been resolved. The pain is located in the epigastric region, LUQ and LLQ. The quality of the pain is aching and sharp. The abdominal pain does not radiate. Associated symptoms include diarrhea (intermittent; occurs 5-10 times per day when present; has not tried Bentyl for diarrhea) and nausea (occ; Phenergan helps). Pertinent negatives include no anorexia (Resolved), belching, constipation, dysuria, fever, flatus, frequency, hematochezia (Resolved), melena, vomiting or weight loss. The pain is aggravated by palpation and eating. The pain is relieved by nothing. She has tried antibiotics (Past: Augmentin and Prednisone for enteritis- helped; Bentyl 20 mg QID PRN- helped; Currently: Protonix 40 mg once daily) for the symptoms. Prior diagnostic workup includes CT scan, GI consult and lower endoscopy. Her past medical history is significant for abdominal surgery (Hx of appendectomy and hysterectomy). There is no history of colon cancer, Crohn's disease, gallstones, GERD, irritable bowel syndrome, pancreatitis, PUD or ulcerative colitis.     Review of Systems   Constitutional: Negative for appetite change, fever, unexpected weight change and weight loss.   HENT: Negative for trouble swallowing.    Respiratory: Negative for cough and shortness of breath.    Cardiovascular: Negative for chest pain.   Gastrointestinal: Positive for abdominal pain, diarrhea (intermittent; occurs 5-10 times per day when present; has not tried Bentyl for diarrhea) and nausea (occ; Phenergan helps). Negative for abdominal distention, anal bleeding, anorexia (Resolved),  blood in stool, constipation, flatus, hematochezia (Resolved), melena, rectal pain and vomiting.   Genitourinary: Negative for difficulty urinating, dysuria and frequency.   Musculoskeletal: Negative for gait problem.   Skin: Negative for rash.   Neurological: Negative for speech difficulty.   Psychiatric/Behavioral: Negative for confusion.       Past Medical History:   Diagnosis Date    ADHD     Ankylosing spondylitis     Anxiety     Colitis     Colon polyp     Depression     Essential hypertension     Fibromyalgia     History of kidney stones     Lupus     Migraine headache     Port-A-Cath in place     Primary immune deficiency disorder     Recurrent UTI     Rheumatoid arteritis     Sjoegren syndrome       Past Surgical History:   Procedure Laterality Date    APPENDECTOMY      BARTHOLIN GLAND CYST EXCISION      COLONOSCOPY  2011    COLONOSCOPY N/A 7/7/2022    Procedure: COLONOSCOPY;  Surgeon: Shaggy Mae MD;  Location: [facility] ENDO;  Service: Endoscopy;  Laterality: N/A;    DILATION OF URETHRA      X 2    HYSTERECTOMY      INSERTION OF TUNNELED CENTRAL VENOUS CATHETER (CVC) WITH SUBCUTANEOUS PORT N/A 10/2/2019    Procedure: [id number];  Surgeon: Lenin Springer MD;  Location: [facility] OR;  Service: General;  Laterality: N/A;    KNEE ARTHROSCOPY Left     LAPAROSCOPIC LYSIS OF ADHESIONS N/A 7/22/2019    Procedure: LYSIS, ADHESIONS, LAPAROSCOPIC;  Surgeon: Clarence Adams MD;  Location: [facility] OR;  Service: OB/GYN;  Laterality: N/A;    LAPAROSCOPIC SALPINGO-OOPHORECTOMY Left 7/22/2019    Procedure: SALPINGO-OOPHORECTOMY, LAPAROSCOPIC- LEFT SIDE ONLY;  Surgeon: Clarence Adams MD;  Location: [facility] OR;  Service: OB/GYN;  Laterality: Left;    OVARIAN CYST REMOVAL Left     SALPINGOOPHORECTOMY Right     TONSILLECTOMY      TOTAL SHOULDER ARTHROPLASTY Right     TUBAL LIGATION        Family History   Problem Relation Age of Onset    Diabetes Mother         type 1    Stroke  Mother     Heart disease Mother     Rheum arthritis Mother     Cancer Father         prostate    Alzheimer's disease Father     Colon polyps Father     Cancer Sister         uterine    Ovarian cancer Sister     Diabetes Brother         type 2    Rheum arthritis Maternal Grandmother     Crohn's disease Neg Hx     Ulcerative colitis Neg Hx       Wt Readings from Last 10 Encounters:   07/27/22 86.2 kg (190 lb 0.6 oz)   07/26/22 84.8 kg (187 lb)   07/21/22 84.8 kg (186 lb 15.2 oz)   07/01/22 85.3 kg (188 lb)   06/29/22 85.6 kg (188 lb 11.4 oz)   06/28/22 86 kg (189 lb 8 oz)   06/24/22 85.6 kg (188 lb 12.8 oz)   06/17/22 86.5 kg (190 lb 12.9 oz)   04/22/22 81.6 kg (180 lb)   03/25/22 84.7 kg (186 lb 12.8 oz)     Lab Results   Component Value Date    WBC 11.25 04/14/2022    HGB 14.9 04/14/2022    HCT 45.9 04/14/2022    MCV 88 04/14/2022     04/14/2022     CMP  Sodium   Date Value Ref Range Status   04/14/2022 138 136 - 145 mmol/L Final     Potassium   Date Value Ref Range Status   04/14/2022 3.7 3.5 - 5.1 mmol/L Final     Chloride   Date Value Ref Range Status   04/14/2022 102 95 - 110 mmol/L Final     CO2   Date Value Ref Range Status   04/14/2022 27 23 - 29 mmol/L Final     Glucose   Date Value Ref Range Status   04/14/2022 121 (H) 70 - 110 mg/dL Final     BUN   Date Value Ref Range Status   04/14/2022 7 6 - 20 mg/dL Final     Creatinine   Date Value Ref Range Status   04/14/2022 0.6 0.5 - 1.4 mg/dL Final     Calcium   Date Value Ref Range Status   04/14/2022 9.6 8.7 - 10.5 mg/dL Final     Total Protein   Date Value Ref Range Status   04/14/2022 7.0 6.0 - 8.4 g/dL Final     Albumin   Date Value Ref Range Status   04/14/2022 3.9 3.5 - 5.2 g/dL Final     Total Bilirubin   Date Value Ref Range Status   04/14/2022 0.3 0.1 - 1.0 mg/dL Final     Comment:     For infants and newborns, interpretation of results should be based  on gestational age, weight and in agreement with  clinical  observations.    Premature Infant recommended reference ranges:  Up to 24 hours.............<8.0 mg/dL  Up to 48 hours............<12.0 mg/dL  3-5 days..................<15.0 mg/dL  6-29 days.................<15.0 mg/dL       Alkaline Phosphatase   Date Value Ref Range Status   04/14/2022 107 55 - 135 U/L Final     AST   Date Value Ref Range Status   04/14/2022 22 10 - 40 U/L Final     ALT   Date Value Ref Range Status   04/14/2022 37 10 - 44 U/L Final     Anion Gap   Date Value Ref Range Status   04/14/2022 9 8 - 16 mmol/L Final     eGFR if    Date Value Ref Range Status   04/14/2022 >60.0 >60 mL/min/1.73 m^2 Final     eGFR if non    Date Value Ref Range Status   04/14/2022 >60.0 >60 mL/min/1.73 m^2 Final     Comment:     Calculation used to obtain the estimated glomerular filtration  rate (eGFR) is the CKD-EPI equation.          Lab Results   Component Value Date    LIPASERES 117 03/11/2022             Reviewed prior medical records including radiology report of CT of abdomen and pelvis 7/22/22 and CT of abdomen and pelvis 6/24/22 (both in care everywhere) & endoscopy history (see surgical history).     Objective:      Physical Exam  Constitutional:       General: She is not in acute distress.     Appearance: She is well-developed.   HENT:      Head: Normocephalic.      Right Ear: Hearing normal.      Left Ear: Hearing normal.      Nose: Nose normal.      Mouth/Throat:      Comments: Pt wearing mask due to COVID concerns  Eyes:      General: Lids are normal.      Conjunctiva/sclera: Conjunctivae normal.      Pupils: Pupils are equal, round, and reactive to light.   Neck:      Trachea: Trachea normal.   Cardiovascular:      Rate and Rhythm: Normal rate and regular rhythm.      Heart sounds: Normal heart sounds. No murmur heard.  Pulmonary:      Effort: Pulmonary effort is normal. No respiratory distress.      Breath sounds: Normal breath sounds. No stridor. No wheezing.    Abdominal:      General: Bowel sounds are normal. There is no distension.      Palpations: Abdomen is soft. There is no mass.      Tenderness: There is no abdominal tenderness. There is no guarding or rebound.   Musculoskeletal:         General: Normal range of motion.      Cervical back: Normal range of motion.   Skin:     General: Skin is warm and dry.      Findings: No rash.      Comments: Non jaundiced   Neurological:      Mental Status: She is alert and oriented to person, place, and time.   Psychiatric:         Speech: Speech normal.         Behavior: Behavior normal. Behavior is cooperative.           Assessment:       1. History of colonoscopy    2. History of abdominal pain    3. Enteritis    4. Irritable bowel syndrome with diarrhea    5. History of colon polyps           Plan:   All diagnoses and orders for this visit:    History of colonoscopy   - Discussed results of colonoscopy, patient verbalized understanding    History of abdominal pain & Enteritis   - Resolved  - Refill and continue: pantoprazole (PROTONIX) 40 MG tablet; Take 1 tablet (40 mg total) by mouth once daily.  Dispense: 30 tablet; Refill: 2 (ok to taper off Protonix)    Irritable bowel syndrome with diarrhea  - Start: rifAXIMin (XIFAXAN) 550 mg Tab; Take 1 tablet (550 mg total) by mouth 3 (three) times daily. for 14 days  Dispense: 42 tablet; Refill: 0  - Continue Bentyl 20 mg QID PRN  - Recommended increase fiber in diet, especially soluble fiber since this can help bulk up the stool consistency and may help to slow down how fast the stool goes through the colon and can prevent diarrhea    History of colon polyps   - Next surveillance colonoscopy due 7/2025    If no improvement in symptoms or symptoms worsen, call/follow-up at clinic or go to ER

## 2022-08-02 RX ORDER — KETOROLAC TROMETHAMINE 30 MG/ML
60 INJECTION, SOLUTION INTRAMUSCULAR; INTRAVENOUS
Status: COMPLETED | OUTPATIENT
Start: 2022-07-26 | End: 2022-07-26

## 2022-08-11 PROBLEM — U07.1 COVID: Status: ACTIVE | Noted: 2022-08-11

## 2022-08-11 RX ORDER — ONDANSETRON 4 MG/1
4 TABLET, ORALLY DISINTEGRATING ORAL
Status: ACTIVE | OUTPATIENT
Start: 2022-08-11 | End: 2022-08-12

## 2022-08-11 RX ORDER — ACETAMINOPHEN 325 MG/1
650 TABLET ORAL
Status: ACTIVE | OUTPATIENT
Start: 2022-08-11 | End: 2022-08-12

## 2022-08-11 RX ORDER — DIPHENHYDRAMINE HYDROCHLORIDE 50 MG/ML
25 INJECTION INTRAMUSCULAR; INTRAVENOUS
Status: ACTIVE | OUTPATIENT
Start: 2022-08-11 | End: 2022-08-12

## 2022-08-11 RX ORDER — EPINEPHRINE 0.3 MG/.3ML
0.3 INJECTION SUBCUTANEOUS
Status: ACTIVE | OUTPATIENT
Start: 2022-08-11 | End: 2022-08-14

## 2022-08-11 RX ORDER — ALBUTEROL SULFATE 90 UG/1
2 AEROSOL, METERED RESPIRATORY (INHALATION)
Status: ACTIVE | OUTPATIENT
Start: 2022-08-11 | End: 2022-08-14

## 2022-08-11 RX ORDER — BEBTELOVIMAB 87.5 MG/ML
175 INJECTION, SOLUTION INTRAVENOUS
Status: COMPLETED | OUTPATIENT
Start: 2022-08-11 | End: 2022-08-12

## 2022-08-12 ENCOUNTER — INFUSION (OUTPATIENT)
Dept: INFECTIOUS DISEASES | Facility: HOSPITAL | Age: 42
End: 2022-08-12
Attending: FAMILY MEDICINE
Payer: COMMERCIAL

## 2022-08-12 VITALS
TEMPERATURE: 97 F | OXYGEN SATURATION: 97 % | HEART RATE: 81 BPM | SYSTOLIC BLOOD PRESSURE: 123 MMHG | DIASTOLIC BLOOD PRESSURE: 88 MMHG | RESPIRATION RATE: 16 BRPM

## 2022-08-12 DIAGNOSIS — U07.1 COVID-19: ICD-10-CM

## 2022-08-12 RX ADMIN — BEBTELOVIMAB 175 MG: 87.5 INJECTION, SOLUTION INTRAVENOUS at 01:08

## 2022-08-12 NOTE — PLAN OF CARE
Pt received Bebtelovimab, EUA infusion, today. Monitored for 1 hour post infusion with no complications.

## 2022-08-12 NOTE — PATIENT INSTRUCTIONS
You should isolate for at least 5 days. To calculate your 5-day isolation period, day 0 is your first day of symptoms. Day 1 is the first full day after your symptoms developed. You can leave isolation after 5 full days.    You can end isolation after 5 full days if you are fever-free for 24 hours without the use of fever-reducing medication and your other symptoms have improved (Loss of taste and smell may persist for weeks or months after recovery and need not delay the end of isolation).    You should continue to wear a well-fitting mask around others at home and in public for 5 additional days (day 6 through day 10) after the end of your 5-day isolation period. If you are unable to wear a mask when around others, you should continue to isolate for a full 10 days. Avoid people who have weakened immune systems or are more likely to get very sick from COVID-19, and nursing homes and other high-risk settings, until after at least 10 days.    If you continue to have fever or your other symptoms have not improved after 5 days of isolation, you should wait to end your isolation until you are fever-free for 24 hours without the use of fever-reducing medication and your other symptoms have improved. Continue to wear a well-fitting mask through day 10. Contact your healthcare provider if you have questions.    Additionally, people who recently had COVID-19 may consider delaying their next booster by 3 months from when their symptoms started or, if they had no symptoms, when they first received a positive test. Reinfection is less likely in the weeks to months after infection. However, certain factors, such as personal risk of severe disease, local COVID-19 community level, and the dominant COVID-19 variant, could be reasons to get a vaccine sooner rather than later.    Talk to your healthcare professional if you have questions about when to get your next COVID-19 vaccine.

## 2022-08-19 DIAGNOSIS — M45.5 ANKYLOSING SPONDYLITIS OF THORACOLUMBAR REGION: ICD-10-CM

## 2022-08-19 RX ORDER — PREDNISONE 5 MG/1
TABLET ORAL
Qty: 60 TABLET | Refills: 3 | Status: SHIPPED | OUTPATIENT
Start: 2022-08-19 | End: 2023-07-24 | Stop reason: SDUPTHER

## 2022-08-19 NOTE — TELEPHONE ENCOUNTER
Pharmacy requesting refill on Prednisone 5mg  Pt's LOV 04/22/2022  Pt's NOV 08/22/2022  Medication pending

## 2022-08-22 ENCOUNTER — OFFICE VISIT (OUTPATIENT)
Dept: RHEUMATOLOGY | Facility: CLINIC | Age: 42
End: 2022-08-22
Payer: COMMERCIAL

## 2022-08-22 VITALS
SYSTOLIC BLOOD PRESSURE: 143 MMHG | HEIGHT: 63 IN | BODY MASS INDEX: 32.86 KG/M2 | DIASTOLIC BLOOD PRESSURE: 96 MMHG | WEIGHT: 185.44 LBS | HEART RATE: 114 BPM

## 2022-08-22 DIAGNOSIS — M25.50 GENERALIZED JOINT PAIN: ICD-10-CM

## 2022-08-22 DIAGNOSIS — M79.7 FIBROMYALGIA: ICD-10-CM

## 2022-08-22 DIAGNOSIS — D84.9 IMMUNOCOMPROMISED: ICD-10-CM

## 2022-08-22 DIAGNOSIS — M54.50 LOW BACK PAIN, UNSPECIFIED BACK PAIN LATERALITY, UNSPECIFIED CHRONICITY, UNSPECIFIED WHETHER SCIATICA PRESENT: ICD-10-CM

## 2022-08-22 DIAGNOSIS — M45.5 ANKYLOSING SPONDYLITIS OF THORACOLUMBAR REGION: Primary | ICD-10-CM

## 2022-08-22 DIAGNOSIS — M35.00 SJOGREN'S SYNDROME, WITH UNSPECIFIED ORGAN INVOLVEMENT: ICD-10-CM

## 2022-08-22 DIAGNOSIS — M25.511 PAIN OF BOTH SHOULDER JOINTS: ICD-10-CM

## 2022-08-22 DIAGNOSIS — M25.512 PAIN OF BOTH SHOULDER JOINTS: ICD-10-CM

## 2022-08-22 DIAGNOSIS — E55.9 VITAMIN D DEFICIENCY: ICD-10-CM

## 2022-08-22 DIAGNOSIS — G43.009 MIGRAINE WITHOUT AURA AND WITHOUT STATUS MIGRAINOSUS, NOT INTRACTABLE: ICD-10-CM

## 2022-08-22 PROCEDURE — 99999 PR PBB SHADOW E&M-EST. PATIENT-LVL IV: CPT | Mod: PBBFAC,,, | Performed by: INTERNAL MEDICINE

## 2022-08-22 PROCEDURE — 99215 PR OFFICE/OUTPT VISIT, EST, LEVL V, 40-54 MIN: ICD-10-PCS | Mod: 25,S$GLB,, | Performed by: INTERNAL MEDICINE

## 2022-08-22 PROCEDURE — 99215 OFFICE O/P EST HI 40 MIN: CPT | Mod: 25,S$GLB,, | Performed by: INTERNAL MEDICINE

## 2022-08-22 PROCEDURE — 96372 THER/PROPH/DIAG INJ SC/IM: CPT | Mod: S$GLB,,, | Performed by: INTERNAL MEDICINE

## 2022-08-22 PROCEDURE — 1159F PR MEDICATION LIST DOCUMENTED IN MEDICAL RECORD: ICD-10-PCS | Mod: CPTII,S$GLB,, | Performed by: INTERNAL MEDICINE

## 2022-08-22 PROCEDURE — 3008F BODY MASS INDEX DOCD: CPT | Mod: CPTII,S$GLB,, | Performed by: INTERNAL MEDICINE

## 2022-08-22 PROCEDURE — 99999 PR PBB SHADOW E&M-EST. PATIENT-LVL IV: ICD-10-PCS | Mod: PBBFAC,,, | Performed by: INTERNAL MEDICINE

## 2022-08-22 PROCEDURE — 96372 PR INJECTION,THERAP/PROPH/DIAG2ST, IM OR SUBCUT: ICD-10-PCS | Mod: S$GLB,,, | Performed by: INTERNAL MEDICINE

## 2022-08-22 PROCEDURE — 1159F MED LIST DOCD IN RCRD: CPT | Mod: CPTII,S$GLB,, | Performed by: INTERNAL MEDICINE

## 2022-08-22 PROCEDURE — 3008F PR BODY MASS INDEX (BMI) DOCUMENTED: ICD-10-PCS | Mod: CPTII,S$GLB,, | Performed by: INTERNAL MEDICINE

## 2022-08-22 RX ORDER — METHYLPREDNISOLONE ACETATE 80 MG/ML
160 INJECTION, SUSPENSION INTRA-ARTICULAR; INTRALESIONAL; INTRAMUSCULAR; SOFT TISSUE
Status: COMPLETED | OUTPATIENT
Start: 2022-08-22 | End: 2022-08-22

## 2022-08-22 RX ORDER — KETOROLAC TROMETHAMINE 30 MG/ML
60 INJECTION, SOLUTION INTRAMUSCULAR; INTRAVENOUS
Status: COMPLETED | OUTPATIENT
Start: 2022-08-22 | End: 2022-08-22

## 2022-08-22 RX ORDER — CYANOCOBALAMIN 1000 UG/ML
1000 INJECTION, SOLUTION INTRAMUSCULAR; SUBCUTANEOUS
Status: COMPLETED | OUTPATIENT
Start: 2022-08-22 | End: 2022-08-22

## 2022-08-22 RX ORDER — GABAPENTIN 300 MG/1
300 CAPSULE ORAL 3 TIMES DAILY
Qty: 90 CAPSULE | Refills: 0 | Status: SHIPPED | OUTPATIENT
Start: 2022-08-22 | End: 2022-10-18

## 2022-08-22 RX ADMIN — KETOROLAC TROMETHAMINE 60 MG: 30 INJECTION, SOLUTION INTRAMUSCULAR; INTRAVENOUS at 01:08

## 2022-08-22 RX ADMIN — METHYLPREDNISOLONE ACETATE 160 MG: 80 INJECTION, SUSPENSION INTRA-ARTICULAR; INTRALESIONAL; INTRAMUSCULAR; SOFT TISSUE at 01:08

## 2022-08-22 RX ADMIN — CYANOCOBALAMIN 1000 MCG: 1000 INJECTION, SOLUTION INTRAMUSCULAR; SUBCUTANEOUS at 01:08

## 2022-08-22 ASSESSMENT — ROUTINE ASSESSMENT OF PATIENT INDEX DATA (RAPID3)
PSYCHOLOGICAL DISTRESS SCORE: 3.3
PATIENT GLOBAL ASSESSMENT SCORE: 8.5
TOTAL RAPID3 SCORE: 6.61
FATIGUE SCORE: 2.2
PAIN SCORE: 8
MDHAQ FUNCTION SCORE: 1

## 2022-08-22 NOTE — PROGRESS NOTES
Subjective:       Patient ID: Breanna Sanchez is a 41 y.o. female.    Chief Complaint: Disease Management    Follow up: 41 year old female who presents to clinic for follow up on AS/RA.she has stress for family.  She is doing poorly  Due to arthritis flares. 7/22/22 pt was hospitalized for sepsis and then  Had a colonscopy to rule out UC/ Crohn's so she had to hold tx x 1 month so she is flaring. She has not been able to take cosentyx consistently due to infections. She continues to have joint pain in her hands, wrists, shoulders, neck, back, and SI joints. Pain is constant and aching. Worse at night and in the morning. She is not taking nsaid routinely but will take ibuprofen 800 PRN. Allergy to naproxen--history of kidney failure as 14 year old? No anaphylaxis or other allergic response but told to avoid.    Labs show IgG total, IgG subclass 2 and 4 are lower than prior testing.   Port has not been accessed in 1 year.    Current tx:  1. Cosentyx 150 monthly- on hold    DR Holden is restarting monthly SCIG for hypogamm.. Because she is on the chronic immunologic medication,             She complains of joint swelling. Associated symptoms include fatigue and myalgias. Pertinent negatives include no fever or headaches.         Review of Systems   Constitutional: Positive for activity change and fatigue. Negative for appetite change, chills and fever.   Eyes: Negative for visual disturbance.   Respiratory: Negative for cough and shortness of breath.    Cardiovascular: Negative for chest pain, palpitations and leg swelling.   Gastrointestinal: Negative for abdominal pain, constipation, diarrhea, nausea and vomiting.   Musculoskeletal: Positive for arthralgias, back pain, joint swelling, myalgias, neck pain and neck stiffness.   Allergic/Immunologic: Positive for immunocompromised state.   Neurological: Negative for dizziness, weakness, light-headedness and headaches.   Psychiatric/Behavioral: The patient is not  nervous/anxious.          Objective:     Vitals:    08/22/22 1138   BP: (!) 143/96   Pulse: (!) 114       Past Medical History:   Diagnosis Date    ADHD     Ankylosing spondylitis     Anxiety     Colitis     Colon polyp     Depression     Essential hypertension     Fibromyalgia     History of kidney stones     Lupus     Migraine headache     Port-A-Cath in place     Primary immune deficiency disorder     Recurrent UTI     Rheumatoid arteritis     Sjoegren syndrome      Past Surgical History:   Procedure Laterality Date    APPENDECTOMY      BARTHOLIN GLAND CYST EXCISION      COLONOSCOPY  2011    COLONOSCOPY N/A 7/7/2022    Procedure: COLONOSCOPY;  Surgeon: Shaggy Mae MD;  Location: [facility] ENDO;  Service: Endoscopy;  Laterality: N/A;    DILATION OF URETHRA      X 2    HYSTERECTOMY      INSERTION OF TUNNELED CENTRAL VENOUS CATHETER (CVC) WITH SUBCUTANEOUS PORT N/A 10/2/2019    Procedure: [id number];  Surgeon: Lenin Springer MD;  Location: [facility] OR;  Service: General;  Laterality: N/A;    KNEE ARTHROSCOPY Left     LAPAROSCOPIC LYSIS OF ADHESIONS N/A 7/22/2019    Procedure: LYSIS, ADHESIONS, LAPAROSCOPIC;  Surgeon: Clarence Adams MD;  Location: [facility] OR;  Service: OB/GYN;  Laterality: N/A;    LAPAROSCOPIC SALPINGO-OOPHORECTOMY Left 7/22/2019    Procedure: SALPINGO-OOPHORECTOMY, LAPAROSCOPIC- LEFT SIDE ONLY;  Surgeon: Clarence Adams MD;  Location: [facility];  Service: OB/GYN;  Laterality: Left;    OVARIAN CYST REMOVAL Left     SALPINGOOPHORECTOMY Right     TONSILLECTOMY      TOTAL SHOULDER ARTHROPLASTY Right     TUBAL LIGATION            Physical Exam   Eyes: Right conjunctiva is not injected. Left conjunctiva is not injected.   Neck: No JVD present. No thyromegaly present.   Cardiovascular: Normal rate and regular rhythm. Exam reveals no decreased pulses.   Pulmonary/Chest: Effort normal.   Musculoskeletal:         General: Tenderness present.      Right shoulder:  "Tenderness present.      Left shoulder: Tenderness present.      Right elbow: Normal.      Left elbow: Normal.      Right wrist: Tenderness present.      Left wrist: Swelling and tenderness present.      Right knee: Normal.      Left knee: Normal.      Right lower leg: Edema present.      Left lower leg: Edema present.   Lymphadenopathy:     She has no cervical adenopathy.   Neurological: Gait normal.   Skin: No rash noted.   Psychiatric: Mood and affect normal.       Right Side Rheumatological Exam     Examination finds the elbow, knee, 1st PIP, 1st MCP, 2nd PIP, 2nd MCP, 3rd PIP, 3rd MCP, 4th PIP, 4th MCP, 5th PIP and 5th MCP normal.    The patient is tender to palpation of the shoulder and wrist    Left Side Rheumatological Exam     Examination finds the elbow, knee, 1st PIP, 2nd PIP, 3rd PIP, 4th PIP, 4th MCP, 5th PIP and 5th MCP normal.    The patient is tender to palpation of the shoulder, wrist, 1st MCP, 2nd MCP and 3rd MCP.    She has swelling of the wrist, 1st MCP, 2nd MCP and 3rd MCP      Back/Neck Exam   Tenderness Right paramedian tenderness of the Upper C-Spine, Lower C-Spine and Upper T-Spine.Left paramedian tenderness of the Upper C-Spine, Lower C-Spine and Upper T-Spine.           Final Pathologic Diagnosis [facility] DIAGNOSIS:   A.   COLON, RANDOM BIOPSIES:   - Benign colonic mucosa with scattered intramucosal lymphoid aggregates.   - Negative for acute, chronic, or microscopic colitis.   - Negative for dysplasia or malignancy.   B.   COLON, ASCENDING, POLYPECTOMY:   - Tubular adenoma without high grade dysplasia.   C.   COLON, DESCENDING, POLYPECTOMY:   - Tubular adenoma without high grade dysplasia.   D.   COLON, SIGMOID, POLYPECTOMY:   - Tubular adenoma without high grade dysplasia.   JAYNE CASANOVA M.D.   Report attached.   Performing site:   [address] [address]   "Disclaimer:  This case diagnosis was rendered completely by the outside   consultation pathologist " "and the case is electronically signed by an [facility]   pathologist listed below solely to release the report into the medical   record."    Comment: Interp By Marisela Mahoney M.D., Signed on 07/14/2022 at 09:05   Disclaimer Unless the case is a 'gross only' or additional testing only, the final   diagnosis for each specimen is based on a microscopic examination of   appropriate tissue sections.    Resulting Agency [facility]             Narrative  Performed by: [employer]  Pre-op Diagnosis: Abnormal CT scan [R93.89]   Enteritis [K52.9]   Abdominal pain, unspecified abdominal location [R10.9]   Diarrhea, unspecified type [R19.7]   Procedure(s):   COLONOSCOPY   1. Random colon bx- evaluate for microscopic colitis   2. Ascending colon polyp   3. Descending colon polyps   4. Sigmoid colon polyp   Which provider would you like to cc?->TYSON VALLE   Release to patient->Immediate   Specimen total (fresh, frozen, permanent):->4      Specimen Collected: 07/07/22 10:20 Last Resulted: 07/14/22 09:34              Results for orders placed or performed in visit on 07/21/22   Urine culture    Specimen: Urine, Clean Catch   Result Value Ref Range    Urine Culture, Routine No growth        Assessment:       1. Ankylosing spondylitis of thoracolumbar region    2. Low back pain, unspecified back pain laterality, unspecified chronicity, unspecified whether sciatica present    3. Generalized joint pain    4. Pain of both shoulder joints    5. Vitamin D deficiency    6. Immunocompromised    7. Sjogren's syndrome, with unspecified organ involvement    8. Fibromyalgia    9. Migraine without aura and without status migrainosus, not intractable            Plan:       Ankylosing spondylitis of thoracolumbar region  -     ketorolac injection 60 mg  -     methylPREDNISolone acetate injection 160 mg  -     cyanocobalamin injection 1,000 mcg  -     gabapentin (NEURONTIN) 300 MG capsule; Take 1 capsule (300 mg total) by mouth 3 (three) times daily.  " Dispense: 90 capsule; Refill: 0  -     CBC Auto Differential; Future; Expected date: 08/22/2022  -     Comprehensive Metabolic Panel; Future; Expected date: 08/22/2022  -     C-Reactive Protein; Future; Expected date: 08/22/2022  -     Sedimentation rate; Future; Expected date: 08/22/2022  -     AGAPITO Screen w/Reflex; Future; Expected date: 08/22/2022  -     Sjogrens syndrome-A extractable nuclear antibody; Future; Expected date: 08/22/2022  -     Sjogrens syndrome-B extractable nuclear antibody; Future; Expected date: 08/22/2022  -     Vitamin D; Future; Expected date: 08/22/2022    Low back pain, unspecified back pain laterality, unspecified chronicity, unspecified whether sciatica present  -     ketorolac injection 60 mg  -     methylPREDNISolone acetate injection 160 mg  -     cyanocobalamin injection 1,000 mcg  -     gabapentin (NEURONTIN) 300 MG capsule; Take 1 capsule (300 mg total) by mouth 3 (three) times daily.  Dispense: 90 capsule; Refill: 0  -     CBC Auto Differential; Future; Expected date: 08/22/2022  -     Comprehensive Metabolic Panel; Future; Expected date: 08/22/2022  -     C-Reactive Protein; Future; Expected date: 08/22/2022  -     Sedimentation rate; Future; Expected date: 08/22/2022  -     AGAPITO Screen w/Reflex; Future; Expected date: 08/22/2022  -     Sjogrens syndrome-A extractable nuclear antibody; Future; Expected date: 08/22/2022  -     Sjogrens syndrome-B extractable nuclear antibody; Future; Expected date: 08/22/2022  -     Vitamin D; Future; Expected date: 08/22/2022    Generalized joint pain  -     ketorolac injection 60 mg  -     methylPREDNISolone acetate injection 160 mg  -     cyanocobalamin injection 1,000 mcg  -     CBC Auto Differential; Future; Expected date: 08/22/2022  -     Comprehensive Metabolic Panel; Future; Expected date: 08/22/2022  -     C-Reactive Protein; Future; Expected date: 08/22/2022  -     Sedimentation rate; Future; Expected date: 08/22/2022  -     AGAPITO Screen  w/Reflex; Future; Expected date: 08/22/2022  -     Sjogrens syndrome-A extractable nuclear antibody; Future; Expected date: 08/22/2022  -     Sjogrens syndrome-B extractable nuclear antibody; Future; Expected date: 08/22/2022  -     Vitamin D; Future; Expected date: 08/22/2022    Pain of both shoulder joints  -     CBC Auto Differential; Future; Expected date: 08/22/2022  -     Comprehensive Metabolic Panel; Future; Expected date: 08/22/2022  -     C-Reactive Protein; Future; Expected date: 08/22/2022  -     Sedimentation rate; Future; Expected date: 08/22/2022  -     AGAPITO Screen w/Reflex; Future; Expected date: 08/22/2022  -     Sjogrens syndrome-A extractable nuclear antibody; Future; Expected date: 08/22/2022  -     Sjogrens syndrome-B extractable nuclear antibody; Future; Expected date: 08/22/2022  -     Vitamin D; Future; Expected date: 08/22/2022    Vitamin D deficiency  -     CBC Auto Differential; Future; Expected date: 08/22/2022  -     Comprehensive Metabolic Panel; Future; Expected date: 08/22/2022  -     C-Reactive Protein; Future; Expected date: 08/22/2022  -     Sedimentation rate; Future; Expected date: 08/22/2022  -     AGAPITO Screen w/Reflex; Future; Expected date: 08/22/2022  -     Sjogrens syndrome-A extractable nuclear antibody; Future; Expected date: 08/22/2022  -     Sjogrens syndrome-B extractable nuclear antibody; Future; Expected date: 08/22/2022  -     Vitamin D; Future; Expected date: 08/22/2022    Immunocompromised  -     CBC Auto Differential; Future; Expected date: 08/22/2022  -     Comprehensive Metabolic Panel; Future; Expected date: 08/22/2022  -     C-Reactive Protein; Future; Expected date: 08/22/2022  -     Sedimentation rate; Future; Expected date: 08/22/2022  -     AGAPITO Screen w/Reflex; Future; Expected date: 08/22/2022  -     Sjogrens syndrome-A extractable nuclear antibody; Future; Expected date: 08/22/2022  -     Sjogrens syndrome-B extractable nuclear antibody; Future; Expected  date: 08/22/2022  -     Vitamin D; Future; Expected date: 08/22/2022    Sjogren's syndrome, with unspecified organ involvement    Fibromyalgia    Migraine without aura and without status migrainosus, not intractable        Assessment:  41 year old female with  Ankylosing spondylitis, sjogren's syndrome, CHARLIE, fibromyalgia  --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 Chery     Plan:  1. Nurse shots today  2. Immunology following scheduled  to discuss IVIG(SCIG)  3. Cosentyx 150 mg monthly when free of infection will go up tp 300 mg once SCIG starts  4. Dr Chrey managing pain control  5. Add gabapentin until pain medications are readjusted.

## 2022-08-22 NOTE — PROGRESS NOTES
2 pt identifiers used  Allergies reviewed      Administered 2 cc ( 30 mg/ml ) of toradol to the left upper outer gluteal. Patient tolerated injections well. Informed of s/s to report verbalized understanding. No adverse reactions noted.       Administered 2 cc ( 80 mg/ml ) of depomedrol to the right upper outer gluteal. Informed of s/s to report verbalized understanding. No adverse reactions noted.      Administered 1 cc ( 1000 mcg/ml ) of b12 to the right upper outer arm. Informed of s/s to report verbalized understanding. No adverse reactions noted.    Left facility in stable condition.

## 2022-09-13 ENCOUNTER — PATIENT MESSAGE (OUTPATIENT)
Dept: NEUROLOGY | Facility: CLINIC | Age: 42
End: 2022-09-13
Payer: COMMERCIAL

## 2022-09-14 ENCOUNTER — PATIENT MESSAGE (OUTPATIENT)
Dept: NEUROLOGY | Facility: CLINIC | Age: 42
End: 2022-09-14
Payer: COMMERCIAL

## 2022-09-15 RX ORDER — PREDNISONE 10 MG/1
TABLET ORAL
Qty: 20 TABLET | Refills: 0 | Status: ON HOLD | OUTPATIENT
Start: 2022-09-15 | End: 2023-01-11 | Stop reason: CLARIF

## 2022-09-21 ENCOUNTER — PATIENT MESSAGE (OUTPATIENT)
Dept: NEUROLOGY | Facility: CLINIC | Age: 42
End: 2022-09-21

## 2022-09-21 ENCOUNTER — PROCEDURE VISIT (OUTPATIENT)
Dept: NEUROLOGY | Facility: CLINIC | Age: 42
End: 2022-09-21
Payer: COMMERCIAL

## 2022-09-21 VITALS
DIASTOLIC BLOOD PRESSURE: 89 MMHG | BODY MASS INDEX: 34.02 KG/M2 | SYSTOLIC BLOOD PRESSURE: 137 MMHG | WEIGHT: 184.88 LBS | HEIGHT: 62 IN | RESPIRATION RATE: 17 BRPM | HEART RATE: 114 BPM

## 2022-09-21 DIAGNOSIS — G43.711 CHRONIC MIGRAINE WITHOUT AURA, WITH INTRACTABLE MIGRAINE, SO STATED, WITH STATUS MIGRAINOSUS: ICD-10-CM

## 2022-09-21 DIAGNOSIS — G43.719 INTRACTABLE CHRONIC MIGRAINE WITHOUT AURA AND WITHOUT STATUS MIGRAINOSUS: Primary | ICD-10-CM

## 2022-09-21 PROCEDURE — 96372 THER/PROPH/DIAG INJ SC/IM: CPT | Mod: 59,S$GLB,, | Performed by: PSYCHIATRY & NEUROLOGY

## 2022-09-21 PROCEDURE — 96372 PR INJECTION,THERAP/PROPH/DIAG2ST, IM OR SUBCUT: ICD-10-PCS | Mod: 59,S$GLB,, | Performed by: PSYCHIATRY & NEUROLOGY

## 2022-09-21 PROCEDURE — 64615 CHEMODENERV MUSC MIGRAINE: CPT | Mod: S$GLB,,, | Performed by: PSYCHIATRY & NEUROLOGY

## 2022-09-21 PROCEDURE — 64615 PR CHEMODENERVATION OF MUSCLE FOR CHRONIC MIGRAINE: ICD-10-PCS | Mod: S$GLB,,, | Performed by: PSYCHIATRY & NEUROLOGY

## 2022-09-21 RX ORDER — BUTALBITAL, ACETAMINOPHEN AND CAFFEINE 50; 325; 40 MG/1; MG/1; MG/1
TABLET ORAL
Qty: 12 TABLET | Refills: 3 | Status: SHIPPED | OUTPATIENT
Start: 2022-09-21 | End: 2022-11-02 | Stop reason: SDUPTHER

## 2022-09-21 RX ORDER — ONDANSETRON 2 MG/ML
4 INJECTION INTRAMUSCULAR; INTRAVENOUS
Status: COMPLETED | OUTPATIENT
Start: 2022-09-21 | End: 2022-09-21

## 2022-09-21 RX ORDER — KETOROLAC TROMETHAMINE 30 MG/ML
30 INJECTION, SOLUTION INTRAMUSCULAR; INTRAVENOUS ONCE
Status: COMPLETED | OUTPATIENT
Start: 2022-09-21 | End: 2022-09-21

## 2022-09-21 RX ADMIN — ONDANSETRON 4 MG: 2 INJECTION INTRAMUSCULAR; INTRAVENOUS at 09:09

## 2022-09-21 RX ADMIN — KETOROLAC TROMETHAMINE 30 MG: 30 INJECTION, SOLUTION INTRAMUSCULAR; INTRAVENOUS at 09:09

## 2022-09-21 NOTE — PROCEDURES
Procedures   BOTOX PROCEDURE    PROCEDURE PERFORMED: Botulinum toxin injection (54575)    CLINICAL INDICATION: G43.719    A time out was conducted just before the start of the procedure to verify the correct patient and procedure, procedure location, and all relevant critical information.     Conventional methods of treatment but the patient has been unresponsive and refractory.The patient meets criteria for chronic headaches according to the ICHD-II, the patient has more than 15 headaches a month which last for more than 4 hours a day.    The Botox injections have achieved well over 50%  improvement in the patient's symptoms. Migraines have been reduced at least 7 days per month and the number of cumulative hours suffering with headaches has been reduced at least 100 total hours per month. Today she does have a headache indicating that the Botox has worn off. Frequency of treatment is every 3 months unless no response to the treatments, at which time we will discontinue the injections.    DESCRIPTION OF PROCEDURE: After obtaining informed consent and under aseptic technique, a total of 155 units of botulinum toxin type A were injected in the following muscles: Procerus 5 units,  5 units bilaterally, frontalis 20 units, temporalis 20 units bilaterally,   occipitalis 15 units, upper cervical paraspinals 10 units bilaterally and trapezius 15 units bilaterally. The patient was given a total of 155 units in 31 sites.The patient tolerated the procedure well. There were no complications. The patient was given a prescription for repeat treatment in 3 months.     Unavoidable waste 45 units    The patient was added to the clinic because of protracted, severe headache. She admitted to nausea, right orbital, frontal and temporal region. I injected 30 mg of IV Toradol and 4 mg of IV Zofran very slow push.          Casie Winston M.D  Medical Director, Headache and Facial Pain  [facility]

## 2022-10-17 DIAGNOSIS — M54.50 LOW BACK PAIN, UNSPECIFIED BACK PAIN LATERALITY, UNSPECIFIED CHRONICITY, UNSPECIFIED WHETHER SCIATICA PRESENT: ICD-10-CM

## 2022-10-17 DIAGNOSIS — M45.5 ANKYLOSING SPONDYLITIS OF THORACOLUMBAR REGION: ICD-10-CM

## 2022-10-17 RX ORDER — PIROXICAM 20 MG/1
20 CAPSULE ORAL DAILY
Qty: 30 CAPSULE | Refills: 5 | Status: SHIPPED | OUTPATIENT
Start: 2022-10-17 | End: 2023-01-31

## 2022-10-18 RX ORDER — GABAPENTIN 300 MG/1
CAPSULE ORAL
Qty: 90 CAPSULE | Refills: 3 | Status: SHIPPED | OUTPATIENT
Start: 2022-10-18 | End: 2023-04-13 | Stop reason: SDUPTHER

## 2022-11-02 ENCOUNTER — OFFICE VISIT (OUTPATIENT)
Dept: NEUROLOGY | Facility: CLINIC | Age: 42
End: 2022-11-02
Payer: COMMERCIAL

## 2022-11-02 DIAGNOSIS — G43.719 INTRACTABLE CHRONIC MIGRAINE WITHOUT AURA AND WITHOUT STATUS MIGRAINOSUS: Primary | ICD-10-CM

## 2022-11-02 DIAGNOSIS — G43.719 INTRACTABLE CHRONIC MIGRAINE WITHOUT AURA AND WITHOUT STATUS MIGRAINOSUS: ICD-10-CM

## 2022-11-02 PROCEDURE — 1160F PR REVIEW ALL MEDS BY PRESCRIBER/CLIN PHARMACIST DOCUMENTED: ICD-10-PCS | Mod: CPTII,95,, | Performed by: NURSE PRACTITIONER

## 2022-11-02 PROCEDURE — 1159F MED LIST DOCD IN RCRD: CPT | Mod: CPTII,95,, | Performed by: NURSE PRACTITIONER

## 2022-11-02 PROCEDURE — 1159F PR MEDICATION LIST DOCUMENTED IN MEDICAL RECORD: ICD-10-PCS | Mod: CPTII,95,, | Performed by: NURSE PRACTITIONER

## 2022-11-02 PROCEDURE — 99213 OFFICE O/P EST LOW 20 MIN: CPT | Mod: 95,,, | Performed by: NURSE PRACTITIONER

## 2022-11-02 PROCEDURE — 99213 PR OFFICE/OUTPT VISIT, EST, LEVL III, 20-29 MIN: ICD-10-PCS | Mod: 95,,, | Performed by: NURSE PRACTITIONER

## 2022-11-02 PROCEDURE — 1160F RVW MEDS BY RX/DR IN RCRD: CPT | Mod: CPTII,95,, | Performed by: NURSE PRACTITIONER

## 2022-11-02 RX ORDER — BUTALBITAL, ACETAMINOPHEN AND CAFFEINE 50; 325; 40 MG/1; MG/1; MG/1
TABLET ORAL
Qty: 12 TABLET | Refills: 3 | Status: SHIPPED | OUTPATIENT
Start: 2022-11-02 | End: 2022-11-02 | Stop reason: SDUPTHER

## 2022-11-02 RX ORDER — BUTALBITAL, ACETAMINOPHEN AND CAFFEINE 50; 325; 40 MG/1; MG/1; MG/1
TABLET ORAL
Qty: 12 TABLET | Refills: 3 | Status: SHIPPED | OUTPATIENT
Start: 2022-11-02 | End: 2023-06-09 | Stop reason: SDUPTHER

## 2022-11-02 NOTE — PROGRESS NOTES
Subjective:       Patient ID: Breanna Sanchez is a 42 y.o. female.    Chief Complaint: Headache (Follow up)    HPI     INTERVAL HISTORY   The patient location is: home   The chief complaint leading to consultation is: follow up  Visit type: audiovisual  Face to Face time with patient: 15  20 minutes of total time spent on the encounter, which includes face to face time and non-face to face time preparing to see the patient (eg, review of tests), Obtaining and/or reviewing separately obtained history, Documenting clinical information in the electronic or other health record, Independently interpreting results (not separately reported) and communicating results to the patient/family/caregiver, or Care coordination (not separately reported).   Each patient to whom he or she provides medical services by telemedicine is:  (1) informed of the relationship between the physician and patient and the respective role of any other health care provider with respect to management of the patient; and (2) notified that he or she may decline to receive medical services by telemedicine and may withdraw from such care at any time.    Notes:     Last office visit was nine months ago with Dr. Winston and at that time plan was to start Botox. Third Botox session was six weeks ago.    Current prevention - Botox, gabapentin, nifedipine, desvenlafaxine      Current acute - phenergan, tizanidine, Ubrelvy - not effective      Today she reports she is much better. She reports five migraines in the past 6 weeks. Current headache pain 3 with range 1-7. She takes Ubrelvy but it is not effective. No side effects to Botox. Otherwise information below is reviewed and verified with no changes made     INITIAL HPI 2/15/22  The patient is a pleasant 42 y/o female presenting with chief complaint of headache. She has positive FHx of migraine affecting her late mother and her sister. SHe has 36 problems noted in the problem list. She describes onset of  headache for many years. She cannot recall when was the last time when she had a headache free day. She has seen Dr. Keaton Shook, neurologist, in [place]. She is on Topamax 100 mg daily, on it for over 10 years. This is causing frequent UTI's, she is to take Doxycyclin for UTI prevention. She admits to inconsistent use. Her headache is mild to moderate but it can become severe but not often. She is on Butrans and prn Percocet for her non-headache pain escalations. She is allowed up to 4 per day, but almost never uses that many. Work up included an MRI of the brain under Dr. Wallace. It apparently revealed white matter lesions consistent with migraine. I have reviewed the MRI of the cervical spine from 8/21/21 which was normal. Please see details of headache characteristics below.  Headache questionnaire    1. When did your Headaches start?    Years ago      2. Where are your headaches located?   All over      3. Your headache's characteristics:   Excruciating,Throbbing, Pounding, Stabbing    4. How long does the headache last?   Hours, days      5. How often does the headache occur?   daily      6. Are your headaches preceded or accompanied by other symptoms? yes   If yes, please describe.  Nausea,vomiting, light sensitivity      7. Does the headache awaken you at night? no   If so, how often? na        8. Please oksana the word that best describes your headache's intensity:    severe      9. Using a scale of 1 through 10, with 0 = no pain and 10 = the worst pain:   What score is your headache now? 3   What score is your headache at its worst? 8   What score is your headache at its best? 0        10. Possible associated headache symptoms:  [x]  Sensitivity to light  [] Dizziness  [] Nasal or sinus pressure/ pain   [x] Sensitivity to noise  [x] Vertigo  [x] Problems with concentration  [] Sensitivity to smells  [] Ringing in ears  [x] Problems with memory    [] Blurred vision  [x] Irritability  [x] Problems with task  completion   [] Double vision  [] Anger  [x]  Problems with relaxation  [] Loss of appetite  [] Anxiety  [x] Neck tightness, Neck pain  [x] Nausea   [] Nasal congestion  [x] Vomiting         11. Headache improving factors:  [] Sleep  [] Heat  [x] Darkness  [] Ice  [] Local pressure [] Menses (period)  [x] Massage   [x] Medications:        12. Headache worsening factors:   [x] Fatigue [] Sneezing  [x] Changes in Weather  [x] Light [x] Bending Over [x] Stress  [x] Noise [] Ovulation  [] Multiple Sclerosis Flare-Up  [x] Smells  [] Menses  [] Food   [] Coughing [] Alcohol      13. Number of caffeinated drinks per day: 2 cups of caffeine    14. Number of diet drinks per day:  0    Please Check any Medications or Procedures tried/failed for Headache    AED Neuromodulators  MAOIs  Ergot Alkaloids    Acetazolamide (Diamox) [] Phenelzine (Nardil) [] Dihydroergotamine (Migranal) []   Carbamazepine (Tegretol) [] Tranylcypromine (Parnate) [] Ergotamine (Ergomar) []   Gabapentin (Neurontin) [x] Antihistamine/Serotonergic  Triptans    Lacosamide (Vimpat) [] Cyproheptadine (Periactin) [] Almotriptan (Axert) []   Lamotrigine (Lamictal) [] Antihypertensives  Eletriptan (Relpax) []   Levatiracetam (Keppra) [] Atenolol (Tenormin) [] Frovatriptan (Frova) []   Oxcarbazepine (Trileptal) [] Bisoprostol (Zebeta) [] Naratriptan (Amerge) []   Phenobarbital [] Candesartan (Atacand) [] Rizatriptan (Maxalt) []     Nebivolol (Bystolic)  Sumatriptan (Imitrex) []   Levetiracetam (Keppra)  Cardeilol (Coreg) [] Zolmitriptan (Zomig) []   Phenytoin (Dilantin) [] Diltiazem (Cardizem) []     Pregabalin (Lyrica) [x] Lisinopril (Prinivil, Zestril) [] Combo Abortives    Primidone (Mysoline) [] Metoprolol (Toprol) [] BC Powder []   Tiagabine (Gabatril) [] Nadolol (Corgard) [] Butalbital and Acetaminophen (Bupap) []   Topiramate (Topamax)  (Trokendi) [x] Nicardipine (Cardene) []     Vigabatrin (Sabril) [] Nifedipine (Procardia) [x] Butalbital,  Acetaminophen, and caffeine (Fioricet) []   Valproic Acid (Depakote) (Divalproex Sodium) [] Propranolol (Inderal) []     Zonisamide (Zonegran) [] Telmisartan (Micardis) [] Butalbital, Aspirin, and caffeine (Fiorinal) []   Benzodiazepines  Timolol (Blocadren) []     Alprazolam (Xanax) [] Verapamil (Calan, Verelan) [] Butalbital, Caffeine, Acetaminophen, and Codeine (Fioricet with Codeine) []   Diazepam (Valium) [] NSAIDs      Lorazepam (Ativan) [] Acetaminophen (Tylenol) [x]     Clonazepam (Klonopin) [] Acetylsalicylic Acid (Aspirin) [] Butalbital, Caffeine, Aspirin, and Codeine  (Fiorinal with Codeine) []   Antidepressants  Diclofenac (Cambia) []     Amitriptyline (Elavil) [] Ibuprofen (Motrin) [x]     Desipramine (Norpramin) [] Indomethacin (Indocin) [] Aspirin, Caffeine, and Acetaminophen (Excedrin) (Goodys) []   Doxepin (Sinequan) [x] Ketoprofen (Orudis) []     Fluoxetine (Prozac) [] Ketorolac (Toradol) [] Acetaminophen, Dichloralphenazone, and Isometheptene (Midrin) []   Imipramine (Tofranil) [] Naproxen (Anaprox) (Aleve) []     Nortriptyline (Pamelor) [] Meclofenamic Acid (Meclomen) []     Venlafaxine (Effexor) [] Meloxicam (Mobic) [] Procedures    Desvenlafazine (Pristiq) [] Monoclonals  Greater occipital nerve block []   Duloxetine (Cymbalta) [x] Eptinezumab [] Cervical, Thoracic, Lumbar radiofrequency ablation []   Trazadone [] Erenumab-aooe (Aimovig) [] Spenopalatine ganglion block []   Wellbutrin [] Galcanezumab (Emgality) [] Occipital neuro stimulation []   Protriptyline (Vivactil) [] Fremanazumab-vfrm (Ajovy)  Cervical, Thoracic, Lumbar, Caudal Epidural steroid injection []   Escitalopram (Lexapro) [] Other [] Sacroiliac joint steroid injection []   Celexa [] Memantine (Namenda) [] Transforaminal epidural steroid injection []     Botox [] Facet joint injections []     Baclofen (Lioresal) [] Cervical, Thoracic, Lumbar medial branch blocks []       Cefaly []       Gamma Core []       Iovera []        Transcranial Magnetic stimulation []                             HEADACHE HISTORY    Ms. Sanchez reports the following information about her headaches:        Review of Systems   Constitutional:  Positive for fatigue. Negative for activity change, appetite change and fever.   HENT:  Negative for congestion, dental problem, hearing loss, sinus pressure, tinnitus, trouble swallowing and voice change.    Eyes:  Negative for photophobia, pain, redness and visual disturbance.   Respiratory:  Negative for cough, chest tightness and shortness of breath.    Cardiovascular:  Negative for chest pain, palpitations and leg swelling.   Gastrointestinal:  Negative for abdominal pain, blood in stool, nausea and vomiting.   Endocrine: Negative for cold intolerance and heat intolerance.   Genitourinary:  Negative for difficulty urinating, frequency, menstrual problem and urgency.   Musculoskeletal:  Positive for arthralgias and myalgias. Negative for back pain, gait problem, joint swelling, neck pain and neck stiffness.   Skin: Negative.    Neurological:  Negative for dizziness, tremors, seizures, syncope, facial asymmetry, speech difficulty, weakness, light-headedness, numbness and headaches.   Hematological:  Negative for adenopathy. Does not bruise/bleed easily.   Psychiatric/Behavioral:  Positive for dysphoric mood. Negative for agitation, behavioral problems, confusion, decreased concentration, self-injury, sleep disturbance and suicidal ideas. The patient is nervous/anxious. The patient is not hyperactive.              Past Medical History:   Diagnosis Date    ADHD     Ankylosing spondylitis     Anxiety     Colitis     Colon polyp     Depression     Essential hypertension     Fibromyalgia     History of kidney stones     Lupus     Migraine headache     Port-A-Cath in place     Primary immune deficiency disorder     Recurrent UTI     Rheumatoid arteritis     Sjoegren syndrome      Past Surgical History:   Procedure Laterality  Date    APPENDECTOMY      BARTHOLIN GLAND CYST EXCISION      COLONOSCOPY  2011    COLONOSCOPY N/A 7/7/2022    Procedure: COLONOSCOPY;  Surgeon: Shaggy Mae MD;  Location: [facility] ENDO;  Service: Endoscopy;  Laterality: N/A;    DILATION OF URETHRA      X 2    HYSTERECTOMY      INSERTION OF TUNNELED CENTRAL VENOUS CATHETER (CVC) WITH SUBCUTANEOUS PORT N/A 10/2/2019    Procedure: [id number];  Surgeon: Lenin Springer MD;  Location: [facility] OR;  Service: General;  Laterality: N/A;    KNEE ARTHROSCOPY Left     LAPAROSCOPIC LYSIS OF ADHESIONS N/A 7/22/2019    Procedure: LYSIS, ADHESIONS, LAPAROSCOPIC;  Surgeon: Clarence Adams MD;  Location: [facility] OR;  Service: OB/GYN;  Laterality: N/A;    LAPAROSCOPIC SALPINGO-OOPHORECTOMY Left 7/22/2019    Procedure: SALPINGO-OOPHORECTOMY, LAPAROSCOPIC- LEFT SIDE ONLY;  Surgeon: Clarence Adams MD;  Location: [facility] OR;  Service: OB/GYN;  Laterality: Left;    OVARIAN CYST REMOVAL Left     SALPINGOOPHORECTOMY Right     TONSILLECTOMY      TOTAL SHOULDER ARTHROPLASTY Right     TUBAL LIGATION       Family History   Problem Relation Age of Onset    Diabetes Mother         type 1    Stroke Mother     Heart disease Mother     Rheum arthritis Mother     Cancer Father         prostate    Alzheimer's disease Father     Colon polyps Father     Cancer Sister         uterine    Ovarian cancer Sister     Diabetes Brother         type 2    Rheum arthritis Maternal Grandmother     Crohn's disease Neg Hx     Ulcerative colitis Neg Hx      Social History     Socioeconomic History    Marital status:    Tobacco Use    Smoking status: Every Day     Packs/day: 0.50     Years: 23.00     Pack years: 11.50     Types: Cigarettes     Start date: 2/10/1995    Smokeless tobacco: Never   Substance and Sexual Activity    Alcohol use: Not Currently     Comment: rarely    Drug use: No    Sexual activity: Yes     Partners: Male     Review of patient's allergies indicates:   Allergen Reactions     Seroquel [quetiapine] Anaphylaxis    Aleve [naproxen sodium]     Sulfa (sulfonamide antibiotics)     Tramadol      seizure    Levaquin [levofloxacin] Other (See Comments)     Tendon tear    Miralax [polyethylene glycol 3350] Rash       Current Outpatient Medications:     amoxicillin-clavulanate 875-125mg (AUGMENTIN) 875-125 mg per tablet, Take 1 tablet by mouth 2 (two) times daily., Disp: , Rfl:     butalbital-acetaminophen-caffeine -40 mg (FIORICET, ESGIC) -40 mg per tablet, 1 tab PO q6 hr PRN severe migraine. No more than 10 tab per 30 days., Disp: 12 tablet, Rfl: 3    cyanocobalamin 1,000 mcg/mL injection, INJECT 1 ML INTO THE MUSCLE EVERY 28 DAYS., Disp: 3 mL, Rfl: 0    desvenlafaxine succinate (PRISTIQ) 50 MG Tb24, Take 100 mg by mouth once daily., Disp: , Rfl:     dextroamphetamine-amphetamine (ADDERALL) 20 mg tablet, Take 1 tablet by mouth 2 (two) times daily., Disp: , Rfl:     dicyclomine (BENTYL) 20 mg tablet, TAKE 1 TABLET BY MOUTH EVERY 6 HOURS, Disp: 120 tablet, Rfl: 0    doxycycline (MONODOX) 100 MG capsule, Take 100 mg by mouth 2 (two) times daily., Disp: , Rfl:     epinephrine (EPIPEN INJ), Inject 1 application as directed continuous prn (anaphylaxis)., Disp: , Rfl:     ergocalciferol (ERGOCALCIFEROL) 50,000 unit Cap, Take 1 capsule (50,000 Units total) by mouth every 7 days., Disp: 12 capsule, Rfl: 3    gabapentin (NEURONTIN) 300 MG capsule, TAKE 1 CAPSULE BY MOUTH THREE TIMES A DAY, Disp: 90 capsule, Rfl: 3    L.acidophil,parac-S.therm-Bif. (RISAQUAD) Cap capsule, Take 1 capsule by mouth once daily., Disp: 30 capsule, Rfl: 0    levocetirizine (XYZAL) 5 MG tablet, Take 5 mg by mouth every evening., Disp: , Rfl:     lidocaine-prilocaine (EMLA) cream, Apply topically as needed. (Patient taking differently: Apply 1 each topically as needed (PAC access).), Disp: 30 g, Rfl: 3    morphine (MS CONTIN) 30 MG 12 hr tablet, Take 30 mg by mouth 2 (two) times daily., Disp: , Rfl:     NARCAN 4  mg/actuation Spry, 1 spray by Nasal route once. , Disp: , Rfl:     NIFEdipine (PROCARDIA-XL) 60 MG (OSM) 24 hr tablet, Take 1 tablet (60 mg total) by mouth once daily., Disp: 90 tablet, Rfl: 1    oxyCODONE-acetaminophen (PERCOCET)  mg per tablet, Take 1 tablet by mouth 3 (three) times daily as needed for Pain (7-10)., Disp: , Rfl:     piroxicam (FELDENE) 20 MG capsule, Take 1 capsule (20 mg total) by mouth once daily., Disp: 30 capsule, Rfl: 5    predniSONE (DELTASONE) 10 MG tablet, 4 tab PO in AM  X 2 days, 3 tab in AM x 2 days, 2 tab in AM x 2 days, 1 tab in AM x 2 days then stop., Disp: 20 tablet, Rfl: 0    predniSONE (DELTASONE) 5 MG tablet, Take 2 tablets by mouth daily as needed for arthritis flare, Disp: 60 tablet, Rfl: 3    promethazine (PHENERGAN) 25 MG tablet, TAKE 1 TABLET BY MOUTH EVERY 6 HOURS AS NEEDED FOR NAUSEA, Disp: 45 tablet, Rfl: 3    secukinumab (COSENTYX PEN) 150 mg/mL PnIj, Inject 150 mg into the skin every 30 days., Disp: 1 mL, Rfl: 5    secukinumab (COSENTYX PEN) 150 mg/mL PnIj, Inject 150 mg into the skin every 30 days., Disp: 1 mL, Rfl: 6    tiZANidine (ZANAFLEX) 4 MG tablet, Take 1 tablet (4 mg total) by mouth every 8 (eight) hours., Disp: 90 tablet, Rfl: 3    ubrogepant (UBROGEPANT) 100 mg tablet, Take 100 mg by mouth as needed., Disp: , Rfl:     URO--10-40.8-36 mg Cap, Take 1 capsule by mouth once daily. , Disp: , Rfl:     Current Facility-Administered Medications:     onabotulinumtoxina injection 200 Units, 200 Units, Intramuscular, Q90 Days, Yuli Winston MD    onabotulinumtoxina injection 200 Units, 200 Units, Intramuscular, Q90 Days, Yuli Winston MD, 200 Units at 03/18/22 0835    onabotulinumtoxina injection 200 Units, 200 Units, Intramuscular, Q90 Days, Yuli Winston MD, 200 Units at 06/17/22 1332    onabotulinumtoxina injection 200 Units, 200 Units, Intramuscular, Q90 Days, Yuli Winston MD, 200 Units at 09/21/22  0903    Facility-Administered Medications Ordered in Other Visits:     acetaminophen tablet 650 mg, 650 mg, Oral, PRN, Michael Sawant MD    acetaminophen tablet 650 mg, 650 mg, Oral, PRN, Michael Sawant MD    lactated ringers infusion, , Intravenous, Continuous, Shaggy Mae MD, Last Rate: 75 mL/hr at 07/07/22 0858, New Bag at 07/07/22 0858    sodium chloride 0.9% 100 mL flush bag, , Intravenous, PRN, Michael Sawant MD    sodium chloride 0.9% flush 10 mL, 10 mL, Intravenous, PRN, Michael Sawant MD    sodium chloride 0.9% flush 10 mL, 10 mL, Intravenous, PRN, Michael Sawant MD    sodium chloride 0.9% flush 10 mL, 10 mL, Intravenous, PRN, Michael Sawant MD    sodium chloride 0.9% flush 10 mL, 10 mL, Intravenous, Q6H PRN, Shaggy Mae MD      Objective:      There were no vitals filed for this visit.    There is no height or weight on file to calculate BMI.    Physical Exam    11/2/22  Constitutional:   She appears well-developed and well-nourished. She is well groomed     Neurological Exam:  General: well-developed, well-nourished, no distress  Mental status: Awake and alert  Speech language: No dysarthria or aphasia on conversation  Cranial nerves: Face symmetric    Previous:  Constitutional:   She appears well-developed and well-nourished. She is well groomed    HENT:    Head: Atraumatic, oral and nasal mucosa intact  Eyes: Conjunctivae and EOM are normal. Pupils are equal, round, and reactive to light OU  Neck: Neck supple. No thyromegaly present  Cardiovascular: Normal rate and normal heart sounds  No murmur heard  Pulmonary/Chest: Effort normal and breath sounds normal  Musculoskeletal: Normal range of motion. No joint stiffness. No vertebral point tenderness  Skin: Skin is warm and dry  Psychiatric: Depressed mood and flat affect     Neuro exam:    Mental status:  Awake, attentive, Alert, oriented to self, place, year and month  Language function is intact    Cranial Nerves:  Smell was not  formally evaluated  Cranial Nerves II - XII: intact  Pursuits were smooth, normal saccades, no nystagmus OU  Funduscopic exam - disc were flat and pink, no exudates or hemorrhages OU  Motor - facial movement was symmetrical and normal     Palate moved well and was symmetrical with normal palatal and oral sensation  Tongue movements were full    Coordination:     Rapid alternating movements and rapid finger tapping - normal bilaterally  Finger to nose - normal and symmetric bilaterally   Heel to shin test - normal and symmetric bilaterally   Arm roll - smooth and symmetric   No intentional or positional tremor.     Motor:  Normal muscle bulk and symmetry. No fasciculations were noted    No pronator drift  Strength 5/5 bilaterally     Reflexes:  Tendon reflexes were 2 + at biceps, triceps, brachioradialis, patellar, and Achilles bilaterally  On plantar stimulation toes were down going bilaterally  No clonus was noted     Sensory: Intact to primary modalitiesin all extremities.     Gait: Romberg absent. Normal gait. Normal arm swing and turns. Normal postural reflexes.     Review of Data:  Lab Results   Component Value Date    BNP <10 01/23/2019     04/14/2022    K 3.7 04/14/2022    MG 2.2 04/19/2021     04/14/2022    CO2 27 04/14/2022    BUN 7 04/14/2022    CREATININE 0.6 04/14/2022     (H) 04/14/2022    HGBA1C 5.3 01/23/2019    AST 22 04/14/2022    ALT 37 04/14/2022    ALBUMIN 3.9 04/14/2022    PROT 7.0 04/14/2022    BILITOT 0.3 04/14/2022    CHOL 206 (H) 07/28/2021    HDL 57 07/28/2021    LDLCALC 128.2 07/28/2021    TRIG 104 07/28/2021       Lab Results   Component Value Date    WBC 11.25 04/14/2022    HGB 14.9 04/14/2022    HCT 45.9 04/14/2022    MCV 88 04/14/2022     04/14/2022       Lab Results   Component Value Date    TSH 0.385 (L) 12/28/2020             Assessment and Plan   BOTOX  The patient has chronic migraines ( G43.719) and suffers from headaches more than 3 months, more than 15  days of headache days per month lasting more than 4 hours with at least 8 attacks that meet criteria for migraine. She has tried multiple medications including but not limited to Topamax, Gabapentin, Lyrica, Doxepin, Cymbalta, Procardia. The patient is an ideal candidate for Botox. After treatment, I expect 50%  improvement in the patient's symptoms. A reduction of at least 7 days per month and the number of cumulative hours suffering with headaches as well as at least 100 total hours affected with migraine per month. After obtaining informed consent and under aseptic technique, a total of 155 units of botulinum toxin type A will be injected in the following muscles: Procerus 5 units,  5 units bilaterally, frontalis 20 units, temporalis 20 units bilaterally, occipitalis 15 units, upper cervical paraspinals 10 units bilaterally and trapezius 15 units bilaterally. The patient will receive a total of 155 units in 31 sites. Frequency of treatment is every 3 months unless no response to the treatments, at which time we will discontinue the injections.  Intractable chronic migraine without aura and without status migrainosus  -    discontinued Topamax. This is causing chronic urinary problems and is not helping her migraines. In addition, Doxycycline given for UTI prevention can be associated with headache. Advised to stop  -     will do limited Fioricet as Ubrelvy not effective.    She is s/p third Botox and has had at least 50% improvement in migraine frequency and severity. Will continue.     Multiple medical problems as noted in the problem list. This complicates and limits our treatment options    I have discussed the side effects of the medications prescribed and the patient acknowledges understanding      BALJEET Park

## 2022-11-02 NOTE — TELEPHONE ENCOUNTER
Called and spoke with Chelsey at [facility] and gave RX from Bozena Shah NP. Will send to NP to sign off.

## 2022-11-02 NOTE — TELEPHONE ENCOUNTER
----- Message from Rain Rivas sent at 11/2/2022 11:22 AM CDT -----  Regarding: pharmacy  Contact: Saleem with [facility]  Type:  Pharmacy Calling to Clarify an RX    Name of Caller:  Saleem  Pharmacy Name:  [facility]  Prescription Name:  butalbital-acetaminophen-caffeine -40 mg (FIORICET, ESGIC) -40 mg per tablet  What do they need to clarify?:  failed validation  Best Call Back Number:  [phone]  Additional Information:  Saleem is asking to have it called in to pharmacy.Thanks!

## 2022-11-07 ENCOUNTER — PATIENT MESSAGE (OUTPATIENT)
Dept: RHEUMATOLOGY | Facility: CLINIC | Age: 42
End: 2022-11-07
Payer: COMMERCIAL

## 2022-11-08 ENCOUNTER — PATIENT MESSAGE (OUTPATIENT)
Dept: RHEUMATOLOGY | Facility: CLINIC | Age: 42
End: 2022-11-08
Payer: COMMERCIAL

## 2022-11-17 ENCOUNTER — PATIENT MESSAGE (OUTPATIENT)
Dept: RHEUMATOLOGY | Facility: CLINIC | Age: 42
End: 2022-11-17
Payer: COMMERCIAL

## 2022-11-22 ENCOUNTER — TELEPHONE (OUTPATIENT)
Dept: RHEUMATOLOGY | Facility: CLINIC | Age: 42
End: 2022-11-22
Payer: COMMERCIAL

## 2022-12-01 ENCOUNTER — TELEPHONE (OUTPATIENT)
Dept: INFUSION THERAPY | Facility: HOSPITAL | Age: 42
End: 2022-12-01

## 2022-12-01 NOTE — TELEPHONE ENCOUNTER
Ok toradol 60, depo 160, b12  She also sent a message regarding Dr. Sawant taking over pain medication. Please let her know this would need to occur after an office visit so we can discuss and document. She also mentioned morphine, which Dr. Sawant will not prescribe and I'm not sure if she wanted her to or prescribe this or not. Please call and update the patient. I will also discuss options at her appt on 12/15/22.

## 2022-12-01 NOTE — TELEPHONE ENCOUNTER
ANGELICAM in regards to missed port flush appts. Pt has missed last few appts to have port flushed. Instructed pt to return call at [phone] to further discuss if she needs has PAC or if she is getting elsewhere.

## 2022-12-02 NOTE — TELEPHONE ENCOUNTER
Contacted patient and scheduled a nurse visit for next week. Informed patient that Dr Sawant taking over pain management will need to be discussed at next office visit so it can be documented in her chart. Verbalized understanding.

## 2022-12-06 ENCOUNTER — TELEPHONE (OUTPATIENT)
Dept: RHEUMATOLOGY | Facility: CLINIC | Age: 42
End: 2022-12-06
Payer: COMMERCIAL

## 2022-12-06 NOTE — TELEPHONE ENCOUNTER
Patient is cancelling her nurse visit for today. States she will have an appointment with Ms Margo Acuna PA-C next week and will ask for injections then.

## 2022-12-14 ENCOUNTER — PROCEDURE VISIT (OUTPATIENT)
Dept: NEUROLOGY | Facility: CLINIC | Age: 42
End: 2022-12-14
Payer: COMMERCIAL

## 2022-12-14 VITALS
HEART RATE: 102 BPM | BODY MASS INDEX: 30.73 KG/M2 | HEIGHT: 64 IN | WEIGHT: 180 LBS | SYSTOLIC BLOOD PRESSURE: 125 MMHG | DIASTOLIC BLOOD PRESSURE: 86 MMHG | RESPIRATION RATE: 17 BRPM

## 2022-12-14 DIAGNOSIS — G43.719 INTRACTABLE CHRONIC MIGRAINE WITHOUT AURA AND WITHOUT STATUS MIGRAINOSUS: Primary | ICD-10-CM

## 2022-12-14 PROCEDURE — 64615 PR CHEMODENERVATION OF MUSCLE FOR CHRONIC MIGRAINE: ICD-10-PCS | Mod: S$GLB,,, | Performed by: PSYCHIATRY & NEUROLOGY

## 2022-12-14 PROCEDURE — 64615 CHEMODENERV MUSC MIGRAINE: CPT | Mod: S$GLB,,, | Performed by: PSYCHIATRY & NEUROLOGY

## 2022-12-14 NOTE — PROCEDURES
Procedures   BOTOX PROCEDURE    PROCEDURE PERFORMED: Botulinum toxin injection (17977)    CLINICAL INDICATION: G43.719    A time out was conducted just before the start of the procedure to verify the correct patient and procedure, procedure location, and all relevant critical information.     Conventional methods of treatment but the patient has been unresponsive and refractory.The patient meets criteria for chronic headaches according to the ICHD-II, the patient has more than 15 headaches a month which last for more than 4 hours a day.    The Botox injections have achieved well over 50%  improvement in the patient's symptoms. Migraines have been reduced at least 7 days per month and the number of cumulative hours suffering with headaches has been reduced at least 100 total hours per month. Today she does have a headache indicating that the Botox has worn off. Frequency of treatment is every 3 months unless no response to the treatments, at which time we will discontinue the injections.    DESCRIPTION OF PROCEDURE: After obtaining informed consent and under aseptic technique, a total of 155 units of botulinum toxin type A were injected in the following muscles: Procerus 5 units,  5 units bilaterally, frontalis 20 units, temporalis 20 units bilaterally,   occipitalis 15 units, upper cervical paraspinals 10 units bilaterally and trapezius 15 units bilaterally. The patient was given a total of 155 units in 31 sites.The patient tolerated the procedure well. There were no complications. The patient was given a prescription for repeat treatment in 3 months.     Unavoidable waste 45 units          Casie Winston M.D  Medical Director, Headache and Facial Pain  [facility]

## 2022-12-15 ENCOUNTER — OFFICE VISIT (OUTPATIENT)
Dept: RHEUMATOLOGY | Facility: CLINIC | Age: 42
End: 2022-12-15
Payer: COMMERCIAL

## 2022-12-15 VITALS
HEIGHT: 64 IN | WEIGHT: 189 LBS | BODY MASS INDEX: 32.27 KG/M2 | SYSTOLIC BLOOD PRESSURE: 125 MMHG | HEART RATE: 109 BPM | DIASTOLIC BLOOD PRESSURE: 86 MMHG

## 2022-12-15 DIAGNOSIS — G89.4 CHRONIC PAIN SYNDROME: ICD-10-CM

## 2022-12-15 DIAGNOSIS — M45.5 ANKYLOSING SPONDYLITIS OF THORACOLUMBAR REGION: Primary | ICD-10-CM

## 2022-12-15 DIAGNOSIS — H60.90 OTITIS EXTERNA, UNSPECIFIED CHRONICITY, UNSPECIFIED LATERALITY, UNSPECIFIED TYPE: ICD-10-CM

## 2022-12-15 DIAGNOSIS — Z16.12 UTI DUE TO EXTENDED-SPECTRUM BETA LACTAMASE (ESBL) PRODUCING ESCHERICHIA COLI: Primary | ICD-10-CM

## 2022-12-15 DIAGNOSIS — D84.9 IMMUNOCOMPROMISED: ICD-10-CM

## 2022-12-15 DIAGNOSIS — M35.00 SJOGREN'S SYNDROME, WITH UNSPECIFIED ORGAN INVOLVEMENT: ICD-10-CM

## 2022-12-15 DIAGNOSIS — B96.29 UTI DUE TO EXTENDED-SPECTRUM BETA LACTAMASE (ESBL) PRODUCING ESCHERICHIA COLI: Primary | ICD-10-CM

## 2022-12-15 DIAGNOSIS — M45.5 ANKYLOSING SPONDYLITIS OF THORACOLUMBAR REGION: ICD-10-CM

## 2022-12-15 DIAGNOSIS — N39.0 UTI DUE TO EXTENDED-SPECTRUM BETA LACTAMASE (ESBL) PRODUCING ESCHERICHIA COLI: Primary | ICD-10-CM

## 2022-12-15 PROCEDURE — 99215 OFFICE O/P EST HI 40 MIN: CPT | Mod: 25,S$GLB,, | Performed by: PHYSICIAN ASSISTANT

## 2022-12-15 PROCEDURE — 3079F DIAST BP 80-89 MM HG: CPT | Mod: CPTII,S$GLB,, | Performed by: PHYSICIAN ASSISTANT

## 2022-12-15 PROCEDURE — 96372 THER/PROPH/DIAG INJ SC/IM: CPT | Mod: S$GLB,,, | Performed by: PHYSICIAN ASSISTANT

## 2022-12-15 PROCEDURE — 1159F PR MEDICATION LIST DOCUMENTED IN MEDICAL RECORD: ICD-10-PCS | Mod: CPTII,S$GLB,, | Performed by: PHYSICIAN ASSISTANT

## 2022-12-15 PROCEDURE — 3074F SYST BP LT 130 MM HG: CPT | Mod: CPTII,S$GLB,, | Performed by: PHYSICIAN ASSISTANT

## 2022-12-15 PROCEDURE — 99215 PR OFFICE/OUTPT VISIT, EST, LEVL V, 40-54 MIN: ICD-10-PCS | Mod: 25,S$GLB,, | Performed by: PHYSICIAN ASSISTANT

## 2022-12-15 PROCEDURE — 3008F PR BODY MASS INDEX (BMI) DOCUMENTED: ICD-10-PCS | Mod: CPTII,S$GLB,, | Performed by: PHYSICIAN ASSISTANT

## 2022-12-15 PROCEDURE — 3074F PR MOST RECENT SYSTOLIC BLOOD PRESSURE < 130 MM HG: ICD-10-PCS | Mod: CPTII,S$GLB,, | Performed by: PHYSICIAN ASSISTANT

## 2022-12-15 PROCEDURE — 1160F RVW MEDS BY RX/DR IN RCRD: CPT | Mod: CPTII,S$GLB,, | Performed by: PHYSICIAN ASSISTANT

## 2022-12-15 PROCEDURE — 3008F BODY MASS INDEX DOCD: CPT | Mod: CPTII,S$GLB,, | Performed by: PHYSICIAN ASSISTANT

## 2022-12-15 PROCEDURE — 96372 PR INJECTION,THERAP/PROPH/DIAG2ST, IM OR SUBCUT: ICD-10-PCS | Mod: S$GLB,,, | Performed by: PHYSICIAN ASSISTANT

## 2022-12-15 PROCEDURE — 1160F PR REVIEW ALL MEDS BY PRESCRIBER/CLIN PHARMACIST DOCUMENTED: ICD-10-PCS | Mod: CPTII,S$GLB,, | Performed by: PHYSICIAN ASSISTANT

## 2022-12-15 PROCEDURE — 1159F MED LIST DOCD IN RCRD: CPT | Mod: CPTII,S$GLB,, | Performed by: PHYSICIAN ASSISTANT

## 2022-12-15 PROCEDURE — 3079F PR MOST RECENT DIASTOLIC BLOOD PRESSURE 80-89 MM HG: ICD-10-PCS | Mod: CPTII,S$GLB,, | Performed by: PHYSICIAN ASSISTANT

## 2022-12-15 PROCEDURE — 99999 PR PBB SHADOW E&M-EST. PATIENT-LVL V: CPT | Mod: PBBFAC,,, | Performed by: PHYSICIAN ASSISTANT

## 2022-12-15 PROCEDURE — 99999 PR PBB SHADOW E&M-EST. PATIENT-LVL V: ICD-10-PCS | Mod: PBBFAC,,, | Performed by: PHYSICIAN ASSISTANT

## 2022-12-15 RX ORDER — KETOROLAC TROMETHAMINE 30 MG/ML
60 INJECTION, SOLUTION INTRAMUSCULAR; INTRAVENOUS
Status: COMPLETED | OUTPATIENT
Start: 2022-12-15 | End: 2022-12-15

## 2022-12-15 RX ORDER — NIFEDIPINE 60 MG/1
60 TABLET, EXTENDED RELEASE ORAL DAILY
Qty: 90 TABLET | Refills: 3 | Status: SHIPPED | OUTPATIENT
Start: 2022-12-15 | End: 2023-04-24 | Stop reason: SDUPTHER

## 2022-12-15 RX ORDER — CIPROFLOXACIN AND DEXAMETHASONE 3; 1 MG/ML; MG/ML
4 SUSPENSION/ DROPS AURICULAR (OTIC) 2 TIMES DAILY
Qty: 7.5 ML | Refills: 0 | Status: SHIPPED | OUTPATIENT
Start: 2022-12-15 | End: 2022-12-22

## 2022-12-15 RX ORDER — CYANOCOBALAMIN 1000 UG/ML
1000 INJECTION, SOLUTION INTRAMUSCULAR; SUBCUTANEOUS
Status: COMPLETED | OUTPATIENT
Start: 2022-12-15 | End: 2022-12-15

## 2022-12-15 RX ORDER — SECUKINUMAB 150 MG/ML
300 INJECTION SUBCUTANEOUS
Qty: 2 ML | Refills: 5 | Status: SHIPPED | OUTPATIENT
Start: 2022-12-15 | End: 2023-07-24

## 2022-12-15 RX ORDER — METHYLPREDNISOLONE ACETATE 80 MG/ML
160 INJECTION, SUSPENSION INTRA-ARTICULAR; INTRALESIONAL; INTRAMUSCULAR; SOFT TISSUE
Status: COMPLETED | OUTPATIENT
Start: 2022-12-15 | End: 2022-12-15

## 2022-12-15 RX ORDER — TIZANIDINE 4 MG/1
4 TABLET ORAL EVERY 8 HOURS
Qty: 270 TABLET | Refills: 3 | Status: SHIPPED | OUTPATIENT
Start: 2022-12-15 | End: 2023-11-15 | Stop reason: SDUPTHER

## 2022-12-15 RX ADMIN — METHYLPREDNISOLONE ACETATE 160 MG: 80 INJECTION, SUSPENSION INTRA-ARTICULAR; INTRALESIONAL; INTRAMUSCULAR; SOFT TISSUE at 09:12

## 2022-12-15 RX ADMIN — CYANOCOBALAMIN 1000 MCG: 1000 INJECTION, SOLUTION INTRAMUSCULAR; SUBCUTANEOUS at 09:12

## 2022-12-15 RX ADMIN — KETOROLAC TROMETHAMINE 60 MG: 30 INJECTION, SOLUTION INTRAMUSCULAR; INTRAVENOUS at 09:12

## 2022-12-15 NOTE — PROGRESS NOTES
Subjective:       Patient ID: Breanna Sanchez is a 42 y.o. female.    Chief Complaint: Disease Management    Mrs. Sanchez is a 42 year old female who presents to clinic for follow up on AS/RA. She is doing poorly and was seen in ER last week for pyelonephritis--treated with bactrim (although listed to have sulfa allergy--she did fine with this). She plans to repeat UA today. Not currently on prophylactic antibiotics with Urology.    Start hyquivia montly approx 2 months ago.    She complains of increased joint pain. She has joint pain in her hands, wrists, shoulders, neck, back, and SI joints. Pain is constant and aching. Worse at night and in the morning. She has increased lateral L hip pain and difficulty lying on her side. She reports some improvement with cosentyx, but disease remains active and uncontrolled on cosentyx 150 mg monthly. She has been without pain medication since early November--she is not longer followed by Dr. Chery. Morphine was not helping her pain. Previously on percocet which was helpful. Tried butrans patch in the past but too costly.    She complains of L ear pain.    Prior visit 4/2022:  She was treated for EBSL UTI 3/11/22 in the ER and admitted to [facility] 4/9/22 with pyelonephritis/sepsis. She was evaluated by Urology in October of last year and prophylactic antibiotics recommended 3 days a week, but she was concerned about this and never started. CT stone protocol suggested as well to see if this is increasing her risk of bacteria in the urine--also not completed yet.  She has not been able to take cosentyx consistently due to infections. Last injection was given in February. She continues to have joint pain in her hands, wrists, shoulders, neck, back, and SI joints. Pain is constant and aching. Worse at night and in the morning. She is not taking nsaid routinely but will take ibuprofen 800 PRN. Allergy to naproxen--history of kidney failure as 14 year old? No anaphylaxis or other  allergic response but told to avoid.  Labs show IgG total, IgG subclass 2 and 4 are lower than prior testing. She tried SC IG in the past and did not tolerate this well. She is open to reconsidering that given recurrent infections and hospitalizations.    Port has not been accessed in 1 year.    Current tx:  1. Cosentyx          Review of Systems   Constitutional:  Positive for activity change. Negative for appetite change, chills, fatigue, fever and unexpected weight change.   HENT:  Negative for mouth sores and trouble swallowing.    Eyes:  Positive for redness. Negative for visual disturbance.   Respiratory:  Negative for cough and shortness of breath.    Cardiovascular:  Negative for chest pain, palpitations and leg swelling.   Gastrointestinal:  Negative for abdominal pain, constipation, diarrhea, nausea and vomiting.   Genitourinary:  Positive for dysuria. Negative for genital sores.   Musculoskeletal:  Positive for arthralgias, back pain, joint swelling, myalgias, neck pain and neck stiffness.   Skin:  Negative for rash.   Allergic/Immunologic: Positive for immunocompromised state.   Neurological:  Negative for dizziness, weakness, light-headedness and headaches.   Hematological:  Does not bruise/bleed easily.   Psychiatric/Behavioral:  The patient is not nervous/anxious.        Objective:     Vitals:    12/15/22 0843   BP: 125/86   Pulse: 109       Past Medical History:   Diagnosis Date    ADHD     Ankylosing spondylitis     Anxiety     Colitis     Colon polyp     Depression     Essential hypertension     Fibromyalgia     History of kidney stones     Lupus     Migraine headache     Port-A-Cath in place     Primary immune deficiency disorder     Recurrent UTI     Rheumatoid arteritis     Sjoegren syndrome      Past Surgical History:   Procedure Laterality Date    APPENDECTOMY      BARTHOLIN GLAND CYST EXCISION      COLONOSCOPY  2011    COLONOSCOPY N/A 7/7/2022    Procedure: COLONOSCOPY;   Surgeon: Shaggy Mae MD;  Location: [facility] ENDO;  Service: Endoscopy;  Laterality: N/A;    DILATION OF URETHRA      X 2    HYSTERECTOMY      INSERTION OF TUNNELED CENTRAL VENOUS CATHETER (CVC) WITH SUBCUTANEOUS PORT N/A 10/2/2019    Procedure: [id number]-YHNX-A-CATH;  Surgeon: Lenin Springer MD;  Location: [facility] OR;  Service: General;  Laterality: N/A;    KNEE ARTHROSCOPY Left     LAPAROSCOPIC LYSIS OF ADHESIONS N/A 7/22/2019    Procedure: LYSIS, ADHESIONS, LAPAROSCOPIC;  Surgeon: Clarence Adams MD;  Location: [facility] OR;  Service: OB/GYN;  Laterality: N/A;    LAPAROSCOPIC SALPINGO-OOPHORECTOMY Left 7/22/2019    Procedure: SALPINGO-OOPHORECTOMY, LAPAROSCOPIC- LEFT SIDE ONLY;  Surgeon: Clarence Adams MD;  Location: [facility] OR;  Service: OB/GYN;  Laterality: Left;    OVARIAN CYST REMOVAL Left     SALPINGOOPHORECTOMY Right     TONSILLECTOMY      TOTAL SHOULDER ARTHROPLASTY Right     TUBAL LIGATION            Physical Exam   HENT:   Left Ear: There is tenderness. Tympanic membrane is erythematous.   Eyes: Right conjunctiva is not injected. Left conjunctiva is not injected.   Neck: No JVD present. No thyromegaly present.   Cardiovascular: Normal rate and regular rhythm. Exam reveals no decreased pulses.   Pulmonary/Chest: Effort normal.   Musculoskeletal:      Right shoulder: Tenderness present.      Left shoulder: Tenderness present.      Right elbow: Normal.      Left elbow: Normal.      Right wrist: Tenderness present.      Left wrist: Swelling and tenderness present.      Right knee: Normal.      Left knee: Normal.   Lymphadenopathy:     She has no cervical adenopathy.   Neurological: Gait normal.   Skin: No rash noted.   Psychiatric: Mood and affect normal.       Right Side Rheumatological Exam     Examination finds the elbow, knee, 1st PIP, 1st MCP, 2nd PIP, 2nd MCP, 3rd PIP, 3rd MCP, 4th PIP, 4th MCP, 5th PIP and 5th MCP normal.    The patient is tender to palpation of the shoulder and  wrist    Left Side Rheumatological Exam     Examination finds the elbow, knee, 1st PIP, 2nd PIP, 3rd PIP, 4th PIP, 4th MCP, 5th PIP and 5th MCP normal.    The patient is tender to palpation of the shoulder, wrist, 1st MCP, 2nd MCP and 3rd MCP.    She has swelling of the wrist, 1st MCP, 2nd MCP and 3rd MCP    Hip Exam   Tenderness Location: lateral      Back/Neck Exam   Tenderness Right paramedian tenderness of the Upper C-Spine, Lower C-Spine and Upper T-Spine.Left paramedian tenderness of the Upper C-Spine, Lower C-Spine and Upper T-Spine.    Labs:  Component      Latest Ref Rng & Units 12/6/2022   WBC      3.90 - 12.70 K/uL 16.61 (H)   RBC      4.00 - 5.40 M/uL 5.44 (H)   HEMOGLOBIN      12.0 - 16.0 g/dL 16.0   HEMATOCRIT      37.0 - 48.5 % 48.1   MCV      82 - 98 fL 88   MCH      27.0 - 31.0 pg 29.4   MCHC      32.0 - 36.0 g/dL 33.3   RDW      11.5 - 14.5 % 13.6   Platelets      150 - 450 K/uL 329   MPV      9.2 - 12.9 fL 9.8   Immature Granulocytes      0.0 - 0.5 % 0.9 (H)   Gran # (ANC)      1.8 - 7.7 K/uL 9.4 (H)   Immature Grans (Abs)      0.00 - 0.04 K/uL 0.15 (H)   Lymph #      1.0 - 4.8 K/uL 5.5 (H)   Mono #      0.3 - 1.0 K/uL 1.2 (H)   Eos #      0.0 - 0.5 K/uL 0.3   Baso #      0.00 - 0.20 K/uL 0.07   nRBC      0 /100 WBC 0   Gran %      38.0 - 73.0 % 56.8   Lymph %      18.0 - 48.0 % 33.2   Mono %      4.0 - 15.0 % 7.0   Eosinophil %      0.0 - 8.0 % 1.7   Basophil %      0.0 - 1.9 % 0.4   Differential Method       Automated   Sodium      136 - 145 mmol/L 139   Potassium      3.5 - 5.1 mmol/L 3.9   Chloride      95 - 110 mmol/L 102   CO2      22 - 31 mmol/L 31   Glucose      70 - 110 mg/dL 101   BUN      7 - 18 mg/dL 7   Creatinine      0.50 - 1.40 mg/dL 0.50   Calcium      8.4 - 10.2 mg/dL 9.4   PROTEIN TOTAL      6.0 - 8.4 g/dL 8.0   Albumin      3.5 - 5.2 g/dL 4.5   BILIRUBIN TOTAL      0.2 - 1.3 mg/dL 0.5   Alkaline Phosphatase      38 - 145 U/L 146 (H)   AST      14 - 36 U/L 29   ALT      0 - 35  U/L 33   ANION GAP      8 - 16 mmol/L 6 (L)   eGFR      >60 mL/min/1.73 m:2 >60   Specimen UA       Urine, Clean Catch   Color, UA      Yellow, Straw, Janell Yellow   Appearance, UA      Clear Clear   pH, UA      5.0 - 8.0 6.0   Specific Gravity, UA      1.005 - 1.030 1.010   Protein, UA      Negative Trace (A)   Glucose, UA      Negative Negative   Ketones, UA      Negative Negative   Bilirubin (UA)      Negative Negative   Occult Blood UA      Negative 1+ (A)   NITRITE UA      Negative Positive (A)   UROBILINOGEN UA      <2.0 EU/dL 1.0   Leukocytes, UA      Negative 1+ (A)        Assessment:       1. UTI due to extended-spectrum beta lactamase (ESBL) producing Escherichia coli    2. Ankylosing spondylitis of thoracolumbar region    3. Sjogren's syndrome, with unspecified organ involvement    4. Otitis externa, unspecified chronicity, unspecified laterality, unspecified type    5. Immunocompromised    6. Chronic pain syndrome              Plan:       UTI due to extended-spectrum beta lactamase (ESBL) producing Escherichia coli  -     Urine culture; Future; Expected date: 12/15/2022  -     Urinalysis; Future; Expected date: 12/15/2022    Ankylosing spondylitis of thoracolumbar region  -     secukinumab (COSENTYX PEN) 150 mg/mL PnIj; Inject 300 mg into the skin every 30 days.  Dispense: 2 mL; Refill: 5  -     tiZANidine (ZANAFLEX) 4 MG tablet; Take 1 tablet (4 mg total) by mouth every 8 (eight) hours.  Dispense: 270 tablet; Refill: 3  -     ketorolac injection 60 mg  -     methylPREDNISolone acetate injection 160 mg  -     cyanocobalamin injection 1,000 mcg    Sjogren's syndrome, with unspecified organ involvement  -     tiZANidine (ZANAFLEX) 4 MG tablet; Take 1 tablet (4 mg total) by mouth every 8 (eight) hours.  Dispense: 270 tablet; Refill: 3  -     NIFEdipine (PROCARDIA-XL) 60 MG (OSM) 24 hr tablet; Take 1 tablet (60 mg total) by mouth once daily.  Dispense: 90 tablet; Refill: 3  -     ketorolac injection 60 mg  -      methylPREDNISolone acetate injection 160 mg  -     cyanocobalamin injection 1,000 mcg    Otitis externa, unspecified chronicity, unspecified laterality, unspecified type  -     ciprofloxacin-dexAMETHasone 0.3-0.1% (CIPRODEX) 0.3-0.1 % DrpS; Place 4 drops into the left ear 2 (two) times daily. 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 Holden--OFF tx since 9/2020--restarted hyquvia 10/2022  --HTN  --chronic pain syndrome followed by Dr. Chery  --recurrent ESBL UTI     Plan  Toradol 60, depo 160, b12 today for flare  Repeat UA to be sure infection has resolved  Ciprodex for ear infection  Increase cosentyx 300 mg monthly (if above infection resolved)  Cont IVIG monthly  Meds refilled  Pain contract signed. Emanuel pended for MD.  I have checked [place] prescription monitoring program site and no unusual or abnormal behavior has occurred pt understand the risk and benefits of taking opioid medications and has decided to continue the medication.  Labs 1 week prior to next visit    Follow up:  4 months Dr Bowen ch/labs prior

## 2022-12-16 NOTE — PROGRESS NOTES
----- Message from Nimisha Burciaga RN sent at 3/27/2019  2:36 PM CDT -----  Update:Patient has had a 10 lb weight gain in the last 3 days.  Patient has been weighing at different times of the day.  Educated on weighing first thing in the morning each day to achieve an accurate reading.   Patient also reported that with his birthday this week he has been going out to eat and eating more currie normal.  For example last night was a 22 ounce porterhouse. Today he had pasta and soda prior to weighing himself.   Patients lungs were clear.  Bilateral legs were free of edema.  Patient reports minimal shortness of breath.  Any questions please let me know. Thanks,Nimisha DILLARD     2 pt identifiers used  Allergies reviewed      Administered 2 cc ( 30 mg/ml ) of toradol to the left upper outer gluteal. Patient tolerated injections well. Informed of s/s to report verbalized understanding. No adverse reactions noted.     Administered 2 cc ( 80 mg/ml ) of depomedrol to the right upper outer gluteal. Informed of s/s to report verbalized understanding. No adverse reactions noted.    Administered 1 cc ( 1000 mcg/ml ) of b12 to the right upper outer arm. Informed of s/s to report verbalized understanding. No adverse reactions noted.      Left facility in stable condition.Answers submitted by the patient for this visit:  Rheumatology Questionnaire (Submitted on 12/15/2022)  fever: No  eye redness: Yes  mouth sores: No  headaches: Yes  shortness of breath: No  chest pain: No  trouble swallowing: No  diarrhea: No  constipation: No  unexpected weight change: No  genital sore: No  dysuria: Yes  During the last 3 days, have you had a skin rash?: No  Bruises or bleeds easily: No  cough: No

## 2022-12-16 NOTE — TELEPHONE ENCOUNTER
----- Message from Roberta Fischer sent at 12/16/2022  8:30 AM CST -----  Type: Needs Medical Advice  Who Called: Pt   Symptoms (please be specific):   How long has patient had these symptoms:    Pharmacy name and phone #:  [id number] [place] [address]   Phone:  [phone]  Fax:  [phone]  Best Call Back Number: [phone]  Additional Information: Pt requesting a call back concerning her pain medication that was supposed to be called into her pharmacy on yesterday.

## 2022-12-18 RX ORDER — HYDROCODONE BITARTRATE AND ACETAMINOPHEN 10; 325 MG/1; MG/1
1 TABLET ORAL EVERY 8 HOURS PRN
Qty: 90 TABLET | Refills: 0 | Status: SHIPPED | OUTPATIENT
Start: 2022-12-18 | End: 2023-01-06

## 2022-12-19 ENCOUNTER — SPECIALTY PHARMACY (OUTPATIENT)
Dept: PHARMACY | Facility: CLINIC | Age: 42
End: 2022-12-19

## 2022-12-19 NOTE — TELEPHONE ENCOUNTER
Clinical questions populated on [facility] website.  PA request completed.  Waiting on response from insurance.    Patient has been added to Franey schedule today to get refill on medication

## 2022-12-19 NOTE — TELEPHONE ENCOUNTER
Diamond, this is Leana Low with [facility].  We are working on your prescription that your doctor has sent us. We will be working with your insurance to get this approved for you. We will be calling you along the way with updates on your medication.  If you have any questions, you can reach us at [phone].    Welcome call outcome: Patient/caregiver reached    New order for Cosentyx received.  Pt is increasing dose from 150 mg to 300 mg.  New PA required.  Outgoing call to pt to confirm she is aware of dose increase.  Pt stated she is aware and [facility]  Informed pt that [facility] will [facility] once approved.  Pt voiced understanding.     Cosentyx PA submitted via [facility] website (Key: [id number]).  Waiting for clinical questions to populate.

## 2022-12-22 ENCOUNTER — TELEPHONE (OUTPATIENT)
Dept: RHEUMATOLOGY | Facility: CLINIC | Age: 42
End: 2022-12-22
Payer: COMMERCIAL

## 2022-12-22 DIAGNOSIS — M45.5 ANKYLOSING SPONDYLITIS OF THORACOLUMBAR REGION: Primary | ICD-10-CM

## 2022-12-22 NOTE — TELEPHONE ENCOUNTER
Received fax from [employer] regarding Cosentyx PA request.  Insurance requesting PA request be filled out and faxed back.  PA request has that pt should have TB within 6 months.  Pt's last TB test was done in 2020.  Sending message to MD to have TB lab redrawn.  Will f/u.

## 2022-12-22 NOTE — TELEPHONE ENCOUNTER
----- Message from Leana Low PharmD sent at 12/22/2022  2:38 PM CST -----  Regarding: Cosentyx  Good afternoon Dr. Acuna,    [facility] has been working on the prior authorization for Ms. Sanchez's dose increase for Cosentyx.  Her last TB test was done in 2020.  The prior authorization requires her to have a more recent TB test.  Can you please order a new TB test to have drawn?    Thank you,  Leana Low, PharmD  Clinical Pharmacist    [facility]  [address] [place] [address] [place] [address]  P [phone]  F [phone]

## 2022-12-22 NOTE — TELEPHONE ENCOUNTER
Outgoing call to [facility] to check status of Cosentyx PA.  Rep Dominique stated PA is still pending determination.  Will continue to f/u.

## 2022-12-23 NOTE — TELEPHONE ENCOUNTER
MD ordered TB labs to be drawn.  Outgoing call to pt to inform to have to labs drawn for PA.  Pt voiced understanding and will contact MDO to schedule labs.

## 2022-12-23 NOTE — TELEPHONE ENCOUNTER
Faxing Cosentyx PA request to [facility] Case Review Unit at [phone] for review (PA Case: [id number]).  Will f/u.

## 2022-12-27 ENCOUNTER — TELEPHONE (OUTPATIENT)
Dept: RHEUMATOLOGY | Facility: CLINIC | Age: 42
End: 2022-12-27
Payer: COMMERCIAL

## 2022-12-27 NOTE — TELEPHONE ENCOUNTER
Paper work faxed  ----- Message from Steffanie Brown sent at 12/23/2022  4:44 PM CST -----  Type: Needs Medical Advice  Who Called:  Anthony [facility]     Best Call Back Number: [phone]   Additional Information: chart notes to see if pt responding to rx secukinumab (COSENTYX PEN) 150 mg/mL PnIj  Please advise thank you

## 2022-12-27 NOTE — TELEPHONE ENCOUNTER
Received fax notification from [facility] requesting chart notes for Cosentyx PA.  According to chart, MDO faxed requested chart notes to [facility] already.  Will continue to f/u.

## 2022-12-30 ENCOUNTER — HOSPITAL ENCOUNTER (OUTPATIENT)
Dept: RADIOLOGY | Facility: HOSPITAL | Age: 42
Discharge: HOME | End: 2022-12-30
Attending: SPECIALIST
Payer: COMMERCIAL

## 2022-12-30 DIAGNOSIS — Z13.820 ENCOUNTER FOR SCREENING FOR OSTEOPOROSIS: ICD-10-CM

## 2022-12-30 DIAGNOSIS — Z78.0 ASYMPTOMATIC MENOPAUSAL STATE: ICD-10-CM

## 2022-12-30 DIAGNOSIS — E28.319 PREMATURE MENOPAUSE: ICD-10-CM

## 2022-12-30 PROCEDURE — 77080 DXA BONE DENSITY AXIAL: CPT | Mod: 26,,, | Performed by: RADIOLOGY

## 2022-12-30 PROCEDURE — 77080 DEXA BONE DENSITY SPINE HIP: ICD-10-PCS | Mod: 26,,, | Performed by: RADIOLOGY

## 2022-12-30 PROCEDURE — 77080 DXA BONE DENSITY AXIAL: CPT | Mod: TC,PO

## 2023-01-03 ENCOUNTER — PATIENT MESSAGE (OUTPATIENT)
Dept: RHEUMATOLOGY | Facility: CLINIC | Age: 43
End: 2023-01-03
Payer: COMMERCIAL

## 2023-01-03 DIAGNOSIS — M81.0 OSTEOPOROSIS, UNSPECIFIED OSTEOPOROSIS TYPE, UNSPECIFIED PATHOLOGICAL FRACTURE PRESENCE: Primary | ICD-10-CM

## 2023-01-05 NOTE — TELEPHONE ENCOUNTER
Called patient to let her know we needed update labs patient did not answer unable to leave message sent patient a my chart message

## 2023-01-06 DIAGNOSIS — G89.4 CHRONIC PAIN SYNDROME: ICD-10-CM

## 2023-01-06 DIAGNOSIS — M45.5 ANKYLOSING SPONDYLITIS OF THORACOLUMBAR REGION: Primary | ICD-10-CM

## 2023-01-06 NOTE — TELEPHONE ENCOUNTER
In clinic last month, wanted to change from percocet to norco and is now requesting change back to original pain medication. I pended Jan/Feb

## 2023-01-08 RX ORDER — OXYCODONE AND ACETAMINOPHEN 7.5; 325 MG/1; MG/1
1 TABLET ORAL EVERY 8 HOURS PRN
Qty: 90 TABLET | Refills: 0 | Status: SHIPPED | OUTPATIENT
Start: 2023-02-17 | End: 2023-01-17 | Stop reason: SDUPTHER

## 2023-01-08 RX ORDER — OXYCODONE AND ACETAMINOPHEN 7.5; 325 MG/1; MG/1
1 TABLET ORAL EVERY 8 HOURS PRN
Qty: 90 TABLET | Refills: 0 | Status: ON HOLD | OUTPATIENT
Start: 2023-01-18 | End: 2023-01-12 | Stop reason: HOSPADM

## 2023-01-12 PROBLEM — N20.0 BILATERAL RENAL STONES: Status: ACTIVE | Noted: 2023-01-12

## 2023-01-12 NOTE — TELEPHONE ENCOUNTER
Re-submitted Cosentyx PA request with attached chart notes via [facility] website (Key: [id number]).

## 2023-01-16 ENCOUNTER — PATIENT MESSAGE (OUTPATIENT)
Dept: RHEUMATOLOGY | Facility: CLINIC | Age: 43
End: 2023-01-16
Payer: COMMERCIAL

## 2023-01-17 DIAGNOSIS — G89.4 CHRONIC PAIN SYNDROME: ICD-10-CM

## 2023-01-17 DIAGNOSIS — M45.5 ANKYLOSING SPONDYLITIS OF THORACOLUMBAR REGION: ICD-10-CM

## 2023-01-17 RX ORDER — ALENDRONATE SODIUM 70 MG/1
70 TABLET ORAL
Qty: 4 TABLET | Refills: 11 | Status: SHIPPED | OUTPATIENT
Start: 2023-01-17 | End: 2023-06-15

## 2023-01-18 NOTE — TELEPHONE ENCOUNTER
----- Message from Leana Low, PharmD sent at 1/18/2023  1:47 PM CST -----  Regarding: Cosentyx 300 mg dose  Good afternoon Margo,    I am working on the prior authorization for Ms. Sanchez's dose increase to 300 mg.  The insurance is requesting documentation of a positive clinical response to Cosentyx.  I do not see any documentation in her chart.  Would you still like to proceed with the dose increase?  If so, I need documentation that she has a positive response to Cosentyx but is still having symptoms of AS.     Thank you,  Leana Low, PharmD  Clinical Pharmacist    [facility]  [address] [place] [address]  P [phone]  F [phone]

## 2023-01-18 NOTE — TELEPHONE ENCOUNTER
Cosentyx PA request denied.  Documentation needed that pt had positive clinical response to Cosentyx but still having symptoms of AS.  Sending message to MDO.

## 2023-01-20 ENCOUNTER — TELEPHONE (OUTPATIENT)
Dept: RHEUMATOLOGY | Facility: CLINIC | Age: 43
End: 2023-01-20

## 2023-01-22 RX ORDER — OXYCODONE AND ACETAMINOPHEN 7.5; 325 MG/1; MG/1
1 TABLET ORAL EVERY 8 HOURS PRN
Qty: 90 TABLET | Refills: 0 | Status: SHIPPED | OUTPATIENT
Start: 2023-02-17 | End: 2023-01-23 | Stop reason: SDUPTHER

## 2023-01-23 ENCOUNTER — LAB VISIT (OUTPATIENT)
Dept: LAB | Facility: HOSPITAL | Age: 43
End: 2023-01-23
Payer: COMMERCIAL

## 2023-01-23 DIAGNOSIS — G89.4 CHRONIC PAIN SYNDROME: ICD-10-CM

## 2023-01-23 DIAGNOSIS — M45.5 ANKYLOSING SPONDYLITIS OF THORACOLUMBAR REGION: ICD-10-CM

## 2023-01-23 PROCEDURE — 86480 TB TEST CELL IMMUN MEASURE: CPT | Performed by: PHYSICIAN ASSISTANT

## 2023-01-23 RX ORDER — OXYCODONE AND ACETAMINOPHEN 7.5; 325 MG/1; MG/1
1 TABLET ORAL EVERY 8 HOURS PRN
Qty: 90 TABLET | Refills: 0 | Status: SHIPPED | OUTPATIENT
Start: 2023-01-23 | End: 2023-04-24 | Stop reason: SDUPTHER

## 2023-01-24 NOTE — TELEPHONE ENCOUNTER
Margo Acuna updated her chart notes. Faxed updated chart notes to [facility] ([phone]).

## 2023-01-25 LAB
GAMMA INTERFERON BACKGROUND BLD IA-ACNC: 0.07 IU/ML
M TB IFN-G CD4+ BCKGRND COR BLD-ACNC: -0.01 IU/ML
MITOGEN IGNF BCKGRD COR BLD-ACNC: 9.93 IU/ML
TB GOLD PLUS: NEGATIVE
TB2 - NIL: -0 IU/ML

## 2023-01-25 NOTE — TELEPHONE ENCOUNTER
Received fax from [email or web address] to answer PA questions again. Filled out and faxed to [email or web address] along with chart notes ([phone]).

## 2023-01-26 ENCOUNTER — TELEPHONE (OUTPATIENT)
Dept: UROLOGY | Facility: CLINIC | Age: 43
End: 2023-01-26

## 2023-01-26 ENCOUNTER — OFFICE VISIT (OUTPATIENT)
Dept: UROLOGY | Facility: CLINIC | Age: 43
End: 2023-01-26
Payer: COMMERCIAL

## 2023-01-26 VITALS — WEIGHT: 191.56 LBS | BODY MASS INDEX: 33.94 KG/M2 | HEIGHT: 63 IN

## 2023-01-26 DIAGNOSIS — N20.0 KIDNEY STONES: ICD-10-CM

## 2023-01-26 DIAGNOSIS — N30.00 ACUTE CYSTITIS WITHOUT HEMATURIA: Primary | ICD-10-CM

## 2023-01-26 DIAGNOSIS — N39.0 RECURRENT UTI: ICD-10-CM

## 2023-01-26 DIAGNOSIS — N30.10 INTERSTITIAL CYSTITIS: ICD-10-CM

## 2023-01-26 DIAGNOSIS — N20.0 KIDNEY STONES: Primary | ICD-10-CM

## 2023-01-26 LAB
BILIRUBIN, UA POC OHS: ABNORMAL
BLOOD, UA POC OHS: ABNORMAL
CLARITY, UA POC OHS: CLEAR
COLOR, UA POC OHS: YELLOW
GLUCOSE, UA POC OHS: NEGATIVE
KETONES, UA POC OHS: NEGATIVE
LEUKOCYTES, UA POC OHS: NEGATIVE
NITRITE, UA POC OHS: POSITIVE
PH, UA POC OHS: 6
PROTEIN, UA POC OHS: 100
SPECIFIC GRAVITY, UA POC OHS: >=1.03
UROBILINOGEN, UA POC OHS: 1

## 2023-01-26 PROCEDURE — 81003 URINALYSIS AUTO W/O SCOPE: CPT | Mod: QW,S$GLB,, | Performed by: STUDENT IN AN ORGANIZED HEALTH CARE EDUCATION/TRAINING PROGRAM

## 2023-01-26 PROCEDURE — 3008F BODY MASS INDEX DOCD: CPT | Mod: CPTII,S$GLB,, | Performed by: STUDENT IN AN ORGANIZED HEALTH CARE EDUCATION/TRAINING PROGRAM

## 2023-01-26 PROCEDURE — 1159F PR MEDICATION LIST DOCUMENTED IN MEDICAL RECORD: ICD-10-PCS | Mod: CPTII,S$GLB,, | Performed by: STUDENT IN AN ORGANIZED HEALTH CARE EDUCATION/TRAINING PROGRAM

## 2023-01-26 PROCEDURE — 81003 POCT URINALYSIS(INSTRUMENT): ICD-10-PCS | Mod: QW,S$GLB,, | Performed by: STUDENT IN AN ORGANIZED HEALTH CARE EDUCATION/TRAINING PROGRAM

## 2023-01-26 PROCEDURE — 99999 PR PBB SHADOW E&M-EST. PATIENT-LVL IV: CPT | Mod: PBBFAC,,, | Performed by: STUDENT IN AN ORGANIZED HEALTH CARE EDUCATION/TRAINING PROGRAM

## 2023-01-26 PROCEDURE — 99214 OFFICE O/P EST MOD 30 MIN: CPT | Mod: S$GLB,,, | Performed by: STUDENT IN AN ORGANIZED HEALTH CARE EDUCATION/TRAINING PROGRAM

## 2023-01-26 PROCEDURE — 99214 PR OFFICE/OUTPT VISIT, EST, LEVL IV, 30-39 MIN: ICD-10-PCS | Mod: S$GLB,,, | Performed by: STUDENT IN AN ORGANIZED HEALTH CARE EDUCATION/TRAINING PROGRAM

## 2023-01-26 PROCEDURE — 99999 PR PBB SHADOW E&M-EST. PATIENT-LVL IV: ICD-10-PCS | Mod: PBBFAC,,, | Performed by: STUDENT IN AN ORGANIZED HEALTH CARE EDUCATION/TRAINING PROGRAM

## 2023-01-26 PROCEDURE — 1160F RVW MEDS BY RX/DR IN RCRD: CPT | Mod: CPTII,S$GLB,, | Performed by: STUDENT IN AN ORGANIZED HEALTH CARE EDUCATION/TRAINING PROGRAM

## 2023-01-26 PROCEDURE — 3008F PR BODY MASS INDEX (BMI) DOCUMENTED: ICD-10-PCS | Mod: CPTII,S$GLB,, | Performed by: STUDENT IN AN ORGANIZED HEALTH CARE EDUCATION/TRAINING PROGRAM

## 2023-01-26 PROCEDURE — 1159F MED LIST DOCD IN RCRD: CPT | Mod: CPTII,S$GLB,, | Performed by: STUDENT IN AN ORGANIZED HEALTH CARE EDUCATION/TRAINING PROGRAM

## 2023-01-26 PROCEDURE — 1160F PR REVIEW ALL MEDS BY PRESCRIBER/CLIN PHARMACIST DOCUMENTED: ICD-10-PCS | Mod: CPTII,S$GLB,, | Performed by: STUDENT IN AN ORGANIZED HEALTH CARE EDUCATION/TRAINING PROGRAM

## 2023-01-26 NOTE — PROGRESS NOTES
"[facility] - Urology   Clinic Note    Subjective:     Chief Complaint: Nephrolithiasis (Possible nephrolithiasis) and Follow-up (ER follow up)    History of Present Illness:  Breanna Sanchez is a 42 y.o. female who presents to clinic for evaluation and management of multiple urologic issues. She is new to our clinic, previously seen at [facility].     She has had recurrent ESBL UTIs. CT RSS from 1/23/2023 was independently reviewed and interpreted showing bilateral punctate renal stone. There is a calcification near the left distal ureter but appears outside the course of the ureter.     Cultures reviewed below:  Component Urine Culture, Routine   Latest Ref Rng & Units    1/23/2023 ESCHERICHIA COLI ESBL (A) . . .   1/11/2023 No growth   1/6/2023 ESCHERICHIA COLI ESBL (A) . . .   12/6/2022 ESCHERICHIA COLI ESBL (A) . . .   7/21/2022 No growth     She reports being told she has interstitial cystitis. She had a cystoscopy within the last 2 years. Reports there was scar tissue in the bladder.     She has AS, Sjogren's and RA and is on immunosuppression.     Past medical, family, surgical and social history reviewed as documented in chart with pertinent positive medical, family, surgical and social history detailed in HPI.    A review systems was conducted with pertinent positive and negative findings documented in HPI.    Objective:     Estimated body mass index is 33.94 kg/m² as calculated from the following:    Height as of this encounter: 5' 3" (1.6 m).    Weight as of this encounter: 86.9 kg (191 lb 9.3 oz).    Vital Signs (Most Recent)       Physical Exam  Vitals and nursing note reviewed.   Constitutional:       General: She is not in acute distress.     Appearance: She is well-developed. She is not ill-appearing or toxic-appearing.   Pulmonary:      Effort: Pulmonary effort is normal. No accessory muscle usage or respiratory distress.   Neurological:      General: No focal deficit present.      Mental " Status: She is alert and oriented to person, place, and time. Mental status is at baseline.   Psychiatric:         Mood and Affect: Mood normal.         Behavior: Behavior normal.         Thought Content: Thought content normal.         Judgment: Judgment normal.       Lab Results   Component Value Date    BUN 9 01/12/2023    CREATININE 0.45 (L) 01/12/2023    WBC 14.58 (H) 01/12/2023    HGB 14.5 01/12/2023    HCT 45.1 01/12/2023     01/12/2023    AST 24 01/11/2023    ALT 26 01/11/2023    ALKPHOS 145 01/11/2023    ALBUMIN 5.0 01/11/2023    HGBA1C 5.3 01/23/2019      Urine dipstick today showed small blood, no leukocyte esterase, and positive nitrite.     Assessment:     1. Acute cystitis without hematuria    2. Recurrent UTI    3. Kidney stones    4. Interstitial cystitis      Plan:     We reviewed cultures and imaging. Discussed options. Stones could be harboring infection although given the size the likelihood is low. Regardless, removing all stone burden is an option. Discussed ureteroscopy, laser litho, basketing. She would like to proceed with surgery.    I will reach out to Dr. Cullen for ideal antibiotic regimen ahead of surgery. She has a recent culture that she has not begun treatment for.     For the IC, we will evaluate the bladder with cystoscopy at the time of URS and make further recommendations from there    Florentin Aleman MD

## 2023-01-31 NOTE — TELEPHONE ENCOUNTER
Outgoing call to insurance to check status of Cosentyx PA.   [facility] stated PA is approved until 1/26/24.  OSP is OON.  Pt is required to fill at [facility].    Outgoing call to pt to inform her of approval.  Pt voiced understanding.  Pt stated she has not restarted medication and was prescribed antibiotics today.  Informed pt to contact MDO once she completes antibiotics for clearance to restart Cosentyx.  Pt voiced understanding.  Forwarding prescription and closing pt out.

## 2023-02-02 PROBLEM — N12 PYELONEPHRITIS: Status: RESOLVED | Noted: 2018-05-08 | Resolved: 2023-02-02

## 2023-02-03 ENCOUNTER — TELEPHONE (OUTPATIENT)
Dept: UROLOGY | Facility: CLINIC | Age: 43
End: 2023-02-03
Payer: COMMERCIAL

## 2023-02-04 ENCOUNTER — NURSE TRIAGE (OUTPATIENT)
Dept: ADMINISTRATIVE | Facility: CLINIC | Age: 43
End: 2023-02-04
Payer: COMMERCIAL

## 2023-02-04 NOTE — TELEPHONE ENCOUNTER
Bilat Ureteral stent placement 2/2/23  Began having incontinence last evening and has continued throughout the day.   Has been taking the prescribed medications.   No fever.   Toilet paper fell on one of the strings and unsure if it moved.     Call placed to MD Spaulding for recommendation. States pt can go to ED for xray to verify placement, or wait until Monday and call clinic to be seen.     Options provided to pt, whom VU.             Reason for Disposition   Nursing judgment or information in reference    Protocols used: No Guideline [id number]

## 2023-02-06 ENCOUNTER — TELEPHONE (OUTPATIENT)
Dept: UROLOGY | Facility: CLINIC | Age: 43
End: 2023-02-06
Payer: COMMERCIAL

## 2023-02-06 NOTE — TELEPHONE ENCOUNTER
Contacted pt to let her know Dr. Aleman said she could still get her right stent removed tomorrow and to take ibuprofen or naproxen to help with break through discomfort. Stay hydrated and continue antibiotics as prescribed. MT

## 2023-02-07 ENCOUNTER — PATIENT MESSAGE (OUTPATIENT)
Dept: UROLOGY | Facility: CLINIC | Age: 43
End: 2023-02-07

## 2023-02-07 ENCOUNTER — CLINICAL SUPPORT (OUTPATIENT)
Dept: UROLOGY | Facility: CLINIC | Age: 43
End: 2023-02-07
Payer: COMMERCIAL

## 2023-02-07 DIAGNOSIS — N20.0 KIDNEY STONES: Primary | ICD-10-CM

## 2023-02-07 PROCEDURE — 99999 PR PBB SHADOW E&M-EST. PATIENT-LVL III: CPT | Mod: PBBFAC,,,

## 2023-02-07 PROCEDURE — 99999 PR PBB SHADOW E&M-EST. PATIENT-LVL III: ICD-10-PCS | Mod: PBBFAC,,,

## 2023-02-07 NOTE — PROGRESS NOTES
Patient to clinic for removal of ureteral stent per Dr Ash hunter , patient stent removed intact, patient tolerated well.

## 2023-02-17 ENCOUNTER — OUTPATIENT CASE MANAGEMENT (OUTPATIENT)
Dept: ADMINISTRATIVE | Facility: OTHER | Age: 43
End: 2023-02-17
Payer: COMMERCIAL

## 2023-02-17 NOTE — PROGRESS NOTES
Outpatient Care Management  Initial Patient Assessment    Patient: Breanna Sanchez  MRN: [id number]  Date of Service: 02/17/2023  Completed by: Lisa Whitlock RN  Referral Date: 02/11/2023  Program: High Risk  Status: Identified  Start Date: 2/17/2023  Responsible Staff: Lisa Whitlock RN        Reason for Visit   Patient presents with    OPCM Chart Review     02/17/23    OPCM Enrollment Call     02/17/23    Initial Assessment     02/17/23    Nursing Assessment     02/17/23    Plan Of Care     02/17/23       Brief Summary:  Breanna Sanchez was referred by Dr. Reji Roman for pyelonephritis. Patient qualifies for program based on 93.5%.  Mrs. Sanchez was hospitalized from 2/11/23 to 2/13/23 with pyelonephritis.  Leading up to this hospitalization, on 1/11/23 she developed ESBL E. Coli UTI, bilateral renal stones, on 2/2/23 had an cystoureteroscopy with urinary stents x 2, and on 2/4/23 was seen in the emergency room for urinary frequency and a dislodged stent was removed by Dr. Spaulding. S  She is disabled and lives with her spouse and has 3 children, one who is in college. She notes in regards to her UTI treatment, she has several days of her doxycycline regimen left and is compliant.  Her Lupus flares are better where they are approximately every 6 months - she notes the combination of her medications and reduced stress helps diminish the flares.  Treatment other than medications for her pain, she uses a heating pad, has an electric blanket, and OTC creams. She is followed by Dr. To for psych, Dr. Aleman for urology, and Dr. Dacosta, her PCP.  She notes she hasn't been able to exercise due to pain in general/fatigue.  She does love gardening and has a goal to start that this spring.  She wants more information on ESBL and to quit smoking.  Active problem list, medical, surgical and social history reviewed. Active comorbidities include: recurrent UTI's, primary immune deficiency, Lupus, fibromyalgia, RA, tobacco  abuse, ankylosing spondylitis, chronic midline back pain, and also gets Botox for migraines. Areas of need identified by patient include knowledge deficit regarding ESBL E. Coli, what to avoid to reduce recurrent UTI's, and smoking cessation.   Complex care plan created with patient/caregiver input. By next encounter, patient agrees to review instructions/education via portal when received and speak with contact for smoking cessation.  Next steps:   Follow up in 1 week, on or around 2/24/23.  Refer to smoking cessation for assistance with nicotine replacement.  Identify her willingness to quit now.  Urge tobacco cessation.  Follow up 1 week post smoking cessation.  Send message to Dr. Dacosta to add the following medications to her medication list as she just recently, Dec. 2022, established care with JOHN Lu:  Testosterone 1% cream 10 mg  apply 1 gm at bedtime nightly (uses her thigh) and Estradiol 1% apply 1 packet (1 mg) to upper right or left thigh daily.    Send information/education regarding ESBL E. Coli UTI's.  Assist with self-management plan that includes exercise and health nutrition to reduce risk factors of recurring UTI's.

## 2023-02-17 NOTE — LETTER
February 20, 2023    Breanna Sanchez  [address] [place] [address]             [facility]  [address] [facility] [place] [address] Dear Mrs. Sanchez:    Welcome to [facility]s Complex Care Management Program.  It was a pleasure talking with you today.  My name is BLAYNE Aragon, RN, [facility]. I look forward to working with you as your Care Manager.  My goal is to help you function at the healthiest and highest level possible.  You can contact me directly at [phone].    As an [facility] patient, some of the services we may be able to provide include:      Development of an individualized care plan with a Registered Nurse    Connection with a    Connection with available resources and services     Coordinate communication among your care team members    Provide coaching and education    Help you understand your doctors treatment plan   Help you obtain information about your insurance coverage.     All services provided by [facility]s Complex Care Managers and other care team members are coordinated with and communicated to your primary care team.    As part of your enrollment, you will be receiving education materials and more information about these services in your [facility] account, by phone or through the mail.  If you do not wish to participate or receive information, please contact our office at [phone].      Sincerely,    BLAYNE Aragon, RN, CCM [facility]   Out-patient RN Complex Care Manager         Lisa Whitlock, RN

## 2023-02-17 NOTE — LETTER
February 20, 2023    Breanna Sanchez  [address] [place] [address]             [facility]  [address] [facility] [place] [address] Dear Mrs. Sanchez:    Welcome to [facility]s Complex Care Management Program.  It was a pleasure talking with you today.  My name is BLAYNE Aragon, RN, [facility]. I look forward to working with you as your Care Manager.  My goal is to help you function at the healthiest and highest level possible.  You can contact me directly at [phone].    As an [facility] patient, some of the services we may be able to provide include:      Development of an individualized care plan with a Registered Nurse    Connection with a    Connection with available resources and services     Coordinate communication among your care team members    Provide coaching and education    Help you understand your doctors treatment plan   Help you obtain information about your insurance coverage.     All services provided by [facility]s Complex Care Managers and other care team members are coordinated with and communicated to your primary care team.    As part of your enrollment, you will be receiving education materials and more information about these services in your [facility] account, by phone or through the mail.  If you do not wish to participate or receive information, please contact our office at [phone].      Sincerely,    BLAYNE Aragon, RN, CCM [facility]   Out-patient RN Complex Care Manager         Lisa Whitlock, RN

## 2023-02-20 ENCOUNTER — TELEPHONE (OUTPATIENT)
Dept: SMOKING CESSATION | Facility: CLINIC | Age: 43
End: 2023-02-20
Payer: COMMERCIAL

## 2023-02-20 NOTE — TELEPHONE ENCOUNTER
I received message that patient is interested in quitting smoking so I gave patient number to call to get set up with program.

## 2023-02-22 ENCOUNTER — PATIENT MESSAGE (OUTPATIENT)
Dept: ADMINISTRATIVE | Facility: OTHER | Age: 43
End: 2023-02-22
Payer: COMMERCIAL

## 2023-02-23 ENCOUNTER — TELEPHONE (OUTPATIENT)
Dept: INFUSION THERAPY | Facility: HOSPITAL | Age: 43
End: 2023-02-23

## 2023-02-23 NOTE — TELEPHONE ENCOUNTER
----- Message from Dorene Blade sent at 2/23/2023 12:47 PM CST -----  Regarding: SAME DAY INJECTION  APPT RESCHED  Type: Patient Call Back         Who called: Patient         What is the request in detail: I have Breanna Sanchez calling regarding her appt today for a port flush; states she is feeling weak and is not wanting to drivel wants to know if she can resched the appt; please advise         Best call back number: [phone]; prefers [facility]         Additional Information: states had sun exposure the reason she is not feeling well; decliend to serena with Triage nurse           Thank You

## 2023-02-24 ENCOUNTER — OUTPATIENT CASE MANAGEMENT (OUTPATIENT)
Dept: ADMINISTRATIVE | Facility: OTHER | Age: 43
End: 2023-02-24
Payer: COMMERCIAL

## 2023-02-24 NOTE — PROGRESS NOTES
Outpatient Care Management  Plan of Care Follow Up Visit    Patient: Breanna Sanchez  MRN: [id number]  Date of Service: 02/24/2023  Completed by: Lisa Whitlock RN  Referral Date: 02/11/2023    Reason for Visit   Patient presents with    OPCM Chart Review     02/24/23  03/02/23  03/08/23  03/10/23    OPCM RN First Follow-Up Attempt     02/24/23    OPCM RN Second Follow-Up Attempt     03/03/23    OPCM RN Third Follow-Up Attempt     03/08/23    Other Misc     03/10/23 OPCM RN Fourth Follow-up Attempt    Update Plan Of Care     03/10/23       Brief Summary: Ms. Sanchez is doing better; just recently diagnosed with another UTI and is on antibiotic regimen; last c/o burning on urination was 2 nights ago and has minimal pain now; left lower ankle pain better, 1/10 on pain scale.  Next Steps:   Mrs. Sanchez agrees to follow up on or around 3/24/23.  Send message to smoking cessation regarding status and request to follow up with Mrs. Sanchez.  Follow up regarding compliance of showers versus baths.  Follow up on improvement in exercise.      03/08/23 - 3rd attempt to complete follow-up for Outpatient Care Management.  Unable to leave message on Mrs. Sanchez's phone.  OPCM RN reached out to Mr. Sanchez and he reported Mrs. Sanchez has been ill, went to the ER for UTI then visited PCP yesterday.  He was requested to have Mrs. Sanchez contact OPCM RN back as contact info was provided and he will do so.      03/03/23 - 2nd attempt to complete follow-up for Outpatient Care Management, left message requesting a return call. Attempt letter sent via portal.      02/24/23 Follow up call post initial enrollment.  Left message and contact information of SUDHEER RN.

## 2023-03-03 ENCOUNTER — PATIENT MESSAGE (OUTPATIENT)
Dept: ADMINISTRATIVE | Facility: OTHER | Age: 43
End: 2023-03-03
Payer: COMMERCIAL

## 2023-03-08 ENCOUNTER — TELEPHONE (OUTPATIENT)
Dept: NEUROLOGY | Facility: CLINIC | Age: 43
End: 2023-03-08
Payer: COMMERCIAL

## 2023-03-08 NOTE — TELEPHONE ENCOUNTER
----- Message from Karthik Dupont sent at 3/8/2023 10:29 AM CST -----  Regarding: Botox  Contact: BREANNA ARIAS [[id number]]  Type:  Sooner Appointment Request    Caller is requesting a sooner appointment.      Name of Caller:  Breanna    When is the first available appointment?  Dept book    Symptoms:  na    Best Call Back Number:  [phone] (home)     Additional Information:  Needs to reschedule Botox. Please call to advise

## 2023-03-13 ENCOUNTER — TELEPHONE (OUTPATIENT)
Dept: SMOKING CESSATION | Facility: CLINIC | Age: 43
End: 2023-03-13
Payer: COMMERCIAL

## 2023-03-13 NOTE — TELEPHONE ENCOUNTER
I called patient regarding smoking cessation and patient states she forgot that I gave her the number and was apologetic. Patient will call me back as she was driving.

## 2023-03-17 ENCOUNTER — PROCEDURE VISIT (OUTPATIENT)
Dept: NEUROLOGY | Facility: CLINIC | Age: 43
End: 2023-03-17
Payer: COMMERCIAL

## 2023-03-17 VITALS
RESPIRATION RATE: 17 BRPM | WEIGHT: 189.94 LBS | HEART RATE: 105 BPM | TEMPERATURE: 98 F | HEIGHT: 63 IN | DIASTOLIC BLOOD PRESSURE: 93 MMHG | BODY MASS INDEX: 33.66 KG/M2 | SYSTOLIC BLOOD PRESSURE: 130 MMHG

## 2023-03-17 DIAGNOSIS — R30.0 BURNING WITH URINATION: ICD-10-CM

## 2023-03-17 DIAGNOSIS — G43.719 INTRACTABLE CHRONIC MIGRAINE WITHOUT AURA AND WITHOUT STATUS MIGRAINOSUS: Primary | ICD-10-CM

## 2023-03-17 PROCEDURE — 64615 CHEMODENERV MUSC MIGRAINE: CPT | Mod: S$GLB,,, | Performed by: PSYCHIATRY & NEUROLOGY

## 2023-03-17 PROCEDURE — 64615 PR CHEMODENERVATION OF MUSCLE FOR CHRONIC MIGRAINE: ICD-10-PCS | Mod: S$GLB,,, | Performed by: PSYCHIATRY & NEUROLOGY

## 2023-03-17 PROCEDURE — 87086 URINE CULTURE/COLONY COUNT: CPT

## 2023-03-17 RX ORDER — PIROXICAM 20 MG/1
20 CAPSULE ORAL
COMMUNITY
Start: 2022-11-15 | End: 2023-07-24 | Stop reason: SDUPTHER

## 2023-03-17 RX ORDER — DEXTROAMPHETAMINE SACCHARATE, AMPHETAMINE ASPARTATE, DEXTROAMPHETAMINE SULFATE AND AMPHETAMINE SULFATE 7.5; 7.5; 7.5; 7.5 MG/1; MG/1; MG/1; MG/1
30 TABLET ORAL 2 TIMES DAILY
COMMUNITY
Start: 2023-03-15

## 2023-03-17 NOTE — PROCEDURES
Procedures   BOTOX PROCEDURE    PROCEDURE PERFORMED: Botulinum toxin injection (12770)    CLINICAL INDICATION: G43.719    A time out was conducted just before the start of the procedure to verify the correct patient and procedure, procedure location, and all relevant critical information.     Conventional methods of treatment but the patient has been unresponsive and refractory.The patient meets criteria for chronic headaches according to the ICHD-II, the patient has more than 15 headaches a month which last for more than 4 hours a day.    The Botox injections have achieved well over 50%  improvement in the patient's symptoms. Migraines have been reduced at least 7 days per month and the number of cumulative hours suffering with headaches has been reduced at least 100 total hours per month. Today she does have a headache indicating that the Botox has worn off. Frequency of treatment is every 3 months unless no response to the treatments, at which time we will discontinue the injections.    DESCRIPTION OF PROCEDURE: After obtaining informed consent and under aseptic technique, a total of 155 units of botulinum toxin type A were injected in the following muscles: Procerus 5 units,  5 units bilaterally, frontalis 20 units, temporalis 20 units bilaterally,   occipitalis 15 units, upper cervical paraspinals 10 units bilaterally and trapezius 15 units bilaterally. The patient was given a total of 155 units in 31 sites.The patient tolerated the procedure well. There were no complications. The patient was given a prescription for repeat treatment in 3 months.     Unavoidable waste 45 units          Casie Winston M.D  Medical Director, Headache and Facial Pain  [facility]

## 2023-03-23 ENCOUNTER — TELEPHONE (OUTPATIENT)
Dept: INFECTIOUS DISEASES | Facility: CLINIC | Age: 43
End: 2023-03-23
Payer: COMMERCIAL

## 2023-03-23 NOTE — TELEPHONE ENCOUNTER
Returned call to patient, referral for recurrent UTI, has seen Dr. Cullen in hospital, will review with Dr. Cullen and advise patient. Advised if condition worsens contact PCP or seek eval and tx at ER.

## 2023-03-23 NOTE — TELEPHONE ENCOUNTER
----- Message from Star Wyman sent at 3/23/2023  1:25 PM CDT -----  Regarding: appointment  Contact: Patient  Patient want to speak with a nurse regarding scheduling appointment for recurring UTI, please call back at [phone] (home)     Case number [id number]

## 2023-03-24 ENCOUNTER — PATIENT MESSAGE (OUTPATIENT)
Dept: ADMINISTRATIVE | Facility: OTHER | Age: 43
End: 2023-03-24
Payer: COMMERCIAL

## 2023-03-24 ENCOUNTER — OUTPATIENT CASE MANAGEMENT (OUTPATIENT)
Dept: ADMINISTRATIVE | Facility: OTHER | Age: 43
End: 2023-03-24
Payer: COMMERCIAL

## 2023-03-24 NOTE — TELEPHONE ENCOUNTER
Per Dr. Cullen: if patient is symptomatic she should get a cath urine and follow  urology.     Called patient,  discussed Dr. Cullen's recommendation, patient voiced understanding and appreciation.

## 2023-03-24 NOTE — PROGRESS NOTES
Outpatient Care Management  Plan of Care Follow Up Visit    Patient: Breanna Sanchez  MRN: [id number]  Date of Service: 03/24/2023  Completed by: Lisa Whitlock RN  Referral Date: 02/11/2023    Reason for Visit   Patient presents with    OPCM Chart Review     03/24/23 03/29/23    OPCM RN First Follow-Up Attempt     3/24/2023  1st attempt to complete Follow-Up  for Outpatient Care Management, left message.  Will mail unable to assess letter.       Update Plan Of Care     03/29/23       Brief Summary: Ms. Sanchez followed up PCP and contacted ID per a referral placed.  C/o having a flare up with symptoms of whole body stiffness to include shoulders, wrist, hands, ankles, knees and hip pain.  She reached out to Dr. Sawant, Rheumatology, for nurse appointment.  Next Steps:   Ms. Sanchez agreed to OPCM RN follow up on or around 4/12/23.  Follow up to reduction of smoking and result of reaching out to smoking cessation.

## 2023-03-27 ENCOUNTER — PATIENT MESSAGE (OUTPATIENT)
Dept: RHEUMATOLOGY | Facility: CLINIC | Age: 43
End: 2023-03-27
Payer: COMMERCIAL

## 2023-03-31 ENCOUNTER — TELEPHONE (OUTPATIENT)
Dept: RHEUMATOLOGY | Facility: CLINIC | Age: 43
End: 2023-03-31
Payer: COMMERCIAL

## 2023-03-31 ENCOUNTER — PATIENT MESSAGE (OUTPATIENT)
Dept: RHEUMATOLOGY | Facility: CLINIC | Age: 43
End: 2023-03-31
Payer: COMMERCIAL

## 2023-04-04 ENCOUNTER — CLINICAL SUPPORT (OUTPATIENT)
Dept: RHEUMATOLOGY | Facility: CLINIC | Age: 43
End: 2023-04-04
Payer: COMMERCIAL

## 2023-04-04 DIAGNOSIS — G89.4 CHRONIC PAIN SYNDROME: ICD-10-CM

## 2023-04-04 DIAGNOSIS — M79.7 FIBROMYALGIA: ICD-10-CM

## 2023-04-04 DIAGNOSIS — M45.5 ANKYLOSING SPONDYLITIS OF THORACOLUMBAR REGION: Primary | ICD-10-CM

## 2023-04-04 PROCEDURE — 96372 THER/PROPH/DIAG INJ SC/IM: CPT | Mod: S$GLB,,, | Performed by: PHYSICIAN ASSISTANT

## 2023-04-04 PROCEDURE — 99999 PR PBB SHADOW E&M-EST. PATIENT-LVL I: ICD-10-PCS | Mod: PBBFAC,,,

## 2023-04-04 PROCEDURE — 96372 PR INJECTION,THERAP/PROPH/DIAG2ST, IM OR SUBCUT: ICD-10-PCS | Mod: S$GLB,,, | Performed by: PHYSICIAN ASSISTANT

## 2023-04-04 PROCEDURE — 99999 PR PBB SHADOW E&M-EST. PATIENT-LVL I: CPT | Mod: PBBFAC,,,

## 2023-04-04 RX ORDER — CYANOCOBALAMIN 1000 UG/ML
1000 INJECTION, SOLUTION INTRAMUSCULAR; SUBCUTANEOUS
Status: COMPLETED | OUTPATIENT
Start: 2023-04-04 | End: 2023-04-04

## 2023-04-04 RX ORDER — METHYLPREDNISOLONE ACETATE 80 MG/ML
160 INJECTION, SUSPENSION INTRA-ARTICULAR; INTRALESIONAL; INTRAMUSCULAR; SOFT TISSUE
Status: COMPLETED | OUTPATIENT
Start: 2023-04-04 | End: 2023-04-04

## 2023-04-04 RX ORDER — KETOROLAC TROMETHAMINE 30 MG/ML
60 INJECTION, SOLUTION INTRAMUSCULAR; INTRAVENOUS
Status: COMPLETED | OUTPATIENT
Start: 2023-04-04 | End: 2023-04-04

## 2023-04-04 RX ADMIN — CYANOCOBALAMIN 1000 MCG: 1000 INJECTION, SOLUTION INTRAMUSCULAR; SUBCUTANEOUS at 01:04

## 2023-04-04 RX ADMIN — METHYLPREDNISOLONE ACETATE 160 MG: 80 INJECTION, SUSPENSION INTRA-ARTICULAR; INTRALESIONAL; INTRAMUSCULAR; SOFT TISSUE at 01:04

## 2023-04-04 RX ADMIN — KETOROLAC TROMETHAMINE 60 MG: 30 INJECTION, SOLUTION INTRAMUSCULAR; INTRAVENOUS at 01:04

## 2023-04-05 NOTE — TELEPHONE ENCOUNTER
Referral placed.   Topical Sulfur Applications Pregnancy And Lactation Text: This medication is Pregnancy Category C and has an unknown safety profile during pregnancy. It is unknown if this topical medication is excreted in breast milk.

## 2023-04-06 ENCOUNTER — INFUSION (OUTPATIENT)
Dept: INFUSION THERAPY | Facility: HOSPITAL | Age: 43
End: 2023-04-06
Attending: PHYSICIAN ASSISTANT
Payer: COMMERCIAL

## 2023-04-06 DIAGNOSIS — M45.9 ANKYLOSING SPONDYLITIS, UNSPECIFIED SITE OF SPINE: Primary | ICD-10-CM

## 2023-04-06 PROCEDURE — A4216 STERILE WATER/SALINE, 10 ML: HCPCS | Mod: PN | Performed by: PHYSICIAN ASSISTANT

## 2023-04-06 PROCEDURE — 63600175 PHARM REV CODE 636 W HCPCS: Mod: PN | Performed by: PHYSICIAN ASSISTANT

## 2023-04-06 PROCEDURE — 25000003 PHARM REV CODE 250: Mod: PN | Performed by: PHYSICIAN ASSISTANT

## 2023-04-06 PROCEDURE — 96523 IRRIG DRUG DELIVERY DEVICE: CPT | Mod: PN

## 2023-04-06 RX ORDER — HEPARIN 100 UNIT/ML
500 SYRINGE INTRAVENOUS
Status: COMPLETED | OUTPATIENT
Start: 2023-04-06 | End: 2023-04-06

## 2023-04-06 RX ORDER — SODIUM CHLORIDE 0.9 % (FLUSH) 0.9 %
10 SYRINGE (ML) INJECTION
Status: COMPLETED | OUTPATIENT
Start: 2023-04-06 | End: 2023-04-06

## 2023-04-06 RX ORDER — SODIUM CHLORIDE 0.9 % (FLUSH) 0.9 %
10 SYRINGE (ML) INJECTION
Status: CANCELLED | OUTPATIENT
Start: 2023-04-20

## 2023-04-06 RX ORDER — HEPARIN 100 UNIT/ML
500 SYRINGE INTRAVENOUS
Status: CANCELLED | OUTPATIENT
Start: 2023-04-20

## 2023-04-06 RX ADMIN — Medication 10 ML: at 01:04

## 2023-04-06 RX ADMIN — Medication 500 UNITS: at 01:04

## 2023-04-10 DIAGNOSIS — M81.0 OSTEOPOROSIS, UNSPECIFIED OSTEOPOROSIS TYPE, UNSPECIFIED PATHOLOGICAL FRACTURE PRESENCE: ICD-10-CM

## 2023-04-13 ENCOUNTER — PATIENT OUTREACH (OUTPATIENT)
Dept: ADMINISTRATIVE | Facility: OTHER | Age: 43
End: 2023-04-13
Payer: COMMERCIAL

## 2023-04-13 ENCOUNTER — OUTPATIENT CASE MANAGEMENT (OUTPATIENT)
Dept: ADMINISTRATIVE | Facility: OTHER | Age: 43
End: 2023-04-13
Payer: COMMERCIAL

## 2023-04-13 ENCOUNTER — PATIENT MESSAGE (OUTPATIENT)
Dept: ADMINISTRATIVE | Facility: OTHER | Age: 43
End: 2023-04-13
Payer: COMMERCIAL

## 2023-04-13 DIAGNOSIS — M45.5 ANKYLOSING SPONDYLITIS OF THORACOLUMBAR REGION: ICD-10-CM

## 2023-04-13 DIAGNOSIS — M54.50 LOW BACK PAIN, UNSPECIFIED BACK PAIN LATERALITY, UNSPECIFIED CHRONICITY, UNSPECIFIED WHETHER SCIATICA PRESENT: ICD-10-CM

## 2023-04-13 RX ORDER — GABAPENTIN 300 MG/1
300 CAPSULE ORAL 3 TIMES DAILY
Qty: 90 CAPSULE | Refills: 3 | Status: SHIPPED | OUTPATIENT
Start: 2023-04-13 | End: 2023-05-18

## 2023-04-13 NOTE — TELEPHONE ENCOUNTER
Pharmacy requesting refill on Gabapentin 300mg  Pt's LOV 12/15/2022  Pt's NOV 04/24/2023  Medication pending

## 2023-04-17 ENCOUNTER — LAB VISIT (OUTPATIENT)
Dept: LAB | Facility: HOSPITAL | Age: 43
End: 2023-04-17
Attending: INTERNAL MEDICINE
Payer: COMMERCIAL

## 2023-04-17 DIAGNOSIS — E55.9 VITAMIN D DEFICIENCY: ICD-10-CM

## 2023-04-17 DIAGNOSIS — M54.50 LOW BACK PAIN, UNSPECIFIED BACK PAIN LATERALITY, UNSPECIFIED CHRONICITY, UNSPECIFIED WHETHER SCIATICA PRESENT: ICD-10-CM

## 2023-04-17 DIAGNOSIS — M45.5 ANKYLOSING SPONDYLITIS OF THORACOLUMBAR REGION: ICD-10-CM

## 2023-04-17 DIAGNOSIS — M25.50 GENERALIZED JOINT PAIN: ICD-10-CM

## 2023-04-17 DIAGNOSIS — M25.511 PAIN OF BOTH SHOULDER JOINTS: ICD-10-CM

## 2023-04-17 DIAGNOSIS — D84.9 IMMUNOCOMPROMISED: ICD-10-CM

## 2023-04-17 DIAGNOSIS — M25.512 PAIN OF BOTH SHOULDER JOINTS: ICD-10-CM

## 2023-04-17 PROBLEM — Z16.12 UTI DUE TO EXTENDED-SPECTRUM BETA LACTAMASE (ESBL) PRODUCING ESCHERICHIA COLI: Status: RESOLVED | Noted: 2021-04-13 | Resolved: 2023-04-17

## 2023-04-17 PROBLEM — N39.0 UTI DUE TO EXTENDED-SPECTRUM BETA LACTAMASE (ESBL) PRODUCING ESCHERICHIA COLI: Status: RESOLVED | Noted: 2021-04-13 | Resolved: 2023-04-17

## 2023-04-17 PROBLEM — B96.29 UTI DUE TO EXTENDED-SPECTRUM BETA LACTAMASE (ESBL) PRODUCING ESCHERICHIA COLI: Status: RESOLVED | Noted: 2021-04-13 | Resolved: 2023-04-17

## 2023-04-17 PROBLEM — A41.9 SEPSIS: Status: RESOLVED | Noted: 2021-04-13 | Resolved: 2023-04-17

## 2023-04-17 LAB
25(OH)D3+25(OH)D2 SERPL-MCNC: 22 NG/ML (ref 30–96)
BASOPHILS # BLD AUTO: 0.08 K/UL (ref 0–0.2)
BASOPHILS NFR BLD: 0.6 % (ref 0–1.9)
DIFFERENTIAL METHOD: ABNORMAL
EOSINOPHIL # BLD AUTO: 0.2 K/UL (ref 0–0.5)
EOSINOPHIL NFR BLD: 1.1 % (ref 0–8)
ERYTHROCYTE [DISTWIDTH] IN BLOOD BY AUTOMATED COUNT: 13.2 % (ref 11.5–14.5)
ERYTHROCYTE [SEDIMENTATION RATE] IN BLOOD BY WESTERGREN METHOD: 5 MM/HR (ref 0–20)
HCT VFR BLD AUTO: 47.6 % (ref 37–48.5)
HGB BLD-MCNC: 15.1 G/DL (ref 12–16)
IMM GRANULOCYTES # BLD AUTO: 0.07 K/UL (ref 0–0.04)
IMM GRANULOCYTES NFR BLD AUTO: 0.5 % (ref 0–0.5)
LYMPHOCYTES # BLD AUTO: 3.4 K/UL (ref 1–4.8)
LYMPHOCYTES NFR BLD: 24.2 % (ref 18–48)
MCH RBC QN AUTO: 29.2 PG (ref 27–31)
MCHC RBC AUTO-ENTMCNC: 31.7 G/DL (ref 32–36)
MCV RBC AUTO: 92 FL (ref 82–98)
MONOCYTES # BLD AUTO: 1.1 K/UL (ref 0.3–1)
MONOCYTES NFR BLD: 7.5 % (ref 4–15)
NEUTROPHILS # BLD AUTO: 9.4 K/UL (ref 1.8–7.7)
NEUTROPHILS NFR BLD: 66.1 % (ref 38–73)
NRBC BLD-RTO: 0 /100 WBC
PLATELET # BLD AUTO: 386 K/UL (ref 150–450)
PMV BLD AUTO: 10.6 FL (ref 9.2–12.9)
RBC # BLD AUTO: 5.17 M/UL (ref 4–5.4)
WBC # BLD AUTO: 14.19 K/UL (ref 3.9–12.7)

## 2023-04-17 PROCEDURE — 86235 NUCLEAR ANTIGEN ANTIBODY: CPT | Mod: 59 | Performed by: INTERNAL MEDICINE

## 2023-04-17 PROCEDURE — 86235 NUCLEAR ANTIGEN ANTIBODY: CPT | Performed by: INTERNAL MEDICINE

## 2023-04-17 PROCEDURE — 80053 COMPREHEN METABOLIC PANEL: CPT | Performed by: INTERNAL MEDICINE

## 2023-04-17 PROCEDURE — 82306 VITAMIN D 25 HYDROXY: CPT | Performed by: INTERNAL MEDICINE

## 2023-04-17 PROCEDURE — 85651 RBC SED RATE NONAUTOMATED: CPT | Mod: PO | Performed by: INTERNAL MEDICINE

## 2023-04-17 PROCEDURE — 36415 COLL VENOUS BLD VENIPUNCTURE: CPT | Mod: PO | Performed by: INTERNAL MEDICINE

## 2023-04-17 PROCEDURE — 86140 C-REACTIVE PROTEIN: CPT | Performed by: INTERNAL MEDICINE

## 2023-04-17 PROCEDURE — 86038 ANTINUCLEAR ANTIBODIES: CPT | Performed by: INTERNAL MEDICINE

## 2023-04-17 PROCEDURE — 86039 ANTINUCLEAR ANTIBODIES (ANA): CPT | Performed by: INTERNAL MEDICINE

## 2023-04-17 PROCEDURE — 85025 COMPLETE CBC W/AUTO DIFF WBC: CPT | Performed by: INTERNAL MEDICINE

## 2023-04-18 LAB
ALBUMIN SERPL BCP-MCNC: 4.3 G/DL (ref 3.5–5.2)
ALP SERPL-CCNC: 119 U/L (ref 55–135)
ALT SERPL W/O P-5'-P-CCNC: 22 U/L (ref 10–44)
ANA PATTERN 1: NORMAL
ANA PATTERN 2: NORMAL
ANA SER QL IF: POSITIVE
ANA TITER 2: NORMAL
ANA TITR SER IF: NORMAL {TITER}
ANION GAP SERPL CALC-SCNC: 12 MMOL/L (ref 8–16)
ANTI-SSA ANTIBODY: 0.07 RATIO (ref 0–0.99)
ANTI-SSA INTERPRETATION: NEGATIVE
ANTI-SSB ANTIBODY: 0.05 RATIO (ref 0–0.99)
ANTI-SSB INTERPRETATION: NEGATIVE
AST SERPL-CCNC: 16 U/L (ref 10–40)
BILIRUB SERPL-MCNC: 0.2 MG/DL (ref 0.1–1)
BUN SERPL-MCNC: 6 MG/DL (ref 6–20)
CALCIUM SERPL-MCNC: 9.9 MG/DL (ref 8.7–10.5)
CHLORIDE SERPL-SCNC: 103 MMOL/L (ref 95–110)
CO2 SERPL-SCNC: 26 MMOL/L (ref 23–29)
CREAT SERPL-MCNC: 0.6 MG/DL (ref 0.5–1.4)
CRP SERPL-MCNC: 3.3 MG/L (ref 0–8.2)
EST. GFR  (NO RACE VARIABLE): >60 ML/MIN/1.73 M^2
GLUCOSE SERPL-MCNC: 110 MG/DL (ref 70–110)
POTASSIUM SERPL-SCNC: 4 MMOL/L (ref 3.5–5.1)
PROT SERPL-MCNC: 7.6 G/DL (ref 6–8.4)
SODIUM SERPL-SCNC: 141 MMOL/L (ref 136–145)

## 2023-04-19 LAB
ANTI SM ANTIBODY: 0.08 RATIO (ref 0–0.99)
ANTI SM/RNP ANTIBODY: 0.11 RATIO (ref 0–0.99)
ANTI-SM INTERPRETATION: NEGATIVE
ANTI-SM/RNP INTERPRETATION: NEGATIVE
ANTI-SSA ANTIBODY: 0.07 RATIO (ref 0–0.99)
ANTI-SSA INTERPRETATION: NEGATIVE
ANTI-SSB ANTIBODY: 0.05 RATIO (ref 0–0.99)
ANTI-SSB INTERPRETATION: NEGATIVE
DSDNA AB SER-ACNC: NORMAL [IU]/ML

## 2023-04-24 ENCOUNTER — OFFICE VISIT (OUTPATIENT)
Dept: RHEUMATOLOGY | Facility: CLINIC | Age: 43
End: 2023-04-24
Payer: COMMERCIAL

## 2023-04-24 VITALS
SYSTOLIC BLOOD PRESSURE: 138 MMHG | DIASTOLIC BLOOD PRESSURE: 93 MMHG | HEART RATE: 118 BPM | HEIGHT: 63 IN | BODY MASS INDEX: 33.71 KG/M2 | WEIGHT: 190.25 LBS

## 2023-04-24 DIAGNOSIS — M45.5 ANKYLOSING SPONDYLITIS OF THORACOLUMBAR REGION: Primary | ICD-10-CM

## 2023-04-24 DIAGNOSIS — G89.4 CHRONIC PAIN SYNDROME: ICD-10-CM

## 2023-04-24 DIAGNOSIS — E55.9 VITAMIN D DEFICIENCY: ICD-10-CM

## 2023-04-24 DIAGNOSIS — M79.7 FIBROMYALGIA: ICD-10-CM

## 2023-04-24 DIAGNOSIS — E11.69 TYPE 2 DIABETES MELLITUS WITH OTHER SPECIFIED COMPLICATION, UNSPECIFIED WHETHER LONG TERM INSULIN USE: ICD-10-CM

## 2023-04-24 DIAGNOSIS — H20.9 IRITIS: ICD-10-CM

## 2023-04-24 DIAGNOSIS — N39.0 CHRONIC UTI: ICD-10-CM

## 2023-04-24 DIAGNOSIS — K58.0 IRRITABLE BOWEL SYNDROME WITH DIARRHEA: ICD-10-CM

## 2023-04-24 DIAGNOSIS — M35.00 SJOGREN'S SYNDROME, WITH UNSPECIFIED ORGAN INVOLVEMENT: ICD-10-CM

## 2023-04-24 PROCEDURE — 1160F RVW MEDS BY RX/DR IN RCRD: CPT | Mod: CPTII,S$GLB,, | Performed by: INTERNAL MEDICINE

## 2023-04-24 PROCEDURE — 99215 OFFICE O/P EST HI 40 MIN: CPT | Mod: S$GLB,,, | Performed by: INTERNAL MEDICINE

## 2023-04-24 PROCEDURE — 1159F MED LIST DOCD IN RCRD: CPT | Mod: CPTII,S$GLB,, | Performed by: INTERNAL MEDICINE

## 2023-04-24 PROCEDURE — 3008F PR BODY MASS INDEX (BMI) DOCUMENTED: ICD-10-PCS | Mod: CPTII,S$GLB,, | Performed by: INTERNAL MEDICINE

## 2023-04-24 PROCEDURE — 3075F SYST BP GE 130 - 139MM HG: CPT | Mod: CPTII,S$GLB,, | Performed by: INTERNAL MEDICINE

## 2023-04-24 PROCEDURE — 99999 PR PBB SHADOW E&M-EST. PATIENT-LVL V: ICD-10-PCS | Mod: PBBFAC,,, | Performed by: INTERNAL MEDICINE

## 2023-04-24 PROCEDURE — 99215 PR OFFICE/OUTPT VISIT, EST, LEVL V, 40-54 MIN: ICD-10-PCS | Mod: S$GLB,,, | Performed by: INTERNAL MEDICINE

## 2023-04-24 PROCEDURE — 99999 PR PBB SHADOW E&M-EST. PATIENT-LVL V: CPT | Mod: PBBFAC,,, | Performed by: INTERNAL MEDICINE

## 2023-04-24 PROCEDURE — 1160F PR REVIEW ALL MEDS BY PRESCRIBER/CLIN PHARMACIST DOCUMENTED: ICD-10-PCS | Mod: CPTII,S$GLB,, | Performed by: INTERNAL MEDICINE

## 2023-04-24 PROCEDURE — 3080F DIAST BP >= 90 MM HG: CPT | Mod: CPTII,S$GLB,, | Performed by: INTERNAL MEDICINE

## 2023-04-24 PROCEDURE — 1159F PR MEDICATION LIST DOCUMENTED IN MEDICAL RECORD: ICD-10-PCS | Mod: CPTII,S$GLB,, | Performed by: INTERNAL MEDICINE

## 2023-04-24 PROCEDURE — 3080F PR MOST RECENT DIASTOLIC BLOOD PRESSURE >= 90 MM HG: ICD-10-PCS | Mod: CPTII,S$GLB,, | Performed by: INTERNAL MEDICINE

## 2023-04-24 PROCEDURE — 3008F BODY MASS INDEX DOCD: CPT | Mod: CPTII,S$GLB,, | Performed by: INTERNAL MEDICINE

## 2023-04-24 PROCEDURE — 3075F PR MOST RECENT SYSTOLIC BLOOD PRESS GE 130-139MM HG: ICD-10-PCS | Mod: CPTII,S$GLB,, | Performed by: INTERNAL MEDICINE

## 2023-04-24 RX ORDER — AMOXICILLIN AND CLAVULANATE POTASSIUM 875; 125 MG/1; MG/1
1 TABLET, FILM COATED ORAL 2 TIMES DAILY
Qty: 20 TABLET | Refills: 1 | Status: SHIPPED | OUTPATIENT
Start: 2023-04-24 | End: 2023-05-04

## 2023-04-24 RX ORDER — OXYCODONE AND ACETAMINOPHEN 7.5; 325 MG/1; MG/1
1 TABLET ORAL EVERY 8 HOURS PRN
Qty: 90 TABLET | Refills: 0 | Status: SHIPPED | OUTPATIENT
Start: 2023-04-24 | End: 2023-07-24

## 2023-04-24 RX ORDER — NITROFURANTOIN 25; 75 MG/1; MG/1
100 CAPSULE ORAL 2 TIMES DAILY
Qty: 20 CAPSULE | Refills: 1 | Status: SHIPPED | OUTPATIENT
Start: 2023-04-24 | End: 2023-05-04

## 2023-04-24 RX ORDER — NIFEDIPINE 60 MG/1
60 TABLET, EXTENDED RELEASE ORAL DAILY
Qty: 90 TABLET | Refills: 3 | Status: SHIPPED | OUTPATIENT
Start: 2023-04-24

## 2023-04-24 RX ORDER — ERGOCALCIFEROL 1.25 MG/1
50000 CAPSULE ORAL
Qty: 12 CAPSULE | Refills: 3 | Status: SHIPPED | OUTPATIENT
Start: 2023-04-24 | End: 2024-04-18

## 2023-04-24 RX ORDER — ALENDRONATE SODIUM 70 MG/1
70 TABLET ORAL
Qty: 4 TABLET | Refills: 11 | OUTPATIENT
Start: 2023-04-24 | End: 2024-04-23

## 2023-04-24 RX ORDER — DIPHENOXYLATE HYDROCHLORIDE AND ATROPINE SULFATE 2.5; .025 MG/1; MG/1
1 TABLET ORAL 4 TIMES DAILY PRN
Qty: 40 TABLET | Refills: 3 | Status: SHIPPED | OUTPATIENT
Start: 2023-04-24 | End: 2023-05-04

## 2023-04-24 RX ORDER — OXYCODONE AND ACETAMINOPHEN 7.5; 325 MG/1; MG/1
1 TABLET ORAL EVERY 8 HOURS PRN
Qty: 90 TABLET | Refills: 0 | Status: SHIPPED | OUTPATIENT
Start: 2023-06-21 | End: 2023-07-06 | Stop reason: SDUPTHER

## 2023-04-24 RX ORDER — SEMAGLUTIDE 1.34 MG/ML
0.25 INJECTION, SOLUTION SUBCUTANEOUS
Qty: 0.75 ML | Refills: 3 | Status: SHIPPED | OUTPATIENT
Start: 2023-04-24 | End: 2023-07-24

## 2023-04-24 RX ORDER — OXYCODONE AND ACETAMINOPHEN 7.5; 325 MG/1; MG/1
1 TABLET ORAL EVERY 8 HOURS PRN
Qty: 90 TABLET | Refills: 0 | Status: SHIPPED | OUTPATIENT
Start: 2023-05-22 | End: 2023-06-21

## 2023-04-24 ASSESSMENT — ROUTINE ASSESSMENT OF PATIENT INDEX DATA (RAPID3)
PAIN SCORE: 6
PSYCHOLOGICAL DISTRESS SCORE: 2.2
PATIENT GLOBAL ASSESSMENT SCORE: 7
TOTAL RAPID3 SCORE: 5.55
FATIGUE SCORE: 2.2
MDHAQ FUNCTION SCORE: 1.1

## 2023-04-24 NOTE — PROGRESS NOTES
Subjective:       Patient ID: Breanna Sanchez is a 42 y.o. female.    Chief Complaint: Disease Management    Mrs. Sanchez is a 42 year old female who presents to clinic for follow up on AS/RA. End of march she started with diarrhea  Iritis, low grade fevers, She is doing poorly and was seen in ER last week for pyelonephritis--treated with bactrim (although listed to have sulfa allergy--she did fine with this). On hyquivia montly approx 2 months ago. She complains of increased joint pain. She has joint pain in her hands, wrists, shoulders, neck, back, and SI joints. Pain is constant and aching. Worse at night and in the morning. She has increased lateral L hip pain and difficulty lying on her side.  8 to 10 BM daily.She reports some improvement with cosentyx, but disease remains active and uncontrolled on cosentyx 150 mg monthly. She has been without pain medication since early November--she is not longer followed by Dr. Chery. Morphine was not helping her pain. Previously on percocet which was helpful. Tried butrans patch in the past but too costly.      Current tx:  1. Cosentyx 300 mg           Review of Systems   Constitutional:  Positive for activity change and fatigue. Negative for appetite change, chills and unexpected weight change.   HENT:  Negative for mouth sores.    Eyes:  Positive for redness. Negative for visual disturbance.   Respiratory:  Negative for cough and shortness of breath.    Cardiovascular:  Negative for chest pain, palpitations and leg swelling.   Gastrointestinal:  Negative for abdominal pain, constipation, diarrhea, nausea and vomiting.   Genitourinary:  Negative for genital sores.   Musculoskeletal:  Positive for arthralgias, back pain, neck pain and neck stiffness.   Skin:  Negative for rash.   Allergic/Immunologic: Positive for immunocompromised state.   Neurological:  Negative for dizziness, weakness and light-headedness.   Hematological:  Does not bruise/bleed easily.    Psychiatric/Behavioral:  The patient is not nervous/anxious.        Objective:     Vitals:    04/24/23 1124   BP: (!) 138/93   Pulse: (!) 118       Past Medical History:   Diagnosis Date    ADHD     Ankylosing spondylitis     Anxiety     Colitis     Colon polyp     Depression     Essential hypertension     Fibromyalgia     History of kidney stones     Lupus     Migraine headache     Port-A-Cath in place     Primary immune deficiency disorder     Primary immunodeficiency disorder     Pyelonephritis 5/8/2018    Recurrent UTI     Renal disorder     Rheumatoid arteritis     Sjoegren syndrome      Past Surgical History:   Procedure Laterality Date    APPENDECTOMY      BARTHOLIN GLAND CYST EXCISION      COLONOSCOPY  2011    COLONOSCOPY N/A 7/7/2022    Procedure: COLONOSCOPY;  Surgeon: Shaggy Mae MD;  Location: [facility];  Service: Endoscopy;  Laterality: N/A;    CYSTOSCOPY WITH CALCULUS EXTRACTION Bilateral 2/2/2023    Procedure: CYSTOSCOPY, WITH CALCULUS REMOVAL;  Surgeon: Florentin Aleman MD;  Location: [facility] OR;  Service: Urology;  Laterality: Bilateral;    CYSTOURETEROSCOPY WITH RETROGRADE PYELOGRAPHY AND INSERTION OF STENT INTO URETER Bilateral 2/2/2023    Procedure: CYSTOURETEROSCOPY, WITH RETROGRADE PYELOGRAM AND URETERAL STENT INSERTION;  Surgeon: Florentin Aleman MD;  Location: [facility] OR;  Service: Urology;  Laterality: Bilateral;    DILATION OF URETHRA      X 2    HYSTERECTOMY      INSERTION OF TUNNELED CENTRAL VENOUS CATHETER (CVC) WITH SUBCUTANEOUS PORT N/A 10/2/2019    Procedure: [id number]-CATH;  Surgeon: Lenin Springer MD;  Location: [facility] OR;  Service: General;  Laterality: N/A;    KNEE ARTHROSCOPY Left     LAPAROSCOPIC LYSIS OF ADHESIONS N/A 7/22/2019    Procedure: LYSIS, ADHESIONS, LAPAROSCOPIC;  Surgeon: Clarence Adams MD;  Location: [facility] OR;  Service: OB/GYN;  Laterality: N/A;    LAPAROSCOPIC SALPINGO-OOPHORECTOMY Left 7/22/2019    Procedure:  SALPINGO-OOPHORECTOMY, LAPAROSCOPIC- LEFT SIDE ONLY;  Surgeon: Clarence Adams MD;  Location: [facility] OR;  Service: OB/GYN;  Laterality: Left;    OVARIAN CYST REMOVAL Left     SALPINGOOPHORECTOMY Right     TONSILLECTOMY      TOTAL SHOULDER ARTHROPLASTY Right     TUBAL LIGATION            Physical Exam   HENT:   Left Ear: There is tenderness. Tympanic membrane is erythematous.   Eyes: Right conjunctiva is not injected. Left conjunctiva is not injected.   Neck: No JVD present. No thyromegaly present.   Cardiovascular: Normal rate and regular rhythm. Exam reveals no decreased pulses.   Pulmonary/Chest: Effort normal.   Musculoskeletal:      Right shoulder: Tenderness present.      Left shoulder: Tenderness present.      Right elbow: Normal.      Left elbow: Normal.      Right wrist: Tenderness present.      Left wrist: Swelling and tenderness present.      Right knee: Normal.      Left knee: Normal.   Lymphadenopathy:     She has no cervical adenopathy.   Neurological: Gait normal.   Skin: No rash noted.   Psychiatric: Mood and affect normal.       Right Side Rheumatological Exam     Examination finds the elbow, knee, 1st PIP, 1st MCP, 2nd PIP, 2nd MCP, 3rd PIP, 3rd MCP, 4th PIP, 4th MCP, 5th PIP and 5th MCP normal.    The patient is tender to palpation of the shoulder and wrist    Left Side Rheumatological Exam     Examination finds the elbow, knee, 1st PIP, 2nd PIP, 3rd PIP, 4th PIP, 4th MCP, 5th PIP and 5th MCP normal.    The patient is tender to palpation of the shoulder, wrist, 1st MCP, 2nd MCP and 3rd MCP.    She has swelling of the wrist, 1st MCP, 2nd MCP and 3rd MCP    Hip Exam   Tenderness Location: lateral      Back/Neck Exam   Tenderness Right paramedian tenderness of the Upper C-Spine, Lower C-Spine and Upper T-Spine.Left paramedian tenderness of the Upper C-Spine, Lower C-Spine and Upper T-Spine.       Collected Updated Procedure    04/20/2023 1357 04/20/2023 1403 POCT URINE DIPSTICK WITHOUT  MICROSCOPE [[id number]]    (Abnormal)   Urine    Component Value   No component results          04/17/2023 1114 04/17/2023 2333 Vitamin D [[id number]]   (Abnormal)   Blood    Component Value Units   Vit D, 25-Hydroxy 22 Low   ng/mL          04/17/2023 1114 04/18/2023 1647 Sjogrens syndrome-B extractable nuclear antibody [[id number]]    Blood    Component Value Units   Anti-SSB Antibody 0.05 Ratio   Anti-SSB Interpretation Negative           04/17/2023 1114 04/18/2023 1647 Sjogrens syndrome-A extractable nuclear antibody [[id number]]    Blood    Component Value Units   Anti-SSA Antibody 0.07 Ratio   Anti-SSA Interpretation Negative           04/17/2023 1114 04/18/2023 1241 AGAPITO Screen w/Reflex [[id number]]   (Abnormal)   Blood    Component Value   AGAPITO Screen Positive Abnormal            04/17/2023 1114 04/17/2023 1230 Sedimentation rate [[id number]]   Blood    Component Value Units   Sed Rate 5 mm/Hr          04/17/2023 1114 04/18/2023 0037 C-Reactive Protein [[id number]]   Blood    Component Value Units   CRP 3.3 mg/L          04/17/2023 1114 04/18/2023 0037 Comprehensive Metabolic Panel [[id number]]   Blood    Component Value Units   Sodium 141 mmol/L   Potassium 4.0 mmol/L   Chloride 103 mmol/L   CO2 26 mmol/L   Glucose 110 mg/dL   BUN 6 mg/dL   Creatinine 0.6 mg/dL   Calcium 9.9 mg/dL   Total Protein 7.6 g/dL   Albumin 4.3 g/dL   Total Bilirubin 0.2  mg/dL   Alkaline Phosphatase 119 U/L   AST 16 U/L   ALT 22 U/L   Anion Gap 12 mmol/L   eGFR >60.0 mL/min/1.73 m^2          04/17/2023 1114 04/17/2023 2217 CBC Auto Differential [[id number]]   (Abnormal)   Blood    Component Value Units   WBC 14.19 High  K/uL   RBC 5.17 M/uL   Hemoglobin 15.1 g/dL   Hematocrit 47.6 %   MCV 92 fL   MCH 29.2 pg   MCHC 31.7 Low  g/dL   RDW 13.2 %   Platelets 386 K/uL   MPV 10.6 fL   Immature Granulocytes 0.5 %   Gran # (ANC) 9.4 High  K/uL   Immature Grans (Abs) 0.07 High   K/uL   Lymph # 3.4 K/uL   Mono # 1.1 High  K/uL   Eos # 0.2 K/uL   Baso #  0.08 K/uL   nRBC 0 /100 WBC   Gran % 66.1 %   Lymph % 24.2 %   Mono % 7.5 %   Eosinophil % 1.1 %   Basophil % 0.6 %   Differential Method Automated           04/17/2023 1114 04/18/2023 1242 AGAPITO Titer 2 [[id number]] Component Value   AGAPITO Titer 2 1:160          04/17/2023 1114 04/18/2023 1242 AGAPITO Pattern 1 [[id number]] Component Value   AGAPITO PATTERN 1 Homogeneous          04/17/2023 1114 04/18/2023 1242 AGAPITO Titer 1 [[id number]] Component Value   AGAPITO Titer 1 1:320          04/17/2023 1114 04/19/2023 1440 AGAPITO Profile [[id number]] Component Value Units   Anti Sm Antibody 0.08 Ratio   Anti-Sm Interpretation Negative    Anti-SSA Antibody 0.07 Ratio   Anti-SSA Interpretation Negative    Anti-SSB Antibody 0.05 Ratio   Anti-SSB Interpretation Negative    ds DNA Ab Negative 1:10     Anti Sm/RNP Antibody 0.11 Ratio   Anti-Sm/RNP Interpretation Negative           04/17/2023 1114 04/18/2023 1242 AGAPITO Pattern 2 [[id number]] Component Value   AGAPITO Pattern 2 Speckled          03/16/2023 1512 03/16/2023 1516 POCT URINE DIPSTICK WITHOUT MICROSCOPE [[id number]]    (Abnormal)   Urine    Component Value   No component results          03/06/2023 1012 03/06/2023 1101 Urinalysis, Reflex to Urine Culture Urine, Clean Catch [[id number]]    (Abnormal)   Urine    Component Value Units   Specimen UA Urine, Clean Catch    Color, UA Yellow    Appearance, UA Cloudy Abnormal     pH, UA 6.5    Specific Gravity, UA 1.025    Protein, UA 1+ Abnormal      Glucose, UA Negative    Ketones, UA Trace Abnormal     Bilirubin (UA) 1+ Abnormal      Occult Blood UA 1+ Abnormal     Nitrite, UA Negative    Urobilinogen, UA 1.0 EU/dL   Leukocytes, UA 1+ Abnormal              Assessment:       1. Ankylosing spondylitis of thoracolumbar region    2. Chronic pain syndrome    3. Fibromyalgia    4. Irritable bowel syndrome with diarrhea    5. Iritis    6. Sjogren's syndrome, with unspecified organ involvement    7. Chronic UTI    8. Vitamin D deficiency                Plan:        Ankylosing spondylitis of thoracolumbar region  -     gentamicin-prednisoLONE (PRED G) 0.3-1 % ophthalmic drops; Place 1 drop into the right eye 2 (two) times daily. for 10 days  Dispense: 10 mL; Refill: 1  -     diphenoxylate-atropine 2.5-0.025 mg (LOMOTIL) 2.5-0.025 mg per tablet; Take 1 tablet by mouth 4 (four) times daily as needed for Diarrhea.  Dispense: 40 tablet; Refill: 3  -     oxyCODONE-acetaminophen (PERCOCET) 7.5-325 mg per tablet; Take 1 tablet by mouth every 8 (eight) hours as needed for Pain.  Dispense: 90 tablet; Refill: 0  -     oxyCODONE-acetaminophen (PERCOCET) 7.5-325 mg per tablet; Take 1 tablet by mouth every 8 (eight) hours as needed for Pain.  Dispense: 90 tablet; Refill: 0  -     oxyCODONE-acetaminophen (PERCOCET) 7.5-325 mg per tablet; Take 1 tablet by mouth every 8 (eight) hours as needed for Pain.  Dispense: 90 tablet; Refill: 0  -     Sedimentation rate; Future; Expected date: 04/24/2023  -     C-Reactive Protein; Future; Expected date: 04/24/2023  -     Comprehensive Metabolic Panel; Future; Expected date: 04/24/2023  -     CBC Auto Differential; Future; Expected date: 04/24/2023    Chronic pain syndrome  -     gentamicin-prednisoLONE (PRED G) 0.3-1 % ophthalmic drops; Place 1 drop into the right eye 2 (two) times daily. for 10 days  Dispense: 10 mL; Refill: 1  -     diphenoxylate-atropine 2.5-0.025 mg (LOMOTIL) 2.5-0.025 mg per tablet; Take 1 tablet by mouth 4 (four) times daily as needed for Diarrhea.  Dispense: 40 tablet; Refill: 3  -     oxyCODONE-acetaminophen (PERCOCET) 7.5-325 mg per tablet; Take 1 tablet by mouth every 8 (eight) hours as needed for Pain.  Dispense: 90 tablet; Refill: 0  -     oxyCODONE-acetaminophen (PERCOCET) 7.5-325 mg per tablet; Take 1 tablet by mouth every 8 (eight) hours as needed for Pain.  Dispense: 90 tablet; Refill: 0  -     oxyCODONE-acetaminophen (PERCOCET) 7.5-325 mg per tablet; Take 1 tablet by mouth every 8 (eight) hours as needed for Pain.   Dispense: 90 tablet; Refill: 0  -     Sedimentation rate; Future; Expected date: 04/24/2023  -     C-Reactive Protein; Future; Expected date: 04/24/2023  -     Comprehensive Metabolic Panel; Future; Expected date: 04/24/2023  -     CBC Auto Differential; Future; Expected date: 04/24/2023    Fibromyalgia  -     gentamicin-prednisoLONE (PRED G) 0.3-1 % ophthalmic drops; Place 1 drop into the right eye 2 (two) times daily. for 10 days  Dispense: 10 mL; Refill: 1  -     diphenoxylate-atropine 2.5-0.025 mg (LOMOTIL) 2.5-0.025 mg per tablet; Take 1 tablet by mouth 4 (four) times daily as needed for Diarrhea.  Dispense: 40 tablet; Refill: 3  -     oxyCODONE-acetaminophen (PERCOCET) 7.5-325 mg per tablet; Take 1 tablet by mouth every 8 (eight) hours as needed for Pain.  Dispense: 90 tablet; Refill: 0  -     oxyCODONE-acetaminophen (PERCOCET) 7.5-325 mg per tablet; Take 1 tablet by mouth every 8 (eight) hours as needed for Pain.  Dispense: 90 tablet; Refill: 0  -     oxyCODONE-acetaminophen (PERCOCET) 7.5-325 mg per tablet; Take 1 tablet by mouth every 8 (eight) hours as needed for Pain.  Dispense: 90 tablet; Refill: 0  -     Sedimentation rate; Future; Expected date: 04/24/2023  -     C-Reactive Protein; Future; Expected date: 04/24/2023  -     Comprehensive Metabolic Panel; Future; Expected date: 04/24/2023  -     CBC Auto Differential; Future; Expected date: 04/24/2023    Irritable bowel syndrome with diarrhea  -     gentamicin-prednisoLONE (PRED G) 0.3-1 % ophthalmic drops; Place 1 drop into the right eye 2 (two) times daily. for 10 days  Dispense: 10 mL; Refill: 1  -     diphenoxylate-atropine 2.5-0.025 mg (LOMOTIL) 2.5-0.025 mg per tablet; Take 1 tablet by mouth 4 (four) times daily as needed for Diarrhea.  Dispense: 40 tablet; Refill: 3  -     oxyCODONE-acetaminophen (PERCOCET) 7.5-325 mg per tablet; Take 1 tablet by mouth every 8 (eight) hours as needed for Pain.  Dispense: 90 tablet; Refill: 0  -      oxyCODONE-acetaminophen (PERCOCET) 7.5-325 mg per tablet; Take 1 tablet by mouth every 8 (eight) hours as needed for Pain.  Dispense: 90 tablet; Refill: 0  -     oxyCODONE-acetaminophen (PERCOCET) 7.5-325 mg per tablet; Take 1 tablet by mouth every 8 (eight) hours as needed for Pain.  Dispense: 90 tablet; Refill: 0  -     Sedimentation rate; Future; Expected date: 04/24/2023  -     C-Reactive Protein; Future; Expected date: 04/24/2023  -     Comprehensive Metabolic Panel; Future; Expected date: 04/24/2023  -     CBC Auto Differential; Future; Expected date: 04/24/2023    Iritis  -     gentamicin-prednisoLONE (PRED G) 0.3-1 % ophthalmic drops; Place 1 drop into the right eye 2 (two) times daily. for 10 days  Dispense: 10 mL; Refill: 1  -     diphenoxylate-atropine 2.5-0.025 mg (LOMOTIL) 2.5-0.025 mg per tablet; Take 1 tablet by mouth 4 (four) times daily as needed for Diarrhea.  Dispense: 40 tablet; Refill: 3  -     oxyCODONE-acetaminophen (PERCOCET) 7.5-325 mg per tablet; Take 1 tablet by mouth every 8 (eight) hours as needed for Pain.  Dispense: 90 tablet; Refill: 0  -     oxyCODONE-acetaminophen (PERCOCET) 7.5-325 mg per tablet; Take 1 tablet by mouth every 8 (eight) hours as needed for Pain.  Dispense: 90 tablet; Refill: 0  -     oxyCODONE-acetaminophen (PERCOCET) 7.5-325 mg per tablet; Take 1 tablet by mouth every 8 (eight) hours as needed for Pain.  Dispense: 90 tablet; Refill: 0  -     Sedimentation rate; Future; Expected date: 04/24/2023  -     C-Reactive Protein; Future; Expected date: 04/24/2023  -     Comprehensive Metabolic Panel; Future; Expected date: 04/24/2023  -     CBC Auto Differential; Future; Expected date: 04/24/2023    Sjogren's syndrome, with unspecified organ involvement  -     NIFEdipine (PROCARDIA-XL) 60 MG (OSM) 24 hr tablet; Take 1 tablet (60 mg total) by mouth once daily.  Dispense: 90 tablet; Refill: 3  -     Sedimentation rate; Future; Expected date: 04/24/2023  -     C-Reactive  Protein; Future; Expected date: 04/24/2023  -     Comprehensive Metabolic Panel; Future; Expected date: 04/24/2023  -     CBC Auto Differential; Future; Expected date: 04/24/2023    Chronic UTI  -     nitrofurantoin, macrocrystal-monohydrate, (MACROBID) 100 MG capsule; Take 1 capsule (100 mg total) by mouth 2 (two) times daily. for 10 days  Dispense: 20 capsule; Refill: 1  -     amoxicillin-clavulanate 875-125mg (AUGMENTIN) 875-125 mg per tablet; Take 1 tablet by mouth 2 (two) times daily. for 10 days  Dispense: 20 tablet; Refill: 1  -     Urinalysis; Standing  -     Urine culture; Standing  -     Sedimentation rate; Future; Expected date: 04/24/2023  -     C-Reactive Protein; Future; Expected date: 04/24/2023  -     Comprehensive Metabolic Panel; Future; Expected date: 04/24/2023  -     CBC Auto Differential; Future; Expected date: 04/24/2023    Vitamin D deficiency  -     ergocalciferol (ERGOCALCIFEROL) 50,000 unit Cap; Take 1 capsule (50,000 Units total) by mouth every 7 days.  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 Chery  --recurrent ESBL UTI     Plan  Standing  UA to be sure infection has resolved  Gent/ steroid eye   Increase cosentyx 300 mg monthly (if above infection resolved)  Cont IVIG monthly  Meds refilled  Pain contract signed. Emanuel LING have checked [place] prescription monitoring program site and no unusual or abnormal behavior has occurred pt understand the risk and benefits of taking opioid medications and has decided to continue the medication.    More than 50% of the  40 minute encounter was spent face to face counseling the patient regarding current status and future plan of care as well as side effects  of the medications. All questions were  answered to patient's satisfaction also includes  non-face to face time preparing to see the patient (eg, review of tests), Obtaining and/or reviewing separately obtained history, Documenting clinical information in the electronic or other health record, Independently interpreting results

## 2023-04-28 ENCOUNTER — TELEPHONE (OUTPATIENT)
Dept: RHEUMATOLOGY | Facility: CLINIC | Age: 43
End: 2023-04-28

## 2023-04-28 NOTE — TELEPHONE ENCOUNTER
----- Message from Hilary Bowles sent at 4/26/2023  2:45 PM CDT -----  Regarding: advice  Contact: Patient  Type: Needs Medical Advice  Who Called:  Patient   Symptoms (please be specific):    How long has patient had these symptoms:    Pharmacy name and phone #:    Best Call Back Number: [phone] (home)     Additional Information: Patient stated that she was contacting to get information for a prescription of eye drops due to inflammation. Patient states that she hasn't heard anything else. Please contact patient to advise. Thanks!

## 2023-05-09 ENCOUNTER — OUTPATIENT CASE MANAGEMENT (OUTPATIENT)
Dept: ADMINISTRATIVE | Facility: OTHER | Age: 43
End: 2023-05-09
Payer: COMMERCIAL

## 2023-05-09 ENCOUNTER — PATIENT MESSAGE (OUTPATIENT)
Dept: ADMINISTRATIVE | Facility: OTHER | Age: 43
End: 2023-05-09
Payer: COMMERCIAL

## 2023-05-15 PROBLEM — N39.0 UTI (URINARY TRACT INFECTION): Status: RESOLVED | Noted: 2021-05-16 | Resolved: 2023-05-15

## 2023-05-23 ENCOUNTER — OFFICE VISIT (OUTPATIENT)
Dept: GASTROENTEROLOGY | Facility: CLINIC | Age: 43
End: 2023-05-23
Payer: COMMERCIAL

## 2023-05-23 VITALS — BODY MASS INDEX: 33.91 KG/M2 | HEIGHT: 63 IN | WEIGHT: 191.38 LBS

## 2023-05-23 DIAGNOSIS — K21.9 GASTROESOPHAGEAL REFLUX DISEASE, UNSPECIFIED WHETHER ESOPHAGITIS PRESENT: ICD-10-CM

## 2023-05-23 DIAGNOSIS — R10.9 ABDOMINAL CRAMPING: ICD-10-CM

## 2023-05-23 DIAGNOSIS — R14.2 BELCHING: Primary | ICD-10-CM

## 2023-05-23 DIAGNOSIS — R14.2 BELCHING: ICD-10-CM

## 2023-05-23 DIAGNOSIS — Z86.010 HISTORY OF COLON POLYPS: ICD-10-CM

## 2023-05-23 DIAGNOSIS — K58.0 IRRITABLE BOWEL SYNDROME WITH DIARRHEA: Primary | ICD-10-CM

## 2023-05-23 PROCEDURE — 99214 PR OFFICE/OUTPT VISIT, EST, LEVL IV, 30-39 MIN: ICD-10-PCS | Mod: S$GLB,,, | Performed by: NURSE PRACTITIONER

## 2023-05-23 PROCEDURE — 1159F MED LIST DOCD IN RCRD: CPT | Mod: CPTII,S$GLB,, | Performed by: NURSE PRACTITIONER

## 2023-05-23 PROCEDURE — 99999 PR PBB SHADOW E&M-EST. PATIENT-LVL V: ICD-10-PCS | Mod: PBBFAC,,, | Performed by: NURSE PRACTITIONER

## 2023-05-23 PROCEDURE — 1160F RVW MEDS BY RX/DR IN RCRD: CPT | Mod: CPTII,S$GLB,, | Performed by: NURSE PRACTITIONER

## 2023-05-23 PROCEDURE — 99999 PR PBB SHADOW E&M-EST. PATIENT-LVL V: CPT | Mod: PBBFAC,,, | Performed by: NURSE PRACTITIONER

## 2023-05-23 PROCEDURE — 3008F BODY MASS INDEX DOCD: CPT | Mod: CPTII,S$GLB,, | Performed by: NURSE PRACTITIONER

## 2023-05-23 PROCEDURE — 1159F PR MEDICATION LIST DOCUMENTED IN MEDICAL RECORD: ICD-10-PCS | Mod: CPTII,S$GLB,, | Performed by: NURSE PRACTITIONER

## 2023-05-23 PROCEDURE — 1160F PR REVIEW ALL MEDS BY PRESCRIBER/CLIN PHARMACIST DOCUMENTED: ICD-10-PCS | Mod: CPTII,S$GLB,, | Performed by: NURSE PRACTITIONER

## 2023-05-23 PROCEDURE — 99214 OFFICE O/P EST MOD 30 MIN: CPT | Mod: S$GLB,,, | Performed by: NURSE PRACTITIONER

## 2023-05-23 PROCEDURE — 3008F PR BODY MASS INDEX (BMI) DOCUMENTED: ICD-10-PCS | Mod: CPTII,S$GLB,, | Performed by: NURSE PRACTITIONER

## 2023-05-23 RX ORDER — DIPHENOXYLATE HYDROCHLORIDE AND ATROPINE SULFATE 2.5; .025 MG/1; MG/1
1 TABLET ORAL 4 TIMES DAILY PRN
COMMUNITY
Start: 2023-04-24 | End: 2023-11-15

## 2023-05-23 RX ORDER — HYOSCYAMINE SULFATE 0.125 MG
125 TABLET ORAL EVERY 6 HOURS PRN
Qty: 120 TABLET | Refills: 1 | Status: SHIPPED | OUTPATIENT
Start: 2023-05-23 | End: 2023-07-05

## 2023-05-23 RX ORDER — OMEPRAZOLE 40 MG/1
40 CAPSULE, DELAYED RELEASE ORAL DAILY
Qty: 30 CAPSULE | Refills: 2 | Status: SHIPPED | OUTPATIENT
Start: 2023-05-23 | End: 2024-03-28

## 2023-05-23 RX ORDER — PANTOPRAZOLE SODIUM 40 MG/1
40 TABLET, DELAYED RELEASE ORAL DAILY
Qty: 30 TABLET | Refills: 2 | Status: SHIPPED | OUTPATIENT
Start: 2023-05-23 | End: 2023-05-23

## 2023-05-23 NOTE — PROGRESS NOTES
Subjective:       Patient ID: Breanna Sanchez is a 42 y.o. female, Body mass index is 33.9 kg/m².    Chief Complaint: Diarrhea      Established patient of Dr. Mae & myself.     Diarrhea   This is a chronic problem. The current episode started more than 1 year ago. The problem occurs 2 to 4 times per day (some days more than 10 times per day). The problem has been waxing and waning (improved after last office visit but worsened ~ two months ago). The stool consistency is described as Watery (describes stool as type 6 or 7 on bristol scale). The patient states that diarrhea awakens her from sleep. Associated symptoms include abdominal pain (describes as cramping). Pertinent negatives include no bloating, chills, coughing, fever, increased  flatus, vomiting or weight loss. Associated symptoms comments: Associated symptoms also include fecal urgency, belching and heartburn. Nothing aggravates the symptoms. Risk factors include recent antibiotic use (denies recent hospitalization or foreign travel). She has tried anti-motility drug (Past: Bentyl 20 mg QID PRN- caused fatigue and fogginess; Xifaxan- thinks it helped; Currently: OTC Imodium PRN- somewhat helps) for the symptoms. Her past medical history is significant for irritable bowel syndrome. There is no history of bowel resection, inflammatory bowel disease or a recent abdominal surgery.   Review of Systems   Constitutional:  Negative for appetite change, chills, fever, unexpected weight change and weight loss.   HENT:  Negative for trouble swallowing.    Respiratory:  Negative for cough and shortness of breath.    Cardiovascular:  Negative for chest pain.   Gastrointestinal:  Positive for abdominal pain (describes as cramping) and diarrhea. Negative for abdominal distention, anal bleeding, bloating, blood in stool, constipation, flatus, nausea, rectal pain and vomiting.   Genitourinary:  Negative for difficulty urinating and dysuria.   Musculoskeletal:   Negative for gait problem.   Skin:  Negative for rash.   Neurological:  Negative for speech difficulty.   Psychiatric/Behavioral:  Negative for confusion.      Past Medical History:   Diagnosis Date    ADHD     Ankylosing spondylitis     Anxiety     Colitis     Colon polyp     Depression     Essential hypertension     Fibromyalgia     History of kidney stones     Lupus     Migraine headache     Port-A-Cath in place     Primary immune deficiency disorder     Primary immunodeficiency disorder     Pyelonephritis 5/8/2018    Recurrent UTI     Renal disorder     Rheumatoid arteritis     Sjoegren syndrome       Past Surgical History:   Procedure Laterality Date    APPENDECTOMY      BARTHOLIN GLAND CYST EXCISION      COLONOSCOPY  2011    COLONOSCOPY N/A 7/7/2022    Procedure: COLONOSCOPY;  Surgeon: Shaggy Mae MD;  Location: [facility] ENDO;  Service: Endoscopy;  Laterality: N/A;    CYSTOSCOPY WITH CALCULUS EXTRACTION Bilateral 2/2/2023    Procedure: CYSTOSCOPY, WITH CALCULUS REMOVAL;  Surgeon: Florentin Aleman MD;  Location: [facility] OR;  Service: Urology;  Laterality: Bilateral;    CYSTOURETEROSCOPY WITH RETROGRADE PYELOGRAPHY AND INSERTION OF STENT INTO URETER Bilateral 2/2/2023    Procedure: CYSTOURETEROSCOPY, WITH RETROGRADE PYELOGRAM AND URETERAL STENT INSERTION;  Surgeon: Florentin Aleman MD;  Location: [facility] OR;  Service: Urology;  Laterality: Bilateral;    DILATION OF URETHRA      X 2    HYSTERECTOMY      INSERTION OF TUNNELED CENTRAL VENOUS CATHETER (CVC) WITH SUBCUTANEOUS PORT N/A 10/2/2019    Procedure: [id number];  Surgeon: Lenin Springer MD;  Location: [facility] OR;  Service: General;  Laterality: N/A;    KNEE ARTHROSCOPY Left     LAPAROSCOPIC LYSIS OF ADHESIONS N/A 7/22/2019    Procedure: LYSIS, ADHESIONS, LAPAROSCOPIC;  Surgeon: Clarence Adams MD;  Location: [facility] OR;  Service: OB/GYN;  Laterality: N/A;    LAPAROSCOPIC SALPINGO-OOPHORECTOMY Left 7/22/2019    Procedure: SALPINGO-OOPHORECTOMY, LAPAROSCOPIC-  LEFT SIDE ONLY;  Surgeon: Clarence Adams MD;  Location: [facility] OR;  Service: OB/GYN;  Laterality: Left;    OVARIAN CYST REMOVAL Left     SALPINGOOPHORECTOMY Right     TONSILLECTOMY      TOTAL SHOULDER ARTHROPLASTY Right     TUBAL LIGATION        Family History   Problem Relation Age of Onset    Diabetes Mother         type 1    Stroke Mother     Heart disease Mother     Rheum arthritis Mother     Cancer Father         prostate    Alzheimer's disease Father     Colon polyps Father     Cancer Sister         uterine    Ovarian cancer Sister     Diabetes Brother         type 2    Rheum arthritis Maternal Grandmother     Crohn's disease Neg Hx     Ulcerative colitis Neg Hx       Wt Readings from Last 10 Encounters:   05/23/23 86.8 kg (191 lb 5.8 oz)   05/18/23 87.3 kg (192 lb 8 oz)   04/24/23 86.3 kg (190 lb 4.1 oz)   04/20/23 87.2 kg (192 lb 3.2 oz)   03/17/23 86.1 kg (189 lb 14.8 oz)   03/16/23 85.7 kg (189 lb)   03/07/23 86.1 kg (189 lb 12.8 oz)   03/06/23 86.2 kg (190 lb)   02/28/23 88.3 kg (194 lb 9.6 oz)   02/11/23 88.3 kg (194 lb 10.7 oz)     Lab Results   Component Value Date    WBC 14.19 (H) 04/17/2023    HGB 15.1 04/17/2023    HCT 47.6 04/17/2023    MCV 92 04/17/2023     04/17/2023     CMP  Sodium   Date Value Ref Range Status   04/17/2023 141 136 - 145 mmol/L Final     Potassium   Date Value Ref Range Status   04/17/2023 4.0 3.5 - 5.1 mmol/L Final     Chloride   Date Value Ref Range Status   04/17/2023 103 95 - 110 mmol/L Final     CO2   Date Value Ref Range Status   04/17/2023 26 23 - 29 mmol/L Final     Glucose   Date Value Ref Range Status   04/17/2023 110 70 - 110 mg/dL Final     BUN   Date Value Ref Range Status   04/17/2023 6 6 - 20 mg/dL Final     Creatinine   Date Value Ref Range Status   04/17/2023 0.6 0.5 - 1.4 mg/dL Final     Calcium   Date Value Ref Range Status   04/17/2023 9.9 8.7 - 10.5 mg/dL Final     Total Protein   Date Value Ref Range Status   04/17/2023 7.6 6.0 - 8.4 g/dL Final      Albumin   Date Value Ref Range Status   04/17/2023 4.3 3.5 - 5.2 g/dL Final     Total Bilirubin   Date Value Ref Range Status   04/17/2023 0.2 0.1 - 1.0 mg/dL Final     Comment:     For infants and newborns, interpretation of results should be based  on gestational age, weight and in agreement with clinical  observations.    Premature Infant recommended reference ranges:  Up to 24 hours.............<8.0 mg/dL  Up to 48 hours............<12.0 mg/dL  3-5 days..................<15.0 mg/dL  6-29 days.................<15.0 mg/dL       Alkaline Phosphatase   Date Value Ref Range Status   04/17/2023 119 55 - 135 U/L Final     AST   Date Value Ref Range Status   04/17/2023 16 10 - 40 U/L Final     ALT   Date Value Ref Range Status   04/17/2023 22 10 - 44 U/L Final     Anion Gap   Date Value Ref Range Status   04/17/2023 12 8 - 16 mmol/L Final     eGFR if    Date Value Ref Range Status   04/14/2022 >60.0 >60 mL/min/1.73 m^2 Final     eGFR if non    Date Value Ref Range Status   04/14/2022 >60.0 >60 mL/min/1.73 m^2 Final     Comment:     Calculation used to obtain the estimated glomerular filtration  rate (eGFR) is the CKD-EPI equation.          Lab Results   Component Value Date    LIPASERES 113 01/11/2023             Reviewed prior medical records including radiology report of CT Renal Stone Study 3/6/23 CT of abdomen and pelvis 2/11/23 & endoscopy history (see surgical history).     Objective:      Physical Exam  Constitutional:       General: She is not in acute distress.     Appearance: She is well-developed.   HENT:      Head: Normocephalic.      Right Ear: Hearing normal.      Left Ear: Hearing normal.      Nose: Nose normal.      Mouth/Throat:      Mouth: No oral lesions.      Pharynx: Uvula midline.   Eyes:      General: Lids are normal.      Conjunctiva/sclera: Conjunctivae normal.      Pupils: Pupils are equal, round, and reactive to light.   Neck:      Trachea: Trachea normal.    Cardiovascular:      Rate and Rhythm: Normal rate and regular rhythm.      Heart sounds: Normal heart sounds. No murmur heard.  Pulmonary:      Effort: Pulmonary effort is normal. No respiratory distress.      Breath sounds: Normal breath sounds. No stridor. No wheezing.   Abdominal:      General: Bowel sounds are normal. There is no distension.      Palpations: Abdomen is soft. There is no mass.      Tenderness: There is abdominal tenderness (mild) in the right lower quadrant. There is no guarding or rebound.   Musculoskeletal:         General: Normal range of motion.      Cervical back: Normal range of motion.   Skin:     General: Skin is warm and dry.      Findings: No rash.      Comments: Non jaundiced   Neurological:      Mental Status: She is alert and oriented to person, place, and time.   Psychiatric:         Speech: Speech normal.         Behavior: Behavior normal. Behavior is cooperative.         Assessment:       1. Irritable bowel syndrome with diarrhea    2. Abdominal cramping    3. Gastroesophageal reflux disease, unspecified whether esophagitis present    4. Belching    5. History of colon polyps           Plan:   All diagnoses and orders for this visit:    Irritable bowel syndrome with diarrhea & Abdominal cramping  - Stool Exam-Ova,Cysts,Parasites; Future; Expected date: 05/23/2023  - Giardia / Cryptosporidum, EIA; Future; Expected date: 05/23/2023  - Stool culture; Future; Expected date: 05/23/2023  - Occult blood x 1, stool; Future; Expected date: 05/23/2023  - WBC, Stool; Future; Expected date: 05/23/2023  - pH, stool; Future; Expected date: 05/23/2023  - Clostridium difficile EIA; Future; Expected date: 05/23/2023  - Recommended increase fiber in diet, especially soluble fiber since this can help bulk up the stool consistency and may help to slow down how fast the stool goes through the colon and can prevent diarrhea   - Start: hyoscyamine (ANASPAZ,LEVSIN) 0.125 mg Tab; Take 1 tablet (125 mcg  total) by mouth every 6 (six) hours as needed (abdominal pain; diarrhea).  Dispense: 120 tablet; Refill: 1  - Consider trial of Xifaxan/Librax if symptoms persist    Gastroesophageal reflux disease, unspecified whether esophagitis present & Belching  - Start: pantoprazole (PROTONIX) 40 MG tablet; Take 1 tablet (40 mg total) by mouth once daily.  Dispense: 30 tablet; Refill: 2  - Schedule EGD   - Take PPI in the morning 30 minutes before breakfast  - Recommend to avoid large meals, avoid eating within 3 hours of bedtime, elevate head of bed if nocturnal symptoms are present, smoking cessation (if current smoker), & weight loss (if overweight).   - Recommend minimize/avoid high-fat foods, chocolate, caffeine, citrus, alcohol, & tomato products.  - Advised to avoid/limit use of NSAID's, since they can cause GI upset, bleeding, and/or ulcers. If needed, take with food.      History of colon polyps   - Next surveillance colonoscopy due 7/2025    If no improvement in symptoms or symptoms worsen, call/follow-up at clinic or go to ER

## 2023-05-25 ENCOUNTER — OUTPATIENT CASE MANAGEMENT (OUTPATIENT)
Dept: ADMINISTRATIVE | Facility: OTHER | Age: 43
End: 2023-05-25
Payer: COMMERCIAL

## 2023-05-25 NOTE — PROGRESS NOTES
Outpatient Care Management  Plan of Care Follow Up Visit    Patient: Breanna Sanchez  MRN: [id number]  Date of Service: 05/25/2023  Completed by: Lisa Whitlock RN  Referral Date: 02/11/2023    Reason for Visit   Patient presents with    OPCM RN Follow Up Call       Brief Summary: OPCM RN followed up with Mrs. Sanchez today and she is doing okay.  She reports that she did see her PCP for back pain assistance as it is difficult to get in to see Mannie Black and that Dr. Sawant informed her to see the PCP if needed.  The pain is located in the thoracolumbar area, PCP increased her gabapentin and she reports it is helping; she is now averaging a 4/10 on pain scale; she also uses a heating pad or warm bath with epsom salts if needed. (Discussed showers versus baths with care plan to avoid UTI).  Next Steps:   Mrs. Sanchez agreed to OPCM RN follow up on or around 6/12/23.  Follow up on smoking cessation or cutting back.

## 2023-06-09 ENCOUNTER — PROCEDURE VISIT (OUTPATIENT)
Dept: NEUROLOGY | Facility: CLINIC | Age: 43
End: 2023-06-09
Payer: COMMERCIAL

## 2023-06-09 VITALS
HEIGHT: 63 IN | RESPIRATION RATE: 17 BRPM | BODY MASS INDEX: 33.91 KG/M2 | SYSTOLIC BLOOD PRESSURE: 144 MMHG | DIASTOLIC BLOOD PRESSURE: 100 MMHG | HEART RATE: 97 BPM | WEIGHT: 191.38 LBS

## 2023-06-09 DIAGNOSIS — G43.719 INTRACTABLE CHRONIC MIGRAINE WITHOUT AURA AND WITHOUT STATUS MIGRAINOSUS: Primary | ICD-10-CM

## 2023-06-09 DIAGNOSIS — G43.719 INTRACTABLE CHRONIC MIGRAINE WITHOUT AURA AND WITHOUT STATUS MIGRAINOSUS: ICD-10-CM

## 2023-06-09 PROCEDURE — 64615 CHEMODENERV MUSC MIGRAINE: CPT | Mod: S$GLB,,, | Performed by: PSYCHIATRY & NEUROLOGY

## 2023-06-09 PROCEDURE — 64615 PR CHEMODENERVATION OF MUSCLE FOR CHRONIC MIGRAINE: ICD-10-PCS | Mod: S$GLB,,, | Performed by: PSYCHIATRY & NEUROLOGY

## 2023-06-09 PROCEDURE — 96372 THER/PROPH/DIAG INJ SC/IM: CPT | Mod: 59,S$GLB,, | Performed by: PSYCHIATRY & NEUROLOGY

## 2023-06-09 PROCEDURE — 96372 PR INJECTION,THERAP/PROPH/DIAG2ST, IM OR SUBCUT: ICD-10-PCS | Mod: 59,S$GLB,, | Performed by: PSYCHIATRY & NEUROLOGY

## 2023-06-09 RX ORDER — UBROGEPANT 100 MG/1
TABLET ORAL
Qty: 10 TABLET | Refills: 2 | Status: SHIPPED | OUTPATIENT
Start: 2023-06-09 | End: 2023-12-13

## 2023-06-09 RX ORDER — UBROGEPANT 100 MG/1
TABLET ORAL
Qty: 10 TABLET | Refills: 2 | Status: SHIPPED | OUTPATIENT
Start: 2023-06-09 | End: 2023-06-09 | Stop reason: CLARIF

## 2023-06-09 RX ORDER — BUTALBITAL, ACETAMINOPHEN AND CAFFEINE 50; 325; 40 MG/1; MG/1; MG/1
TABLET ORAL
Qty: 12 TABLET | Refills: 3 | Status: SHIPPED | OUTPATIENT
Start: 2023-06-09 | End: 2024-03-28

## 2023-06-09 RX ORDER — ONDANSETRON 2 MG/ML
4 INJECTION INTRAMUSCULAR; INTRAVENOUS
Status: COMPLETED | OUTPATIENT
Start: 2023-06-09 | End: 2023-06-09

## 2023-06-09 RX ORDER — KETOROLAC TROMETHAMINE 30 MG/ML
30 INJECTION, SOLUTION INTRAMUSCULAR; INTRAVENOUS
Status: COMPLETED | OUTPATIENT
Start: 2023-06-09 | End: 2023-06-09

## 2023-06-09 RX ADMIN — ONDANSETRON 4 MG: 2 INJECTION INTRAMUSCULAR; INTRAVENOUS at 12:06

## 2023-06-09 RX ADMIN — KETOROLAC TROMETHAMINE 30 MG: 30 INJECTION, SOLUTION INTRAMUSCULAR; INTRAVENOUS at 12:06

## 2023-06-09 NOTE — PROCEDURES
Procedures   BOTOX PROCEDURE    PROCEDURE PERFORMED: Botulinum toxin injection (93225)    CLINICAL INDICATION: G43.719    A time out was conducted just before the start of the procedure to verify the correct patient and procedure, procedure location, and all relevant critical information.     Conventional methods of treatment but the patient has been unresponsive and refractory.The patient meets criteria for chronic headaches according to the ICHD-II, the patient has more than 15 headaches a month which last for more than 4 hours a day.    The Botox injections have achieved well over 50%  improvement in the patient's symptoms. Migraines have been reduced at least 7 days per month and the number of cumulative hours suffering with headaches has been reduced at least 100 total hours per month. Today she does have a headache indicating that the Botox has worn off. Frequency of treatment is every 3 months unless no response to the treatments, at which time we will discontinue the injections.    DESCRIPTION OF PROCEDURE: After obtaining informed consent and under aseptic technique, a total of 155 units of botulinum toxin type A were injected in the following muscles: Procerus 5 units,  5 units bilaterally, frontalis 20 units, temporalis 20 units bilaterally,   occipitalis 15 units, upper cervical paraspinals 10 units bilaterally and trapezius 15 units bilaterally. The patient was given a total of 155 units in 31 sites.The patient tolerated the procedure well. There were no complications. The patient was given a prescription for repeat treatment in 3 months.     Unavoidable waste 45 units          Casie Winston M.D  Medical Director, Headache and Facial Pain  [facility]

## 2023-06-13 ENCOUNTER — OUTPATIENT CASE MANAGEMENT (OUTPATIENT)
Dept: ADMINISTRATIVE | Facility: OTHER | Age: 43
End: 2023-06-13
Payer: COMMERCIAL

## 2023-06-13 NOTE — PROGRESS NOTES
06/13/23 - Contacted Mrs. Sanchez today and she is currently at the orthodontist with her son.  Plans are for OPCM RN to follow up with her tomorrow.     6/15/2023 1st attempt to complete Follow-Up  for Outpatient Care Management, left message.  Will send via portal unable to assess letter.

## 2023-06-15 ENCOUNTER — PATIENT MESSAGE (OUTPATIENT)
Dept: ADMINISTRATIVE | Facility: OTHER | Age: 43
End: 2023-06-15
Payer: COMMERCIAL

## 2023-06-19 ENCOUNTER — PATIENT MESSAGE (OUTPATIENT)
Dept: RHEUMATOLOGY | Facility: CLINIC | Age: 43
End: 2023-06-19
Payer: COMMERCIAL

## 2023-06-19 DIAGNOSIS — M45.5 ANKYLOSING SPONDYLITIS OF THORACOLUMBAR REGION: Primary | ICD-10-CM

## 2023-06-20 ENCOUNTER — PATIENT MESSAGE (OUTPATIENT)
Dept: RHEUMATOLOGY | Facility: CLINIC | Age: 43
End: 2023-06-20
Payer: COMMERCIAL

## 2023-06-21 ENCOUNTER — HOSPITAL ENCOUNTER (OUTPATIENT)
Dept: RADIOLOGY | Facility: HOSPITAL | Age: 43
Discharge: HOME | End: 2023-06-21
Attending: PHYSICIAN ASSISTANT
Payer: COMMERCIAL

## 2023-06-21 ENCOUNTER — TELEPHONE (OUTPATIENT)
Dept: PAIN MEDICINE | Facility: CLINIC | Age: 43
End: 2023-06-21
Payer: COMMERCIAL

## 2023-06-21 DIAGNOSIS — M45.5 ANKYLOSING SPONDYLITIS OF THORACOLUMBAR REGION: ICD-10-CM

## 2023-06-21 PROCEDURE — 72114 X-RAY EXAM L-S SPINE BENDING: CPT | Mod: 26,,, | Performed by: RADIOLOGY

## 2023-06-21 PROCEDURE — 72114 XR LUMBAR SPINE 5 VIEW WITH FLEX AND EXT: ICD-10-PCS | Mod: 26,,, | Performed by: RADIOLOGY

## 2023-06-21 PROCEDURE — 72070 X-RAY EXAM THORAC SPINE 2VWS: CPT | Mod: TC,PO

## 2023-06-21 PROCEDURE — 72114 X-RAY EXAM L-S SPINE BENDING: CPT | Mod: TC,PO

## 2023-06-21 PROCEDURE — 72070 X-RAY EXAM THORAC SPINE 2VWS: CPT | Mod: 26,,, | Performed by: RADIOLOGY

## 2023-06-21 PROCEDURE — 72070 XR THORACIC SPINE AP LATERAL: ICD-10-PCS | Mod: 26,,, | Performed by: RADIOLOGY

## 2023-06-21 NOTE — TELEPHONE ENCOUNTER
----- Message from Mary Palacios, Patient Care Assistant sent at 6/20/2023  4:42 PM CDT -----  Contact: self  Pt is calling to sheila an appt from her referral from Margo ruiz [phone]  thanks

## 2023-06-21 NOTE — TELEPHONE ENCOUNTER
----- Message from Josie Evangelista LPN sent at 6/20/2023  4:58 PM CDT -----  Contact: self    ----- Message -----  From: Mary Palacios, Patient Care Assistant  Sent: 6/20/2023   4:46 PM CDT  To: Paul AREVALO Staff    Pt is calling to sheila an appt from her referral from Margo ruiz [phone]  thanks

## 2023-06-21 NOTE — TELEPHONE ENCOUNTER
I spoke with Malia Laura and she is scheduled to see Keeley Miller 6-23-23, she indicated understanding.

## 2023-06-28 ENCOUNTER — OUTPATIENT CASE MANAGEMENT (OUTPATIENT)
Dept: ADMINISTRATIVE | Facility: OTHER | Age: 43
End: 2023-06-28
Payer: COMMERCIAL

## 2023-06-28 NOTE — PROGRESS NOTES
6/28/2023  2nd attempt to complete Follow-Up  for Outpatient Care Management, left message.  Will mail unable to assess letter.

## 2023-06-28 NOTE — LETTER
Breanna Sanchez  [address]  [place] [address]      Dear Breanna Sanchez,     I am your nurse with [facility]s Outpatient Care Management Department. I have been unsuccessful at reaching you since we spoke on 05/25/23.  At your earliest convenience, I would like to discuss your healthcare progress.      Please contact me at [phone] from 8:00AM to 4:30 PM on Monday thru Friday.     As you know, [facility] is a program offered to you through [facility] where a nurse is available 24/7 to answer questions or provide medical advice, their number is [phone].    Thanks,      Lisa Whitlock, RN  Outpatient Care Management

## 2023-06-29 ENCOUNTER — TELEPHONE (OUTPATIENT)
Dept: INFUSION THERAPY | Facility: HOSPITAL | Age: 43
End: 2023-06-29
Payer: COMMERCIAL

## 2023-07-04 DIAGNOSIS — K58.0 IRRITABLE BOWEL SYNDROME WITH DIARRHEA: ICD-10-CM

## 2023-07-05 RX ORDER — HYOSCYAMINE SULFATE 0.125 MG
TABLET ORAL
Qty: 120 TABLET | Refills: 1 | Status: SHIPPED | OUTPATIENT
Start: 2023-07-05 | End: 2023-07-27

## 2023-07-06 ENCOUNTER — PATIENT MESSAGE (OUTPATIENT)
Dept: RHEUMATOLOGY | Facility: CLINIC | Age: 43
End: 2023-07-06
Payer: COMMERCIAL

## 2023-07-06 DIAGNOSIS — M79.7 FIBROMYALGIA: ICD-10-CM

## 2023-07-06 DIAGNOSIS — H20.9 IRITIS: ICD-10-CM

## 2023-07-06 DIAGNOSIS — M45.5 ANKYLOSING SPONDYLITIS OF THORACOLUMBAR REGION: ICD-10-CM

## 2023-07-06 DIAGNOSIS — K58.0 IRRITABLE BOWEL SYNDROME WITH DIARRHEA: ICD-10-CM

## 2023-07-06 DIAGNOSIS — G89.4 CHRONIC PAIN SYNDROME: ICD-10-CM

## 2023-07-06 RX ORDER — OXYCODONE AND ACETAMINOPHEN 7.5; 325 MG/1; MG/1
1 TABLET ORAL EVERY 8 HOURS PRN
Qty: 90 TABLET | Refills: 0 | Status: SHIPPED | OUTPATIENT
Start: 2023-07-14 | End: 2023-07-24 | Stop reason: SDUPTHER

## 2023-07-12 ENCOUNTER — PATIENT MESSAGE (OUTPATIENT)
Dept: RHEUMATOLOGY | Facility: CLINIC | Age: 43
End: 2023-07-12
Payer: COMMERCIAL

## 2023-07-12 ENCOUNTER — OUTPATIENT CASE MANAGEMENT (OUTPATIENT)
Dept: ADMINISTRATIVE | Facility: OTHER | Age: 43
End: 2023-07-12
Payer: COMMERCIAL

## 2023-07-12 NOTE — PROGRESS NOTES
Outpatient Care Management  Plan of Care Follow Up Visit    Patient: Breanna Sanchez  MRN: [id number]  Date of Service: 07/12/2023  Completed by: Lisa Whitlock RN  Referral Date: 02/11/2023    No chief complaint on file.      Brief Summary:  General plan of care goal and barrier met.  Tobacco cessation plan of care goal and barrier not met.  Mrs. Sanchez continues to smoke.  She agrees with case closure and will continue her efforts in smoking cessation.  She was encouraged to contact OPCM RN for any questions/guidance/assistance/grief counseling arrangements and she verbalized an understanding.  Case closure.

## 2023-07-17 ENCOUNTER — TELEPHONE (OUTPATIENT)
Dept: FAMILY MEDICINE | Facility: CLINIC | Age: 43
End: 2023-07-17
Payer: COMMERCIAL

## 2023-07-17 NOTE — TELEPHONE ENCOUNTER
Tried calling pt about re-scheduling missed Back and Spine appt from 6/2023  No answer, Mailbox full, Info sent to pt portal

## 2023-07-19 ENCOUNTER — PATIENT MESSAGE (OUTPATIENT)
Dept: RHEUMATOLOGY | Facility: CLINIC | Age: 43
End: 2023-07-19
Payer: COMMERCIAL

## 2023-07-20 ENCOUNTER — PATIENT MESSAGE (OUTPATIENT)
Dept: RHEUMATOLOGY | Facility: CLINIC | Age: 43
End: 2023-07-20
Payer: COMMERCIAL

## 2023-07-24 ENCOUNTER — OFFICE VISIT (OUTPATIENT)
Dept: RHEUMATOLOGY | Facility: CLINIC | Age: 43
End: 2023-07-24
Payer: COMMERCIAL

## 2023-07-24 DIAGNOSIS — K58.0 IRRITABLE BOWEL SYNDROME WITH DIARRHEA: ICD-10-CM

## 2023-07-24 DIAGNOSIS — D84.9 IMMUNOCOMPROMISED: ICD-10-CM

## 2023-07-24 DIAGNOSIS — M79.7 FIBROMYALGIA: ICD-10-CM

## 2023-07-24 DIAGNOSIS — M45.5 ANKYLOSING SPONDYLITIS OF THORACOLUMBAR REGION: ICD-10-CM

## 2023-07-24 DIAGNOSIS — H20.9 IRITIS: ICD-10-CM

## 2023-07-24 DIAGNOSIS — M35.00 SJOGREN'S SYNDROME, WITH UNSPECIFIED ORGAN INVOLVEMENT: ICD-10-CM

## 2023-07-24 DIAGNOSIS — G89.4 CHRONIC PAIN SYNDROME: ICD-10-CM

## 2023-07-24 DIAGNOSIS — E11.69 TYPE 2 DIABETES MELLITUS WITH OTHER SPECIFIED COMPLICATION, UNSPECIFIED WHETHER LONG TERM INSULIN USE: ICD-10-CM

## 2023-07-24 DIAGNOSIS — M45.5 ANKYLOSING SPONDYLITIS OF THORACOLUMBAR REGION: Primary | ICD-10-CM

## 2023-07-24 DIAGNOSIS — M05.79 RHEUMATOID ARTHRITIS INVOLVING MULTIPLE SITES WITH POSITIVE RHEUMATOID FACTOR: ICD-10-CM

## 2023-07-24 PROCEDURE — 1159F MED LIST DOCD IN RCRD: CPT | Mod: CPTII,95,, | Performed by: PHYSICIAN ASSISTANT

## 2023-07-24 PROCEDURE — 99215 OFFICE O/P EST HI 40 MIN: CPT | Mod: 95,,, | Performed by: PHYSICIAN ASSISTANT

## 2023-07-24 PROCEDURE — 1160F RVW MEDS BY RX/DR IN RCRD: CPT | Mod: CPTII,95,, | Performed by: PHYSICIAN ASSISTANT

## 2023-07-24 PROCEDURE — 99215 PR OFFICE/OUTPT VISIT, EST, LEVL V, 40-54 MIN: ICD-10-PCS | Mod: 95,,, | Performed by: PHYSICIAN ASSISTANT

## 2023-07-24 PROCEDURE — 1160F PR REVIEW ALL MEDS BY PRESCRIBER/CLIN PHARMACIST DOCUMENTED: ICD-10-PCS | Mod: CPTII,95,, | Performed by: PHYSICIAN ASSISTANT

## 2023-07-24 PROCEDURE — 1159F PR MEDICATION LIST DOCUMENTED IN MEDICAL RECORD: ICD-10-PCS | Mod: CPTII,95,, | Performed by: PHYSICIAN ASSISTANT

## 2023-07-24 RX ORDER — SEMAGLUTIDE 1.34 MG/ML
1 INJECTION, SOLUTION SUBCUTANEOUS
Qty: 3 ML | Refills: 5 | Status: SHIPPED | OUTPATIENT
Start: 2023-07-24 | End: 2024-07-23

## 2023-07-24 RX ORDER — PIROXICAM 20 MG/1
20 CAPSULE ORAL DAILY
Qty: 30 CAPSULE | Refills: 3 | Status: SHIPPED | OUTPATIENT
Start: 2023-07-24 | End: 2023-11-15

## 2023-07-24 RX ORDER — TOFACITINIB 11 MG/1
11 TABLET, FILM COATED, EXTENDED RELEASE ORAL DAILY
Qty: 30 TABLET | Refills: 11 | Status: ACTIVE | OUTPATIENT
Start: 2023-07-24 | End: 2023-08-01

## 2023-07-24 RX ORDER — PREDNISONE 5 MG/1
TABLET ORAL
Qty: 60 TABLET | Refills: 3 | Status: SHIPPED | OUTPATIENT
Start: 2023-07-24

## 2023-07-24 NOTE — Clinical Note
Needs codi four, hep b, lipid fasting in [place] soon Needs codi four 1 week prior to next visit F/u with me 4+gamaliel Referral to [facility] printed

## 2023-07-24 NOTE — PROGRESS NOTES
The patient location is: home  The chief complaint leading to consultation is: RA/AS    Visit type: audiovisual    Face to Face time with patient: 27 minutes  35 minutes of total time spent on the encounter, which includes face to face time and non-face to face time preparing to see the patient (eg, review of tests), Obtaining and/or reviewing separately obtained history, Documenting clinical information in the electronic or other health record, Independently interpreting results (not separately reported) and communicating results to the patient/family/caregiver, or Care coordination (not separately reported).   Each patient to whom he or she provides medical services by telemedicine is:  (1) informed of the relationship between the physician and patient and the respective role of any other health care provider with respect to management of the patient; and (2) notified that he or she may decline to receive medical services by telemedicine and may withdraw from such care at any time.    Notes:     Subjective:       Patient ID: Breanna Sanchez is a 42 y.o. female.    Chief Complaint: Disease Management    Mrs. Sanchez is a 42 year old female who presents to clinic for follow up on AS/RA. She is doing poorly and reports increased pain in her thoracic spine, which is constant and aching. She has new pain in her low back with radiating pain down there L leg. She states SI joint pain is chronic and unchanged. She has not been able to take cosentyx routinely due to infections (URI/sinusitis). She has not been hospitalized since 2/2023. She is taking percocet tid prn for severe pain, but her pain remains uncontrolled. She is interested in alternative pain control. She is taking piroxicam only PRN and has recent diagnosis of gastritis on EGD.    She is tolerating ozempic 0.5 mg and has lost 8 lbs.     She is due for labs.    Current tx:  1. Cosentyx    Prior treatment:  1. Humira  2. Enbrel  3. MTX  4. Orencia  5. Simponi  aria  6. Remicade          Review of Systems   Constitutional:  Positive for activity change. Negative for appetite change, chills, fatigue, fever and unexpected weight change.   HENT:  Negative for mouth sores and trouble swallowing.    Eyes:  Positive for redness. Negative for visual disturbance.   Respiratory:  Negative for cough and shortness of breath.    Cardiovascular:  Negative for chest pain, palpitations and leg swelling.   Gastrointestinal:  Negative for abdominal pain, constipation, diarrhea, nausea and vomiting.   Genitourinary:  Positive for dysuria. Negative for genital sores.   Musculoskeletal:  Positive for arthralgias, back pain, joint swelling, myalgias, neck pain and neck stiffness.   Skin:  Negative for rash.   Allergic/Immunologic: Positive for immunocompromised state.   Neurological:  Negative for dizziness, weakness, light-headedness and headaches.   Hematological:  Does not bruise/bleed easily.   Psychiatric/Behavioral:  The patient is not nervous/anxious.        Objective:     There were no vitals filed for this visit.      Past Medical History:   Diagnosis Date    ADHD     Ankylosing spondylitis     Anxiety     Colitis     Colon polyp     Depression     Essential hypertension     Fibromyalgia     History of kidney stones     Lupus     Migraine headache     Port-A-Cath in place     Primary immune deficiency disorder     Primary immunodeficiency disorder     Pyelonephritis 5/8/2018    Recurrent UTI     Renal disorder     Rheumatoid arteritis     Sjoegren syndrome      Past Surgical History:   Procedure Laterality Date    APPENDECTOMY      BARTHOLIN GLAND CYST EXCISION      COLONOSCOPY  2011    COLONOSCOPY N/A 7/7/2022    Procedure: COLONOSCOPY;  Surgeon: Shaggy Mae MD;  Location: [facility] ENDO;  Service: Endoscopy;  Laterality: N/A;    CYSTOSCOPY WITH CALCULUS EXTRACTION Bilateral 2/2/2023    Procedure: CYSTOSCOPY, WITH CALCULUS REMOVAL;  Surgeon: Florentin Aleman MD;  Location: [facility] OR;   Service: Urology;  Laterality: Bilateral;    CYSTOURETEROSCOPY WITH RETROGRADE PYELOGRAPHY AND INSERTION OF STENT INTO URETER Bilateral 2/2/2023    Procedure: CYSTOURETEROSCOPY, WITH RETROGRADE PYELOGRAM AND URETERAL STENT INSERTION;  Surgeon: Florentin Aleman MD;  Location: [facility] OR;  Service: Urology;  Laterality: Bilateral;    DILATION OF URETHRA      X 2    ESOPHAGOGASTRODUODENOSCOPY N/A 6/16/2023    Procedure: EGD (ESOPHAGOGASTRODUODENOSCOPY);  Surgeon: Shaggy Mae MD;  Location: [facility];  Service: Endoscopy;  Laterality: N/A;    HYSTERECTOMY      INSERTION OF TUNNELED CENTRAL VENOUS CATHETER (CVC) WITH SUBCUTANEOUS PORT N/A 10/2/2019    Procedure: [id number]-T-CATH;  Surgeon: Lenin Springer MD;  Location: [facility] OR;  Service: General;  Laterality: N/A;    KNEE ARTHROSCOPY Left     LAPAROSCOPIC LYSIS OF ADHESIONS N/A 7/22/2019    Procedure: LYSIS, ADHESIONS, LAPAROSCOPIC;  Surgeon: Clarence Adams MD;  Location: [facility] OR;  Service: OB/GYN;  Laterality: N/A;    LAPAROSCOPIC SALPINGO-OOPHORECTOMY Left 7/22/2019    Procedure: SALPINGO-OOPHORECTOMY, LAPAROSCOPIC- LEFT SIDE ONLY;  Surgeon: Clarence Adams MD;  Location: [facility] OR;  Service: OB/GYN;  Laterality: Left;    OVARIAN CYST REMOVAL Left     SALPINGOOPHORECTOMY Right     TONSILLECTOMY      TOTAL SHOULDER ARTHROPLASTY Right     TUBAL LIGATION            Physical Exam   Constitutional: She is oriented to person, place, and time.   Neurological: She is oriented to person, place, and time.       Labs:  Component      Latest Ref Rng & Units 4/17/2023 4/17/2023          11:14 AM 11:14 AM   WBC      3.90 - 12.70 K/uL  14.19 (H)   RBC      4.00 - 5.40 M/uL  5.17   Hemoglobin      12.0 - 16.0 g/dL  15.1   Hematocrit      37.0 - 48.5 %  47.6   MCV      82 - 98 fL  92   MCH      27.0 - 31.0 pg  29.2   MCHC      32.0 - 36.0 g/dL  31.7 (L)   RDW      11.5 - 14.5 %  13.2   Platelets      150 - 450 K/uL  386   MPV      9.2 - 12.9 fL  10.6   Immature  Granulocytes      0.0 - 0.5 %  0.5   Gran # (ANC)      1.8 - 7.7 K/uL  9.4 (H)   Immature Grans (Abs)      0.00 - 0.04 K/uL  0.07 (H)   Lymph #      1.0 - 4.8 K/uL  3.4   Mono #      0.3 - 1.0 K/uL  1.1 (H)   Eos #      0.0 - 0.5 K/uL  0.2   Baso #      0.00 - 0.20 K/uL  0.08   nRBC      0 /100 WBC  0   Gran %      38.0 - 73.0 %  66.1   Lymph %      18.0 - 48.0 %  24.2   Mono %      4.0 - 15.0 %  7.5   Eosinophil %      0.0 - 8.0 %  1.1   Basophil %      0.0 - 1.9 %  0.6   Differential Method        Automated   Sodium      136 - 145 mmol/L  141   Potassium      3.5 - 5.1 mmol/L  4.0   Chloride      95 - 110 mmol/L  103   CO2      23 - 29 mmol/L  26   Glucose      70 - 110 mg/dL  110   BUN      6 - 20 mg/dL  6   Creatinine      0.5 - 1.4 mg/dL  0.6   Calcium      8.7 - 10.5 mg/dL  9.9   PROTEIN TOTAL      6.0 - 8.4 g/dL  7.6   Albumin      3.5 - 5.2 g/dL  4.3   BILIRUBIN TOTAL      0.1 - 1.0 mg/dL  0.2   Alkaline Phosphatase      55 - 135 U/L  119   AST      10 - 40 U/L  16   ALT      10 - 44 U/L  22   Anion Gap      8 - 16 mmol/L  12   eGFR      >60 mL/min/1.73 m:2  >60.0   Anti Sm Antibody      0.00 - 0.99 Ratio 0.08    Anti-Sm Interpretation      Negative Negative    Anti-SSA Antibody      0.00 - 0.99 Ratio 0.07 0.07   Anti-SSA Interpretation      Negative Negative Negative   Anti-SSB Antibody      0.00 - 0.99 Ratio 0.05 0.05   Anti-SSB Interpretation      Negative Negative Negative   ds DNA Ab      Negative 1:10 Negative 1:10    Anti Sm/RNP Antibody      0.00 - 0.99 Ratio 0.11    Anti-Sm/RNP Interpretation      Negative Negative    CRP      0.0 - 8.2 mg/L  3.3   Sed Rate      0 - 20 mm/Hr  5   AGAPITO Screen      Negative <1:80  Positive (A)   Vit D, 25-Hydroxy      30 - 96 ng/mL  22 (L)   AGAPITO Pattern 2        Speckled   AGAPITO Titer 1        1:320   AGAPITO PATTERN 1        Homogeneous   AGAPITO Titer 2        1:160      Assessment:       1. Ankylosing spondylitis of thoracolumbar region    2. Rheumatoid arthritis involving  multiple sites with positive rheumatoid factor    3. Sjogren's syndrome, with unspecified organ involvement    4. Type 2 diabetes mellitus with other specified complication, unspecified whether long term insulin use    5. Immunocompromised                Plan:       Ankylosing spondylitis of thoracolumbar region  -     predniSONE (DELTASONE) 5 MG tablet; Take 2 tablets by mouth daily as needed for arthritis flare  Dispense: 60 tablet; Refill: 3  -     piroxicam (FELDENE) 20 MG capsule; Take 1 capsule (20 mg total) by mouth once daily.  Dispense: 30 capsule; Refill: 3  -     Lipid Panel; Future; Expected date: 07/24/2023  -     Ambulatory referral/consult to Pain Clinic; Future; Expected date: 07/31/2023  -     Hepatitis B Surface Antigen; Future; Expected date: 07/24/2023  -     HEPATITIS B SURFACE ANTIBODY; Future; Expected date: 07/24/2023  -     Hepatitis B Core Antibody, Total; Future; Expected date: 07/24/2023  -     tofacitinib (XELJANZ XR) 11 mg Tb24; Take 11 mg by mouth once daily.  Dispense: 30 tablet; Refill: 11    Rheumatoid arthritis involving multiple sites with positive rheumatoid factor  -     piroxicam (FELDENE) 20 MG capsule; Take 1 capsule (20 mg total) by mouth once daily.  Dispense: 30 capsule; Refill: 3  -     Lipid Panel; Future; Expected date: 07/24/2023  -     tofacitinib (XELJANZ XR) 11 mg Tb24; Take 11 mg by mouth once daily.  Dispense: 30 tablet; Refill: 11    Sjogren's syndrome, with unspecified organ involvement  -     Hepatitis B Surface Antigen; Future; Expected date: 07/24/2023  -     HEPATITIS B SURFACE ANTIBODY; Future; Expected date: 07/24/2023  -     Hepatitis B Core Antibody, Total; Future; Expected date: 07/24/2023    Type 2 diabetes mellitus with other specified complication, unspecified whether long term insulin use  -     semaglutide (OZEMPIC) 1 mg/dose (4 mg/3 mL); Inject 1 mg into the skin every 7 days.  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 Holden--OFF tx since 9/2020--restarted hyquvia 10/2022  --HTN  --chronic pain syndrome followed by Dr. Chery  --recurrent ESBL UTI     Plan  Nurse injections later this week  2. Cont cosentyx for month of July then d/c. Will plan to start Xeljanz XR 11 mg daily 1 month after last cosentyx injection.   3. Check fasting labs soon  4. Increase ozempic 1 mg weekly  5. Cont piroxicam, tizanidine, prednisone PRN  6. Cont percocet PRN.  I have checked [place] prescription monitoring program site and no unusual or abnormal behavior has occurred pt understand the risk and benefits of taking opioid medications and has decided to continue the medication.. Referral for alternative pain control  7. Back and Spine eval as well      Follow up:  4 months Dr. Sawant

## 2023-07-24 NOTE — TELEPHONE ENCOUNTER
----- Message from Margo Acuna PA-C sent at 7/24/2023  9:04 AM CDT -----  Needs codi four, hep b, lipid fasting in [place] soon  Needs codi four 1 week prior to next visit  F/u with me 4+gamaliel  Referral to [facility]

## 2023-07-25 ENCOUNTER — TELEPHONE (OUTPATIENT)
Dept: PHARMACY | Facility: CLINIC | Age: 43
End: 2023-07-25
Payer: COMMERCIAL

## 2023-07-25 RX ORDER — OXYCODONE AND ACETAMINOPHEN 7.5; 325 MG/1; MG/1
1 TABLET ORAL EVERY 8 HOURS PRN
Qty: 90 TABLET | Refills: 0 | Status: SHIPPED | OUTPATIENT
Start: 2023-09-14 | End: 2023-10-25 | Stop reason: SDUPTHER

## 2023-07-25 RX ORDER — OXYCODONE AND ACETAMINOPHEN 7.5; 325 MG/1; MG/1
1 TABLET ORAL EVERY 8 HOURS PRN
Qty: 90 TABLET | Refills: 0 | Status: SHIPPED | OUTPATIENT
Start: 2023-08-15 | End: 2023-09-14

## 2023-07-25 RX ORDER — OXYCODONE AND ACETAMINOPHEN 7.5; 325 MG/1; MG/1
1 TABLET ORAL EVERY 8 HOURS PRN
Qty: 90 TABLET | Refills: 0 | Status: SHIPPED | OUTPATIENT
Start: 2023-10-14 | End: 2023-11-15

## 2023-07-25 NOTE — TELEPHONE ENCOUNTER
Hello, this is Aissatou Houston, clinical pharmacist with [facility] that is part of your care team.  We have begun working on your prescription that your doctor has sent us. Our next steps include:     Working with your insurance company to obtain approval for your medication  Working with you to ensure your medication is affordable     We will be calling you along the way with updates on your medication but if you have any concerns or receive information that you would like to discuss please reach us at [phone].    Welcome call outcome: No answer/Unable to leave voicemail

## 2023-07-26 ENCOUNTER — PATIENT MESSAGE (OUTPATIENT)
Dept: RHEUMATOLOGY | Facility: CLINIC | Age: 43
End: 2023-07-26
Payer: COMMERCIAL

## 2023-07-26 DIAGNOSIS — M35.00 SJOGREN'S SYNDROME, WITH UNSPECIFIED ORGAN INVOLVEMENT: ICD-10-CM

## 2023-07-26 DIAGNOSIS — M79.7 FIBROMYALGIA: ICD-10-CM

## 2023-07-26 DIAGNOSIS — M45.5 ANKYLOSING SPONDYLITIS OF THORACOLUMBAR REGION: Primary | ICD-10-CM

## 2023-07-26 DIAGNOSIS — M05.79 RHEUMATOID ARTHRITIS INVOLVING MULTIPLE SITES WITH POSITIVE RHEUMATOID FACTOR: ICD-10-CM

## 2023-07-27 DIAGNOSIS — K58.0 IRRITABLE BOWEL SYNDROME WITH DIARRHEA: ICD-10-CM

## 2023-07-27 RX ORDER — HYOSCYAMINE SULFATE 0.125 MG
TABLET ORAL
Qty: 120 TABLET | Refills: 1 | Status: SHIPPED | OUTPATIENT
Start: 2023-07-27

## 2023-07-31 ENCOUNTER — LAB VISIT (OUTPATIENT)
Dept: LAB | Facility: HOSPITAL | Age: 43
End: 2023-07-31
Attending: INTERNAL MEDICINE
Payer: COMMERCIAL

## 2023-07-31 DIAGNOSIS — M79.7 FIBROMYALGIA: ICD-10-CM

## 2023-07-31 DIAGNOSIS — N39.0 CHRONIC UTI: ICD-10-CM

## 2023-07-31 DIAGNOSIS — M05.79 RHEUMATOID ARTHRITIS INVOLVING MULTIPLE SITES WITH POSITIVE RHEUMATOID FACTOR: ICD-10-CM

## 2023-07-31 DIAGNOSIS — M45.5 ANKYLOSING SPONDYLITIS OF THORACOLUMBAR REGION: ICD-10-CM

## 2023-07-31 DIAGNOSIS — M35.00 SJOGREN'S SYNDROME, WITH UNSPECIFIED ORGAN INVOLVEMENT: ICD-10-CM

## 2023-07-31 DIAGNOSIS — E55.9 VITAMIN D DEFICIENCY: ICD-10-CM

## 2023-07-31 DIAGNOSIS — H20.9 IRITIS: ICD-10-CM

## 2023-07-31 DIAGNOSIS — K58.0 IRRITABLE BOWEL SYNDROME WITH DIARRHEA: ICD-10-CM

## 2023-07-31 DIAGNOSIS — G89.4 CHRONIC PAIN SYNDROME: ICD-10-CM

## 2023-07-31 LAB
ALBUMIN SERPL BCP-MCNC: 4 G/DL (ref 3.5–5.2)
ALP SERPL-CCNC: 108 U/L (ref 55–135)
ALT SERPL W/O P-5'-P-CCNC: 19 U/L (ref 10–44)
ANION GAP SERPL CALC-SCNC: 12 MMOL/L (ref 8–16)
AST SERPL-CCNC: 20 U/L (ref 10–40)
BASOPHILS # BLD AUTO: 0.06 K/UL (ref 0–0.2)
BASOPHILS NFR BLD: 0.6 % (ref 0–1.9)
BILIRUB SERPL-MCNC: 0.3 MG/DL (ref 0.1–1)
BUN SERPL-MCNC: 6 MG/DL (ref 6–20)
CALCIUM SERPL-MCNC: 9.8 MG/DL (ref 8.7–10.5)
CHLORIDE SERPL-SCNC: 102 MMOL/L (ref 95–110)
CHOLEST SERPL-MCNC: 218 MG/DL (ref 120–199)
CHOLEST/HDLC SERPL: 5.1 {RATIO} (ref 2–5)
CO2 SERPL-SCNC: 28 MMOL/L (ref 23–29)
CREAT SERPL-MCNC: 0.7 MG/DL (ref 0.5–1.4)
CRP SERPL-MCNC: 4.8 MG/L (ref 0–8.2)
DIFFERENTIAL METHOD: ABNORMAL
EOSINOPHIL # BLD AUTO: 0.3 K/UL (ref 0–0.5)
EOSINOPHIL NFR BLD: 3.2 % (ref 0–8)
ERYTHROCYTE [DISTWIDTH] IN BLOOD BY AUTOMATED COUNT: 13.5 % (ref 11.5–14.5)
ERYTHROCYTE [SEDIMENTATION RATE] IN BLOOD BY WESTERGREN METHOD: 5 MM/HR (ref 0–20)
EST. GFR  (NO RACE VARIABLE): >60 ML/MIN/1.73 M^2
GLUCOSE SERPL-MCNC: 87 MG/DL (ref 70–110)
HBV CORE AB SERPL QL IA: NORMAL
HBV SURFACE AB SER-ACNC: <3 MIU/ML
HBV SURFACE AB SER-ACNC: NORMAL M[IU]/ML
HBV SURFACE AG SERPL QL IA: NORMAL
HCT VFR BLD AUTO: 48.9 % (ref 37–48.5)
HDLC SERPL-MCNC: 43 MG/DL (ref 40–75)
HDLC SERPL: 19.7 % (ref 20–50)
HGB BLD-MCNC: 15.8 G/DL (ref 12–16)
IMM GRANULOCYTES # BLD AUTO: 0.03 K/UL (ref 0–0.04)
IMM GRANULOCYTES NFR BLD AUTO: 0.3 % (ref 0–0.5)
LDLC SERPL CALC-MCNC: 135.2 MG/DL (ref 63–159)
LYMPHOCYTES # BLD AUTO: 3.7 K/UL (ref 1–4.8)
LYMPHOCYTES NFR BLD: 34.5 % (ref 18–48)
MCH RBC QN AUTO: 28.6 PG (ref 27–31)
MCHC RBC AUTO-ENTMCNC: 32.3 G/DL (ref 32–36)
MCV RBC AUTO: 89 FL (ref 82–98)
MONOCYTES # BLD AUTO: 0.6 K/UL (ref 0.3–1)
MONOCYTES NFR BLD: 6 % (ref 4–15)
NEUTROPHILS # BLD AUTO: 5.9 K/UL (ref 1.8–7.7)
NEUTROPHILS NFR BLD: 55.4 % (ref 38–73)
NONHDLC SERPL-MCNC: 175 MG/DL
NRBC BLD-RTO: 0 /100 WBC
PLATELET # BLD AUTO: 358 K/UL (ref 150–450)
PMV BLD AUTO: 10.8 FL (ref 9.2–12.9)
POTASSIUM SERPL-SCNC: 3.9 MMOL/L (ref 3.5–5.1)
PROT SERPL-MCNC: 7.3 G/DL (ref 6–8.4)
RBC # BLD AUTO: 5.52 M/UL (ref 4–5.4)
SODIUM SERPL-SCNC: 142 MMOL/L (ref 136–145)
TRIGL SERPL-MCNC: 199 MG/DL (ref 30–150)
WBC # BLD AUTO: 10.6 K/UL (ref 3.9–12.7)

## 2023-07-31 PROCEDURE — 86706 HEP B SURFACE ANTIBODY: CPT | Mod: 91 | Performed by: PHYSICIAN ASSISTANT

## 2023-07-31 PROCEDURE — 36415 COLL VENOUS BLD VENIPUNCTURE: CPT | Mod: PO | Performed by: INTERNAL MEDICINE

## 2023-07-31 PROCEDURE — 80061 LIPID PANEL: CPT | Performed by: PHYSICIAN ASSISTANT

## 2023-07-31 PROCEDURE — 85025 COMPLETE CBC W/AUTO DIFF WBC: CPT | Performed by: INTERNAL MEDICINE

## 2023-07-31 PROCEDURE — 86704 HEP B CORE ANTIBODY TOTAL: CPT | Performed by: PHYSICIAN ASSISTANT

## 2023-07-31 PROCEDURE — 80053 COMPREHEN METABOLIC PANEL: CPT | Performed by: INTERNAL MEDICINE

## 2023-07-31 PROCEDURE — 87340 HEPATITIS B SURFACE AG IA: CPT | Performed by: PHYSICIAN ASSISTANT

## 2023-07-31 PROCEDURE — 85651 RBC SED RATE NONAUTOMATED: CPT | Mod: PO | Performed by: INTERNAL MEDICINE

## 2023-07-31 PROCEDURE — 86140 C-REACTIVE PROTEIN: CPT | Performed by: INTERNAL MEDICINE

## 2023-08-01 ENCOUNTER — TELEPHONE (OUTPATIENT)
Dept: RHEUMATOLOGY | Facility: CLINIC | Age: 43
End: 2023-08-01
Payer: COMMERCIAL

## 2023-08-01 ENCOUNTER — TELEPHONE (OUTPATIENT)
Dept: PHARMACY | Facility: CLINIC | Age: 43
End: 2023-08-01
Payer: COMMERCIAL

## 2023-08-01 DIAGNOSIS — M45.5 ANKYLOSING SPONDYLITIS OF THORACOLUMBAR REGION: Primary | ICD-10-CM

## 2023-08-01 RX ORDER — UPADACITINIB 15 MG/1
15 TABLET, EXTENDED RELEASE ORAL DAILY
Qty: 30 TABLET | Refills: 11 | Status: ACTIVE | OUTPATIENT
Start: 2023-08-01

## 2023-08-01 NOTE — TELEPHONE ENCOUNTER
We can try Rinvoq.     STAFF: let the patient know that Rinvoq was the preferred medication on her plan so we will move forward with this instead of Xeljanz.

## 2023-08-01 NOTE — TELEPHONE ENCOUNTER
Hello, this is Aissatou Houston, clinical pharmacist with [facility] that is part of your care team.  We have begun working on your prescription that your doctor has sent us. Our next steps include:     Working with your insurance company to obtain approval for your medication  Working with you to ensure your medication is affordable     We will be calling you along the way with updates on your medication but if you have any concerns or receive information that you would like to discuss please reach us at [phone].    Welcome call outcome: No answer/Unable to leave voicemail, mailbox full

## 2023-08-01 NOTE — TELEPHONE ENCOUNTER
----- Message from Aissatou Houston PharmD sent at 7/26/2023 11:25 AM CDT -----  Regarding: Xeljanz  Good morning,     The prior authorization for Xeljanz was denied due to being non-formulary. The preferred drug is Rinvoq. Can we switch the Xeljanz to  Rinvoq? If so, can you please send a new order? If not, is there any medical reason the patient cannot try Rinvoq?    Thanks,     Aissatou Houston PharmD   [facility] [place] [address]  [phone] (phone)  [phone] (fax)

## 2023-08-01 NOTE — TELEPHONE ENCOUNTER
Spoke to patient. Let patient know that Rinvoq was the preferred medication on her plan so per MAURO Mcmanus, we will move forward with this instead of Xeljanz. Pt verbalized understanding.  MM-LPN

## 2023-08-02 ENCOUNTER — SPECIALTY PHARMACY (OUTPATIENT)
Dept: PHARMACY | Facility: CLINIC | Age: 43
End: 2023-08-02
Payer: COMMERCIAL

## 2023-08-02 ENCOUNTER — CLINICAL SUPPORT (OUTPATIENT)
Dept: RHEUMATOLOGY | Facility: CLINIC | Age: 43
End: 2023-08-02
Payer: COMMERCIAL

## 2023-08-02 VITALS — SYSTOLIC BLOOD PRESSURE: 117 MMHG | DIASTOLIC BLOOD PRESSURE: 81 MMHG | HEART RATE: 125 BPM

## 2023-08-02 DIAGNOSIS — M79.7 FIBROMYALGIA: ICD-10-CM

## 2023-08-02 DIAGNOSIS — G89.4 CHRONIC PAIN SYNDROME: ICD-10-CM

## 2023-08-02 DIAGNOSIS — M45.5 ANKYLOSING SPONDYLITIS OF THORACOLUMBAR REGION: Primary | ICD-10-CM

## 2023-08-02 DIAGNOSIS — M05.79 RHEUMATOID ARTHRITIS INVOLVING MULTIPLE SITES WITH POSITIVE RHEUMATOID FACTOR: ICD-10-CM

## 2023-08-02 DIAGNOSIS — E53.8 B12 DEFICIENCY: ICD-10-CM

## 2023-08-02 PROCEDURE — 99999 PR PBB SHADOW E&M-EST. PATIENT-LVL IV: ICD-10-PCS | Mod: PBBFAC,,,

## 2023-08-02 PROCEDURE — 96372 PR INJECTION,THERAP/PROPH/DIAG2ST, IM OR SUBCUT: ICD-10-PCS | Mod: S$GLB,,, | Performed by: PHYSICIAN ASSISTANT

## 2023-08-02 PROCEDURE — 96372 THER/PROPH/DIAG INJ SC/IM: CPT | Mod: S$GLB,,, | Performed by: PHYSICIAN ASSISTANT

## 2023-08-02 PROCEDURE — 99999 PR PBB SHADOW E&M-EST. PATIENT-LVL IV: CPT | Mod: PBBFAC,,,

## 2023-08-02 RX ORDER — KETOROLAC TROMETHAMINE 30 MG/ML
60 INJECTION, SOLUTION INTRAMUSCULAR; INTRAVENOUS
Status: COMPLETED | OUTPATIENT
Start: 2023-08-02 | End: 2023-08-02

## 2023-08-02 RX ORDER — CYANOCOBALAMIN 1000 UG/ML
1000 INJECTION, SOLUTION INTRAMUSCULAR; SUBCUTANEOUS
Status: COMPLETED | OUTPATIENT
Start: 2023-08-02 | End: 2023-08-02

## 2023-08-02 RX ORDER — METHYLPREDNISOLONE ACETATE 80 MG/ML
160 INJECTION, SUSPENSION INTRA-ARTICULAR; INTRALESIONAL; INTRAMUSCULAR; SOFT TISSUE
Status: COMPLETED | OUTPATIENT
Start: 2023-08-02 | End: 2023-08-02

## 2023-08-02 RX ADMIN — METHYLPREDNISOLONE ACETATE 160 MG: 80 INJECTION, SUSPENSION INTRA-ARTICULAR; INTRALESIONAL; INTRAMUSCULAR; SOFT TISSUE at 11:08

## 2023-08-02 RX ADMIN — CYANOCOBALAMIN 1000 MCG: 1000 INJECTION, SOLUTION INTRAMUSCULAR; SUBCUTANEOUS at 11:08

## 2023-08-02 RX ADMIN — KETOROLAC TROMETHAMINE 60 MG: 30 INJECTION, SOLUTION INTRAMUSCULAR; INTRAVENOUS at 11:08

## 2023-08-02 NOTE — PROGRESS NOTES
Patient presented to clinic needing nurse injections. Patient contacted office requesting nurse injections. Margo responded with injection order of b 12, 60 mg toradol and 160 mg depo. Patient is complaining of level 4 out of 10 to fingers, feet and back. Tolerated injections well, mo signs or symptoms of allergic reactions noted. Left facility in stable condition.    Answers submitted by the patient for this visit:  Rheumatology Questionnaire (Submitted on 7/26/2023)  fever: Yes  eye redness: Yes  mouth sores: No  headaches: No  shortness of breath: No  chest pain: No  trouble swallowing: No  diarrhea: No  constipation: No  unexpected weight change: No  genital sore: No  dysuria: No  During the last 3 days, have you had a skin rash?: No  Bruises or bleeds easily: No  cough: No

## 2023-08-02 NOTE — TELEPHONE ENCOUNTER
PA approved for Rinvoq from 8/1/23-8/1/24. [facility]. Pt must fill at [facility], [phone]. Routing rx and closing out of [facility]. Outgoing call to pt to inform her.

## 2023-08-17 ENCOUNTER — OFFICE VISIT (OUTPATIENT)
Dept: GASTROENTEROLOGY | Facility: CLINIC | Age: 43
End: 2023-08-17
Payer: COMMERCIAL

## 2023-08-17 VITALS — WEIGHT: 180.13 LBS | BODY MASS INDEX: 31.92 KG/M2 | HEIGHT: 63 IN

## 2023-08-17 DIAGNOSIS — R10.9 RIGHT SIDED ABDOMINAL PAIN: Primary | ICD-10-CM

## 2023-08-17 PROCEDURE — 99214 PR OFFICE/OUTPT VISIT, EST, LEVL IV, 30-39 MIN: ICD-10-PCS | Mod: S$GLB,,, | Performed by: INTERNAL MEDICINE

## 2023-08-17 PROCEDURE — 3008F BODY MASS INDEX DOCD: CPT | Mod: CPTII,S$GLB,, | Performed by: INTERNAL MEDICINE

## 2023-08-17 PROCEDURE — 99999 PR PBB SHADOW E&M-EST. PATIENT-LVL IV: CPT | Mod: PBBFAC,,, | Performed by: INTERNAL MEDICINE

## 2023-08-17 PROCEDURE — 99999 PR PBB SHADOW E&M-EST. PATIENT-LVL IV: ICD-10-PCS | Mod: PBBFAC,,, | Performed by: INTERNAL MEDICINE

## 2023-08-17 PROCEDURE — 99214 OFFICE O/P EST MOD 30 MIN: CPT | Mod: S$GLB,,, | Performed by: INTERNAL MEDICINE

## 2023-08-17 PROCEDURE — 3008F PR BODY MASS INDEX (BMI) DOCUMENTED: ICD-10-PCS | Mod: CPTII,S$GLB,, | Performed by: INTERNAL MEDICINE

## 2023-08-17 RX ORDER — PREDNISOLONE ACETATE 10 MG/ML
1 SUSPENSION/ DROPS OPHTHALMIC 4 TIMES DAILY
COMMUNITY
Start: 2023-08-02

## 2023-08-17 RX ORDER — PANTOPRAZOLE SODIUM 40 MG/1
40 TABLET, DELAYED RELEASE ORAL DAILY
Qty: 30 TABLET | Refills: 3 | Status: SHIPPED | OUTPATIENT
Start: 2023-08-17 | End: 2023-08-28

## 2023-08-17 RX ORDER — CYCLOSPORINE 0.5 MG/ML
EMULSION OPHTHALMIC
COMMUNITY
Start: 2023-08-04 | End: 2023-11-15

## 2023-08-21 ENCOUNTER — HOSPITAL ENCOUNTER (OUTPATIENT)
Dept: RADIOLOGY | Facility: HOSPITAL | Age: 43
Discharge: HOME | End: 2023-08-21
Attending: INTERNAL MEDICINE
Payer: COMMERCIAL

## 2023-08-21 PROCEDURE — 76705 ECHO EXAM OF ABDOMEN: CPT | Mod: TC,PO

## 2023-08-21 PROCEDURE — 76705 ECHO EXAM OF ABDOMEN: CPT | Mod: 26,,, | Performed by: STUDENT IN AN ORGANIZED HEALTH CARE EDUCATION/TRAINING PROGRAM

## 2023-08-21 PROCEDURE — 76705 US ABDOMEN LIMITED: ICD-10-PCS | Mod: 26,,, | Performed by: STUDENT IN AN ORGANIZED HEALTH CARE EDUCATION/TRAINING PROGRAM

## 2023-08-23 NOTE — PROGRESS NOTES
"  CC: reflux    HPI 42 y.o. female with history of ankylosing spondylitis, RA, Sjogren syndrome here for follow up.    EGD showed LA grade A reflux esophagitis without bleeding, erythematous mucosa in antrum biopsied, normal examined duodenum. She is on protonix daily.    Colonoscopy showed several polyps, one was 10 mm polyp in the sigmoid colon that was removed with a hot snare. She also had nonbleeding internal hemorrhoids pathology returned tubular adenomas without high-grade dysplasia. She needs repeat in 2025.    Medical records reviewed. Additional history supplemented by nursing.     Past Medical History:   Diagnosis Date    ADHD     Ankylosing spondylitis     Anxiety     Colitis     Colon polyp     Depression     Essential hypertension     Fibromyalgia     History of kidney stones     Lupus     Migraine headache     Port-A-Cath in place     Primary immune deficiency disorder     Primary immunodeficiency disorder     Pyelonephritis 5/8/2018    Recurrent UTI     Renal disorder     Rheumatoid arteritis     Sjoegren syndrome      Review of Systems  General ROS: negative for chills, fever or weight loss  Cardiovascular ROS: no chest pain or dyspnea on exertion  Gastrointestinal ROS: no abdominal pain, change in bowel habits, or black/ bloody stools    Physical Examination  Ht 5' 3" (1.6 m)   Wt 81.7 kg (180 lb 1.9 oz)   LMP 02/12/2017 (Exact Date)   BMI 31.91 kg/m²   General appearance: alert, cooperative, no distress  HENT: Normocephalic, atraumatic, neck symmetrical, no nasal discharge   Lungs: clear to auscultation bilaterally, symmetric chest wall expansion bilaterally  Heart: regular rate and rhythm without rub; no displacement of the PMI   Abdomen: soft, non-tender; bowel sounds normoactive; no organomegaly  Extremities: extremities symmetric; no clubbing, cyanosis, or edema  Neurologic: Alert and oriented X 3, normal strength, normal coordination and gait    Labs:  Lab Results   Component Value Date    " WBC 10.60 07/31/2023    HGB 15.8 07/31/2023    HCT 48.9 (H) 07/31/2023    MCV 89 07/31/2023     07/31/2023     CMP  Sodium   Date Value Ref Range Status   07/31/2023 142 136 - 145 mmol/L Final     Potassium   Date Value Ref Range Status   07/31/2023 3.9 3.5 - 5.1 mmol/L Final     Chloride   Date Value Ref Range Status   07/31/2023 102 95 - 110 mmol/L Final     CO2   Date Value Ref Range Status   07/31/2023 28 23 - 29 mmol/L Final     Glucose   Date Value Ref Range Status   07/31/2023 87 70 - 110 mg/dL Final     BUN   Date Value Ref Range Status   07/31/2023 6 6 - 20 mg/dL Final     Creatinine   Date Value Ref Range Status   07/31/2023 0.7 0.5 - 1.4 mg/dL Final     Calcium   Date Value Ref Range Status   07/31/2023 9.8 8.7 - 10.5 mg/dL Final     Total Protein   Date Value Ref Range Status   07/31/2023 7.3 6.0 - 8.4 g/dL Final     Albumin   Date Value Ref Range Status   07/31/2023 4.0 3.5 - 5.2 g/dL Final     Total Bilirubin   Date Value Ref Range Status   07/31/2023 0.3 0.1 - 1.0 mg/dL Final     Comment:     For infants and newborns, interpretation of results should be based  on gestational age, weight and in agreement with clinical  observations.    Premature Infant recommended reference ranges:  Up to 24 hours.............<8.0 mg/dL  Up to 48 hours............<12.0 mg/dL  3-5 days..................<15.0 mg/dL  6-29 days.................<15.0 mg/dL       Alkaline Phosphatase   Date Value Ref Range Status   07/31/2023 108 55 - 135 U/L Final     AST   Date Value Ref Range Status   07/31/2023 20 10 - 40 U/L Final     ALT   Date Value Ref Range Status   07/31/2023 19 10 - 44 U/L Final     Anion Gap   Date Value Ref Range Status   07/31/2023 12 8 - 16 mmol/L Final     eGFR if    Date Value Ref Range Status   04/14/2022 >60.0 >60 mL/min/1.73 m^2 Final     eGFR if non    Date Value Ref Range Status   04/14/2022 >60.0 >60 mL/min/1.73 m^2 Final     Comment:     Calculation used to  obtain the estimated glomerular filtration  rate (eGFR) is the CKD-EPI equation.        Imaging:  US Abdomen Limited (GALLBLADDER)   Final Result         1. Upper limits normal sized liver with findings suggestive of fatty infiltration.  Tiny left hepatic lobe cyst.      Finalized on: 8/21/2023 12:51 PM By:  Leonardo Keys III, MD   [facility]# [id number]      2023-08-21 12:54:00.150    [facility]        Independently reviewed    Assessment:   GERD with mild reflux esophagitis  IBS with diarrhea  History of colon polyps    Plan:  -Continue protonix daily  -Imodium as needed  -Bentyl or levsin as needed in future for IBS  -Plan colonoscopy in 2025 for surveillance    30 minutes of total time spent on the encounter, which includes face to face time and non-face to face time preparing to see the patient (eg, review of tests), obtaining and/or reviewing separately obtained history, documenting clinical information in the electronic or other health record, Independently interpreting results (not separately reported) and communicating results to the patient/family/caregiver, or care coordination (not separately reported).          Shaggy Mae MD  [facility]  [address] [place] [address]  Office: [phone]  Fax: [phone]

## 2023-08-28 DIAGNOSIS — R11.10 INTRACTABLE VOMITING: Primary | ICD-10-CM

## 2023-08-28 RX ORDER — PANTOPRAZOLE SODIUM 40 MG/1
40 TABLET, DELAYED RELEASE ORAL DAILY
Qty: 30 TABLET | Refills: 3 | Status: SHIPPED | OUTPATIENT
Start: 2023-08-28 | End: 2023-12-01

## 2023-08-28 RX ORDER — LANSOPRAZOLE 30 MG/1
30 CAPSULE, DELAYED RELEASE ORAL DAILY
Qty: 30 CAPSULE | Refills: 0 | Status: SHIPPED | OUTPATIENT
Start: 2023-08-28 | End: 2023-08-28

## 2023-08-29 DIAGNOSIS — R11.10 INTRACTABLE VOMITING: ICD-10-CM

## 2023-08-29 RX ORDER — LANSOPRAZOLE 30 MG/1
CAPSULE, DELAYED RELEASE ORAL
Refills: 0 | OUTPATIENT
Start: 2023-08-29

## 2023-09-21 ENCOUNTER — TELEPHONE (OUTPATIENT)
Dept: INFUSION THERAPY | Facility: HOSPITAL | Age: 43
End: 2023-09-21
Payer: COMMERCIAL

## 2023-09-21 ENCOUNTER — PATIENT MESSAGE (OUTPATIENT)
Dept: INFUSION THERAPY | Facility: HOSPITAL | Age: 43
End: 2023-09-21

## 2023-09-21 NOTE — TELEPHONE ENCOUNTER
1436 - attempted to call patient regarding her missed port flush appointment. No answer, voicemail box was full. Patient message sent through the portal.

## 2023-10-20 ENCOUNTER — TELEPHONE (OUTPATIENT)
Dept: RHEUMATOLOGY | Facility: CLINIC | Age: 43
End: 2023-10-20
Payer: COMMERCIAL

## 2023-10-20 DIAGNOSIS — E53.8 B12 DEFICIENCY: ICD-10-CM

## 2023-10-20 DIAGNOSIS — M05.79 RHEUMATOID ARTHRITIS INVOLVING MULTIPLE SITES WITH POSITIVE RHEUMATOID FACTOR: ICD-10-CM

## 2023-10-20 DIAGNOSIS — G89.4 CHRONIC PAIN SYNDROME: ICD-10-CM

## 2023-10-20 DIAGNOSIS — M45.5 ANKYLOSING SPONDYLITIS OF THORACOLUMBAR REGION: Primary | ICD-10-CM

## 2023-10-20 RX ORDER — CYANOCOBALAMIN 1000 UG/ML
1000 INJECTION, SOLUTION INTRAMUSCULAR; SUBCUTANEOUS
Status: COMPLETED | OUTPATIENT
Start: 2023-10-20 | End: 2023-11-15

## 2023-10-20 RX ORDER — METHYLPREDNISOLONE ACETATE 80 MG/ML
160 INJECTION, SUSPENSION INTRA-ARTICULAR; INTRALESIONAL; INTRAMUSCULAR; SOFT TISSUE
Status: COMPLETED | OUTPATIENT
Start: 2023-10-20 | End: 2023-11-15

## 2023-10-20 RX ORDER — KETOROLAC TROMETHAMINE 30 MG/ML
60 INJECTION, SOLUTION INTRAMUSCULAR; INTRAVENOUS
Status: COMPLETED | OUTPATIENT
Start: 2023-10-20 | End: 2023-11-15

## 2023-10-20 NOTE — TELEPHONE ENCOUNTER
Toradol 60 mg, B12, and Depomedrol 160 mg ok. Ordered. Of note, next OV on 11/15/23 with Dr. Sawant    Staff,  Please reach out to patient to schedule her for nurse visit for injections. Thanks!

## 2023-10-20 NOTE — TELEPHONE ENCOUNTER
FW: Appointment Request   Miryam Hernandez LPN   Sent:   3:08 PM   To: Jazmyne Vargas, PharmD      Breanna Sanchez   MRN: [id number] : 1980   Pt Home: [phone]     Entered: [phone]        Message    Asking for nurse injections   ----- Message -----   From: Breanna Sanchez   Sent: 10/19/2023   2:54 PM CDT   To: [facility] Clinical Support Staff   Subject: Appointment Request                                Appointment Request From: Breanna Sanchez      With Provider: Margo Acuna PA-C [[facility] - Rheumatology]      Preferred Date Range: Any      Preferred Times: Any Time      Reason for visit: Shots      Comments:   Steroid shot combo. My inflammation has been high and Im in a flare up.

## 2023-10-25 DIAGNOSIS — G89.4 CHRONIC PAIN SYNDROME: ICD-10-CM

## 2023-10-25 DIAGNOSIS — M79.7 FIBROMYALGIA: ICD-10-CM

## 2023-10-25 DIAGNOSIS — M45.5 ANKYLOSING SPONDYLITIS OF THORACOLUMBAR REGION: ICD-10-CM

## 2023-10-29 RX ORDER — OXYCODONE AND ACETAMINOPHEN 7.5; 325 MG/1; MG/1
1 TABLET ORAL EVERY 8 HOURS PRN
Qty: 90 TABLET | Refills: 0 | Status: SHIPPED | OUTPATIENT
Start: 2023-11-14 | End: 2023-12-14

## 2023-11-15 ENCOUNTER — OFFICE VISIT (OUTPATIENT)
Dept: RHEUMATOLOGY | Facility: CLINIC | Age: 43
End: 2023-11-15
Payer: COMMERCIAL

## 2023-11-15 VITALS
SYSTOLIC BLOOD PRESSURE: 117 MMHG | HEIGHT: 63 IN | HEART RATE: 116 BPM | WEIGHT: 172.38 LBS | DIASTOLIC BLOOD PRESSURE: 82 MMHG | BODY MASS INDEX: 30.54 KG/M2

## 2023-11-15 DIAGNOSIS — D84.9 IMMUNOCOMPROMISED: ICD-10-CM

## 2023-11-15 DIAGNOSIS — G89.4 CHRONIC PAIN SYNDROME: ICD-10-CM

## 2023-11-15 DIAGNOSIS — E11.69 TYPE 2 DIABETES MELLITUS WITH OTHER SPECIFIED COMPLICATION, UNSPECIFIED WHETHER LONG TERM INSULIN USE: Primary | ICD-10-CM

## 2023-11-15 DIAGNOSIS — L40.9 PSORIASIS OF NAIL: ICD-10-CM

## 2023-11-15 DIAGNOSIS — M35.00 SJOGREN'S SYNDROME, WITH UNSPECIFIED ORGAN INVOLVEMENT: ICD-10-CM

## 2023-11-15 DIAGNOSIS — E53.8 B12 DEFICIENCY: ICD-10-CM

## 2023-11-15 DIAGNOSIS — M45.5 ANKYLOSING SPONDYLITIS OF THORACOLUMBAR REGION: ICD-10-CM

## 2023-11-15 DIAGNOSIS — M05.79 RHEUMATOID ARTHRITIS INVOLVING MULTIPLE SITES WITH POSITIVE RHEUMATOID FACTOR: ICD-10-CM

## 2023-11-15 PROCEDURE — 99999 PR PBB SHADOW E&M-EST. PATIENT-LVL V: ICD-10-PCS | Mod: PBBFAC,,, | Performed by: INTERNAL MEDICINE

## 2023-11-15 PROCEDURE — 3074F PR MOST RECENT SYSTOLIC BLOOD PRESSURE < 130 MM HG: ICD-10-PCS | Mod: CPTII,S$GLB,, | Performed by: INTERNAL MEDICINE

## 2023-11-15 PROCEDURE — 99999 PR PBB SHADOW E&M-EST. PATIENT-LVL V: CPT | Mod: PBBFAC,,, | Performed by: INTERNAL MEDICINE

## 2023-11-15 PROCEDURE — 3074F SYST BP LT 130 MM HG: CPT | Mod: CPTII,S$GLB,, | Performed by: INTERNAL MEDICINE

## 2023-11-15 PROCEDURE — 96372 PR INJECTION,THERAP/PROPH/DIAG2ST, IM OR SUBCUT: ICD-10-PCS | Mod: S$GLB,,, | Performed by: INTERNAL MEDICINE

## 2023-11-15 PROCEDURE — 3079F DIAST BP 80-89 MM HG: CPT | Mod: CPTII,S$GLB,, | Performed by: INTERNAL MEDICINE

## 2023-11-15 PROCEDURE — 1160F PR REVIEW ALL MEDS BY PRESCRIBER/CLIN PHARMACIST DOCUMENTED: ICD-10-PCS | Mod: CPTII,S$GLB,, | Performed by: INTERNAL MEDICINE

## 2023-11-15 PROCEDURE — 99215 PR OFFICE/OUTPT VISIT, EST, LEVL V, 40-54 MIN: ICD-10-PCS | Mod: 25,S$GLB,, | Performed by: INTERNAL MEDICINE

## 2023-11-15 PROCEDURE — 96372 THER/PROPH/DIAG INJ SC/IM: CPT | Mod: S$GLB,,, | Performed by: INTERNAL MEDICINE

## 2023-11-15 PROCEDURE — 3008F BODY MASS INDEX DOCD: CPT | Mod: CPTII,S$GLB,, | Performed by: INTERNAL MEDICINE

## 2023-11-15 PROCEDURE — 1159F PR MEDICATION LIST DOCUMENTED IN MEDICAL RECORD: ICD-10-PCS | Mod: CPTII,S$GLB,, | Performed by: INTERNAL MEDICINE

## 2023-11-15 PROCEDURE — 3008F PR BODY MASS INDEX (BMI) DOCUMENTED: ICD-10-PCS | Mod: CPTII,S$GLB,, | Performed by: INTERNAL MEDICINE

## 2023-11-15 PROCEDURE — 1160F RVW MEDS BY RX/DR IN RCRD: CPT | Mod: CPTII,S$GLB,, | Performed by: INTERNAL MEDICINE

## 2023-11-15 PROCEDURE — 1159F MED LIST DOCD IN RCRD: CPT | Mod: CPTII,S$GLB,, | Performed by: INTERNAL MEDICINE

## 2023-11-15 PROCEDURE — 3079F PR MOST RECENT DIASTOLIC BLOOD PRESSURE 80-89 MM HG: ICD-10-PCS | Mod: CPTII,S$GLB,, | Performed by: INTERNAL MEDICINE

## 2023-11-15 PROCEDURE — 99215 OFFICE O/P EST HI 40 MIN: CPT | Mod: 25,S$GLB,, | Performed by: INTERNAL MEDICINE

## 2023-11-15 RX ORDER — CYANOCOBALAMIN 1000 UG/ML
1000 INJECTION, SOLUTION INTRAMUSCULAR; SUBCUTANEOUS
Status: COMPLETED | OUTPATIENT
Start: 2023-11-15 | End: 2023-11-15

## 2023-11-15 RX ORDER — OXYCODONE AND ACETAMINOPHEN 7.5; 325 MG/1; MG/1
1 TABLET ORAL EVERY 8 HOURS PRN
Qty: 90 TABLET | Refills: 0 | Status: SHIPPED | OUTPATIENT
Start: 2024-01-01 | End: 2024-01-31

## 2023-11-15 RX ORDER — TIZANIDINE 4 MG/1
4 TABLET ORAL EVERY 8 HOURS
Qty: 270 TABLET | Refills: 3 | Status: SHIPPED | OUTPATIENT
Start: 2023-11-15

## 2023-11-15 RX ORDER — OXYCODONE AND ACETAMINOPHEN 7.5; 325 MG/1; MG/1
1 TABLET ORAL EVERY 8 HOURS PRN
Qty: 90 TABLET | Refills: 0 | Status: SHIPPED | OUTPATIENT
Start: 2023-12-01 | End: 2023-12-31

## 2023-11-15 RX ORDER — TIRZEPATIDE 5 MG/.5ML
5 INJECTION, SOLUTION SUBCUTANEOUS
Qty: 4 PEN | Refills: 5 | Status: SHIPPED | OUTPATIENT
Start: 2023-11-15 | End: 2024-05-13

## 2023-11-15 RX ORDER — OXYCODONE AND ACETAMINOPHEN 7.5; 325 MG/1; MG/1
1 TABLET ORAL EVERY 8 HOURS PRN
Qty: 90 TABLET | Refills: 0 | Status: SHIPPED | OUTPATIENT
Start: 2024-02-01 | End: 2024-02-15 | Stop reason: SDUPTHER

## 2023-11-15 RX ORDER — CLOBETASOL PROPIONATE 0.46 MG/ML
SOLUTION TOPICAL 2 TIMES DAILY
Qty: 100 ML | Refills: 3 | Status: SHIPPED | OUTPATIENT
Start: 2023-11-15 | End: 2024-05-13

## 2023-11-15 RX ORDER — KETOROLAC TROMETHAMINE 30 MG/ML
60 INJECTION, SOLUTION INTRAMUSCULAR; INTRAVENOUS
Status: COMPLETED | OUTPATIENT
Start: 2023-11-15 | End: 2023-11-15

## 2023-11-15 RX ORDER — METHYLPREDNISOLONE ACETATE 80 MG/ML
160 INJECTION, SUSPENSION INTRA-ARTICULAR; INTRALESIONAL; INTRAMUSCULAR; SOFT TISSUE
Status: COMPLETED | OUTPATIENT
Start: 2023-11-15 | End: 2023-11-15

## 2023-11-15 RX ADMIN — METHYLPREDNISOLONE ACETATE 160 MG: 80 INJECTION, SUSPENSION INTRA-ARTICULAR; INTRALESIONAL; INTRAMUSCULAR; SOFT TISSUE at 12:11

## 2023-11-15 RX ADMIN — CYANOCOBALAMIN 1000 MCG: 1000 INJECTION, SOLUTION INTRAMUSCULAR; SUBCUTANEOUS at 12:11

## 2023-11-15 RX ADMIN — KETOROLAC TROMETHAMINE 60 MG: 30 INJECTION, SOLUTION INTRAMUSCULAR; INTRAVENOUS at 12:11

## 2023-11-15 NOTE — PROGRESS NOTES
Subjective:       Patient ID: Breanna Sanchez is a 43 y.o. female.    Chief Complaint: Disease Management    Follow up:43 year old female who presents to clinic for follow up on AS/RA. Started Rinvoq  in Aug 2023 she has  si joints flare pitting and non pitting edema and digital swelling. She is doing poorly and reports increased pain in her thoracic spine, which is constant and aching. She has new pain in her low back with radiating pain down there L leg. She states SI joint pain is chronic and unchanged. She has not been able to take cosentyx routinely due to infections (URI/sinusitis). She is on ozempic and losr 20 lbs but has twice a week episodes of n/v. She is taking percocet tid prn for severe pain, but her pain remains uncontrolled. She is interested in alternative pain control.      Current tx:  1. Rinvoq    Prior treatment:  1. Humira  2. Enbrel  3. MTX  4. Orencia  5. Simponi aria  6. Remicade  7. Cosentyx.            Review of Systems   Constitutional:  Positive for activity change and fatigue. Negative for appetite change, chills and unexpected weight change.   HENT:  Negative for mouth sores.    Eyes:  Positive for redness. Negative for visual disturbance.   Respiratory:  Negative for cough and shortness of breath.    Cardiovascular:  Negative for chest pain, palpitations and leg swelling.   Gastrointestinal:  Negative for abdominal pain, constipation, diarrhea, nausea and vomiting.   Genitourinary:  Negative for genital sores.   Musculoskeletal:  Positive for arthralgias, back pain, neck pain and neck stiffness.   Skin:  Negative for rash.   Allergic/Immunologic: Positive for immunocompromised state.   Neurological:  Negative for dizziness, weakness and light-headedness.   Hematological:  Does not bruise/bleed easily.   Psychiatric/Behavioral:  The patient is not nervous/anxious.          Objective:     Vitals:    11/15/23 1012   BP: 117/82   Pulse: (!) 116         Past Medical History:   Diagnosis  Date    ADHD     Ankylosing spondylitis     Anxiety     Colitis     Colon polyp     Depression     Essential hypertension     Fibromyalgia     History of kidney stones     Lupus     Migraine headache     Port-A-Cath in place     Primary immune deficiency disorder     Primary immunodeficiency disorder     Pyelonephritis 5/8/2018    Recurrent UTI     Renal disorder     Rheumatoid arteritis     Sjoegren syndrome      Past Surgical History:   Procedure Laterality Date    APPENDECTOMY      BARTHOLIN GLAND CYST EXCISION      COLONOSCOPY  2011    COLONOSCOPY N/A 7/7/2022    Procedure: COLONOSCOPY;  Surgeon: Shaggy Mae MD;  Location: [facility];  Service: Endoscopy;  Laterality: N/A;    CYSTOSCOPY WITH CALCULUS EXTRACTION Bilateral 2/2/2023    Procedure: CYSTOSCOPY, WITH CALCULUS REMOVAL;  Surgeon: Florentin Aleman MD;  Location: [facility] OR;  Service: Urology;  Laterality: Bilateral;    CYSTOURETEROSCOPY WITH RETROGRADE PYELOGRAPHY AND INSERTION OF STENT INTO URETER Bilateral 2/2/2023    Procedure: CYSTOURETEROSCOPY, WITH RETROGRADE PYELOGRAM AND URETERAL STENT INSERTION;  Surgeon: Florentin Aleman MD;  Location: [facility] OR;  Service: Urology;  Laterality: Bilateral;    DILATION OF URETHRA      X 2    ESOPHAGOGASTRODUODENOSCOPY N/A 6/16/2023    Procedure: EGD (ESOPHAGOGASTRODUODENOSCOPY);  Surgeon: Shaggy Mae MD;  Location: [facility];  Service: Endoscopy;  Laterality: N/A;    HYSTERECTOMY      INSERTION OF TUNNELED CENTRAL VENOUS CATHETER (CVC) WITH SUBCUTANEOUS PORT N/A 10/2/2019    Procedure: [id number];  Surgeon: Lenin Springer MD;  Location: [facility];  Service: General;  Laterality: N/A;    KNEE ARTHROSCOPY Left     LAPAROSCOPIC LYSIS OF ADHESIONS N/A 7/22/2019    Procedure: LYSIS, ADHESIONS, LAPAROSCOPIC;  Surgeon: Clarence Adams MD;  Location: [facility];  Service: OB/GYN;  Laterality: N/A;    LAPAROSCOPIC SALPINGO-OOPHORECTOMY Left 7/22/2019    Procedure: SALPINGO-OOPHORECTOMY, LAPAROSCOPIC- LEFT SIDE  ONLY;  Surgeon: Clarence Adams MD;  Location: [facility];  Service: OB/GYN;  Laterality: Left;    OVARIAN CYST REMOVAL Left     SALPINGOOPHORECTOMY Right     TONSILLECTOMY      TOTAL SHOULDER ARTHROPLASTY Right     TUBAL LIGATION            Physical Exam   Constitutional: She is oriented to person, place, and time.   HENT:   Head: Normocephalic and atraumatic.   Mouth/Throat: Oropharynx is clear and moist.   Eyes: Pupils are equal, round, and reactive to light.   Neck: No thyromegaly present.   Cardiovascular: Normal rate, regular rhythm and normal heart sounds. Exam reveals no gallop and no friction rub.   No murmur heard.  Pulmonary/Chest: Breath sounds normal. She has no wheezes. She has no rales. She exhibits no tenderness.   Abdominal: There is no abdominal tenderness. There is no rebound and no guarding.   Musculoskeletal:         General: Tenderness present.      Right shoulder: Tenderness present.      Left shoulder: Tenderness present.      Right elbow: Normal.      Left elbow: Normal.      Cervical back: Neck supple.      Right knee: Swelling and effusion present. Tenderness present.      Left knee: Effusion present.      Comments: Pt has mild  psoriatic nail dz   Lymphadenopathy:     She has no cervical adenopathy.   Neurological: She is alert and oriented to person, place, and time. She has normal sensation. Gait normal.   Reflex Scores:       Patellar reflexes are 3+ on the right side and 3+ on the left side.  Skin: No rash noted. No erythema. No pallor.   Psychiatric: Mood and affect normal.   Vitals reviewed.      Right Side Rheumatological Exam     Examination finds the elbow, 1st MCP, 2nd MCP, 3rd MCP, 4th MCP and 5th MCP normal.    The patient is tender to palpation of the shoulder and knee    She has swelling of the knee    The patient has an enlarged wrist, 1st PIP, 2nd PIP, 3rd PIP, 4th PIP and 5th PIP    Shoulder Exam   Tenderness Location: acromion    Range of Motion   Active abduction:   abnormal   Adduction: abnormal  Sensation: normal    Knee Exam   Tenderness Location: medial joint line  Patellofemoral Crepitus: positive  Effusion: positive  Sensation: normal    Hip Exam   Tenderness Location: no tenderness  Sensation: normal    Elbow/Wrist Exam   Tenderness Location: no tenderness  Sensation: normal    Left Side Rheumatological Exam     Examination finds the elbow, 1st MCP, 2nd MCP, 3rd MCP, 4th MCP and 5th MCP normal.    The patient is tender to palpation of the shoulder.    The patient has an enlarged wrist, 1st PIP, 2nd PIP, 3rd PIP, 4th PIP and 5th PIP.    Shoulder Exam   Tenderness Location: acromion    Range of Motion   Active abduction:  abnormal   Sensation: normal    Knee Exam   Tenderness Location: lateral joint line and medial joint line    Patellofemoral Crepitus: positive  Effusion: positive  Sensation: normal    Hip Exam   Tenderness Location: no tenderness  Sensation: normal    Elbow/Wrist Exam   Sensation: normal      Back/Neck Exam   General Inspection   Gait: normal       Tenderness Right paramedian tenderness of the Lower C-Spine and Lower L-Spine.Left paramedian tenderness of the Upper C-Spine and Lower L-Spine.        Results for orders placed or performed in visit on 10/23/23   POCT Influenza A/B Molecular   Result Value Ref Range    POC Molecular Influenza A Ag Negative Negative, Not Reported    POC Molecular Influenza B Ag Negative Negative, Not Reported     Acceptable Yes    POCT COVID-19 Rapid Screening   Result Value Ref Range    POC Rapid COVID Negative Negative     Acceptable Yes    POCT Strep A, Molecular   Result Value Ref Range    Molecular Strep A, POC Negative Negative     Acceptable Yes      *Note: Due to a large number of results and/or encounters for the requested time period, some results have not been displayed. A complete set of results can be found in Results Review.           Collected Updated Procedure     10/03/2023 0737 10/03/2023 1147 C-Reactive Protein [[id number]]   (Abnormal)   Blood    Component Value Units   CRP 1.90 High  mg/dL          10/03/2023 0737 10/03/2023 1107 Sedimentation rate [[id number]]   Blood    Component Value Units   Sed Rate 19 mm/Hr          10/03/2023 0737 10/03/2023 1147 CK [[id number]]   Blood    Component Value Units   CPK 88 U/L          09/29/2023 1607 09/29/2023 1633 Comprehensive Metabolic Panel [[id number]]   Blood    Component Value Units   Sodium 142 mmol/L   Potassium 3.6  mmol/L   Chloride 103 mmol/L   CO2 31 mmol/L   Glucose 104  mg/dL   BUN 10 mg/dL   Creatinine 0.60 mg/dL   Calcium 9.3 mg/dL   Total Protein 7.3 g/dL   Albumin 4.4 g/dL   Total Bilirubin 0.3 mg/dL   Alkaline Phosphatase 93 U/L   AST 28 U/L   ALT 34 U/L   Anion Gap 8  mmol/L   eGFR >60 mL/min/1.73 m^2          09/29/2023 1607 09/29/2023 1621 CBC Auto Differential [[id number]]   (Abnormal)   Blood    Component Value Units   WBC 11.84 K/uL   RBC 5.11 M/uL   Hemoglobin 14.9 g/dL   Hematocrit 45.0 %   MCV 88 fL   MCH 29.2 pg   MCHC 33.1 g/dL   RDW 14.9 High  %   Platelets 340 K/uL   MPV 9.7 fL   Immature Granulocytes 0.2 %   Gran # (ANC) 5.9 K/uL   Immature Grans (Abs) 0.02  K/uL   Lymph # 4.8 K/uL   Mono # 0.8 K/uL   Eos # 0.2 K/uL   Baso # 0.03 K/uL   nRBC 0 /100 WBC   Gran % 50.1 %   Lymph % 40.9 %   Mono % 7.1 %   Eosinophil % 1.4 %   Basophil % 0.3 %   Differential Method Automated           07/31/2023 0843 07/31/2023 1544 LIPID PANEL [[id number]]   (Abnormal)   Blood    Component Value Units   Cholesterol 218 High   mg/dL   Triglycerides 199 High   mg/dL   HDL 43  mg/dL   LDL Cholesterol 135.2  mg/dL   HDL/Cholesterol Ratio 19.7 Low  %   Total Cholesterol/HDL Ratio 5.1 High     Non-HDL Cholesterol 175  mg/dL          07/31/2023 0843 07/31/2023 1619 Hepatitis B Core Antibody, Total [[id number]]   Blood    Component Value   Hep B Core Total Ab Non-reactive          07/31/2023 0843 07/31/2023 1619 HEPATITIS B  SURFACE ANTIBODY [[id number]]   Blood    Component Value Units   Hep B S Ab <3.00 mIU/mL   Hep B S Ab Non-reactive            07/31/2023 0843 07/31/2023 1619 Hepatitis B Surface Antigen [[id number]]   Blood    Component Value   Hepatitis B Surface Ag Non-reactive          07/31/2023 0843 07/31/2023 1629 CBC Auto Differential [[id number]]   (Abnormal)   Blood    Component Value Units   WBC 10.60 K/uL   RBC 5.52 High  M/uL   Hemoglobin 15.8 g/dL   Hematocrit 48.9 High  %   MCV 89 fL   MCH 28.6 pg   MCHC 32.3 g/dL   RDW 13.5 %   Platelets 358 K/uL   MPV 10.8 fL   Immature Granulocytes 0.3 %   Gran # (ANC) 5.9 K/uL   Immature Grans (Abs) 0.03  K/uL   Lymph # 3.7 K/uL   Mono # 0.6 K/uL   Eos # 0.3 K/uL   Baso # 0.06 K/uL   nRBC 0 /100 WBC   Gran % 55.4 %   Lymph % 34.5 %   Mono % 6.0 %   Eosinophil % 3.2 %   Basophil % 0.6 %   Differential Method Automated               Assessment:       1. Type 2 diabetes mellitus with other specified complication, unspecified whether long term insulin use    2. Ankylosing spondylitis of thoracolumbar region    3. Rheumatoid arthritis involving multiple sites with positive rheumatoid factor    4. B12 deficiency    5. Immunocompromised    6. Chronic pain syndrome    7. Psoriasis of nail    8. Sjogren's syndrome, with unspecified organ involvement                  Plan:       Type 2 diabetes mellitus with other specified complication, unspecified whether long term insulin use  -     tirzepatide (MOUNJARO) 5 mg/0.5 mL PnIj; Inject 5 mg into the skin every 7 days.  Dispense: 4 pen ; Refill: 5  -     Sedimentation rate; Future; Expected date: 11/15/2023  -     C-Reactive Protein; Future; Expected date: 11/15/2023  -     Comprehensive Metabolic Panel; Future; Expected date: 11/15/2023  -     CBC Auto Differential; Future; Expected date: 11/15/2023  -     Anti-Thyroglobulin Antibody; Future; Expected date: 11/15/2023  -     T4, Free; Future; Expected date: 11/15/2023  -     Thyroid Peroxidase  Antibody; Future; Expected date: 11/15/2023  -     TSH; Future; Expected date: 11/15/2023  -     T3, Free; Future; Expected date: 11/15/2023  -     ketorolac injection 60 mg  -     methylPREDNISolone acetate injection 160 mg  -     cyanocobalamin injection 1,000 mcg    Ankylosing spondylitis of thoracolumbar region  -     Sedimentation rate; Future; Expected date: 11/15/2023  -     C-Reactive Protein; Future; Expected date: 11/15/2023  -     Comprehensive Metabolic Panel; Future; Expected date: 11/15/2023  -     CBC Auto Differential; Future; Expected date: 11/15/2023  -     Anti-Thyroglobulin Antibody; Future; Expected date: 11/15/2023  -     T4, Free; Future; Expected date: 11/15/2023  -     Thyroid Peroxidase Antibody; Future; Expected date: 11/15/2023  -     TSH; Future; Expected date: 11/15/2023  -     T3, Free; Future; Expected date: 11/15/2023  -     ketorolac injection 60 mg  -     methylPREDNISolone acetate injection 160 mg  -     cyanocobalamin injection 1,000 mcg  -     tiZANidine (ZANAFLEX) 4 MG tablet; Take 1 tablet (4 mg total) by mouth every 8 (eight) hours.  Dispense: 270 tablet; Refill: 3    Rheumatoid arthritis involving multiple sites with positive rheumatoid factor  -     Sedimentation rate; Future; Expected date: 11/15/2023  -     C-Reactive Protein; Future; Expected date: 11/15/2023  -     Comprehensive Metabolic Panel; Future; Expected date: 11/15/2023  -     CBC Auto Differential; Future; Expected date: 11/15/2023  -     Anti-Thyroglobulin Antibody; Future; Expected date: 11/15/2023  -     T4, Free; Future; Expected date: 11/15/2023  -     Thyroid Peroxidase Antibody; Future; Expected date: 11/15/2023  -     TSH; Future; Expected date: 11/15/2023  -     T3, Free; Future; Expected date: 11/15/2023  -     ketorolac injection 60 mg  -     methylPREDNISolone acetate injection 160 mg  -     cyanocobalamin injection 1,000 mcg    B12 deficiency  -     Sedimentation rate; Future; Expected date:  11/15/2023  -     C-Reactive Protein; Future; Expected date: 11/15/2023  -     Comprehensive Metabolic Panel; Future; Expected date: 11/15/2023  -     CBC Auto Differential; Future; Expected date: 11/15/2023  -     Anti-Thyroglobulin Antibody; Future; Expected date: 11/15/2023  -     T4, Free; Future; Expected date: 11/15/2023  -     Thyroid Peroxidase Antibody; Future; Expected date: 11/15/2023  -     TSH; Future; Expected date: 11/15/2023  -     T3, Free; Future; Expected date: 11/15/2023  -     ketorolac injection 60 mg  -     methylPREDNISolone acetate injection 160 mg  -     cyanocobalamin injection 1,000 mcg    Immunocompromised  -     Sedimentation rate; Future; Expected date: 11/15/2023  -     C-Reactive Protein; Future; Expected date: 11/15/2023  -     Comprehensive Metabolic Panel; Future; Expected date: 11/15/2023  -     CBC Auto Differential; Future; Expected date: 11/15/2023  -     Anti-Thyroglobulin Antibody; Future; Expected date: 11/15/2023  -     T4, Free; Future; Expected date: 11/15/2023  -     Thyroid Peroxidase Antibody; Future; Expected date: 11/15/2023  -     TSH; Future; Expected date: 11/15/2023  -     T3, Free; Future; Expected date: 11/15/2023  -     ketorolac injection 60 mg  -     methylPREDNISolone acetate injection 160 mg  -     cyanocobalamin injection 1,000 mcg  -     oxyCODONE-acetaminophen (PERCOCET) 7.5-325 mg per tablet; Take 1 tablet by mouth every 8 (eight) hours as needed for Pain.  Dispense: 90 tablet; Refill: 0  -     oxyCODONE-acetaminophen (PERCOCET) 7.5-325 mg per tablet; Take 1 tablet by mouth every 8 (eight) hours as needed for Pain.  Dispense: 90 tablet; Refill: 0  -     oxyCODONE-acetaminophen (PERCOCET) 7.5-325 mg per tablet; Take 1 tablet by mouth every 8 (eight) hours as needed for Pain.  Dispense: 90 tablet; Refill: 0    Chronic pain syndrome  -     Sedimentation rate; Future; Expected date: 11/15/2023  -     C-Reactive Protein; Future; Expected date:  11/15/2023  -     Comprehensive Metabolic Panel; Future; Expected date: 11/15/2023  -     CBC Auto Differential; Future; Expected date: 11/15/2023  -     Anti-Thyroglobulin Antibody; Future; Expected date: 11/15/2023  -     T4, Free; Future; Expected date: 11/15/2023  -     Thyroid Peroxidase Antibody; Future; Expected date: 11/15/2023  -     TSH; Future; Expected date: 11/15/2023  -     T3, Free; Future; Expected date: 11/15/2023  -     ketorolac injection 60 mg  -     methylPREDNISolone acetate injection 160 mg  -     cyanocobalamin injection 1,000 mcg  -     oxyCODONE-acetaminophen (PERCOCET) 7.5-325 mg per tablet; Take 1 tablet by mouth every 8 (eight) hours as needed for Pain.  Dispense: 90 tablet; Refill: 0  -     oxyCODONE-acetaminophen (PERCOCET) 7.5-325 mg per tablet; Take 1 tablet by mouth every 8 (eight) hours as needed for Pain.  Dispense: 90 tablet; Refill: 0  -     oxyCODONE-acetaminophen (PERCOCET) 7.5-325 mg per tablet; Take 1 tablet by mouth every 8 (eight) hours as needed for Pain.  Dispense: 90 tablet; Refill: 0    Psoriasis of nail  -     clobetasoL (TEMOVATE) 0.05 % external solution; Apply topically 2 (two) times daily.  Dispense: 100 mL; Refill: 3    Sjogren's syndrome, with unspecified organ involvement  -     tiZANidine (ZANAFLEX) 4 MG tablet; Take 1 tablet (4 mg total) by mouth every 8 (eight) hours.  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 Chery  --recurrent ESBL UTI     Plan  1.Nurse injection  2. On rinvoq .   3. Continue labs soon  4. Change to moujaro  5. Cont piroxicam, tizanidine, prednisone PRN  6. Cont percocet PRN.  I have checked [place] prescription monitoring program site and no unusual or abnormal behavior has occurred pt  understand the risk and benefits of taking opioid medications and has decided to continue the medication.. Referral for alternative pain control  7. Back and Spine eval as well    More than 50% of the  40 minute encounter was spent face to face counseling the patient regarding current status and future plan of care as well as side effects  of the medications. All questions were answered to patient's satisfaction also includes  non-face to face time preparing to see the patient (eg, review of tests), Obtaining and/or reviewing separately obtained history, Documenting clinical information in the electronic or other health record, Independently interpreting results

## 2023-11-30 DIAGNOSIS — R11.10 INTRACTABLE VOMITING: ICD-10-CM

## 2023-12-01 RX ORDER — PANTOPRAZOLE SODIUM 40 MG/1
40 TABLET, DELAYED RELEASE ORAL
Qty: 90 TABLET | Refills: 1 | Status: SHIPPED | OUTPATIENT
Start: 2023-12-01

## 2023-12-12 DIAGNOSIS — G43.719 INTRACTABLE CHRONIC MIGRAINE WITHOUT AURA AND WITHOUT STATUS MIGRAINOSUS: ICD-10-CM

## 2023-12-13 RX ORDER — UBROGEPANT 100 MG/1
TABLET ORAL
Qty: 10 TABLET | Refills: 2 | Status: SHIPPED | OUTPATIENT
Start: 2023-12-13

## 2024-01-11 ENCOUNTER — TELEPHONE (OUTPATIENT)
Dept: RHEUMATOLOGY | Facility: CLINIC | Age: 44
End: 2024-01-11
Payer: COMMERCIAL

## 2024-01-11 ENCOUNTER — OFFICE VISIT (OUTPATIENT)
Dept: PAIN MEDICINE | Facility: CLINIC | Age: 44
End: 2024-01-11
Payer: COMMERCIAL

## 2024-01-11 ENCOUNTER — LAB VISIT (OUTPATIENT)
Dept: LAB | Facility: HOSPITAL | Age: 44
End: 2024-01-11
Attending: INTERNAL MEDICINE
Payer: COMMERCIAL

## 2024-01-11 VITALS
BODY MASS INDEX: 29.7 KG/M2 | HEART RATE: 110 BPM | WEIGHT: 167.69 LBS | SYSTOLIC BLOOD PRESSURE: 140 MMHG | DIASTOLIC BLOOD PRESSURE: 96 MMHG

## 2024-01-11 DIAGNOSIS — M45.5 ANKYLOSING SPONDYLITIS OF THORACOLUMBAR REGION: ICD-10-CM

## 2024-01-11 DIAGNOSIS — G89.4 CHRONIC PAIN SYNDROME: ICD-10-CM

## 2024-01-11 DIAGNOSIS — M54.41 CHRONIC BILATERAL LOW BACK PAIN WITH BILATERAL SCIATICA: ICD-10-CM

## 2024-01-11 DIAGNOSIS — D84.9 IMMUNOCOMPROMISED: ICD-10-CM

## 2024-01-11 DIAGNOSIS — G89.29 CHRONIC BILATERAL LOW BACK PAIN WITH BILATERAL SCIATICA: ICD-10-CM

## 2024-01-11 DIAGNOSIS — E11.69 TYPE 2 DIABETES MELLITUS WITH OTHER SPECIFIED COMPLICATION, UNSPECIFIED WHETHER LONG TERM INSULIN USE: ICD-10-CM

## 2024-01-11 DIAGNOSIS — M54.2 CERVICALGIA: Primary | ICD-10-CM

## 2024-01-11 DIAGNOSIS — M54.6 CHRONIC MIDLINE THORACIC BACK PAIN: ICD-10-CM

## 2024-01-11 DIAGNOSIS — M54.42 CHRONIC BILATERAL LOW BACK PAIN WITH BILATERAL SCIATICA: ICD-10-CM

## 2024-01-11 DIAGNOSIS — M05.79 RHEUMATOID ARTHRITIS INVOLVING MULTIPLE SITES WITH POSITIVE RHEUMATOID FACTOR: ICD-10-CM

## 2024-01-11 DIAGNOSIS — E53.8 B12 DEFICIENCY: ICD-10-CM

## 2024-01-11 DIAGNOSIS — G89.29 CHRONIC MIDLINE THORACIC BACK PAIN: ICD-10-CM

## 2024-01-11 LAB
ALBUMIN SERPL BCP-MCNC: 4.4 G/DL (ref 3.5–5.2)
ALP SERPL-CCNC: 107 U/L (ref 55–135)
ALT SERPL W/O P-5'-P-CCNC: 19 U/L (ref 10–44)
ANION GAP SERPL CALC-SCNC: 12 MMOL/L (ref 8–16)
AST SERPL-CCNC: 13 U/L (ref 10–40)
BASOPHILS # BLD AUTO: 0.07 K/UL (ref 0–0.2)
BASOPHILS NFR BLD: 0.6 % (ref 0–1.9)
BILIRUB SERPL-MCNC: 0.6 MG/DL (ref 0.1–1)
BUN SERPL-MCNC: 9 MG/DL (ref 6–20)
CALCIUM SERPL-MCNC: 9.9 MG/DL (ref 8.7–10.5)
CHLORIDE SERPL-SCNC: 106 MMOL/L (ref 95–110)
CO2 SERPL-SCNC: 24 MMOL/L (ref 23–29)
CREAT SERPL-MCNC: 0.7 MG/DL (ref 0.5–1.4)
CRP SERPL-MCNC: 4.7 MG/L (ref 0–8.2)
DIFFERENTIAL METHOD BLD: ABNORMAL
EOSINOPHIL # BLD AUTO: 0.2 K/UL (ref 0–0.5)
EOSINOPHIL NFR BLD: 1.8 % (ref 0–8)
ERYTHROCYTE [DISTWIDTH] IN BLOOD BY AUTOMATED COUNT: 14.7 % (ref 11.5–14.5)
ERYTHROCYTE [SEDIMENTATION RATE] IN BLOOD BY PHOTOMETRIC METHOD: 30 MM/HR (ref 0–36)
EST. GFR  (NO RACE VARIABLE): >60 ML/MIN/1.73 M^2
GLUCOSE SERPL-MCNC: 86 MG/DL (ref 70–110)
HCT VFR BLD AUTO: 43.7 % (ref 37–48.5)
HGB BLD-MCNC: 14.3 G/DL (ref 12–16)
IMM GRANULOCYTES # BLD AUTO: 0.05 K/UL (ref 0–0.04)
IMM GRANULOCYTES NFR BLD AUTO: 0.4 % (ref 0–0.5)
LYMPHOCYTES # BLD AUTO: 4.9 K/UL (ref 1–4.8)
LYMPHOCYTES NFR BLD: 39.4 % (ref 18–48)
MCH RBC QN AUTO: 30.3 PG (ref 27–31)
MCHC RBC AUTO-ENTMCNC: 32.7 G/DL (ref 32–36)
MCV RBC AUTO: 93 FL (ref 82–98)
MONOCYTES # BLD AUTO: 1 K/UL (ref 0.3–1)
MONOCYTES NFR BLD: 7.7 % (ref 4–15)
NEUTROPHILS # BLD AUTO: 6.2 K/UL (ref 1.8–7.7)
NEUTROPHILS NFR BLD: 50.1 % (ref 38–73)
NRBC BLD-RTO: 0 /100 WBC
PLATELET # BLD AUTO: 380 K/UL (ref 150–450)
PMV BLD AUTO: 10.3 FL (ref 9.2–12.9)
POTASSIUM SERPL-SCNC: 3.1 MMOL/L (ref 3.5–5.1)
PROT SERPL-MCNC: 7.5 G/DL (ref 6–8.4)
RBC # BLD AUTO: 4.72 M/UL (ref 4–5.4)
SODIUM SERPL-SCNC: 142 MMOL/L (ref 136–145)
T3FREE SERPL-MCNC: 3.5 PG/ML (ref 2.3–4.2)
T4 FREE SERPL-MCNC: 1.17 NG/DL (ref 0.71–1.51)
TSH SERPL DL<=0.005 MIU/L-ACNC: 1.02 UIU/ML (ref 0.4–4)
WBC # BLD AUTO: 12.39 K/UL (ref 3.9–12.7)

## 2024-01-11 PROCEDURE — 3008F BODY MASS INDEX DOCD: CPT | Mod: CPTII,S$GLB,, | Performed by: PHYSICIAN ASSISTANT

## 2024-01-11 PROCEDURE — 1159F MED LIST DOCD IN RCRD: CPT | Mod: CPTII,S$GLB,, | Performed by: PHYSICIAN ASSISTANT

## 2024-01-11 PROCEDURE — 85652 RBC SED RATE AUTOMATED: CPT | Performed by: INTERNAL MEDICINE

## 2024-01-11 PROCEDURE — 86800 THYROGLOBULIN ANTIBODY: CPT | Performed by: INTERNAL MEDICINE

## 2024-01-11 PROCEDURE — 36415 COLL VENOUS BLD VENIPUNCTURE: CPT | Mod: PO | Performed by: INTERNAL MEDICINE

## 2024-01-11 PROCEDURE — 85025 COMPLETE CBC W/AUTO DIFF WBC: CPT | Performed by: INTERNAL MEDICINE

## 2024-01-11 PROCEDURE — 3077F SYST BP >= 140 MM HG: CPT | Mod: CPTII,S$GLB,, | Performed by: PHYSICIAN ASSISTANT

## 2024-01-11 PROCEDURE — 99999 PR PBB SHADOW E&M-EST. PATIENT-LVL V: CPT | Mod: PBBFAC,,, | Performed by: PHYSICIAN ASSISTANT

## 2024-01-11 PROCEDURE — 80053 COMPREHEN METABOLIC PANEL: CPT | Performed by: INTERNAL MEDICINE

## 2024-01-11 PROCEDURE — 3080F DIAST BP >= 90 MM HG: CPT | Mod: CPTII,S$GLB,, | Performed by: PHYSICIAN ASSISTANT

## 2024-01-11 PROCEDURE — 86376 MICROSOMAL ANTIBODY EACH: CPT | Performed by: INTERNAL MEDICINE

## 2024-01-11 PROCEDURE — 86140 C-REACTIVE PROTEIN: CPT | Performed by: INTERNAL MEDICINE

## 2024-01-11 PROCEDURE — 84443 ASSAY THYROID STIM HORMONE: CPT | Performed by: INTERNAL MEDICINE

## 2024-01-11 PROCEDURE — 84439 ASSAY OF FREE THYROXINE: CPT | Performed by: INTERNAL MEDICINE

## 2024-01-11 PROCEDURE — 99214 OFFICE O/P EST MOD 30 MIN: CPT | Mod: S$GLB,,, | Performed by: PHYSICIAN ASSISTANT

## 2024-01-11 PROCEDURE — 84481 FREE ASSAY (FT-3): CPT | Performed by: INTERNAL MEDICINE

## 2024-01-11 NOTE — PROGRESS NOTES
[facility] New Patient Evaluation      Referred by: Margo Acuna    PCP: Margo Acuna PA-C    CC:   Chief Complaint   Patient presents with    Back Pain    Low-back Pain          HPI:   Breanna Sanchez is a 43 y.o. year old female patient who has a past medical history of ADHD, Ankylosing spondylitis, Anxiety, Colitis, Colon polyp, Depression, Essential hypertension, Fibromyalgia, History of kidney stones, Lupus, Migraine headache, Port-A-Cath in place, Primary immune deficiency disorder, Primary immunodeficiency disorder, Pyelonephritis, Recurrent UTI, Renal disorder, Rheumatoid arteritis, and Sjoegren syndrome. She presents in referral from Margo Acuna for back pain.      She follows with Dr. Sawant for Ankylosing spondylitis and managed with percocet and prn prednisone.  She has felt chronic lumbar back pain for years, but more recently she has expercienced pain the neck and mid back region. She has pain in the posterior neck to the interscapular region that radiates to the bialteral arms to the elbows.  She feels a pressure type pain in the mid thoracic region.  And has chornic lower lumbar back pain to the sacrum/ coccyx with radiation to the left greater than right lateral leg.  Leg pain occurs about 1 time per month and is associated with numbness/ tingling.  She has tried gabapentin, percocet, prednisone.  She has tried PT and SI joint injections years ago.    Denies bowel/ bladder incontinence.    Past and current medications:  Antineuropathics:  gabapentin  NSAIDs:  Antidepressants:  Muscle relaxers:  Opioids:  percocet  Antiplatelets/Anticoagulants:  Others:  prednisone as needed (from rheumatology)    Physical Therapy/ Chiropractic care:  Aquatherapy -  tried in 2018  PT - prior to 2018; she is worried it would be hard to participate in PT due to pain.     Pain Intervention History:  SI joint injection - Dr. Chery with last one in 2021.    Past Spine Surgical  History:  none        History:    Current Outpatient Medications:     (Magic mouthwash) 1:1:1 diphenhydrAMINE(Benadryl) 12.5mg/5ml liq, aluminum & magnesium hydroxide-simethicone (Maalox), LIDOcaine viscous 2%, Swish and spit 10 mLs every 4 (four) hours as needed. for mouth sores, Disp: 450 mL, Rfl: 0    albuterol (VENTOLIN HFA) 90 mcg/actuation inhaler, Inhale 1-2 puffs into the lungs every 6 (six) hours as needed for Wheezing or Shortness of Breath. Rescue, Disp: 18 g, Rfl: 1    budesonide (PULMICORT FLEXHALER) 90 mcg/actuation AePB, Inhale 2 puffs (180 mcg total) into the lungs 2 (two) times daily as needed (as needed for cough and wheezing). Controller, Disp: 1 each, Rfl: 1    butalbital-acetaminophen-caffeine -40 mg (FIORICET, ESGIC) -40 mg per tablet, 1 tab PO q6 hr PRN severe migraine. No more than 10 days per 30 days., Disp: 12 tablet, Rfl: 3    clobetasoL (TEMOVATE) 0.05 % external solution, Apply topically 2 (two) times daily., Disp: 100 mL, Rfl: 3    desvenlafaxine succinate (PRISTIQ) 100 MG Tb24, Take 100 mg by mouth once daily., Disp: , Rfl:     dextroamphetamine-amphetamine 30 mg Tab, Take 30 mg by mouth 2 (two) times daily., Disp: , Rfl:     epinephrine (EPIPEN INJ), Inject 1 application as directed continuous prn (anaphylaxis)., Disp: , Rfl:     ergocalciferol (ERGOCALCIFEROL) 50,000 unit Cap, Take 1 capsule (50,000 Units total) by mouth every 7 days., Disp: 12 capsule, Rfl: 3    estradioL (ESTRACE) 0.01 % (0.1 mg/gram) vaginal cream, SMARTSI Gram(s) Vaginal, Disp: , Rfl:     fluticasone propionate (FLONASE) 50 mcg/actuation nasal spray, SPRAY 1 SPRAY INTO EACH NOSTRIL TWICE A DAY AS NEEDED FOR RHINITIS OR ALLERGIES, Disp: 48 mL, Rfl: 1    gabapentin (NEURONTIN) 600 MG tablet, Take 1 tablet (600 mg total) by mouth 3 (three) times daily., Disp: 270 tablet, Rfl: 2    hyoscyamine (ANASPAZ,LEVSIN) 0.125 mg Tab, TAKE 1 TABLET BY MOUTH EVERY 6 HOURS AS NEEDED FOR ABDOMINAL PAIN OR DIARRHEA,  Disp: 120 tablet, Rfl: 1    L.acidophil,parac-S.therm-Bif. (RISAQUAD) Cap capsule, Take 1 capsule by mouth once daily., Disp: 30 capsule, Rfl: 0    montelukast (SINGULAIR) 10 mg tablet, Take 1 tablet (10 mg total) by mouth nightly., Disp: 30 tablet, Rfl: 2    NIFEdipine (PROCARDIA-XL) 60 MG (OSM) 24 hr tablet, Take 1 tablet (60 mg total) by mouth once daily., Disp: 90 tablet, Rfl: 3    ondansetron (ZOFRAN-ODT) 4 MG TbDL, Take 1 tablet (4 mg total) by mouth every 8 (eight) hours as needed., Disp: 12 tablet, Rfl: 0    oxybutynin (DITROPAN) 5 MG Tab, Take 1 tablet (5 mg total) by mouth 3 (three) times daily as needed (bladder spasms)., Disp: 30 tablet, Rfl: 2    oxyCODONE-acetaminophen (PERCOCET) 7.5-325 mg per tablet, Take 1 tablet by mouth every 8 (eight) hours as needed for Pain., Disp: 90 tablet, Rfl: 0    [START ON 2/1/2024] oxyCODONE-acetaminophen (PERCOCET) 7.5-325 mg per tablet, Take 1 tablet by mouth every 8 (eight) hours as needed for Pain., Disp: 90 tablet, Rfl: 0    pantoprazole (PROTONIX) 40 MG tablet, TAKE 1 TABLET BY MOUTH EVERY DAY, Disp: 90 tablet, Rfl: 1    prednisoLONE acetate (PRED FORTE) 1 % DrpS, Place 1 drop into the right eye 4 (four) times daily., Disp: , Rfl:     predniSONE (DELTASONE) 5 MG tablet, Take 2 tablets by mouth daily as needed for arthritis flare, Disp: 60 tablet, Rfl: 3    promethazine (PHENERGAN) 25 MG tablet, TAKE 1 TABLET BY MOUTH EVERY 6 HOURS AS NEEDED FOR NAUSEA, Disp: 60 tablet, Rfl: 3    semaglutide (OZEMPIC) 1 mg/dose (4 mg/3 mL), Inject 1 mg into the skin every 7 days., Disp: 3 mL, Rfl: 5    TESTOSTERONE, BULK, MISC, Apply 1 g topically every evening., Disp: , Rfl:     tirzepatide (MOUNJARO) 5 mg/0.5 mL PnIj, Inject 5 mg into the skin every 7 days., Disp: 4 pen , Rfl: 5    tiZANidine (ZANAFLEX) 4 MG tablet, Take 1 tablet (4 mg total) by mouth every 8 (eight) hours., Disp: 270 tablet, Rfl: 3    triamcinolone acetonide 0.1% (KENALOG) 0.1 % cream, Apply topically 2 (two)  times daily., Disp: 45 g, Rfl: 0    UBRELVY 100 mg tablet, START UBRELVY 100 MG FOR HEADACHE ATTACKS. MAY REPEAT ONCE IN 1 HOUR TO A MAX OF 2 PER DAY., Disp: 10 tablet, Rfl: 2    upadacitinib (RINVOQ) 15 mg 24 hr tablet, Take 1 tablet (15 mg total) by mouth once daily., Disp: 30 tablet, Rfl: 11    omeprazole (PRILOSEC) 40 MG capsule, Take 1 capsule (40 mg total) by mouth once daily., Disp: 30 capsule, Rfl: 2    Current Facility-Administered Medications:     onabotulinumtoxina injection 200 Units, 200 Units, Intramuscular, Q90 Days, Yuli Winston MD, 200 Units at 06/09/23 1147    Facility-Administered Medications Ordered in Other Visits:     acetaminophen tablet 650 mg, 650 mg, Oral, PRN, Micahel Sawant MD    acetaminophen tablet 650 mg, 650 mg, Oral, PRN, Michael Sawant MD    lactated ringers infusion, , Intravenous, Continuous, Shaggy Mae MD, Last Rate: 75 mL/hr at 07/07/22 0858, New Bag at 02/02/23 1047    lactated ringers infusion, , Intravenous, Continuous, Jah Gerber MD, Stopped at 02/02/23 1410    LIDOcaine (PF) 10 mg/ml (1%) injection 1 mg, 0.1 mL, Intradermal, Once, Jah Gerber MD    sodium chloride 0.9% 100 mL flush bag, , Intravenous, PRNJese Malik C., MD    sodium chloride 0.9% flush 10 mL, 10 mL, Intravenous, PRNJese Malik C., MD    sodium chloride 0.9% flush 10 mL, 10 mL, Intravenous, PRNJese Malik C., MD    sodium chloride 0.9% flush 10 mL, 10 mL, Intravenous, PRNJese Malik C., MD    sodium chloride 0.9% flush 10 mL, 10 mL, Intravenous, Q6H PRN, Shaggy Mae MD    Past Medical History:   Diagnosis Date    ADHD     Ankylosing spondylitis     Anxiety     Colitis     Colon polyp     Depression     Essential hypertension     Fibromyalgia     History of kidney stones     Lupus     Migraine headache     Port-A-Cath in place     Primary immune deficiency disorder     Primary immunodeficiency disorder     Pyelonephritis 5/8/2018    Recurrent UTI      Renal disorder     Rheumatoid arteritis     Sjoegren syndrome        Past Surgical History:   Procedure Laterality Date    APPENDECTOMY      BARTHOLIN GLAND CYST EXCISION      COLONOSCOPY  2011    COLONOSCOPY N/A 7/7/2022    Procedure: COLONOSCOPY;  Surgeon: Shaggy Mae MD;  Location: [facility];  Service: Endoscopy;  Laterality: N/A;    CYSTOSCOPY WITH CALCULUS EXTRACTION Bilateral 2/2/2023    Procedure: CYSTOSCOPY, WITH CALCULUS REMOVAL;  Surgeon: Florentin Aleman MD;  Location: [facility];  Service: Urology;  Laterality: Bilateral;    CYSTOURETEROSCOPY WITH RETROGRADE PYELOGRAPHY AND INSERTION OF STENT INTO URETER Bilateral 2/2/2023    Procedure: CYSTOURETEROSCOPY, WITH RETROGRADE PYELOGRAM AND URETERAL STENT INSERTION;  Surgeon: Florentin Aleman MD;  Location: [facility];  Service: Urology;  Laterality: Bilateral;    DILATION OF URETHRA      X 2    ESOPHAGOGASTRODUODENOSCOPY N/A 6/16/2023    Procedure: EGD (ESOPHAGOGASTRODUODENOSCOPY);  Surgeon: Shaggy Mae MD;  Location: [facility];  Service: Endoscopy;  Laterality: N/A;    HYSTERECTOMY      INSERTION OF TUNNELED CENTRAL VENOUS CATHETER (CVC) WITH SUBCUTANEOUS PORT N/A 10/2/2019    Procedure: [id number]-CATH;  Surgeon: Lenin Springer MD;  Location: [facility];  Service: General;  Laterality: N/A;    KNEE ARTHROSCOPY Left     LAPAROSCOPIC LYSIS OF ADHESIONS N/A 7/22/2019    Procedure: LYSIS, ADHESIONS, LAPAROSCOPIC;  Surgeon: Clarence Adams MD;  Location: [facility];  Service: OB/GYN;  Laterality: N/A;    LAPAROSCOPIC SALPINGO-OOPHORECTOMY Left 7/22/2019    Procedure: SALPINGO-OOPHORECTOMY, LAPAROSCOPIC- LEFT SIDE ONLY;  Surgeon: Clarence Adams MD;  Location: [facility];  Service: OB/GYN;  Laterality: Left;    OVARIAN CYST REMOVAL Left     SALPINGOOPHORECTOMY Right     TONSILLECTOMY      TOTAL SHOULDER ARTHROPLASTY Right     TUBAL LIGATION         Family History   Problem Relation Age of Onset    Diabetes Mother         type 1    Stroke Mother      Heart disease Mother     Rheum arthritis Mother     Cancer Father         prostate    Alzheimer's disease Father     Colon polyps Father     Cancer Sister         uterine    Ovarian cancer Sister     Diabetes Brother         type 2    Rheum arthritis Maternal Grandmother     Crohn's disease Neg Hx     Ulcerative colitis Neg Hx        Social History     Socioeconomic History    Marital status:    Tobacco Use    Smoking status: Every Day     Current packs/day: 0.50     Average packs/day: 0.5 packs/day for 28.9 years (14.5 ttl pk-yrs)     Types: Cigarettes     Start date: 2/10/1995    Smokeless tobacco: Never   Substance and Sexual Activity    Alcohol use: Not Currently     Comment: rarely    Drug use: No    Sexual activity: Yes     Partners: Male     Social Determinants of Health     Financial Resource Strain: Low Risk  (1/23/2023)    Overall Financial Resource Strain (CARDIA)     Difficulty of Paying Living Expenses: Not very hard   Food Insecurity: No Food Insecurity (1/23/2023)    Hunger Vital Sign     Worried About Running Out of Food in the Last Year: Never true     Ran Out of Food in the Last Year: Never true   Transportation Needs: No Transportation Needs (1/23/2023)    PRAPARE - Transportation     Lack of Transportation (Medical): No     Lack of Transportation (Non-Medical): No   Physical Activity: Unknown (1/23/2023)    Exercise Vital Sign     Days of Exercise per Week: 0 days   Stress: No Stress Concern Present (1/23/2023)    [employer] [place] [employer] - Occupational Stress Questionnaire     Feeling of Stress : Only a little   Social Connections: Unknown (1/23/2023)    Social Connection and Isolation Panel [NHANES]     Frequency of Communication with Friends and Family: More than three times a week     Frequency of Social Gatherings with Friends and Family: Once a week     Active Member of Clubs or Organizations: Yes     Attends Club or Organization Meetings: More than 4 times per year      Marital Status:    Housing Stability: High Risk (1/23/2023)    Housing Stability Vital Sign     Unable to Pay for Housing in the Last Year: Yes     Number of Places Lived in the Last Year: 1     Unstable Housing in the Last Year: Yes       Review of patient's allergies indicates:   Allergen Reactions    Seroquel [quetiapine] Anaphylaxis    Aleve [naproxen sodium]     Bentyl [dicyclomine] Other (See Comments)     Fatigue    Tramadol      seizure    Levaquin [levofloxacin] Other (See Comments)     Tendon tear    Miralax [polyethylene glycol 3350] Rash       Labs:  Lab Results   Component Value Date    HGBA1C 5.3 01/23/2019       Lab Results   Component Value Date    WBC 11.84 09/29/2023    HGB 14.9 09/29/2023    HCT 45.0 09/29/2023    MCV 88 09/29/2023     09/29/2023           Review of Systems:  Neck pain.  Arm pain.  Thoraic back pain.  Lower back pain with left greater than right leg pain. .  Balance of review of systems is negative.    Physical Exam:  Vitals:    01/11/24 0945   BP: (!) 140/96   Pulse: 110   Weight: 76.1 kg (167 lb 10.6 oz)   PainSc:   4   PainLoc: Back     Body mass index is 29.7 kg/m².    Pain disability index:      1/11/2024     9:46 AM   Last 3 PDI Scores   Pain Disability Index (PDI) 33       Gen: NAD  Psych: mood appropriate for given condition  HEENT: eyes anicteric   CV: RRR, 2+ radial pulse  Respiratory: non-labored, no signs of respiratory distress  Abd: non-distended  Skin: warm, dry and intact.  Gait: Able to heel walk, toe walk. No antalgic gait.     Coordination:   Tandem walking coordination: normal    Cervical spine: ROM is full in flexion, extension and lateral rotation without increased pain.  Spurling's maneuver causes no neck pain to either side.  Myofascial exam: No Tenderness to palpation across cervical paraspinous region bilaterally.    Lumbar spine:  Lumbar spine: ROM is full with flexion extension and oblique extension with no increased pain.    Jah's  "test causes no increased pain on either side.    Supine straight leg raise is negative bilaterally.    Internal and external rotation of the hip causes no increased pain on either side.  Myofascial exam: No tenderness to palpation across lumbar paraspinous muscles. No tenderness to palpation over the bilateral greater trochanters and bilateral SI joint    Sensory:  Intact and symmetrical to light touch in C4-T1 dermatomes bilaterally. Intact and symmetrical to light touch in L1-S1 dermatomes bilaterally.    Motor:    Right Left   C4 Shoulder Abduction  5  5   C5 Elbow Flexion    5  5   C6 Wrist Extension  5  5   C7 Elbow Extension   5  5   C8/T1 Hand Intrinsics   5  5        Right Left   L2/3 Iliacus Hip flexion  5  5   L3/4 Qudratus Femoris Knee Extension  5  5   L4/5 Tib Anterior Ankle Dorsiflexion   5  5   L5/S1 Extensor Hallicus Longus Great toe extension  5  5   S1/S2 Gastroc/Soleus Plantar Flexion  5  5      Right Left   Triceps DTR 2+ 2+   Biceps DTR 2+ 2+   Brachioradialis DTR 2+ 2+   Patellar DTR 2+ 2+   Achilles DTR 2+ 2+   Gleason Absent  Absent   Clonus Absent Absent   Babinski Absent Absent       Imaging:    MRI report from [place] 2/2016 states:  " 1.  T12-L1, L1-L2, L2-L3, L3-L4 and L4-L5: No abnormality identified.   2. L5-S1: Minimal, nonlateralizing disc protrusion without disc related mass effect."    MRi cervical spine 8-21-21:  Relatively normal study.  Vertebral body height and alignment are anatomic.  Marrow signal is appropriate.  Disc heights are well maintained.  The cervical cord is normal in contour, caliber, and signal characteristics.  There is no disc herniation, spinal canal narrowing, or neuroforaminal narrowing throughout the cervical spine.     Xray lumbar spine 6-21-23:  The vertebral body heights and intervertebral disc heights are     well-maintained. Negative for     spondylolysis or spondylolisthesis on the neutral, hyperflexion or hyperextension images..  Posterior " elements are intact.     Xray thoracic spine 10-3-23:  Twelve rib-bearing thoracic vertebral bodies.  Alignment is within normal limits.  Vertebral body heights are preserved.  No acute fracture.  Mild multilevel endplate changes.  No ankylosis identified.  Disc heights are mostly preserved.  No unexpected radiopaque soft tissue foreign body.  Partially visualized port.  Visualized lungs are free of consolidation.         Assessment:   Breanna Sanchez is a 43 y.o. year old female patient who has a past medical history of ADHD, Ankylosing spondylitis, Anxiety, Colitis, Colon polyp, Depression, Essential hypertension, Fibromyalgia, History of kidney stones, Lupus, Migraine headache, Port-A-Cath in place, Primary immune deficiency disorder, Primary immunodeficiency disorder, Pyelonephritis, Recurrent UTI, Renal disorder, Rheumatoid arteritis, and Sjoegren syndrome. She presents in referral from Margo Acuna for neck to lower back pain.     Likely strong myofascila component to pain in light of large region of pain and relatively normal findings on prior imaging studies.  No osteophytes noted in the spine thus far related to AS.    Plan:  - I discussed with her the importance of moving, regular exercie and good posture  - recommend PT to help with all areas of pain.  - discussed MRI and obtaining now vs after PT; she elects to proceed with PT for now and if no improvement then consider MRI.  - no chagnes to medications    Problem List Items Addressed This Visit       Ankylosing spondylitis    Relevant Orders    Ambulatory referral/consult to Physical/Occupational Therapy     Other Visit Diagnoses       Cervicalgia    -  Primary    Relevant Orders    Ambulatory referral/consult to Physical/Occupational Therapy    Chronic midline thoracic back pain        Chronic bilateral low back pain with bilateral sciatica        Relevant Orders    Ambulatory referral/consult to Physical/Occupational Therapy            Follow Up:  RTC in 8 weeks to monnitor progress    : Reviewed and consistent with medication use as prescribed.    Thank you for referring this interesting patient, and I look forward to continuing to collaborate in her care.        Keeley Miller PA-C  [facility]

## 2024-01-11 NOTE — TELEPHONE ENCOUNTER
----- Message from Miryam Hernandez LPN sent at 1/10/2024  4:33 PM CST -----    ----- Message -----  From: Denisha Rosado  Sent: 1/9/2024   2:39 PM CST  To: [facility]ese Rodriguez Staff    Type:  Needs Medical Advice    Who Called: pt    Symptoms (please be specific): sudden flare up, swollen in back and joints, heels hurt     How long has patient had these symptoms:  3 days    Pharmacy name and phone #:    [id number] - [place], [place] - [address] [address]  Phone: [phone] Fax: [phone]        Would the patient rather a call back or a response via [facility]? Call back    Best Call Back Number: [phone]      Additional Information: pt said none of her pain medications or muscle relaxer's are helping and she doesn't know what to do.      Please call Back to advise. Thanks!

## 2024-01-12 LAB
THYROGLOB AB SERPL IA-ACNC: <4 IU/ML (ref 0–3.9)
THYROPEROXIDASE IGG SERPL-ACNC: <6 IU/ML

## 2024-01-25 ENCOUNTER — TELEPHONE (OUTPATIENT)
Dept: RHEUMATOLOGY | Facility: CLINIC | Age: 44
End: 2024-01-25

## 2024-01-25 ENCOUNTER — OFFICE VISIT (OUTPATIENT)
Dept: RHEUMATOLOGY | Facility: CLINIC | Age: 44
End: 2024-01-25
Payer: COMMERCIAL

## 2024-01-25 DIAGNOSIS — M05.79 RHEUMATOID ARTHRITIS INVOLVING MULTIPLE SITES WITH POSITIVE RHEUMATOID FACTOR: ICD-10-CM

## 2024-01-25 DIAGNOSIS — M45.5 ANKYLOSING SPONDYLITIS OF THORACOLUMBAR REGION: Primary | ICD-10-CM

## 2024-01-25 DIAGNOSIS — R11.0 NAUSEA: ICD-10-CM

## 2024-01-25 DIAGNOSIS — G89.4 CHRONIC PAIN SYNDROME: ICD-10-CM

## 2024-01-25 DIAGNOSIS — D84.9 IMMUNOCOMPROMISED: ICD-10-CM

## 2024-01-25 PROCEDURE — 1160F RVW MEDS BY RX/DR IN RCRD: CPT | Mod: CPTII,95,, | Performed by: PHYSICIAN ASSISTANT

## 2024-01-25 PROCEDURE — 99214 OFFICE O/P EST MOD 30 MIN: CPT | Mod: 95,,, | Performed by: PHYSICIAN ASSISTANT

## 2024-01-25 PROCEDURE — 1159F MED LIST DOCD IN RCRD: CPT | Mod: CPTII,95,, | Performed by: PHYSICIAN ASSISTANT

## 2024-01-25 RX ORDER — ONDANSETRON 4 MG/1
4 TABLET, ORALLY DISINTEGRATING ORAL EVERY 8 HOURS PRN
Qty: 45 TABLET | Refills: 1 | Status: SHIPPED | OUTPATIENT
Start: 2024-01-25 | End: 2024-04-22 | Stop reason: SDUPTHER

## 2024-01-25 RX ORDER — GABAPENTIN 800 MG/1
800 TABLET ORAL 3 TIMES DAILY
Qty: 90 TABLET | Refills: 5 | Status: SHIPPED | OUTPATIENT
Start: 2024-01-25 | End: 2025-01-24

## 2024-01-25 NOTE — PROGRESS NOTES
The patient location is: home  The chief complaint leading to consultation is: RA/AS    Visit type: audiovisual    Face to Face time with patient: 15 minutes  20 minutes of total time spent on the encounter, which includes face to face time and non-face to face time preparing to see the patient (eg, review of tests), Obtaining and/or reviewing separately obtained history, Documenting clinical information in the electronic or other health record, Independently interpreting results (not separately reported) and communicating results to the patient/family/caregiver, or Care coordination (not separately reported).   Each patient to whom he or she provides medical services by telemedicine is:  (1) informed of the relationship between the physician and patient and the respective role of any other health care provider with respect to management of the patient; and (2) notified that he or she may decline to receive medical services by telemedicine and may withdraw from such care at any time.    Notes:     Subjective:       Patient ID: Breanna Sanchez is a 43 y.o. female.    Chief Complaint: Disease Management    Mrs. Sanchez is a 43 year old female who presents to telemedicine virtual for follow up on AS/RA. She started Rinvoq in August 2023 and she reports and improvement in her joint pain compared to cosentyx. She did have a flare up approx 2 weeks ago with increased pain in her low back and inflammation in her eyes. She also reports GI upset and diarrhea recurrence. She was previously followed by Dr. Parker with diagnosis of IBS. She est care with Dr. Stratton w/plans to rule out IBD.     She is taking percocet tid prn for severe pain, but her pain remains uncontrolled. She is taking gabapentin 600 mg tid as well. She is taking piroxicam only PRN and has recent diagnosis of gastritis on EGD.    Recent labs reviewed.    Current tx:  1. rinvoq    Prior treatment:  1. Humira  2. Enbrel  3. MTX  4. Orencia  5. Simponi  aria  6. Remicade  7. Cosentyx            Review of Systems   Constitutional:  Positive for activity change. Negative for appetite change, chills, fatigue, fever and unexpected weight change.   HENT:  Negative for mouth sores and trouble swallowing.    Eyes:  Positive for redness. Negative for visual disturbance.   Respiratory:  Negative for cough and shortness of breath.    Cardiovascular:  Negative for chest pain, palpitations and leg swelling.   Gastrointestinal:  Negative for abdominal pain, constipation, diarrhea, nausea and vomiting.   Genitourinary:  Positive for dysuria. Negative for genital sores.   Musculoskeletal:  Positive for arthralgias, back pain, joint swelling, myalgias, neck pain and neck stiffness.   Skin:  Negative for rash.   Allergic/Immunologic: Positive for immunocompromised state.   Neurological:  Negative for dizziness, weakness, light-headedness and headaches.   Hematological:  Does not bruise/bleed easily.   Psychiatric/Behavioral:  The patient is not nervous/anxious.          Objective:     There were no vitals filed for this visit.      Past Medical History:   Diagnosis Date    ADHD     Ankylosing spondylitis     Anxiety     Colitis     Colon polyp     Depression     Essential hypertension     Fibromyalgia     History of kidney stones     Lupus     Migraine headache     Port-A-Cath in place     Primary immune deficiency disorder     Primary immunodeficiency disorder     Pyelonephritis 5/8/2018    Recurrent UTI     Renal disorder     Rheumatoid arteritis     Sjoegren syndrome      Past Surgical History:   Procedure Laterality Date    APPENDECTOMY      BARTHOLIN GLAND CYST EXCISION      COLONOSCOPY  2011    COLONOSCOPY N/A 7/7/2022    Procedure: COLONOSCOPY;  Surgeon: Shaggy Mae MD;  Location: [facility];  Service: Endoscopy;  Laterality: N/A;    CYSTOSCOPY WITH CALCULUS EXTRACTION Bilateral 2/2/2023    Procedure: CYSTOSCOPY, WITH CALCULUS REMOVAL;  Surgeon: Florentin Aleman MD;   Location: [facility] OR;  Service: Urology;  Laterality: Bilateral;    CYSTOURETEROSCOPY WITH RETROGRADE PYELOGRAPHY AND INSERTION OF STENT INTO URETER Bilateral 2/2/2023    Procedure: CYSTOURETEROSCOPY, WITH RETROGRADE PYELOGRAM AND URETERAL STENT INSERTION;  Surgeon: Florentin Aleman MD;  Location: [facility] OR;  Service: Urology;  Laterality: Bilateral;    DILATION OF URETHRA      X 2    ESOPHAGOGASTRODUODENOSCOPY N/A 6/16/2023    Procedure: EGD (ESOPHAGOGASTRODUODENOSCOPY);  Surgeon: Shaggy Mae MD;  Location: [facility];  Service: Endoscopy;  Laterality: N/A;    HYSTERECTOMY      INSERTION OF TUNNELED CENTRAL VENOUS CATHETER (CVC) WITH SUBCUTANEOUS PORT N/A 10/2/2019    Procedure: [id number];  Surgeon: Lenin Springer MD;  Location: [facility] OR;  Service: General;  Laterality: N/A;    KNEE ARTHROSCOPY Left     LAPAROSCOPIC LYSIS OF ADHESIONS N/A 7/22/2019    Procedure: LYSIS, ADHESIONS, LAPAROSCOPIC;  Surgeon: Clarence Adams MD;  Location: [facility] OR;  Service: OB/GYN;  Laterality: N/A;    LAPAROSCOPIC SALPINGO-OOPHORECTOMY Left 7/22/2019    Procedure: SALPINGO-OOPHORECTOMY, LAPAROSCOPIC- LEFT SIDE ONLY;  Surgeon: Clarence Adams MD;  Location: [facility] OR;  Service: OB/GYN;  Laterality: Left;    OVARIAN CYST REMOVAL Left     SALPINGOOPHORECTOMY Right     TONSILLECTOMY      TOTAL SHOULDER ARTHROPLASTY Right     TUBAL LIGATION            Physical Exam   Constitutional: She is oriented to person, place, and time.   Neurological: She is oriented to person, place, and time.         Labs:  Component      Latest Ref Rng 1/11/2024   WBC      3.90 - 12.70 K/uL 12.39    RBC      4.00 - 5.40 M/uL 4.72    Hemoglobin      12.0 - 16.0 g/dL 14.3    Hematocrit      37.0 - 48.5 % 43.7    MCV      82 - 98 fL 93    MCH      27.0 - 31.0 pg 30.3    MCHC      32.0 - 36.0 g/dL 32.7    RDW      11.5 - 14.5 % 14.7 (H)    Platelet Count      150 - 450 K/uL 380    MPV      9.2 - 12.9 fL 10.3    Immature Granulocytes      0.0 - 0.5 %  0.4    Gran # (ANC)      1.8 - 7.7 K/uL 6.2    Immature Grans (Abs)      0.00 - 0.04 K/uL 0.05 (H)    Lymph #      1.0 - 4.8 K/uL 4.9 (H)    Mono #      0.3 - 1.0 K/uL 1.0    Eos #      0.0 - 0.5 K/uL 0.2    Baso #      0.00 - 0.20 K/uL 0.07    nRBC      0 /100 WBC 0    Gran %      38.0 - 73.0 % 50.1    Lymph %      18.0 - 48.0 % 39.4    Mono %      4.0 - 15.0 % 7.7    Eosinophil %      0.0 - 8.0 % 1.8    Basophil %      0.0 - 1.9 % 0.6    Differential Method Automated    Sodium      136 - 145 mmol/L 142    Potassium      3.5 - 5.1 mmol/L 3.1 (L)    Chloride      95 - 110 mmol/L 106    CO2      23 - 29 mmol/L 24    Glucose      70 - 110 mg/dL 86    BUN      6 - 20 mg/dL 9    Creatinine      0.5 - 1.4 mg/dL 0.7    Calcium      8.7 - 10.5 mg/dL 9.9    PROTEIN TOTAL      6.0 - 8.4 g/dL 7.5    Albumin      3.5 - 5.2 g/dL 4.4    BILIRUBIN TOTAL      0.1 - 1.0 mg/dL 0.6    ALP      55 - 135 U/L 107    AST      10 - 40 U/L 13    ALT      10 - 44 U/L 19    eGFR      >60 mL/min/1.73 m^2 >60.0    Anion Gap      8 - 16 mmol/L 12    Sed Rate      0 - 36 mm/Hr 30    CRP      0.0 - 8.2 mg/L 4.7    Thyroglobulin Ab Screen      0.0 - 3.9 IU/mL <4.0    Free T4      0.71 - 1.51 ng/dL 1.17    Thyroperoxidase Antibodies      <6.0 IU/mL <6.0    TSH      0.400 - 4.000 uIU/mL 1.023    T3, Free      2.3 - 4.2 pg/mL 3.5       Assessment:       1. Ankylosing spondylitis of thoracolumbar region    2. Nausea    3. Rheumatoid arthritis involving multiple sites with positive rheumatoid factor    4. Immunocompromised    5. Chronic pain syndrome                Plan:       Ankylosing spondylitis of thoracolumbar region  -     gabapentin (NEURONTIN) 800 MG tablet; Take 1 tablet (800 mg total) by mouth 3 (three) times daily.  Dispense: 90 tablet; Refill: 5    Nausea  -     ondansetron (ZOFRAN-ODT) 4 MG TbDL; Take 1 tablet (4 mg total) by mouth every 8 (eight) hours as needed (nausea).  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 Chery  --recurrent ESBL UTI  --vitamin d deficiency     Plan  Cont Rinvoq  2. Increase gabapentin 800 mg tid  3. tizanidine, prednisone PRN  4. Cont percocet PRN.  I have checked [place] prescription monitoring program site and no unusual or abnormal behavior has occurred pt understand the risk and benefits of taking opioid medications and has decided to continue the medication.. Referral for alternative pain control  5. GI work up as scheduled, zofran added for nausea  6. Consider acthar for flares in the future      Follow up:  2 mo w/me

## 2024-02-15 ENCOUNTER — OFFICE VISIT (OUTPATIENT)
Dept: RHEUMATOLOGY | Facility: CLINIC | Age: 44
End: 2024-02-15
Payer: COMMERCIAL

## 2024-02-15 VITALS
HEIGHT: 63 IN | WEIGHT: 169 LBS | SYSTOLIC BLOOD PRESSURE: 125 MMHG | DIASTOLIC BLOOD PRESSURE: 93 MMHG | BODY MASS INDEX: 29.95 KG/M2 | HEART RATE: 120 BPM

## 2024-02-15 DIAGNOSIS — D84.9 IMMUNOCOMPROMISED: ICD-10-CM

## 2024-02-15 DIAGNOSIS — G89.4 CHRONIC PAIN SYNDROME: ICD-10-CM

## 2024-02-15 DIAGNOSIS — D84.9 IMMUNODEFICIENCY DISORDER: ICD-10-CM

## 2024-02-15 DIAGNOSIS — M45.5 ANKYLOSING SPONDYLITIS OF THORACOLUMBAR REGION: Primary | ICD-10-CM

## 2024-02-15 DIAGNOSIS — F33.9 EPISODE OF RECURRENT MAJOR DEPRESSIVE DISORDER, UNSPECIFIED DEPRESSION EPISODE SEVERITY: ICD-10-CM

## 2024-02-15 DIAGNOSIS — M81.0 OSTEOPOROSIS, UNSPECIFIED OSTEOPOROSIS TYPE, UNSPECIFIED PATHOLOGICAL FRACTURE PRESENCE: ICD-10-CM

## 2024-02-15 DIAGNOSIS — M05.79 RHEUMATOID ARTHRITIS INVOLVING MULTIPLE SITES WITH POSITIVE RHEUMATOID FACTOR: ICD-10-CM

## 2024-02-15 PROCEDURE — 96372 THER/PROPH/DIAG INJ SC/IM: CPT | Mod: S$GLB,,, | Performed by: PHYSICIAN ASSISTANT

## 2024-02-15 PROCEDURE — 99215 OFFICE O/P EST HI 40 MIN: CPT | Mod: 25,S$GLB,, | Performed by: PHYSICIAN ASSISTANT

## 2024-02-15 PROCEDURE — 3080F DIAST BP >= 90 MM HG: CPT | Mod: CPTII,S$GLB,, | Performed by: PHYSICIAN ASSISTANT

## 2024-02-15 PROCEDURE — 1159F MED LIST DOCD IN RCRD: CPT | Mod: CPTII,S$GLB,, | Performed by: PHYSICIAN ASSISTANT

## 2024-02-15 PROCEDURE — 3008F BODY MASS INDEX DOCD: CPT | Mod: CPTII,S$GLB,, | Performed by: PHYSICIAN ASSISTANT

## 2024-02-15 PROCEDURE — 3074F SYST BP LT 130 MM HG: CPT | Mod: CPTII,S$GLB,, | Performed by: PHYSICIAN ASSISTANT

## 2024-02-15 PROCEDURE — 99999 PR PBB SHADOW E&M-EST. PATIENT-LVL V: CPT | Mod: PBBFAC,,, | Performed by: PHYSICIAN ASSISTANT

## 2024-02-15 RX ORDER — SULFASALAZINE 500 MG/1
1000 TABLET, DELAYED RELEASE ORAL 2 TIMES DAILY
Qty: 120 TABLET | Refills: 3 | Status: SHIPPED | OUTPATIENT
Start: 2024-02-15

## 2024-02-15 RX ORDER — KETOROLAC TROMETHAMINE 10 MG/1
10 TABLET, FILM COATED ORAL EVERY 6 HOURS PRN
COMMUNITY

## 2024-02-15 RX ORDER — ALENDRONATE SODIUM 70 MG/1
70 TABLET ORAL
Qty: 12 TABLET | Refills: 3 | Status: SHIPPED | OUTPATIENT
Start: 2024-02-15

## 2024-02-15 RX ORDER — DEXAMETHASONE SODIUM PHOSPHATE 4 MG/ML
8 INJECTION, SOLUTION INTRA-ARTICULAR; INTRALESIONAL; INTRAMUSCULAR; INTRAVENOUS; SOFT TISSUE
Status: COMPLETED | OUTPATIENT
Start: 2024-02-15 | End: 2024-02-15

## 2024-02-15 RX ORDER — GRANULES FOR ORAL 3 G/1
3 POWDER ORAL ONCE
COMMUNITY
End: 2024-03-04

## 2024-02-15 RX ORDER — PIROXICAM 20 MG/1
20 CAPSULE ORAL DAILY
COMMUNITY

## 2024-02-15 RX ORDER — KETOROLAC TROMETHAMINE 30 MG/ML
60 INJECTION, SOLUTION INTRAMUSCULAR; INTRAVENOUS
Status: COMPLETED | OUTPATIENT
Start: 2024-02-15 | End: 2024-02-15

## 2024-02-15 RX ORDER — CYANOCOBALAMIN 1000 UG/ML
1000 INJECTION, SOLUTION INTRAMUSCULAR; SUBCUTANEOUS
Status: COMPLETED | OUTPATIENT
Start: 2024-02-15 | End: 2024-02-15

## 2024-02-15 RX ADMIN — DEXAMETHASONE SODIUM PHOSPHATE 8 MG: 4 INJECTION, SOLUTION INTRA-ARTICULAR; INTRALESIONAL; INTRAMUSCULAR; INTRAVENOUS; SOFT TISSUE at 09:02

## 2024-02-15 RX ADMIN — KETOROLAC TROMETHAMINE 60 MG: 30 INJECTION, SOLUTION INTRAMUSCULAR; INTRAVENOUS at 09:02

## 2024-02-15 RX ADMIN — CYANOCOBALAMIN 1000 MCG: 1000 INJECTION, SOLUTION INTRAMUSCULAR; SUBCUTANEOUS at 09:02

## 2024-02-15 ASSESSMENT — ROUTINE ASSESSMENT OF PATIENT INDEX DATA (RAPID3)
PATIENT GLOBAL ASSESSMENT SCORE: 6.5
PSYCHOLOGICAL DISTRESS SCORE: 2.2
MDHAQ FUNCTION SCORE: 1.5
FATIGUE SCORE: 1.1
PAIN SCORE: 5
TOTAL RAPID3 SCORE: 5.5

## 2024-02-15 NOTE — PROGRESS NOTES
Subjective:       Patient ID: Breanna Sanchez is a 43 y.o. female.    Chief Complaint: Disease Management    Mrs. Sanchez is a 43 year old female who presents to clinic for follow up on AS/RA. She started Rinvoq in August 2023 and she reports and improvement in her joint pain compared to cosentyx. She reports arthritis flare today involving her hands, wrists, L ankle. She reports more frequent flares in the last 2 years, which is frustrating. She feels she is less physically able to ambulate and care for herself at this time. She is not able to walk for exercise. She was evaluated by [facility] and advised to start PT, but she has not started yet due to flare.     She also reports GI upset and diarrhea recurrence. She was previously followed by Dr. Parker with diagnosis of IBS. She est care with Dr. Stratton w/plans to rule out IBD.     She is taking percocet tid prn for severe pain, but her pain remains uncontrolled. She is taking gabapentin 800 mg tid as well. She is taking piroxicam only PRN and has recent diagnosis of gastritis on EGD.    She is currently doing SCIG monthly, but would like 2nd opinion on possibly changing to IVIG at infusion center since she is having difficulty with home infusions.     Current tx:  1. rinvoq    Prior treatment:  1. Humira  2. Enbrel  3. MTX  4. Orencia  5. Simponi aria  6. Remicade  7. Cosentyx            Review of Systems   Constitutional:  Positive for activity change. Negative for appetite change, chills, fatigue, fever and unexpected weight change.   HENT:  Negative for mouth sores and trouble swallowing.    Eyes:  Positive for redness. Negative for visual disturbance.   Respiratory:  Negative for cough and shortness of breath.    Cardiovascular:  Negative for chest pain, palpitations and leg swelling.   Gastrointestinal:  Negative for abdominal pain, constipation, diarrhea, nausea and vomiting.   Genitourinary:  Negative for dysuria and genital sores.    Musculoskeletal:  Positive for arthralgias, back pain, joint swelling, myalgias, neck pain and neck stiffness.   Skin:  Negative for rash.   Allergic/Immunologic: Positive for immunocompromised state.   Neurological:  Negative for dizziness, weakness, light-headedness and headaches.   Hematological:  Does not bruise/bleed easily.   Psychiatric/Behavioral:  The patient is not nervous/anxious.          Objective:     Vitals:    02/15/24 0839   BP: (!) 125/93   Pulse: (!) 120         Past Medical History:   Diagnosis Date    ADHD     Ankylosing spondylitis     Anxiety     Colitis     Colon polyp     Depression     Essential hypertension     Fibromyalgia     History of kidney stones     Lupus     Migraine headache     Port-A-Cath in place     Primary immune deficiency disorder     Primary immunodeficiency disorder     Pyelonephritis 5/8/2018    Recurrent UTI     Renal disorder     Rheumatoid arteritis     Sjoegren syndrome      Past Surgical History:   Procedure Laterality Date    APPENDECTOMY      BARTHOLIN GLAND CYST EXCISION      COLONOSCOPY  2011    COLONOSCOPY N/A 7/7/2022    Procedure: COLONOSCOPY;  Surgeon: Shaggy Mae MD;  Location: [facility];  Service: Endoscopy;  Laterality: N/A;    CYSTOSCOPY WITH CALCULUS EXTRACTION Bilateral 2/2/2023    Procedure: CYSTOSCOPY, WITH CALCULUS REMOVAL;  Surgeon: Florentin Aleman MD;  Location: [facility];  Service: Urology;  Laterality: Bilateral;    CYSTOURETEROSCOPY WITH RETROGRADE PYELOGRAPHY AND INSERTION OF STENT INTO URETER Bilateral 2/2/2023    Procedure: CYSTOURETEROSCOPY, WITH RETROGRADE PYELOGRAM AND URETERAL STENT INSERTION;  Surgeon: Florentin Aleman MD;  Location: [facility] OR;  Service: Urology;  Laterality: Bilateral;    DILATION OF URETHRA      X 2    ESOPHAGOGASTRODUODENOSCOPY N/A 6/16/2023    Procedure: EGD (ESOPHAGOGASTRODUODENOSCOPY);  Surgeon: Shaggy Mae MD;  Location: [facility];  Service: Endoscopy;  Laterality: N/A;    HYSTERECTOMY      INSERTION OF  TUNNELED CENTRAL VENOUS CATHETER (CVC) WITH SUBCUTANEOUS PORT N/A 10/2/2019    Procedure: [id number];  Surgeon: Lenin Springer MD;  Location: [facility] OR;  Service: General;  Laterality: N/A;    KNEE ARTHROSCOPY Left     LAPAROSCOPIC LYSIS OF ADHESIONS N/A 7/22/2019    Procedure: LYSIS, ADHESIONS, LAPAROSCOPIC;  Surgeon: Clarence Adams MD;  Location: [facility] OR;  Service: OB/GYN;  Laterality: N/A;    LAPAROSCOPIC SALPINGO-OOPHORECTOMY Left 7/22/2019    Procedure: SALPINGO-OOPHORECTOMY, LAPAROSCOPIC- LEFT SIDE ONLY;  Surgeon: Clarence Adams MD;  Location: [facility] OR;  Service: OB/GYN;  Laterality: Left;    OVARIAN CYST REMOVAL Left     SALPINGOOPHORECTOMY Right     TONSILLECTOMY      TOTAL SHOULDER ARTHROPLASTY Right     TUBAL LIGATION            Physical Exam   Constitutional: She is oriented to person, place, and time.   Eyes: Right conjunctiva is not injected. Left conjunctiva is not injected.   Neck: No JVD present. No thyromegaly present.   Cardiovascular: Normal rate.   Pulmonary/Chest: Effort normal.   Musculoskeletal:      Right shoulder: Normal.      Left shoulder: Normal.      Right elbow: Normal.      Left elbow: Normal.      Right wrist: Swelling and tenderness present.      Left wrist: Swelling and tenderness present.      Right knee: Normal.      Left knee: Normal.      Left ankle: Swelling present. Tenderness present.   Lymphadenopathy:     She has no cervical adenopathy.   Neurological: She is alert and oriented to person, place, and time. Gait normal.   Skin: No rash noted.   Psychiatric: Mood and affect normal.       Right Side Rheumatological Exam     Examination finds the shoulder, elbow, knee, 1st MCP, 2nd MCP, 3rd MCP, 4th MCP and 5th MCP normal.    The patient is tender to palpation of the wrist, 1st PIP, 2nd PIP, 3rd PIP, 4th PIP and 5th PIP    She has swelling of the wrist    Left Side Rheumatological Exam     Examination finds the shoulder, elbow, knee, 1st MCP, 2nd MCP, 3rd  MCP, 4th MCP and 5th MCP normal.    The patient is tender to palpation of the wrist, 1st PIP, 2nd PIP, 3rd PIP, 4th PIP, 5th PIP and ankle.    She has swelling of the wrist and knee            Labs:  Component      Latest Ref Rng 1/11/2024   WBC      3.90 - 12.70 K/uL 12.39    RBC      4.00 - 5.40 M/uL 4.72    Hemoglobin      12.0 - 16.0 g/dL 14.3    Hematocrit      37.0 - 48.5 % 43.7    MCV      82 - 98 fL 93    MCH      27.0 - 31.0 pg 30.3    MCHC      32.0 - 36.0 g/dL 32.7    RDW      11.5 - 14.5 % 14.7 (H)    Platelet Count      150 - 450 K/uL 380    MPV      9.2 - 12.9 fL 10.3    Immature Granulocytes      0.0 - 0.5 % 0.4    Gran # (ANC)      1.8 - 7.7 K/uL 6.2    Immature Grans (Abs)      0.00 - 0.04 K/uL 0.05 (H)    Lymph #      1.0 - 4.8 K/uL 4.9 (H)    Mono #      0.3 - 1.0 K/uL 1.0    Eos #      0.0 - 0.5 K/uL 0.2    Baso #      0.00 - 0.20 K/uL 0.07    nRBC      0 /100 WBC 0    Gran %      38.0 - 73.0 % 50.1    Lymph %      18.0 - 48.0 % 39.4    Mono %      4.0 - 15.0 % 7.7    Eosinophil %      0.0 - 8.0 % 1.8    Basophil %      0.0 - 1.9 % 0.6    Differential Method Automated    Sodium      136 - 145 mmol/L 142    Potassium      3.5 - 5.1 mmol/L 3.1 (L)    Chloride      95 - 110 mmol/L 106    CO2      23 - 29 mmol/L 24    Glucose      70 - 110 mg/dL 86    BUN      6 - 20 mg/dL 9    Creatinine      0.5 - 1.4 mg/dL 0.7    Calcium      8.7 - 10.5 mg/dL 9.9    PROTEIN TOTAL      6.0 - 8.4 g/dL 7.5    Albumin      3.5 - 5.2 g/dL 4.4    BILIRUBIN TOTAL      0.1 - 1.0 mg/dL 0.6    ALP      55 - 135 U/L 107    AST      10 - 40 U/L 13    ALT      10 - 44 U/L 19    eGFR      >60 mL/min/1.73 m^2 >60.0    Anion Gap      8 - 16 mmol/L 12    Sed Rate      0 - 36 mm/Hr 30    CRP      0.0 - 8.2 mg/L 4.7    Thyroglobulin Ab Screen      0.0 - 3.9 IU/mL <4.0    Free T4      0.71 - 1.51 ng/dL 1.17    Thyroperoxidase Antibodies      <6.0 IU/mL <6.0    TSH      0.400 - 4.000 uIU/mL 1.023    T3, Free      2.3 - 4.2 pg/mL 3.5        Assessment:       1. Ankylosing spondylitis of thoracolumbar region    2. Rheumatoid arthritis involving multiple sites with positive rheumatoid factor    3. Immunocompromised    4. Chronic pain syndrome    5. Immunodeficiency disorder    6. Episode of recurrent major depressive disorder, unspecified depression episode severity    7. Osteoporosis, unspecified osteoporosis type, unspecified pathological fracture presence                  Plan:       Ankylosing spondylitis of thoracolumbar region  -     sulfaSALAzine (AZULFIDINE) 500 MG EC tablet; Take 2 tablets (1,000 mg total) by mouth 2 (two) times daily.  Dispense: 120 tablet; Refill: 3  -     Ambulatory referral/consult to Physical/Occupational Therapy; Future; Expected date: 02/22/2024  -     ketorolac injection 60 mg  -     dexAMETHasone injection 8 mg  -     cyanocobalamin injection 1,000 mcg    Rheumatoid arthritis involving multiple sites with positive rheumatoid factor  -     sulfaSALAzine (AZULFIDINE) 500 MG EC tablet; Take 2 tablets (1,000 mg total) by mouth 2 (two) times daily.  Dispense: 120 tablet; Refill: 3  -     Ambulatory referral/consult to Physical/Occupational Therapy; Future; Expected date: 02/22/2024  -     ketorolac injection 60 mg  -     dexAMETHasone injection 8 mg  -     cyanocobalamin injection 1,000 mcg    Immunocompromised    Chronic pain syndrome    Immunodeficiency disorder  -     Ambulatory referral/consult to Immunology; Future; Expected date: 02/22/2024    Episode of recurrent major depressive disorder, unspecified depression episode severity  -     Ambulatory referral/consult to Psychology; Future; Expected date: 02/22/2024    Osteoporosis, unspecified osteoporosis type, unspecified pathological fracture presence  -     alendronate (FOSAMAX) 70 MG tablet; Take 1 tablet (70 mg total) by mouth every 7 days.  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 Cont Rinvoq 15 mg daily  2. Cont gabapentin 800 mg tid  3. tizanidine, prednisone PRN  4. Cont percocet PRN.  I have checked [place] prescription monitoring program site and no unusual or abnormal behavior has occurred pt understand the risk and benefits of taking opioid medications and has decided to continue the medication. Pended x 3  5. GI work up as scheduled  6. Consider acthar for flares in the future  7. Immunology referral  8. OT referral [facility]  9. Psychology referral for counseling      Follow up:  2 mo w/me

## 2024-02-17 RX ORDER — OXYCODONE AND ACETAMINOPHEN 7.5; 325 MG/1; MG/1
1 TABLET ORAL EVERY 8 HOURS PRN
Qty: 90 TABLET | Refills: 0 | Status: SHIPPED | OUTPATIENT
Start: 2024-03-06 | End: 2024-04-05

## 2024-02-17 RX ORDER — OXYCODONE AND ACETAMINOPHEN 7.5; 325 MG/1; MG/1
1 TABLET ORAL EVERY 8 HOURS PRN
Qty: 90 TABLET | Refills: 0 | Status: SHIPPED | OUTPATIENT
Start: 2024-05-05 | End: 2024-06-04

## 2024-02-17 RX ORDER — OXYCODONE AND ACETAMINOPHEN 7.5; 325 MG/1; MG/1
1 TABLET ORAL EVERY 8 HOURS PRN
Qty: 90 TABLET | Refills: 0 | Status: SHIPPED | OUTPATIENT
Start: 2024-04-05 | End: 2024-04-23 | Stop reason: SDUPTHER

## 2024-04-04 ENCOUNTER — OFFICE VISIT (OUTPATIENT)
Dept: NEUROLOGY | Facility: CLINIC | Age: 44
End: 2024-04-04
Payer: COMMERCIAL

## 2024-04-04 VITALS
BODY MASS INDEX: 30.18 KG/M2 | HEART RATE: 94 BPM | WEIGHT: 170.31 LBS | RESPIRATION RATE: 17 BRPM | SYSTOLIC BLOOD PRESSURE: 131 MMHG | TEMPERATURE: 98 F | DIASTOLIC BLOOD PRESSURE: 89 MMHG | HEIGHT: 63 IN

## 2024-04-04 DIAGNOSIS — G43.719 INTRACTABLE CHRONIC MIGRAINE WITHOUT AURA AND WITHOUT STATUS MIGRAINOSUS: Primary | ICD-10-CM

## 2024-04-04 DIAGNOSIS — E11.69 TYPE 2 DIABETES MELLITUS WITH OTHER SPECIFIED COMPLICATION, UNSPECIFIED WHETHER LONG TERM INSULIN USE: ICD-10-CM

## 2024-04-04 PROCEDURE — 1159F MED LIST DOCD IN RCRD: CPT | Mod: CPTII,S$GLB,, | Performed by: NURSE PRACTITIONER

## 2024-04-04 PROCEDURE — 99999 PR PBB SHADOW E&M-EST. PATIENT-LVL V: CPT | Mod: PBBFAC,,, | Performed by: NURSE PRACTITIONER

## 2024-04-04 PROCEDURE — 1160F RVW MEDS BY RX/DR IN RCRD: CPT | Mod: CPTII,S$GLB,, | Performed by: NURSE PRACTITIONER

## 2024-04-04 PROCEDURE — 3075F SYST BP GE 130 - 139MM HG: CPT | Mod: CPTII,S$GLB,, | Performed by: NURSE PRACTITIONER

## 2024-04-04 PROCEDURE — 3079F DIAST BP 80-89 MM HG: CPT | Mod: CPTII,S$GLB,, | Performed by: NURSE PRACTITIONER

## 2024-04-04 PROCEDURE — 99213 OFFICE O/P EST LOW 20 MIN: CPT | Mod: S$GLB,,, | Performed by: NURSE PRACTITIONER

## 2024-04-04 PROCEDURE — 3008F BODY MASS INDEX DOCD: CPT | Mod: CPTII,S$GLB,, | Performed by: NURSE PRACTITIONER

## 2024-04-04 RX ORDER — RIMEGEPANT SULFATE 75 MG/75MG
75 TABLET, ORALLY DISINTEGRATING ORAL DAILY PRN
Qty: 16 TABLET | Refills: 11 | Status: SHIPPED | OUTPATIENT
Start: 2024-04-04

## 2024-04-04 NOTE — PATIENT INSTRUCTIONS
Restart Botox  Stop Ubrelvy. START Nurtec with next migraine. This dissolves under the tongue and is only taken once in 24 hour time frame.

## 2024-04-04 NOTE — PROGRESS NOTES
Subjective:       Patient ID: Breanna Sanchez is a 43 y.o. female.    Chief Complaint: Headache      HPI     INTERVAL HISTORY 4/4/24    Last office visit was a little over one year ago and last Botox was ten months ago.    Today she reports she is worse. She tried to wean off Botox completely and did well for several months but migraines returned about one month ago. She has 3-5 headache days per month. Current pain 4 with range 2-8. She takes Ubrelvy which is sometimes effective and often has to repeat dose same day. Otherwise information below is reviewed and verified with no changes made    INTERVAL HISTORY 11/2/22  The patient location is: home   The chief complaint leading to consultation is: follow up  Visit type: audiovisual  Face to Face time with patient: 15  20 minutes of total time spent on the encounter, which includes face to face time and non-face to face time preparing to see the patient (eg, review of tests), Obtaining and/or reviewing separately obtained history, Documenting clinical information in the electronic or other health record, Independently interpreting results (not separately reported) and communicating results to the patient/family/caregiver, or Care coordination (not separately reported).   Each patient to whom he or she provides medical services by telemedicine is:  (1) informed of the relationship between the physician and patient and the respective role of any other health care provider with respect to management of the patient; and (2) notified that he or she may decline to receive medical services by telemedicine and may withdraw from such care at any time.    Notes:     Last office visit was nine months ago with Dr. Winston and at that time plan was to start Botox. Third Botox session was six weeks ago.    Current prevention - Botox, gabapentin, nifedipine, desvenlafaxine      Current acute - phenergan, tizanidine, Ubrelvy - not effective      Today she reports she is much better.  She reports five migraines in the past 6 weeks. Current headache pain 3 with range 1-7. She takes Ubrelvy but it is not effective. No side effects to Botox. Otherwise information below is reviewed and verified with no changes made     INITIAL HPI 2/15/22  The patient is a pleasant 40 y/o female presenting with chief complaint of headache. She has positive FHx of migraine affecting her late mother and her sister. SHe has 36 problems noted in the problem list. She describes onset of headache for many years. She cannot recall when was the last time when she had a headache free day. She has seen Dr. Keaton Shook, neurologist, in [place]. She is on Topamax 100 mg daily, on it for over 10 years. This is causing frequent UTI's, she is to take Doxycyclin for UTI prevention. She admits to inconsistent use. Her headache is mild to moderate but it can become severe but not often. She is on Butrans and prn Percocet for her non-headache pain escalations. She is allowed up to 4 per day, but almost never uses that many. Work up included an MRI of the brain under Dr. Wallace. It apparently revealed white matter lesions consistent with migraine. I have reviewed the MRI of the cervical spine from 8/21/21 which was normal. Please see details of headache characteristics below.  Headache questionnaire    1. When did your Headaches start?    Years ago      2. Where are your headaches located?   All over      3. Your headache's characteristics:   Excruciating,Throbbing, Pounding, Stabbing    4. How long does the headache last?   Hours, days      5. How often does the headache occur?   daily      6. Are your headaches preceded or accompanied by other symptoms? yes   If yes, please describe.  Nausea,vomiting, light sensitivity      7. Does the headache awaken you at night? no   If so, how often? na        8. Please oksana the word that best describes your headache's intensity:    severe      9. Using a scale of 1 through 10, with 0 = no pain and  10 = the worst pain:   What score is your headache now? 3   What score is your headache at its worst? 8   What score is your headache at its best? 0        10. Possible associated headache symptoms:  [x]  Sensitivity to light  [] Dizziness  [] Nasal or sinus pressure/ pain   [x] Sensitivity to noise  [x] Vertigo  [x] Problems with concentration  [] Sensitivity to smells  [] Ringing in ears  [x] Problems with memory    [] Blurred vision  [x] Irritability  [x] Problems with task completion   [] Double vision  [] Anger  [x]  Problems with relaxation  [] Loss of appetite  [] Anxiety  [x] Neck tightness, Neck pain  [x] Nausea   [] Nasal congestion  [x] Vomiting         11. Headache improving factors:  [] Sleep  [] Heat  [x] Darkness  [] Ice  [] Local pressure [] Menses (period)  [x] Massage   [x] Medications:        12. Headache worsening factors:   [x] Fatigue [] Sneezing  [x] Changes in Weather  [x] Light [x] Bending Over [x] Stress  [x] Noise [] Ovulation  [] Multiple Sclerosis Flare-Up  [x] Smells  [] Menses  [] Food   [] Coughing [] Alcohol      13. Number of caffeinated drinks per day: 2 cups of caffeine    14. Number of diet drinks per day:  0    Please Check any Medications or Procedures tried/failed for Headache    AED Neuromodulators  MAOIs  Ergot Alkaloids    Acetazolamide (Diamox) [] Phenelzine (Nardil) [] Dihydroergotamine (Migranal) []   Carbamazepine (Tegretol) [] Tranylcypromine (Parnate) [] Ergotamine (Ergomar) []   Gabapentin (Neurontin) [x] Antihistamine/Serotonergic  Triptans    Lacosamide (Vimpat) [] Cyproheptadine (Periactin) [] Almotriptan (Axert) []   Lamotrigine (Lamictal) [] Antihypertensives  Eletriptan (Relpax) []   Levatiracetam (Keppra) [] Atenolol (Tenormin) [] Frovatriptan (Frova) []   Oxcarbazepine (Trileptal) [] Bisoprostol (Zebeta) [] Naratriptan (Amerge) []   Phenobarbital [] Candesartan (Atacand) [] Rizatriptan (Maxalt) []     Nebivolol (Bystolic)  Sumatriptan (Imitrex) []    Levetiracetam (Keppra)  Cardeilol (Coreg) [] Zolmitriptan (Zomig) []   Phenytoin (Dilantin) [] Diltiazem (Cardizem) []     Pregabalin (Lyrica) [x] Lisinopril (Prinivil, Zestril) [] Combo Abortives    Primidone (Mysoline) [] Metoprolol (Toprol) [] BC Powder []   Tiagabine (Gabatril) [] Nadolol (Corgard) [] Butalbital and Acetaminophen (Bupap) []   Topiramate (Topamax)  (Trokendi) [x] Nicardipine (Cardene) []     Vigabatrin (Sabril) [] Nifedipine (Procardia) [x] Butalbital, Acetaminophen, and caffeine (Fioricet) []   Valproic Acid (Depakote) (Divalproex Sodium) [] Propranolol (Inderal) []     Zonisamide (Zonegran) [] Telmisartan (Micardis) [] Butalbital, Aspirin, and caffeine (Fiorinal) []   Benzodiazepines  Timolol (Blocadren) []     Alprazolam (Xanax) [] Verapamil (Calan, Verelan) [] Butalbital, Caffeine, Acetaminophen, and Codeine (Fioricet with Codeine) []   Diazepam (Valium) [] NSAIDs      Lorazepam (Ativan) [] Acetaminophen (Tylenol) [x]     Clonazepam (Klonopin) [] Acetylsalicylic Acid (Aspirin) [] Butalbital, Caffeine, Aspirin, and Codeine  (Fiorinal with Codeine) []   Antidepressants  Diclofenac (Cambia) []     Amitriptyline (Elavil) [] Ibuprofen (Motrin) [x]     Desipramine (Norpramin) [] Indomethacin (Indocin) [] Aspirin, Caffeine, and Acetaminophen (Excedrin) (Goodys) []   Doxepin (Sinequan) [x] Ketoprofen (Orudis) []     Fluoxetine (Prozac) [] Ketorolac (Toradol) [] Acetaminophen, Dichloralphenazone, and Isometheptene (Midrin) []   Imipramine (Tofranil) [] Naproxen (Anaprox) (Aleve) []     Nortriptyline (Pamelor) [] Meclofenamic Acid (Meclomen) []     Venlafaxine (Effexor) [] Meloxicam (Mobic) [] Procedures    Desvenlafazine (Pristiq) [] Monoclonals  Greater occipital nerve block []   Duloxetine (Cymbalta) [x] Eptinezumab [] Cervical, Thoracic, Lumbar radiofrequency ablation []   Trazadone [] Erenumab-aooe (Aimovig) [] Spenopalatine ganglion block []   Wellbutrin [] Galcanezumab (Emgality) [] Occipital  neuro stimulation []   Protriptyline (Vivactil) [] Fremanazumab-vfrm (Ajovy)  Cervical, Thoracic, Lumbar, Caudal Epidural steroid injection []   Escitalopram (Lexapro) [] Other [] Sacroiliac joint steroid injection []   Celexa [] Memantine (Namenda) [] Transforaminal epidural steroid injection []     Botox [] Facet joint injections []     Baclofen (Lioresal) [] Cervical, Thoracic, Lumbar medial branch blocks []       Cefaly []       Gamma Core []       Iovera []       Transcranial Magnetic stimulation []                                 Past Medical History:   Diagnosis Date    ADHD     Ankylosing spondylitis     Anxiety     Colitis     Colon polyp     Depression     Essential hypertension     Fibromyalgia     History of kidney stones     Lupus     Migraine headache     Port-A-Cath in place     Primary immune deficiency disorder     Primary immunodeficiency disorder     Pyelonephritis 5/8/2018    Recurrent UTI     Renal disorder     Rheumatoid arteritis     Sjoegren syndrome      Past Surgical History:   Procedure Laterality Date    APPENDECTOMY      BARTHOLIN GLAND CYST EXCISION      COLONOSCOPY  2011    COLONOSCOPY N/A 7/7/2022    Procedure: COLONOSCOPY;  Surgeon: Shaggy Mae MD;  Location: [facility];  Service: Endoscopy;  Laterality: N/A;    CYSTOSCOPY WITH CALCULUS EXTRACTION Bilateral 2/2/2023    Procedure: CYSTOSCOPY, WITH CALCULUS REMOVAL;  Surgeon: Florentin Aleman MD;  Location: [facility] OR;  Service: Urology;  Laterality: Bilateral;    CYSTOURETEROSCOPY WITH RETROGRADE PYELOGRAPHY AND INSERTION OF STENT INTO URETER Bilateral 2/2/2023    Procedure: CYSTOURETEROSCOPY, WITH RETROGRADE PYELOGRAM AND URETERAL STENT INSERTION;  Surgeon: Florentin Aleman MD;  Location: [facility] OR;  Service: Urology;  Laterality: Bilateral;    DILATION OF URETHRA      X 2    ESOPHAGOGASTRODUODENOSCOPY N/A 6/16/2023    Procedure: EGD (ESOPHAGOGASTRODUODENOSCOPY);  Surgeon: Shaggy Mae MD;  Location: [facility];  Service: Endoscopy;   Laterality: N/A;    HYSTERECTOMY      INSERTION OF TUNNELED CENTRAL VENOUS CATHETER (CVC) WITH SUBCUTANEOUS PORT N/A 10/2/2019    Procedure: [id number];  Surgeon: Lenin Springer MD;  Location: [facility] OR;  Service: General;  Laterality: N/A;    KNEE ARTHROSCOPY Left     LAPAROSCOPIC LYSIS OF ADHESIONS N/A 7/22/2019    Procedure: LYSIS, ADHESIONS, LAPAROSCOPIC;  Surgeon: Clarence Adams MD;  Location: [facility] OR;  Service: OB/GYN;  Laterality: N/A;    LAPAROSCOPIC SALPINGO-OOPHORECTOMY Left 7/22/2019    Procedure: SALPINGO-OOPHORECTOMY, LAPAROSCOPIC- LEFT SIDE ONLY;  Surgeon: Clarence Adams MD;  Location: [facility] OR;  Service: OB/GYN;  Laterality: Left;    OVARIAN CYST REMOVAL Left     SALPINGOOPHORECTOMY Right     TONSILLECTOMY      TOTAL SHOULDER ARTHROPLASTY Right     TUBAL LIGATION       Family History   Problem Relation Age of Onset    Diabetes Mother         type 1    Stroke Mother     Heart disease Mother     Rheum arthritis Mother     Cancer Father         prostate    Alzheimer's disease Father     Colon polyps Father     Cancer Sister         uterine    Ovarian cancer Sister     Diabetes Brother         type 2    Rheum arthritis Maternal Grandmother     Crohn's disease Neg Hx     Ulcerative colitis Neg Hx      Social History     Socioeconomic History    Marital status:    Tobacco Use    Smoking status: Every Day     Current packs/day: 0.50     Average packs/day: 0.5 packs/day for 29.1 years (14.6 ttl pk-yrs)     Types: Cigarettes     Start date: 2/10/1995    Smokeless tobacco: Never   Substance and Sexual Activity    Alcohol use: Not Currently     Comment: rarely    Drug use: No    Sexual activity: Yes     Partners: Male     Social Determinants of Health     Financial Resource Strain: Low Risk  (1/23/2023)    Overall Financial Resource Strain (CARDIA)     Difficulty of Paying Living Expenses: Not very hard   Food Insecurity: No Food Insecurity (1/23/2023)    Hunger Vital Sign     Worried  About Running Out of Food in the Last Year: Never true     Ran Out of Food in the Last Year: Never true   Transportation Needs: No Transportation Needs (1/23/2023)    PRAPARE - Transportation     Lack of Transportation (Medical): No     Lack of Transportation (Non-Medical): No   Physical Activity: Unknown (1/23/2023)    Exercise Vital Sign     Days of Exercise per Week: 0 days   Stress: No Stress Concern Present (1/23/2023)    [employer] [place] [employer] - Occupational Stress Questionnaire     Feeling of Stress : Only a little   Social Connections: Unknown (1/23/2023)    Social Connection and Isolation Panel [NHANES]     Frequency of Communication with Friends and Family: More than three times a week     Frequency of Social Gatherings with Friends and Family: Once a week     Active Member of Clubs or Organizations: Yes     Attends Club or Organization Meetings: More than 4 times per year     Marital Status:    Housing Stability: High Risk (1/23/2023)    Housing Stability Vital Sign     Unable to Pay for Housing in the Last Year: Yes     Number of Places Lived in the Last Year: 1     Unstable Housing in the Last Year: Yes     Review of patient's allergies indicates:   Allergen Reactions    Seroquel [quetiapine] Anaphylaxis    Aleve [naproxen sodium]     Bentyl [dicyclomine] Other (See Comments)     Fatigue    Tramadol      seizure    Levaquin [levofloxacin] Other (See Comments)     Tendon tear    Miralax [polyethylene glycol 3350] Rash       Current Outpatient Medications:     (Magic mouthwash) 1:1:1 diphenhydrAMINE(Benadryl) 12.5mg/5ml liq, aluminum & magnesium hydroxide-simethicone (Maalox), LIDOcaine viscous 2%, Swish and spit 10 mLs every 4 (four) hours as needed. for mouth sores, Disp: 450 mL, Rfl: 0    albuterol (VENTOLIN HFA) 90 mcg/actuation inhaler, Inhale 1-2 puffs into the lungs every 6 (six) hours as needed for Wheezing or Shortness of Breath. Rescue, Disp: 18 g, Rfl: 1    alendronate  (FOSAMAX) 70 MG tablet, Take 1 tablet (70 mg total) by mouth every 7 days., Disp: 12 tablet, Rfl: 3    budesonide (PULMICORT FLEXHALER) 90 mcg/actuation AePB, Inhale 2 puffs (180 mcg total) into the lungs 2 (two) times daily as needed (as needed for cough and wheezing). Controller, Disp: 1 each, Rfl: 1    clobetasoL (TEMOVATE) 0.05 % external solution, Apply topically 2 (two) times daily., Disp: 100 mL, Rfl: 3    desvenlafaxine succinate (PRISTIQ) 100 MG Tb24, Take 100 mg by mouth once daily., Disp: , Rfl:     dextroamphetamine-amphetamine 30 mg Tab, Take 30 mg by mouth 2 (two) times daily., Disp: , Rfl:     docusate sodium (COLACE) 100 MG capsule, Take 1 capsule (100 mg total) by mouth 2 (two) times daily., Disp: 60 capsule, Rfl: 0    epinephrine (EPIPEN INJ), Inject 1 application as directed continuous prn (anaphylaxis)., Disp: , Rfl:     ergocalciferol (ERGOCALCIFEROL) 50,000 unit Cap, Take 1 capsule (50,000 Units total) by mouth every 7 days., Disp: 12 capsule, Rfl: 3    estradioL (ESTRACE) 0.01 % (0.1 mg/gram) vaginal cream, SMARTSI Gram(s) Vaginal, Disp: , Rfl:     gabapentin (NEURONTIN) 800 MG tablet, Take 1 tablet (800 mg total) by mouth 3 (three) times daily., Disp: 90 tablet, Rfl: 5    hyoscyamine (ANASPAZ,LEVSIN) 0.125 mg Tab, TAKE 1 TABLET BY MOUTH EVERY 6 HOURS AS NEEDED FOR ABDOMINAL PAIN OR DIARRHEA, Disp: 120 tablet, Rfl: 1    L.acidophil,parac-S.therm-Bif. (RISAQUAD) Cap capsule, Take 1 capsule by mouth once daily., Disp: 30 capsule, Rfl: 0    montelukast (SINGULAIR) 10 mg tablet, Take 1 tablet (10 mg total) by mouth nightly., Disp: 30 tablet, Rfl: 2    NIFEdipine (PROCARDIA-XL) 60 MG (OSM) 24 hr tablet, Take 1 tablet (60 mg total) by mouth once daily., Disp: 90 tablet, Rfl: 3    ondansetron (ZOFRAN-ODT) 4 MG TbDL, Take 1 tablet (4 mg total) by mouth every 8 (eight) hours as needed (nausea)., Disp: 45 tablet, Rfl: 1    ondansetron (ZOFRAN-ODT) 4 MG TbDL, Take 1 tablet (4 mg total) by mouth every  6 (six) hours as needed., Disp: 20 tablet, Rfl: 0    oxybutynin (DITROPAN) 5 MG Tab, Take 1 tablet (5 mg total) by mouth 3 (three) times daily as needed (bladder spasms)., Disp: 30 tablet, Rfl: 2    [START ON 5/5/2024] oxyCODONE-acetaminophen (PERCOCET) 7.5-325 mg per tablet, Take 1 tablet by mouth every 8 (eight) hours as needed for Pain., Disp: 90 tablet, Rfl: 0    [START ON 4/5/2024] oxyCODONE-acetaminophen (PERCOCET) 7.5-325 mg per tablet, Take 1 tablet by mouth every 8 (eight) hours as needed for Pain., Disp: 90 tablet, Rfl: 0    oxyCODONE-acetaminophen (PERCOCET) 7.5-325 mg per tablet, Take 1 tablet by mouth every 8 (eight) hours as needed for Pain., Disp: 90 tablet, Rfl: 0    piroxicam (FELDENE) 20 MG capsule, Take 20 mg by mouth once daily., Disp: , Rfl:     prednisoLONE acetate (PRED FORTE) 1 % DrpS, Place 1 drop into the right eye 4 (four) times daily., Disp: , Rfl:     predniSONE (DELTASONE) 5 MG tablet, Take 2 tablets by mouth daily as needed for arthritis flare, Disp: 60 tablet, Rfl: 3    sulfaSALAzine (AZULFIDINE) 500 MG EC tablet, Take 2 tablets (1,000 mg total) by mouth 2 (two) times daily., Disp: 120 tablet, Rfl: 3    TESTOSTERONE, BULK, MISC, Apply 1 g topically every evening., Disp: , Rfl:     tirzepatide (MOUNJARO) 5 mg/0.5 mL PnIj, Inject 5 mg into the skin every 7 days., Disp: 4 pen , Rfl: 5    tiZANidine (ZANAFLEX) 4 MG tablet, Take 1 tablet (4 mg total) by mouth every 8 (eight) hours., Disp: 270 tablet, Rfl: 3    triamcinolone acetonide 0.1% (KENALOG) 0.1 % cream, Apply topically 2 (two) times daily., Disp: 45 g, Rfl: 0    upadacitinib (RINVOQ) 15 mg 24 hr tablet, Take 1 tablet (15 mg total) by mouth once daily., Disp: 30 tablet, Rfl: 11    ketorolac (TORADOL) 10 mg tablet, Take 10 mg by mouth every 6 (six) hours as needed., Disp: , Rfl:     pantoprazole (PROTONIX) 40 MG tablet, TAKE 1 TABLET BY MOUTH EVERY DAY (Patient not taking: Reported on 4/4/2024), Disp: 90 tablet, Rfl: 1     promethazine (PHENERGAN) 25 MG tablet, TAKE 1 TABLET BY MOUTH EVERY 6 HOURS AS NEEDED FOR NAUSEA (Patient not taking: Reported on 4/4/2024), Disp: 60 tablet, Rfl: 3    rimegepant (NURTEC) 75 mg odt, Take 1 tablet (75 mg total) by mouth daily as needed for Migraine. Place ODT tablet on the tongue; alternatively the ODT tablet may be placed under the tongue, Disp: 16 tablet, Rfl: 11    semaglutide (OZEMPIC) 1 mg/dose (4 mg/3 mL), Inject 1 mg into the skin every 7 days. (Patient not taking: Reported on 4/4/2024), Disp: 3 mL, Rfl: 5  No current facility-administered medications for this visit.    Facility-Administered Medications Ordered in Other Visits:     acetaminophen tablet 650 mg, 650 mg, Oral, SHERIN, Michael Sawant MD    acetaminophen tablet 650 mg, 650 mg, Oral, PRN, Michael Sawant MD    lactated ringers infusion, , Intravenous, Continuous, Shaggy Mae MD, Last Rate: 75 mL/hr at 07/07/22 0858, New Bag at 02/02/23 1047    lactated ringers infusion, , Intravenous, Continuous, Jah Gerber MD, Stopped at 02/02/23 1410    LIDOcaine (PF) 10 mg/ml (1%) injection 1 mg, 0.1 mL, Intradermal, Once, Jah Gerber MD    sodium chloride 0.9% 100 mL flush bag, , Intravenous, PRNJese Malik C., MD    sodium chloride 0.9% flush 10 mL, 10 mL, Intravenous, PRNJese Malik C., MD    sodium chloride 0.9% flush 10 mL, 10 mL, Intravenous, PRNJese Malik C., MD    sodium chloride 0.9% flush 10 mL, 10 mL, Intravenous, PRNJese Malik C., MD    sodium chloride 0.9% flush 10 mL, 10 mL, Intravenous, Q6H PRN, Shaggy Mae MD      Objective:      Vitals:    04/04/24 1010   BP: 131/89   Pulse: 94   Resp: 17   Temp: 97.8 °F (36.6 °C)       Body mass index is 30.17 kg/m².    Physical Exam    4/4/24  Constitutional:   She appears well-developed and well-nourished. She is well groomed     Neurological Exam:  General: well-developed, well-nourished, no distress  Mental status: Awake and alert  Speech  language: No dysarthria or aphasia on conversation  Cranial nerves: Face symmetric  Motor: Moves all extremities well  Coordination: No ataxia. No tremor.        Review of Data:  Lab Results   Component Value Date    BNP <10 01/23/2019     03/26/2024    K 4.2 03/26/2024    MG 2.2 04/19/2021     03/26/2024    CO2 25 03/26/2024    BUN 10 03/26/2024    CREATININE 0.42 (L) 03/26/2024     (H) 03/26/2024    HGBA1C 5.3 01/23/2019    AST 30 03/26/2024    ALT 25 03/26/2024    ALBUMIN 4.5 03/26/2024    PROT 7.6 03/26/2024    BILITOT 0.6 03/26/2024    CHOL 218 (H) 07/31/2023    HDL 43 07/31/2023    LDLCALC 135.2 07/31/2023    TRIG 199 (H) 07/31/2023       Lab Results   Component Value Date    WBC 7.49 03/26/2024    HGB 15.1 03/26/2024    HCT 45.4 03/26/2024    MCV 89 03/26/2024     03/26/2024       Lab Results   Component Value Date    TSH 1.023 01/11/2024             Assessment and Plan   Breanna was seen today for headache.    Diagnoses and all orders for this visit:    Intractable chronic migraine without aura and without status migrainosus  Comments:  Restart Botox. Add Nurtec  Orders:  -     Prior authorization Order  -     rimegepant (NURTEC) 75 mg odt; Take 1 tablet (75 mg total) by mouth daily as needed for Migraine. Place ODT tablet on the tongue; alternatively the ODT tablet may be placed under the tongue    Type 2 diabetes mellitus with other specified complication, unspecified whether long term insulin use  Comments:  A1c in 2019 5.3. Patient reports this was steroid induced        The patient has chronic migraines ( G43.719) and suffers from headaches more than 3 months, more than 15 days of headache days per month lasting more than 4 hours with at least 8 attacks that meet criteria for migraine. She has tried multiple medications including but not limited to Topamax, Gabapentin, Lyrica, Doxepin, desvenlafaxine, Cymbalta, Procardia. The patient is an ideal candidate for Botox. After  treatment, I expect 50%  improvement in the patient's symptoms. A reduction of at least 7 days per month and the number of cumulative hours suffering with headaches as well as at least 100 total hours affected with migraine per month. After obtaining informed consent and under aseptic technique, a total of 155 units of botulinum toxin type A will be injected in the following muscles: Procerus 5 units,  5 units bilaterally, frontalis 20 units, temporalis 20 units bilaterally, occipitalis 15 units, upper cervical paraspinals 10 units bilaterally and trapezius 15 units bilaterally. The patient will receive a total of 155 units in 31 sites. Frequency of treatment is every 3 months unless no response to the treatments, at which time we will discontinue the injections.        Currently on gabapentin, desvenlafaxine, procardia     Multiple medical problems as noted in the problem list. This complicates and limits our treatment options    I have discussed the side effects of the medications prescribed and the patient acknowledges understanding      BALJEET Park

## 2024-04-05 ENCOUNTER — LAB VISIT (OUTPATIENT)
Dept: LAB | Facility: HOSPITAL | Age: 44
End: 2024-04-05
Attending: PHYSICIAN ASSISTANT
Payer: COMMERCIAL

## 2024-04-05 ENCOUNTER — PATIENT MESSAGE (OUTPATIENT)
Dept: RHEUMATOLOGY | Facility: CLINIC | Age: 44
End: 2024-04-05
Payer: COMMERCIAL

## 2024-04-05 DIAGNOSIS — M05.79 RHEUMATOID ARTHRITIS INVOLVING MULTIPLE SITES WITH POSITIVE RHEUMATOID FACTOR: ICD-10-CM

## 2024-04-05 DIAGNOSIS — M45.5 ANKYLOSING SPONDYLITIS OF THORACOLUMBAR REGION: ICD-10-CM

## 2024-04-05 LAB — ERYTHROCYTE [SEDIMENTATION RATE] IN BLOOD BY WESTERGREN METHOD: 7 MM/HR (ref 0–20)

## 2024-04-05 PROCEDURE — 85651 RBC SED RATE NONAUTOMATED: CPT | Mod: PO | Performed by: PHYSICIAN ASSISTANT

## 2024-04-05 PROCEDURE — 36415 COLL VENOUS BLD VENIPUNCTURE: CPT | Mod: PO | Performed by: PHYSICIAN ASSISTANT

## 2024-04-11 ENCOUNTER — LAB VISIT (OUTPATIENT)
Dept: LAB | Facility: HOSPITAL | Age: 44
End: 2024-04-11
Attending: INTERNAL MEDICINE
Payer: COMMERCIAL

## 2024-04-11 DIAGNOSIS — M45.5 ANKYLOSING SPONDYLITIS OF THORACOLUMBAR REGION: ICD-10-CM

## 2024-04-11 DIAGNOSIS — M05.79 RHEUMATOID ARTHRITIS INVOLVING MULTIPLE SITES WITH POSITIVE RHEUMATOID FACTOR: ICD-10-CM

## 2024-04-11 LAB
ALBUMIN SERPL BCP-MCNC: 4.4 G/DL (ref 3.5–5.2)
ALP SERPL-CCNC: 109 U/L (ref 55–135)
ALT SERPL W/O P-5'-P-CCNC: 18 U/L (ref 10–44)
ANION GAP SERPL CALC-SCNC: 10 MMOL/L (ref 8–16)
AST SERPL-CCNC: 15 U/L (ref 10–40)
BASOPHILS # BLD AUTO: 0.06 K/UL (ref 0–0.2)
BASOPHILS NFR BLD: 0.7 % (ref 0–1.9)
BILIRUB SERPL-MCNC: 0.3 MG/DL (ref 0.1–1)
BUN SERPL-MCNC: 6 MG/DL (ref 6–20)
CALCIUM SERPL-MCNC: 9.7 MG/DL (ref 8.7–10.5)
CHLORIDE SERPL-SCNC: 100 MMOL/L (ref 95–110)
CO2 SERPL-SCNC: 27 MMOL/L (ref 23–29)
CREAT SERPL-MCNC: 0.6 MG/DL (ref 0.5–1.4)
CRP SERPL-MCNC: 12.2 MG/L (ref 0–8.2)
DIFFERENTIAL METHOD BLD: NORMAL
EOSINOPHIL # BLD AUTO: 0.3 K/UL (ref 0–0.5)
EOSINOPHIL NFR BLD: 3.6 % (ref 0–8)
ERYTHROCYTE [DISTWIDTH] IN BLOOD BY AUTOMATED COUNT: 13.3 % (ref 11.5–14.5)
EST. GFR  (NO RACE VARIABLE): >60 ML/MIN/1.73 M^2
GLUCOSE SERPL-MCNC: 102 MG/DL (ref 70–110)
HCT VFR BLD AUTO: 42.2 % (ref 37–48.5)
HGB BLD-MCNC: 13.5 G/DL (ref 12–16)
IMM GRANULOCYTES # BLD AUTO: 0.03 K/UL (ref 0–0.04)
IMM GRANULOCYTES NFR BLD AUTO: 0.3 % (ref 0–0.5)
LYMPHOCYTES # BLD AUTO: 4 K/UL (ref 1–4.8)
LYMPHOCYTES NFR BLD: 45.7 % (ref 18–48)
MCH RBC QN AUTO: 29.5 PG (ref 27–31)
MCHC RBC AUTO-ENTMCNC: 32 G/DL (ref 32–36)
MCV RBC AUTO: 92 FL (ref 82–98)
MONOCYTES # BLD AUTO: 0.6 K/UL (ref 0.3–1)
MONOCYTES NFR BLD: 6.8 % (ref 4–15)
NEUTROPHILS # BLD AUTO: 3.7 K/UL (ref 1.8–7.7)
NEUTROPHILS NFR BLD: 42.9 % (ref 38–73)
NRBC BLD-RTO: 0 /100 WBC
PLATELET # BLD AUTO: 307 K/UL (ref 150–450)
PMV BLD AUTO: 10.6 FL (ref 9.2–12.9)
POTASSIUM SERPL-SCNC: 3.9 MMOL/L (ref 3.5–5.1)
PROT SERPL-MCNC: 6.7 G/DL (ref 6–8.4)
RBC # BLD AUTO: 4.57 M/UL (ref 4–5.4)
SODIUM SERPL-SCNC: 137 MMOL/L (ref 136–145)
WBC # BLD AUTO: 8.7 K/UL (ref 3.9–12.7)

## 2024-04-11 PROCEDURE — 80053 COMPREHEN METABOLIC PANEL: CPT | Performed by: PHYSICIAN ASSISTANT

## 2024-04-11 PROCEDURE — 85025 COMPLETE CBC W/AUTO DIFF WBC: CPT | Performed by: PHYSICIAN ASSISTANT

## 2024-04-11 PROCEDURE — 36415 COLL VENOUS BLD VENIPUNCTURE: CPT | Mod: PO | Performed by: PHYSICIAN ASSISTANT

## 2024-04-11 PROCEDURE — 86140 C-REACTIVE PROTEIN: CPT | Performed by: PHYSICIAN ASSISTANT

## 2024-04-22 ENCOUNTER — OFFICE VISIT (OUTPATIENT)
Dept: RHEUMATOLOGY | Facility: CLINIC | Age: 44
End: 2024-04-22
Payer: COMMERCIAL

## 2024-04-22 ENCOUNTER — OFFICE VISIT (OUTPATIENT)
Dept: UROLOGY | Facility: CLINIC | Age: 44
End: 2024-04-22
Payer: COMMERCIAL

## 2024-04-22 ENCOUNTER — PATIENT MESSAGE (OUTPATIENT)
Dept: UROLOGY | Facility: CLINIC | Age: 44
End: 2024-04-22

## 2024-04-22 VITALS — HEIGHT: 63 IN | BODY MASS INDEX: 30.16 KG/M2 | WEIGHT: 170.19 LBS

## 2024-04-22 VITALS
HEIGHT: 63 IN | BODY MASS INDEX: 29.95 KG/M2 | WEIGHT: 169 LBS | SYSTOLIC BLOOD PRESSURE: 132 MMHG | DIASTOLIC BLOOD PRESSURE: 92 MMHG | HEART RATE: 106 BPM

## 2024-04-22 DIAGNOSIS — E11.69 TYPE 2 DIABETES MELLITUS WITH OTHER SPECIFIED COMPLICATION, UNSPECIFIED WHETHER LONG TERM INSULIN USE: ICD-10-CM

## 2024-04-22 DIAGNOSIS — N20.0 KIDNEY STONES: ICD-10-CM

## 2024-04-22 DIAGNOSIS — B37.9 YEAST INFECTION: ICD-10-CM

## 2024-04-22 DIAGNOSIS — D84.9 IMMUNOCOMPROMISED: ICD-10-CM

## 2024-04-22 DIAGNOSIS — M45.5 ANKYLOSING SPONDYLITIS OF THORACOLUMBAR REGION: Primary | ICD-10-CM

## 2024-04-22 DIAGNOSIS — N39.0 RECURRENT UTI: Primary | ICD-10-CM

## 2024-04-22 DIAGNOSIS — G89.4 CHRONIC PAIN SYNDROME: ICD-10-CM

## 2024-04-22 DIAGNOSIS — R11.0 NAUSEA: ICD-10-CM

## 2024-04-22 LAB
BILIRUBIN, UA POC OHS: NEGATIVE
BLOOD, UA POC OHS: ABNORMAL
CLARITY, UA POC OHS: CLEAR
COLOR, UA POC OHS: ABNORMAL
GLUCOSE, UA POC OHS: 100
KETONES, UA POC OHS: NEGATIVE
LEUKOCYTES, UA POC OHS: NEGATIVE
NITRITE, UA POC OHS: NEGATIVE
PH, UA POC OHS: 6
PROTEIN, UA POC OHS: 30
SPECIFIC GRAVITY, UA POC OHS: 1.01
UROBILINOGEN, UA POC OHS: 1

## 2024-04-22 PROCEDURE — 3080F DIAST BP >= 90 MM HG: CPT | Mod: CPTII,S$GLB,, | Performed by: PHYSICIAN ASSISTANT

## 2024-04-22 PROCEDURE — 3008F BODY MASS INDEX DOCD: CPT | Mod: CPTII,S$GLB,, | Performed by: STUDENT IN AN ORGANIZED HEALTH CARE EDUCATION/TRAINING PROGRAM

## 2024-04-22 PROCEDURE — 1159F MED LIST DOCD IN RCRD: CPT | Mod: CPTII,S$GLB,, | Performed by: PHYSICIAN ASSISTANT

## 2024-04-22 PROCEDURE — 99999 PR PBB SHADOW E&M-EST. PATIENT-LVL V: CPT | Mod: PBBFAC,,, | Performed by: PHYSICIAN ASSISTANT

## 2024-04-22 PROCEDURE — 3075F SYST BP GE 130 - 139MM HG: CPT | Mod: CPTII,S$GLB,, | Performed by: PHYSICIAN ASSISTANT

## 2024-04-22 PROCEDURE — 3008F BODY MASS INDEX DOCD: CPT | Mod: CPTII,S$GLB,, | Performed by: PHYSICIAN ASSISTANT

## 2024-04-22 PROCEDURE — 1160F RVW MEDS BY RX/DR IN RCRD: CPT | Mod: CPTII,S$GLB,, | Performed by: PHYSICIAN ASSISTANT

## 2024-04-22 PROCEDURE — 1160F RVW MEDS BY RX/DR IN RCRD: CPT | Mod: CPTII,S$GLB,, | Performed by: STUDENT IN AN ORGANIZED HEALTH CARE EDUCATION/TRAINING PROGRAM

## 2024-04-22 PROCEDURE — 99999 PR PBB SHADOW E&M-EST. PATIENT-LVL V: CPT | Mod: PBBFAC,,, | Performed by: STUDENT IN AN ORGANIZED HEALTH CARE EDUCATION/TRAINING PROGRAM

## 2024-04-22 PROCEDURE — 96372 THER/PROPH/DIAG INJ SC/IM: CPT | Mod: S$GLB,,, | Performed by: PHYSICIAN ASSISTANT

## 2024-04-22 PROCEDURE — 1159F MED LIST DOCD IN RCRD: CPT | Mod: CPTII,S$GLB,, | Performed by: STUDENT IN AN ORGANIZED HEALTH CARE EDUCATION/TRAINING PROGRAM

## 2024-04-22 PROCEDURE — 99214 OFFICE O/P EST MOD 30 MIN: CPT | Mod: S$GLB,,, | Performed by: STUDENT IN AN ORGANIZED HEALTH CARE EDUCATION/TRAINING PROGRAM

## 2024-04-22 PROCEDURE — 99215 OFFICE O/P EST HI 40 MIN: CPT | Mod: 25,S$GLB,, | Performed by: PHYSICIAN ASSISTANT

## 2024-04-22 PROCEDURE — 81003 URINALYSIS AUTO W/O SCOPE: CPT | Mod: QW,S$GLB,, | Performed by: STUDENT IN AN ORGANIZED HEALTH CARE EDUCATION/TRAINING PROGRAM

## 2024-04-22 RX ORDER — CYANOCOBALAMIN 1000 UG/ML
1000 INJECTION, SOLUTION INTRAMUSCULAR; SUBCUTANEOUS
Status: COMPLETED | OUTPATIENT
Start: 2024-04-22 | End: 2024-04-22

## 2024-04-22 RX ORDER — KETOROLAC TROMETHAMINE 30 MG/ML
60 INJECTION, SOLUTION INTRAMUSCULAR; INTRAVENOUS
Status: COMPLETED | OUTPATIENT
Start: 2024-04-22 | End: 2024-04-22

## 2024-04-22 RX ORDER — ONDANSETRON 4 MG/1
4 TABLET, ORALLY DISINTEGRATING ORAL EVERY 8 HOURS PRN
Qty: 45 TABLET | Refills: 2 | Status: SHIPPED | OUTPATIENT
Start: 2024-04-22

## 2024-04-22 RX ORDER — FLUCONAZOLE 150 MG/1
150 TABLET ORAL DAILY
Qty: 3 TABLET | Refills: 5 | Status: SHIPPED | OUTPATIENT
Start: 2024-04-22 | End: 2024-04-25

## 2024-04-22 RX ORDER — DEXAMETHASONE SODIUM PHOSPHATE 4 MG/ML
8 INJECTION, SOLUTION INTRA-ARTICULAR; INTRALESIONAL; INTRAMUSCULAR; INTRAVENOUS; SOFT TISSUE
Status: COMPLETED | OUTPATIENT
Start: 2024-04-22 | End: 2024-04-22

## 2024-04-22 RX ORDER — ESTRADIOL 0.1 MG/G
1 CREAM VAGINAL
Qty: 42.5 G | Refills: 3 | Status: SHIPPED | OUTPATIENT
Start: 2024-04-22

## 2024-04-22 RX ADMIN — DEXAMETHASONE SODIUM PHOSPHATE 8 MG: 4 INJECTION, SOLUTION INTRA-ARTICULAR; INTRALESIONAL; INTRAMUSCULAR; INTRAVENOUS; SOFT TISSUE at 03:04

## 2024-04-22 RX ADMIN — KETOROLAC TROMETHAMINE 60 MG: 30 INJECTION, SOLUTION INTRAMUSCULAR; INTRAVENOUS at 03:04

## 2024-04-22 RX ADMIN — CYANOCOBALAMIN 1000 MCG: 1000 INJECTION, SOLUTION INTRAMUSCULAR; SUBCUTANEOUS at 03:04

## 2024-04-22 ASSESSMENT — ROUTINE ASSESSMENT OF PATIENT INDEX DATA (RAPID3)
MDHAQ FUNCTION SCORE: 1.2
FATIGUE SCORE: 1.1
PSYCHOLOGICAL DISTRESS SCORE: 2.2
PAIN SCORE: 6.5
PATIENT GLOBAL ASSESSMENT SCORE: 7.5
TOTAL RAPID3 SCORE: 6

## 2024-04-22 NOTE — PATIENT INSTRUCTIONS
Recurrent UTI Recommendations:  General recommendations include   - Increasing fluid intake to 2-3 L of water per day  - Avoiding constipation (take 1 cap of miralax daily) with a goal for 1 soft bowel movement daily or every other day  - Don't hold urine, make a point to void every 2 to 3 hours.    - void immediately after sexual intercourse  - Wiping front to back to avoid contamination.    Over the counter options include:     Cranberry pills 500 mg tabs three times per day. Options for prophylaxis include oral juice and tablet formulations as there is not sufficient evidence to support one formulation over another. In addition, there is little risk to cranberry supplements, further increasing their appeal to patients. However, it must be noted that fruit juices can be high in sugar content, which limits its use in diabetic patients.     D-Mannose 2000 U once a day, or 1000 U twice a day may be used as prophylaxis and may be particularly useful for E coli specific UTIs. There is mixed evidence to support its use but there is overall low risk to this option.     Probiotics with at least 20 billion CFU or a probiotic designed for vaginal/urinary health. Additionally you can add foods that improve gut fam such as yogurt or other fermented foods. There is mixed evidence to support its use and further evidence is required in the future.    Prophylactic antibiotics are rarely recommended but can be used as a last resort or for post-coital prophylaxis

## 2024-04-22 NOTE — PROGRESS NOTES
"[facility] - Urology   Clinic Note    Subjective:     Chief Complaint: Urinary Tract Infection (Recurrent UTI'S)    History of Present Illness:  Breanna Sanchez is a 43 y.o. female who presents to clinic for evaluation and management of recurrent UTIs. She is established to our clinic    She has recurrent UTIs. She underwent bilateral ureteroscopy for renal stones. She had issues with the stent being dislodged. She otherwise did well since the procedure and had a longer interval between infections. Cultures reviewed below.     She has IBS with intermittent constipation and diarrhea.     CT RSS from 3/2024 independently reviewed and interpreted showing small non obstructing renal stones bilateral lower poles.      Urine Culture   3/17/2023 No growth    7/31/2023 No growth    9/27/2023 No growth    1/11/2024 ESCHERICHIA COLI ESBL !    2/5/2024 No growth    4/17/2024 ESCHERICHIA COLI ESBL !      Past medical, family, surgical and social history reviewed as documented in chart with pertinent positive medical, family, surgical and social history detailed in HPI.    A review systems was conducted with pertinent positive and negative findings documented in HPI.    Objective:     Estimated body mass index is 30.15 kg/m² as calculated from the following:    Height as of this encounter: 5' 3" (1.6 m).    Weight as of this encounter: 77.2 kg (170 lb 3.1 oz).    Vital Signs (Most Recent)       Physical Exam  Constitutional:       General: She is not in acute distress.     Appearance: She is well-developed. She is not ill-appearing or toxic-appearing.   Pulmonary:      Effort: Pulmonary effort is normal. No accessory muscle usage or respiratory distress.   Neurological:      Mental Status: She is alert.         Labs reviewed below:  Lab Results   Component Value Date    BUN 6 04/11/2024    CREATININE 0.6 04/11/2024    WBC 8.70 04/11/2024    HGB 13.5 04/11/2024    HCT 42.2 04/11/2024     04/11/2024    AST 15 04/11/2024 "    ALT 18 04/11/2024    ALKPHOS 109 04/11/2024    ALBUMIN 4.4 04/11/2024    HGBA1C 5.3 01/23/2019        Assessment:     1. Recurrent UTI    2. Kidney stones      Plan:     We discussed preventatives measures for recurrent UTIs  Avoid constipation  Recommend estrace cream  Low suspicion that stones are the etiology of infections. She would prefer to avoid ureteroscopy and stents. May consider ESWL in the future  Follow up in 6 months with a KUB and renal US     Florentin Aleman MD

## 2024-04-23 DIAGNOSIS — G89.4 CHRONIC PAIN SYNDROME: ICD-10-CM

## 2024-04-23 DIAGNOSIS — D84.9 IMMUNOCOMPROMISED: ICD-10-CM

## 2024-04-24 RX ORDER — OXYCODONE AND ACETAMINOPHEN 7.5; 325 MG/1; MG/1
1 TABLET ORAL EVERY 8 HOURS PRN
Qty: 90 TABLET | Refills: 0 | Status: SHIPPED | OUTPATIENT
Start: 2024-07-03 | End: 2024-08-02

## 2024-04-24 RX ORDER — OXYCODONE AND ACETAMINOPHEN 7.5; 325 MG/1; MG/1
1 TABLET ORAL EVERY 8 HOURS PRN
Qty: 90 TABLET | Refills: 0 | Status: SHIPPED | OUTPATIENT
Start: 2024-06-04 | End: 2024-07-04

## 2024-04-24 NOTE — PROGRESS NOTES
Subjective:       Patient ID: Breanna Sanchez is a 43 y.o. female.    Chief Complaint: Disease Management    Mrs. Sanchez is a 43 year old female who presents to clinic for follow up on AS/RA. She is tolerating SSZ thus far. She is holding SSZ and Rinvoq for UTI at this time. She states dexa 8 mg provided only temporary relief at last visit. She continues to have significant pain in her low back.     She is undergoing evaluation with Dr. Stratton and there is concern for possible IBD given her sx, but testing has been unrevealing so far.     Depression has slightly improved since her last visit.     She is taking percocet tid prn for severe pain. She is taking gabapentin 800 mg tid as well. She is taking piroxicam only PRN and has recent diagnosis of gastritis on EGD.    She is currently doing SCIG monthly, but would like 2nd opinion on possibly changing to IVIG at infusion center since she is having difficulty with home infusions. She is scheduled to see Dr. Prado soon.     Urology added estradiol cream for chronic UTI.     Current tx:  1. rinvoq  2 SSZ    Prior treatment:  1. Humira  2. Enbrel  3. MTX  4. Orencia  5. Simponi aria  6. Remicade  7. Cosentyx            Review of Systems   Constitutional:  Positive for activity change. Negative for appetite change, chills, fatigue, fever and unexpected weight change.   HENT:  Negative for mouth sores and trouble swallowing.    Eyes:  Positive for redness. Negative for visual disturbance.   Respiratory:  Negative for cough and shortness of breath.    Cardiovascular:  Negative for chest pain, palpitations and leg swelling.   Gastrointestinal:  Negative for abdominal pain, constipation, diarrhea, nausea and vomiting.   Genitourinary:  Negative for dysuria and genital sores.   Musculoskeletal:  Positive for arthralgias, back pain, joint swelling, myalgias, neck pain and neck stiffness.   Skin:  Negative for rash.   Allergic/Immunologic: Positive for immunocompromised  state.   Neurological:  Negative for dizziness, weakness, light-headedness and headaches.   Hematological:  Does not bruise/bleed easily.   Psychiatric/Behavioral:  The patient is not nervous/anxious.          Objective:     Vitals:    04/22/24 1503   BP: (!) 132/92   Pulse: 106         Past Medical History:   Diagnosis Date    ADHD     Ankylosing spondylitis     Anxiety     Colitis     Colon polyp     Depression     Essential hypertension     Fibromyalgia     History of kidney stones     Lupus     Migraine headache     Port-A-Cath in place     Primary immune deficiency disorder     Primary immunodeficiency disorder     Pyelonephritis 5/8/2018    Recurrent UTI     Renal disorder     Rheumatoid arteritis     Sjoegren syndrome      Past Surgical History:   Procedure Laterality Date    APPENDECTOMY      BARTHOLIN GLAND CYST EXCISION      COLONOSCOPY  2011    COLONOSCOPY N/A 7/7/2022    Procedure: COLONOSCOPY;  Surgeon: Shaggy Mae MD;  Location: [facility];  Service: Endoscopy;  Laterality: N/A;    CYSTOSCOPY WITH CALCULUS EXTRACTION Bilateral 2/2/2023    Procedure: CYSTOSCOPY, WITH CALCULUS REMOVAL;  Surgeon: Florentin Aleman MD;  Location: [facility] OR;  Service: Urology;  Laterality: Bilateral;    CYSTOURETEROSCOPY WITH RETROGRADE PYELOGRAPHY AND INSERTION OF STENT INTO URETER Bilateral 2/2/2023    Procedure: CYSTOURETEROSCOPY, WITH RETROGRADE PYELOGRAM AND URETERAL STENT INSERTION;  Surgeon: Florentin Aleman MD;  Location: [facility] OR;  Service: Urology;  Laterality: Bilateral;    DILATION OF URETHRA      X 2    ESOPHAGOGASTRODUODENOSCOPY N/A 6/16/2023    Procedure: EGD (ESOPHAGOGASTRODUODENOSCOPY);  Surgeon: Shaggy Mae MD;  Location: [facility];  Service: Endoscopy;  Laterality: N/A;    HYSTERECTOMY      INSERTION OF TUNNELED CENTRAL VENOUS CATHETER (CVC) WITH SUBCUTANEOUS PORT N/A 10/2/2019    Procedure: [id number];  Surgeon: Lenin Springer MD;  Location: [facility] OR;  Service: General;  Laterality: N/A;     KNEE ARTHROSCOPY Left     LAPAROSCOPIC LYSIS OF ADHESIONS N/A 7/22/2019    Procedure: LYSIS, ADHESIONS, LAPAROSCOPIC;  Surgeon: Clarence Adams MD;  Location: [facility] OR;  Service: OB/GYN;  Laterality: N/A;    LAPAROSCOPIC SALPINGO-OOPHORECTOMY Left 7/22/2019    Procedure: SALPINGO-OOPHORECTOMY, LAPAROSCOPIC- LEFT SIDE ONLY;  Surgeon: Clarence Adams MD;  Location: [facility] OR;  Service: OB/GYN;  Laterality: Left;    OVARIAN CYST REMOVAL Left     SALPINGOOPHORECTOMY Right     TONSILLECTOMY      TOTAL SHOULDER ARTHROPLASTY Right     TUBAL LIGATION            Physical Exam   Constitutional: She is oriented to person, place, and time.   Eyes: Right conjunctiva is not injected. Left conjunctiva is not injected.   Neck: No JVD present. No thyromegaly present.   Cardiovascular: Normal rate.   Pulmonary/Chest: Effort normal.   Musculoskeletal:      Right shoulder: Normal.      Left shoulder: Normal.      Right elbow: Normal.      Left elbow: Normal.      Right wrist: Swelling and tenderness present.      Left wrist: Swelling and tenderness present.      Right knee: Normal.      Left knee: Normal.   Lymphadenopathy:     She has no cervical adenopathy.   Neurological: She is alert and oriented to person, place, and time. Gait normal.   Skin: No rash noted.   Psychiatric: Mood and affect normal.       Right Side Rheumatological Exam     Examination finds the shoulder, elbow, knee, 1st MCP, 2nd MCP, 3rd MCP, 4th MCP and 5th MCP normal.    The patient is tender to palpation of the wrist, 1st PIP, 2nd PIP, 3rd PIP, 4th PIP and 5th PIP    She has swelling of the wrist    Left Side Rheumatological Exam     Examination finds the shoulder, elbow, knee, 1st MCP, 2nd MCP, 3rd MCP, 4th MCP and 5th MCP normal.    The patient is tender to palpation of the wrist, 1st PIP, 2nd PIP, 3rd PIP, 4th PIP and 5th PIP.    She has swelling of the wrist            Labs:  Component      Latest Ref Rng 4/5/2024 4/11/2024   WBC      3.90 - 12.70 K/uL   8.70    RBC      4.00 - 5.40 M/uL  4.57    Hemoglobin      12.0 - 16.0 g/dL  13.5    Hematocrit      37.0 - 48.5 %  42.2    MCV      82 - 98 fL  92    MCH      27.0 - 31.0 pg  29.5    MCHC      32.0 - 36.0 g/dL  32.0    RDW      11.5 - 14.5 %  13.3    Platelet Count      150 - 450 K/uL  307    MPV      9.2 - 12.9 fL  10.6    Immature Granulocytes      0.0 - 0.5 %  0.3    Gran # (ANC)      1.8 - 7.7 K/uL  3.7    Immature Grans (Abs)      0.00 - 0.04 K/uL  0.03    Lymph #      1.0 - 4.8 K/uL  4.0    Mono #      0.3 - 1.0 K/uL  0.6    Eos #      0.0 - 0.5 K/uL  0.3    Baso #      0.00 - 0.20 K/uL  0.06    nRBC      0 /100 WBC  0    Gran %      38.0 - 73.0 %  42.9    Lymph %      18.0 - 48.0 %  45.7    Mono %      4.0 - 15.0 %  6.8    Eos %      0.0 - 8.0 %  3.6    Basophil %      0.0 - 1.9 %  0.7    Differential Method  Automated    Sodium      136 - 145 mmol/L  137    Potassium      3.5 - 5.1 mmol/L  3.9    Chloride      95 - 110 mmol/L  100    CO2      23 - 29 mmol/L  27    Glucose      70 - 110 mg/dL  102    BUN      6 - 20 mg/dL  6    Creatinine      0.5 - 1.4 mg/dL  0.6    Calcium      8.7 - 10.5 mg/dL  9.7    PROTEIN TOTAL      6.0 - 8.4 g/dL  6.7    Albumin      3.5 - 5.2 g/dL  4.4    BILIRUBIN TOTAL      0.1 - 1.0 mg/dL  0.3    ALP      55 - 135 U/L  109    AST      10 - 40 U/L  15    ALT      10 - 44 U/L  18    eGFR      >60 mL/min/1.73 m^2  >60.0    Anion Gap      8 - 16 mmol/L  10    Sed Rate      0 - 20 mm/Hr 7     CRP      0.0 - 8.2 mg/L  12.2 (H)       Legend:  (H) High     Assessment:       1. Ankylosing spondylitis of thoracolumbar region    2. Nausea    3. Type 2 diabetes mellitus with other specified complication, unspecified whether long term insulin use    4. Yeast infection    5. Immunocompromised    6. Chronic pain syndrome                  Plan:       Ankylosing spondylitis of thoracolumbar region  -     C-Reactive Protein; Future; Expected date: 04/22/2024  -     Sedimentation rate; Future;  Expected date: 04/22/2024  -     CBC Auto Differential; Future; Expected date: 04/22/2024  -     Comprehensive Metabolic Panel; Future; Expected date: 04/22/2024  -     dexAMETHasone injection 8 mg  -     ketorolac injection 60 mg  -     cyanocobalamin injection 1,000 mcg    Nausea  -     ondansetron (ZOFRAN-ODT) 4 MG TbDL; Take 1 tablet (4 mg total) by mouth every 8 (eight) hours as needed (nausea).  Dispense: 45 tablet; Refill: 2    Type 2 diabetes mellitus with other specified complication, unspecified whether long term insulin use  -     tirzepatide 7.5 mg/0.5 mL PnIj; Inject 7.5 mg into the skin every 7 days.  Dispense: 4 Pen; Refill: 5    Yeast infection  -     fluconazole (DIFLUCAN) 150 MG Tab; Take 1 tablet (150 mg total) by mouth once daily. 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 Cont Rinvoq 15 mg daily once off antibiotics  2. Cont gabapentin 800 mg tid  3. tizanidine, prednisone PRN  4. Cont percocet PRN.  I have checked [place] prescription monitoring program site and no unusual or abnormal behavior has occurred pt understand the risk and benefits of taking opioid medications and has decided to continue the medication. Pended x 3  5. Increase mounjaro 7.5 mg weekly  6. Toradol 60, dexa 8, b12 given today in clinic for acute pain  7. Letter written      Follow up:  2 mo Dr. Sawant

## 2024-05-12 DIAGNOSIS — M35.00 SJOGREN'S SYNDROME, WITH UNSPECIFIED ORGAN INVOLVEMENT: ICD-10-CM

## 2024-05-13 RX ORDER — NIFEDIPINE 60 MG/1
60 TABLET, EXTENDED RELEASE ORAL
Qty: 90 TABLET | Refills: 3 | Status: SHIPPED | OUTPATIENT
Start: 2024-05-13

## 2024-06-17 ENCOUNTER — OFFICE VISIT (OUTPATIENT)
Dept: ALLERGY | Facility: CLINIC | Age: 44
End: 2024-06-17
Payer: COMMERCIAL

## 2024-06-17 ENCOUNTER — LAB VISIT (OUTPATIENT)
Dept: LAB | Facility: HOSPITAL | Age: 44
End: 2024-06-17
Payer: COMMERCIAL

## 2024-06-17 VITALS — WEIGHT: 176.13 LBS | BODY MASS INDEX: 31.21 KG/M2 | HEIGHT: 63 IN

## 2024-06-17 DIAGNOSIS — J32.9 RECURRENT SINUS INFECTIONS: ICD-10-CM

## 2024-06-17 DIAGNOSIS — M06.9 RHEUMATOID ARTHRITIS, INVOLVING UNSPECIFIED SITE, UNSPECIFIED WHETHER RHEUMATOID FACTOR PRESENT: ICD-10-CM

## 2024-06-17 DIAGNOSIS — J31.0 CHRONIC RHINITIS: ICD-10-CM

## 2024-06-17 DIAGNOSIS — D80.3 IGG2 DEFICIENCY: Primary | ICD-10-CM

## 2024-06-17 DIAGNOSIS — D80.3 IGG2 DEFICIENCY: ICD-10-CM

## 2024-06-17 DIAGNOSIS — D80.6 ANTI-POLYSACCHARIDE ANTIBODY DEFICIENCY: ICD-10-CM

## 2024-06-17 LAB
BASOPHILS # BLD AUTO: 0.05 K/UL (ref 0–0.2)
BASOPHILS NFR BLD: 0.6 % (ref 0–1.9)
DIFFERENTIAL METHOD BLD: NORMAL
EOSINOPHIL # BLD AUTO: 0.4 K/UL (ref 0–0.5)
EOSINOPHIL NFR BLD: 4 % (ref 0–8)
ERYTHROCYTE [DISTWIDTH] IN BLOOD BY AUTOMATED COUNT: 12.9 % (ref 11.5–14.5)
HCT VFR BLD AUTO: 41.3 % (ref 37–48.5)
HGB BLD-MCNC: 13.6 G/DL (ref 12–16)
IGA SERPL-MCNC: 161 MG/DL (ref 40–350)
IGG SERPL-MCNC: 646 MG/DL (ref 650–1600)
IGM SERPL-MCNC: 135 MG/DL (ref 50–300)
IMM GRANULOCYTES # BLD AUTO: 0.02 K/UL (ref 0–0.04)
IMM GRANULOCYTES NFR BLD AUTO: 0.2 % (ref 0–0.5)
LYMPHOCYTES # BLD AUTO: 3.1 K/UL (ref 1–4.8)
LYMPHOCYTES NFR BLD: 35 % (ref 18–48)
MCH RBC QN AUTO: 29.4 PG (ref 27–31)
MCHC RBC AUTO-ENTMCNC: 32.9 G/DL (ref 32–36)
MCV RBC AUTO: 89 FL (ref 82–98)
MONOCYTES # BLD AUTO: 0.7 K/UL (ref 0.3–1)
MONOCYTES NFR BLD: 8.4 % (ref 4–15)
NEUTROPHILS # BLD AUTO: 4.6 K/UL (ref 1.8–7.7)
NEUTROPHILS NFR BLD: 51.8 % (ref 38–73)
NRBC BLD-RTO: 0 /100 WBC
PLATELET # BLD AUTO: 372 K/UL (ref 150–450)
PMV BLD AUTO: 10.2 FL (ref 9.2–12.9)
RBC # BLD AUTO: 4.62 M/UL (ref 4–5.4)
WBC # BLD AUTO: 8.83 K/UL (ref 3.9–12.7)

## 2024-06-17 PROCEDURE — 86355 B CELLS TOTAL COUNT: CPT | Performed by: ALLERGY & IMMUNOLOGY

## 2024-06-17 PROCEDURE — 82784 ASSAY IGA/IGD/IGG/IGM EACH: CPT | Mod: 59 | Performed by: ALLERGY & IMMUNOLOGY

## 2024-06-17 PROCEDURE — 86360 T CELL ABSOLUTE COUNT/RATIO: CPT | Performed by: ALLERGY & IMMUNOLOGY

## 2024-06-17 PROCEDURE — 99205 OFFICE O/P NEW HI 60 MIN: CPT | Mod: S$GLB,,, | Performed by: ALLERGY & IMMUNOLOGY

## 2024-06-17 PROCEDURE — 3008F BODY MASS INDEX DOCD: CPT | Mod: CPTII,S$GLB,, | Performed by: ALLERGY & IMMUNOLOGY

## 2024-06-17 PROCEDURE — 85025 COMPLETE CBC W/AUTO DIFF WBC: CPT | Performed by: ALLERGY & IMMUNOLOGY

## 2024-06-17 PROCEDURE — 86359 T CELLS TOTAL COUNT: CPT | Performed by: ALLERGY & IMMUNOLOGY

## 2024-06-17 PROCEDURE — 1160F RVW MEDS BY RX/DR IN RCRD: CPT | Mod: CPTII,S$GLB,, | Performed by: ALLERGY & IMMUNOLOGY

## 2024-06-17 PROCEDURE — 82784 ASSAY IGA/IGD/IGG/IGM EACH: CPT | Performed by: ALLERGY & IMMUNOLOGY

## 2024-06-17 PROCEDURE — 86317 IMMUNOASSAY INFECTIOUS AGENT: CPT | Mod: 59 | Performed by: ALLERGY & IMMUNOLOGY

## 2024-06-17 PROCEDURE — 99999 PR PBB SHADOW E&M-EST. PATIENT-LVL IV: CPT | Mod: PBBFAC,,, | Performed by: ALLERGY & IMMUNOLOGY

## 2024-06-17 PROCEDURE — 82787 IGG 1 2 3 OR 4 EACH: CPT | Mod: 59 | Performed by: ALLERGY & IMMUNOLOGY

## 2024-06-17 PROCEDURE — 1159F MED LIST DOCD IN RCRD: CPT | Mod: CPTII,S$GLB,, | Performed by: ALLERGY & IMMUNOLOGY

## 2024-06-17 PROCEDURE — 86357 NK CELLS TOTAL COUNT: CPT | Performed by: ALLERGY & IMMUNOLOGY

## 2024-06-17 NOTE — PROGRESS NOTES
Subjective:       Patient ID: Breanna Sanchez is a 43 y.o. female.    Chief Complaint:  Allergies (Wants second opinion about immune dificiency)      44 yo woman presents for new patient evaluation of primary immune deficiency. She states she was having frequent to chronic sinus infections and bronchitis. She did have pneumonia about 2014 as well as childhood. She saw Dr Angel in 2018 and had labs with IgG2 low and rest normal but not well protected to strep pneumoniae. Given vaccine and repeat did not show good response. She was started on Hizentra in about 2019. Did abelino decrease infections but had side effects. For about 1 week after infusion had HA, chills, body aches, felt like had flu. Then would be ok for a week then due for next infusion. She is now on Hyqvia but finds it harder to do so tends to delay it and not taking every month like supposed to. Last infusion was April. She is feeling tired, runny nose, stuffy nose, hoarseness. She has had frequent UTI as well. She sees Dr Sawant for RA and ankylosing spondylitis. She has harder time getting RA med's because of immune def. She does have a port, not on IV rheum med now so port is only being flushed. She would like to try IVIG with her port if possible. Never had this before. She has allergic rhinitis with congestion, runny nose off and on. Has mild asthma, has albuterol but not suing often. No food, insect or latex allergy.       Environmental History: see history section for home environment  Review of Systems   Constitutional:  Positive for chills and fatigue. Negative for fever.   HENT:  Positive for congestion, postnasal drip and rhinorrhea. Negative for facial swelling and sneezing.    Eyes:  Positive for discharge. Negative for redness and itching.   Respiratory:  Negative for cough, chest tightness, shortness of breath and wheezing.    Skin:  Negative for color change and rash.        Objective:      Physical Exam  Vitals and nursing note  reviewed.   Constitutional:       General: She is not in acute distress.     Appearance: Normal appearance. She is not ill-appearing.   HENT:      Nose: No rhinorrhea.   Eyes:      General:         Right eye: No discharge.         Left eye: No discharge.      Conjunctiva/sclera: Conjunctivae normal.   Pulmonary:      Effort: Pulmonary effort is normal. No respiratory distress.   Abdominal:      General: There is no distension.   Skin:     General: Skin is warm and dry.      Findings: No erythema or rash.   Neurological:      Mental Status: She is alert and oriented to person, place, and time.   Psychiatric:         Mood and Affect: Mood normal.         Behavior: Behavior normal.         Laboratory:   none performed   Assessment:       1. IgG2 deficiency    2. Anti-polysaccharide antibody deficiency    3. Recurrent sinus infections    4. Chronic rhinitis    5. Rheumatoid arthritis, involving unspecified site, unspecified whether rheumatoid factor present         Plan:       Pt with H/O IgG2 subclass def and anti polysaccharide antibody def on subq IgG. Pt has H/O recurrent infections improved on IgG replacement but severe side effects to Hizentra and having trouble infusing Hyqvia, can consider IVIG. Risks and benefits discussed with pt. Advised port is not ideal in immune def pt as it is increases risk of infection but on review with pt she would like to try. Will check current labs today and then prescribe IV at infusion center  Phone review    I spent a total of 60 minutes on the day of the visit.  This includes face to face time and non-face to face time preparing to see the patient (eg, review of tests), obtaining and/or reviewing separately obtained history, documenting clinical information in the electronic or other health record, independently interpreting results and communicating results to the patient/family/caregiver, or care coordinator.

## 2024-06-18 LAB
CD3+CD4+ CELLS # BLD: 1392 CELLS/UL (ref 300–1400)
CD3+CD4+ CELLS NFR BLD: 41.4 % (ref 28–57)
LYMPHOCYTES NFR CSF MANUAL: 0.96 % (ref 0.9–3.6)
LYMPHOCYTES NFR CSF MANUAL: 1446 CELLS/UL (ref 200–900)
LYMPHOCYTES NFR CSF MANUAL: 214 CELLS/UL (ref 90–600)
LYMPHOCYTES NFR CSF MANUAL: 274 CELLS/UL (ref 100–500)
LYMPHOCYTES NFR CSF MANUAL: 2815 CELLS/UL (ref 700–2100)
LYMPHOCYTES NFR CSF MANUAL: 43 % (ref 10–39)
LYMPHOCYTES NFR CSF MANUAL: 6.6 % (ref 7–31)
LYMPHOCYTES NFR CSF MANUAL: 8.4 % (ref 6–19)
LYMPHOCYTES NFR CSF MANUAL: 83.6 % (ref 55–83)
MISCELLANEOUS TEST NAME: NORMAL

## 2024-06-19 DIAGNOSIS — M45.5 ANKYLOSING SPONDYLITIS OF THORACOLUMBAR REGION: ICD-10-CM

## 2024-06-20 LAB
IGG1 SER-MCNC: 450 MG/DL (ref 382–929)
IGG2 SER-MCNC: 120 MG/DL (ref 242–700)
IGG3 SER-MCNC: 33 MG/DL (ref 22–176)
IGG4 SER-MCNC: 3 MG/DL (ref 4–86)

## 2024-06-24 DIAGNOSIS — E11.69 TYPE 2 DIABETES MELLITUS WITH OTHER SPECIFIED COMPLICATION, UNSPECIFIED WHETHER LONG TERM INSULIN USE: ICD-10-CM

## 2024-06-24 RX ORDER — TIRZEPATIDE 5 MG/.5ML
5 INJECTION, SOLUTION SUBCUTANEOUS
Qty: 2 ML | Refills: 11 | Status: SHIPPED | OUTPATIENT
Start: 2024-06-24 | End: 2025-06-24

## 2024-06-27 ENCOUNTER — PATIENT MESSAGE (OUTPATIENT)
Dept: ALLERGY | Facility: CLINIC | Age: 44
End: 2024-06-27
Payer: COMMERCIAL

## 2024-06-27 DIAGNOSIS — D83.9 CVID (COMMON VARIABLE IMMUNODEFICIENCY): Primary | ICD-10-CM

## 2024-06-27 RX ORDER — HEPARIN 100 UNIT/ML
500 SYRINGE INTRAVENOUS
OUTPATIENT
Start: 2024-06-27

## 2024-06-27 RX ORDER — FAMOTIDINE 10 MG/ML
20 INJECTION INTRAVENOUS
OUTPATIENT
Start: 2024-06-27

## 2024-06-27 RX ORDER — DIPHENHYDRAMINE HYDROCHLORIDE 50 MG/ML
25 INJECTION INTRAMUSCULAR; INTRAVENOUS
OUTPATIENT
Start: 2024-06-27

## 2024-06-27 RX ORDER — SODIUM CHLORIDE 0.9 % (FLUSH) 0.9 %
10 SYRINGE (ML) INJECTION
OUTPATIENT
Start: 2024-06-27

## 2024-06-27 RX ORDER — ACETAMINOPHEN 325 MG/1
650 TABLET ORAL
OUTPATIENT
Start: 2024-06-27

## 2024-07-02 RX ORDER — GABAPENTIN 800 MG/1
800 TABLET ORAL 3 TIMES DAILY
Qty: 90 TABLET | Refills: 5 | Status: SHIPPED | OUTPATIENT
Start: 2024-07-02 | End: 2025-07-02

## 2024-07-10 ENCOUNTER — PATIENT MESSAGE (OUTPATIENT)
Dept: INFUSION THERAPY | Facility: HOSPITAL | Age: 44
End: 2024-07-10
Payer: COMMERCIAL

## 2024-07-12 ENCOUNTER — LAB VISIT (OUTPATIENT)
Dept: LAB | Facility: HOSPITAL | Age: 44
End: 2024-07-12
Attending: INTERNAL MEDICINE
Payer: COMMERCIAL

## 2024-07-12 DIAGNOSIS — M45.5 ANKYLOSING SPONDYLITIS OF THORACOLUMBAR REGION: ICD-10-CM

## 2024-07-12 LAB — ERYTHROCYTE [SEDIMENTATION RATE] IN BLOOD BY WESTERGREN METHOD: 10 MM/HR (ref 0–20)

## 2024-07-12 PROCEDURE — 85025 COMPLETE CBC W/AUTO DIFF WBC: CPT | Performed by: PHYSICIAN ASSISTANT

## 2024-07-12 PROCEDURE — 36415 COLL VENOUS BLD VENIPUNCTURE: CPT | Mod: PO | Performed by: PHYSICIAN ASSISTANT

## 2024-07-12 PROCEDURE — 80053 COMPREHEN METABOLIC PANEL: CPT | Performed by: PHYSICIAN ASSISTANT

## 2024-07-12 PROCEDURE — 85651 RBC SED RATE NONAUTOMATED: CPT | Mod: PO | Performed by: PHYSICIAN ASSISTANT

## 2024-07-12 PROCEDURE — 86140 C-REACTIVE PROTEIN: CPT | Performed by: PHYSICIAN ASSISTANT

## 2024-07-13 LAB
ALBUMIN SERPL BCP-MCNC: 3.9 G/DL (ref 3.5–5.2)
ALP SERPL-CCNC: 98 U/L (ref 55–135)
ALT SERPL W/O P-5'-P-CCNC: 13 U/L (ref 10–44)
ANION GAP SERPL CALC-SCNC: 12 MMOL/L (ref 8–16)
AST SERPL-CCNC: 15 U/L (ref 10–40)
BASOPHILS # BLD AUTO: 0.04 K/UL (ref 0–0.2)
BASOPHILS NFR BLD: 0.4 % (ref 0–1.9)
BILIRUB SERPL-MCNC: 0.2 MG/DL (ref 0.1–1)
BUN SERPL-MCNC: 4 MG/DL (ref 6–20)
CALCIUM SERPL-MCNC: 9.1 MG/DL (ref 8.7–10.5)
CHLORIDE SERPL-SCNC: 107 MMOL/L (ref 95–110)
CO2 SERPL-SCNC: 23 MMOL/L (ref 23–29)
CREAT SERPL-MCNC: 0.7 MG/DL (ref 0.5–1.4)
CRP SERPL-MCNC: 6 MG/L (ref 0–8.2)
DIFFERENTIAL METHOD BLD: ABNORMAL
EOSINOPHIL # BLD AUTO: 0.1 K/UL (ref 0–0.5)
EOSINOPHIL NFR BLD: 1.3 % (ref 0–8)
ERYTHROCYTE [DISTWIDTH] IN BLOOD BY AUTOMATED COUNT: 13.3 % (ref 11.5–14.5)
EST. GFR  (NO RACE VARIABLE): >60 ML/MIN/1.73 M^2
GLUCOSE SERPL-MCNC: 116 MG/DL (ref 70–110)
HCT VFR BLD AUTO: 42.5 % (ref 37–48.5)
HGB BLD-MCNC: 13.3 G/DL (ref 12–16)
IMM GRANULOCYTES # BLD AUTO: 0.02 K/UL (ref 0–0.04)
IMM GRANULOCYTES NFR BLD AUTO: 0.2 % (ref 0–0.5)
LYMPHOCYTES # BLD AUTO: 3 K/UL (ref 1–4.8)
LYMPHOCYTES NFR BLD: 29.3 % (ref 18–48)
MCH RBC QN AUTO: 29 PG (ref 27–31)
MCHC RBC AUTO-ENTMCNC: 31.3 G/DL (ref 32–36)
MCV RBC AUTO: 93 FL (ref 82–98)
MONOCYTES # BLD AUTO: 0.5 K/UL (ref 0.3–1)
MONOCYTES NFR BLD: 4.6 % (ref 4–15)
NEUTROPHILS # BLD AUTO: 6.6 K/UL (ref 1.8–7.7)
NEUTROPHILS NFR BLD: 64.2 % (ref 38–73)
NRBC BLD-RTO: 0 /100 WBC
PLATELET # BLD AUTO: 332 K/UL (ref 150–450)
PMV BLD AUTO: 11.1 FL (ref 9.2–12.9)
POTASSIUM SERPL-SCNC: 3.3 MMOL/L (ref 3.5–5.1)
PROT SERPL-MCNC: 6.7 G/DL (ref 6–8.4)
RBC # BLD AUTO: 4.59 M/UL (ref 4–5.4)
SODIUM SERPL-SCNC: 142 MMOL/L (ref 136–145)
WBC # BLD AUTO: 10.24 K/UL (ref 3.9–12.7)

## 2024-07-16 ENCOUNTER — OFFICE VISIT (OUTPATIENT)
Dept: RHEUMATOLOGY | Facility: CLINIC | Age: 44
End: 2024-07-16
Payer: COMMERCIAL

## 2024-07-16 VITALS
SYSTOLIC BLOOD PRESSURE: 128 MMHG | HEART RATE: 120 BPM | DIASTOLIC BLOOD PRESSURE: 88 MMHG | HEIGHT: 63 IN | WEIGHT: 174.63 LBS | BODY MASS INDEX: 30.94 KG/M2

## 2024-07-16 DIAGNOSIS — I73.00 RAYNAUD'S DISEASE WITHOUT GANGRENE: ICD-10-CM

## 2024-07-16 DIAGNOSIS — M81.0 OSTEOPOROSIS, UNSPECIFIED OSTEOPOROSIS TYPE, UNSPECIFIED PATHOLOGICAL FRACTURE PRESENCE: ICD-10-CM

## 2024-07-16 DIAGNOSIS — L81.1 MELASMA: Primary | ICD-10-CM

## 2024-07-16 DIAGNOSIS — D83.9 CVID (COMMON VARIABLE IMMUNODEFICIENCY): ICD-10-CM

## 2024-07-16 DIAGNOSIS — G89.4 CHRONIC PAIN SYNDROME: ICD-10-CM

## 2024-07-16 DIAGNOSIS — R11.0 NAUSEA: ICD-10-CM

## 2024-07-16 DIAGNOSIS — D84.9 IMMUNOCOMPROMISED: ICD-10-CM

## 2024-07-16 DIAGNOSIS — M45.5 ANKYLOSING SPONDYLITIS OF THORACOLUMBAR REGION: ICD-10-CM

## 2024-07-16 DIAGNOSIS — M35.00 SJOGREN'S SYNDROME, WITH UNSPECIFIED ORGAN INVOLVEMENT: ICD-10-CM

## 2024-07-16 DIAGNOSIS — M25.50 ARTHRALGIA, UNSPECIFIED JOINT: ICD-10-CM

## 2024-07-16 DIAGNOSIS — D84.9 IMMUNODEFICIENCY DISORDER: ICD-10-CM

## 2024-07-16 PROCEDURE — 99999 PR PBB SHADOW E&M-EST. PATIENT-LVL III: CPT | Mod: PBBFAC,,, | Performed by: INTERNAL MEDICINE

## 2024-07-16 PROCEDURE — 96372 THER/PROPH/DIAG INJ SC/IM: CPT | Mod: S$GLB,,, | Performed by: INTERNAL MEDICINE

## 2024-07-16 PROCEDURE — 3079F DIAST BP 80-89 MM HG: CPT | Mod: CPTII,S$GLB,, | Performed by: INTERNAL MEDICINE

## 2024-07-16 PROCEDURE — 99215 OFFICE O/P EST HI 40 MIN: CPT | Mod: 25,S$GLB,, | Performed by: INTERNAL MEDICINE

## 2024-07-16 PROCEDURE — 3074F SYST BP LT 130 MM HG: CPT | Mod: CPTII,S$GLB,, | Performed by: INTERNAL MEDICINE

## 2024-07-16 PROCEDURE — 3008F BODY MASS INDEX DOCD: CPT | Mod: CPTII,S$GLB,, | Performed by: INTERNAL MEDICINE

## 2024-07-16 RX ORDER — TIZANIDINE 4 MG/1
4 TABLET ORAL EVERY 8 HOURS
Qty: 270 TABLET | Refills: 3 | Status: SHIPPED | OUTPATIENT
Start: 2024-07-16

## 2024-07-16 RX ORDER — SILDENAFIL CITRATE 20 MG/1
20 TABLET ORAL 3 TIMES DAILY
Qty: 90 TABLET | Refills: 3 | Status: SHIPPED | OUTPATIENT
Start: 2024-07-16 | End: 2025-07-16

## 2024-07-16 RX ORDER — OXYCODONE AND ACETAMINOPHEN 7.5; 325 MG/1; MG/1
1 TABLET ORAL EVERY 8 HOURS PRN
Qty: 90 TABLET | Refills: 0 | Status: SHIPPED | OUTPATIENT
Start: 2024-08-30 | End: 2024-09-29

## 2024-07-16 RX ORDER — METHYLPREDNISOLONE ACETATE 80 MG/ML
160 INJECTION, SUSPENSION INTRA-ARTICULAR; INTRALESIONAL; INTRAMUSCULAR; SOFT TISSUE
Status: COMPLETED | OUTPATIENT
Start: 2024-07-16 | End: 2024-07-16

## 2024-07-16 RX ORDER — CYANOCOBALAMIN 1000 UG/ML
1000 INJECTION, SOLUTION INTRAMUSCULAR; SUBCUTANEOUS
Status: COMPLETED | OUTPATIENT
Start: 2024-07-16 | End: 2024-07-16

## 2024-07-16 RX ORDER — TRANEXAMIC ACID 650 MG/1
650 TABLET ORAL DAILY
Qty: 30 TABLET | Refills: 0 | Status: SHIPPED | OUTPATIENT
Start: 2024-07-16 | End: 2024-08-15

## 2024-07-16 RX ORDER — KETOROLAC TROMETHAMINE 30 MG/ML
60 INJECTION, SOLUTION INTRAMUSCULAR; INTRAVENOUS
Status: COMPLETED | OUTPATIENT
Start: 2024-07-16 | End: 2024-07-16

## 2024-07-16 RX ORDER — OXYCODONE AND ACETAMINOPHEN 7.5; 325 MG/1; MG/1
1 TABLET ORAL EVERY 8 HOURS PRN
Qty: 90 TABLET | Refills: 0 | Status: SHIPPED | OUTPATIENT
Start: 2024-08-01 | End: 2024-08-31

## 2024-07-16 RX ORDER — ONDANSETRON 4 MG/1
4 TABLET, ORALLY DISINTEGRATING ORAL EVERY 8 HOURS PRN
Qty: 45 TABLET | Refills: 2 | Status: SHIPPED | OUTPATIENT
Start: 2024-07-16

## 2024-07-16 RX ORDER — OXYCODONE AND ACETAMINOPHEN 7.5; 325 MG/1; MG/1
1 TABLET ORAL EVERY 8 HOURS PRN
Qty: 90 TABLET | Refills: 0 | Status: SHIPPED | OUTPATIENT
Start: 2024-09-27 | End: 2024-10-27

## 2024-07-16 RX ADMIN — KETOROLAC TROMETHAMINE 60 MG: 30 INJECTION, SOLUTION INTRAMUSCULAR; INTRAVENOUS at 12:07

## 2024-07-16 RX ADMIN — METHYLPREDNISOLONE ACETATE 160 MG: 80 INJECTION, SUSPENSION INTRA-ARTICULAR; INTRALESIONAL; INTRAMUSCULAR; SOFT TISSUE at 12:07

## 2024-07-16 RX ADMIN — CYANOCOBALAMIN 1000 MCG: 1000 INJECTION, SOLUTION INTRAMUSCULAR; SUBCUTANEOUS at 12:07

## 2024-07-16 ASSESSMENT — ROUTINE ASSESSMENT OF PATIENT INDEX DATA (RAPID3)
PATIENT GLOBAL ASSESSMENT SCORE: 7
TOTAL RAPID3 SCORE: 5.83
PSYCHOLOGICAL DISTRESS SCORE: 1.1
MDHAQ FUNCTION SCORE: 1.2
FATIGUE SCORE: 1.1
PAIN SCORE: 6.5

## 2024-07-16 NOTE — PROGRESS NOTES
Subjective:     Patient ID:  Breanna Sanchez    Chief Complaint:  Disease Management     History of Present Illness:   Mrs. Sanchez is a 43 year old female who presents to clinic for follow up on AS/RA. She is tolerating SSZ thus far. She is holding SSZ and Rinvoq for UTI at this time. She states dexa 8 mg provided only temporary relief at last visit. She continues to have significant pain in her low back. She c/o of severe  raynauds at night. She feels like she has flu like symptoms and she will start privigen     Depression has slightly improved since her last visit. Urology added estradiol cream for chronic UTI.      Current tx:  1. rinvoq  2 SSZ     Prior treatment:  1. Humira  2. Enbrel  3. MTX  4. Orencia  5. Simponi aria  6. Remicade  7. Cosentyx     - Diagnosis/es:  - Positive serologies:  - Infectious screening labs:  - Previous Treatments:  - Current Treatments:     Interval History:   Hospitalization since last office visit: No    Patient Active Problem List    Diagnosis Date Noted    Osteoporosis 07/16/2024    CVID (common variable immunodeficiency) 06/27/2024    Type 2 diabetes mellitus with other specified complication, unspecified whether long term insulin use 04/04/2024    Intractable chronic migraine without aura and without status migrainosus 04/04/2024    Bilateral renal stones 01/12/2023    COVID 08/11/2022    Asymptomatic bacteriuria 10/12/2021    Depression 04/20/2021    Anxiety 04/20/2021    Primary immunodeficiency disorder 04/20/2021    Fever 04/13/2021    Intractable vomiting 04/12/2021    Chronic midline low back pain without sciatica 10/11/2019    Decreased ROM of lumbar spine 10/11/2019    Poor venous access 10/02/2019    Pelvic mass 07/22/2019    Female pelvic peritoneal adhesion 07/22/2019    Seasonal allergic rhinitis due to pollen 04/29/2019    B12 deficiency 04/29/2019    Class 1 obesity 04/29/2019    Atherosclerosis of artery 04/29/2019    Atelectasis 04/29/2019    Tobacco  dependence 04/29/2019    Essential hypertension 04/29/2019    Leukemoid reaction 04/01/2019    Cough 01/07/2019    Shortness of breath 01/07/2019    Abnormal glucose 01/07/2019    Abnormal thyroid function test 01/07/2019    Acute cystitis without hematuria 05/10/2018    Hypokalemia 05/08/2018    Rheumatoid arthritis     Lupus     Fibromyalgia     Ankylosing spondylitis 03/22/2018    Menorrhagia with regular cycle 02/14/2017    Rheumatoid arthritis involving multiple sites with positive rheumatoid factor 02/14/2017    Pelvic pain in female 02/13/2017     Past Surgical History:   Procedure Laterality Date    APPENDECTOMY      BARTHOLIN GLAND CYST EXCISION      COLONOSCOPY  2011    COLONOSCOPY N/A 7/7/2022    Procedure: COLONOSCOPY;  Surgeon: Shgagy Mae MD;  Location: [facility];  Service: Endoscopy;  Laterality: N/A;    CYSTOSCOPY WITH CALCULUS EXTRACTION Bilateral 2/2/2023    Procedure: CYSTOSCOPY, WITH CALCULUS REMOVAL;  Surgeon: Florentin Aleman MD;  Location: [facility] OR;  Service: Urology;  Laterality: Bilateral;    CYSTOURETEROSCOPY WITH RETROGRADE PYELOGRAPHY AND INSERTION OF STENT INTO URETER Bilateral 2/2/2023    Procedure: CYSTOURETEROSCOPY, WITH RETROGRADE PYELOGRAM AND URETERAL STENT INSERTION;  Surgeon: Florentin Aleman MD;  Location: [facility] OR;  Service: Urology;  Laterality: Bilateral;    DILATION OF URETHRA      X 2    ESOPHAGOGASTRODUODENOSCOPY N/A 6/16/2023    Procedure: EGD (ESOPHAGOGASTRODUODENOSCOPY);  Surgeon: Shaggy Mae MD;  Location: [facility];  Service: Endoscopy;  Laterality: N/A;    HYSTERECTOMY      INSERTION OF TUNNELED CENTRAL VENOUS CATHETER (CVC) WITH SUBCUTANEOUS PORT N/A 10/2/2019    Procedure: [id number];  Surgeon: Lenin Springer MD;  Location: [facility] OR;  Service: General;  Laterality: N/A;    KNEE ARTHROSCOPY Left     LAPAROSCOPIC LYSIS OF ADHESIONS N/A 7/22/2019    Procedure: LYSIS, ADHESIONS, LAPAROSCOPIC;  Surgeon: Clarence Adams MD;  Location: [facility] OR;   Service: OB/GYN;  Laterality: N/A;    LAPAROSCOPIC SALPINGO-OOPHORECTOMY Left 7/22/2019    Procedure: SALPINGO-OOPHORECTOMY, LAPAROSCOPIC- LEFT SIDE ONLY;  Surgeon: Clarence Adams MD;  Location: [facility];  Service: OB/GYN;  Laterality: Left;    OVARIAN CYST REMOVAL Left     SALPINGOOPHORECTOMY Right     TONSILLECTOMY      TOTAL SHOULDER ARTHROPLASTY Right     TUBAL LIGATION       Social History     Tobacco Use    Smoking status: Every Day     Current packs/day: 0.50     Average packs/day: 0.5 packs/day for 29.5 years (14.7 ttl pk-yrs)     Types: Cigarettes     Start date: 2/10/1995    Smokeless tobacco: Never   Substance Use Topics    Alcohol use: Not Currently     Comment: rarely    Drug use: No     Family History   Problem Relation Name Age of Onset    Diabetes Mother          type 1    Stroke Mother      Heart disease Mother      Rheum arthritis Mother      Cancer Father          prostate    Alzheimer's disease Father      Colon polyps Father      Cancer Sister          uterine    Ovarian cancer Sister      Diabetes Brother          type 2    Rheum arthritis Maternal Grandmother      Crohn's disease Neg Hx      Ulcerative colitis Neg Hx       Review of patient's allergies indicates:   Allergen Reactions    Seroquel [quetiapine] Anaphylaxis    Aleve [naproxen sodium]     Bentyl [dicyclomine] Other (See Comments)     Fatigue    Tramadol      seizure    Levaquin [levofloxacin] Other (See Comments)     Tendon tear    Miralax [polyethylene glycol 3350] Rash       Review of Systems   Review of Systems   Constitutional:  Positive for chills and fatigue. Negative for activity change, appetite change, diaphoresis, fever and unexpected weight change.   HENT:  Negative for congestion, dental problem, ear discharge, ear pain, facial swelling, mouth sores, nosebleeds, postnasal drip, rhinorrhea, sinus pressure, sneezing, sore throat, tinnitus, trouble swallowing and voice change.    Eyes:  Negative for photophobia, pain,  discharge, redness and itching.   Respiratory:  Positive for cough. Negative for apnea, chest tightness, shortness of breath and wheezing.    Cardiovascular:  Positive for leg swelling. Negative for chest pain and palpitations.   Gastrointestinal:  Positive for abdominal pain. Negative for abdominal distention, constipation, diarrhea, nausea and vomiting.   Endocrine: Negative for cold intolerance, heat intolerance, polydipsia and polyuria.   Genitourinary:  Negative for decreased urine volume, difficulty urinating, dysuria, flank pain, frequency, hematuria and urgency.   Musculoskeletal:  Positive for arthralgias, back pain, joint swelling, neck pain and neck stiffness. Negative for gait problem and myalgias.   Skin:  Negative for pallor, rash and wound.   Allergic/Immunologic: Negative for immunocompromised state.   Neurological:  Negative for dizziness, tremors, numbness and headaches.   Hematological:  Negative for adenopathy. Does not bruise/bleed easily.   Psychiatric/Behavioral:  Negative for sleep disturbance. The patient is not nervous/anxious.         Current Medications:  Current Outpatient Medications   Medication Instructions    (Magic mouthwash) 1:1:1 diphenhydrAMINE(Benadryl) 12.5mg/5ml liq, aluminum & magnesium hydroxide-simethicone (Maalox), LIDOcaine viscous 2% 10 mLs, Swish & Spit, Every 4 hours PRN, for mouth sores    albuterol (VENTOLIN HFA) 90 mcg/actuation inhaler 1-2 puffs, Inhalation, Every 6 hours PRN, Rescue    budesonide (PULMICORT FLEXHALER) 180 mcg, Inhalation, 2 times daily PRN, Controller    clobetasoL (TEMOVATE) 0.05 % external solution Topical (Top), 2 times daily    desvenlafaxine succinate (PRISTIQ) 100 mg, Oral, Daily    dextroamphetamine-amphetamine 30 mg Tab 30 mg, Oral, 2 times daily    docusate sodium (COLACE) 100 mg, Oral, 2 times daily    epinephrine (EPIPEN INJ) 1 application , Injection, Continuous PRN    estradioL (ESTRACE) 1 g, Vaginal, Three times weekly, Place 1  fingertip of cream at urethral opening (where you urinate from) and external vagina 3 times per week. Please do not use plastic applicator    gabapentin (NEURONTIN) 800 mg, Oral, 3 times daily    hyoscyamine (ANASPAZ,LEVSIN) 0.125 mg Tab TAKE 1 TABLET BY MOUTH EVERY 6 HOURS AS NEEDED FOR ABDOMINAL PAIN OR DIARRHEA    ketorolac (TORADOL) 10 mg, Oral, Every 6 hours PRN    L.acidophil,parac-S.therm-Bif. (RISAQUAD) Cap capsule 1 capsule, Oral, Daily    MOUNJARO 5 mg, Subcutaneous, Every 7 days    NIFEdipine (PROCARDIA-XL) 60 mg, Oral    NURTEC 75 mg, Oral, Daily PRN, Place ODT tablet on the tongue; alternatively the ODT tablet may be placed under the tongue    ondansetron (ZOFRAN-ODT) 4 mg, Oral, Every 6 hours PRN    ondansetron (ZOFRAN-ODT) 4 mg, Oral, Every 8 hours PRN    oxybutynin (DITROPAN) 5 mg, Oral, 3 times daily PRN    oxyCODONE-acetaminophen (PERCOCET) 7.5-325 mg per tablet 1 tablet, Oral, Every 8 hours PRN    [START ON 8/30/2024] oxyCODONE-acetaminophen (PERCOCET) 7.5-325 mg per tablet 1 tablet, Oral, Every 8 hours PRN    [START ON 9/27/2024] oxyCODONE-acetaminophen (PERCOCET) 7.5-325 mg per tablet 1 tablet, Oral, Every 8 hours PRN    pantoprazole (PROTONIX) 40 mg, Oral    piroxicam (FELDENE) 20 mg, Oral, Daily    prednisoLONE acetate (PRED FORTE) 1 % DrpS 1 drop, Right Eye, 4 times daily    predniSONE (DELTASONE) 5 MG tablet Take 2 tablets by mouth daily as needed for arthritis flare    promethazine (PHENERGAN) 25 MG tablet TAKE 1 TABLET BY MOUTH EVERY 6 HOURS AS NEEDED FOR NAUSEA    RINVOQ 15 mg, Oral, Daily    sildenafil (REVATIO) 20 mg, Oral, 3 times daily    sulfaSALAzine (AZULFIDINE) 1,000 mg, Oral, 2 times daily    TESTOSTERONE, BULK, MISC 1 g, Topical (Top), Nightly    tirzepatide 7.5 mg, Subcutaneous, Every 7 days    tiZANidine (ZANAFLEX) 4 mg, Oral, Every 8 hours    tranexamic acid (LYSTEDA) 650 mg, Oral, Daily    triamcinolone acetonide 0.1% (KENALOG) 0.1 % cream Topical (Top), 2 times daily     "ubrogepant (UBRELVY) 100 mg tablet 10         Objective:     Vitals:    07/16/24 1041   BP: 128/88   Pulse: (!) 120   Weight: 79.2 kg (174 lb 9.7 oz)   Height: 5' 2.99" (1.6 m)   PainSc:   4      Body mass index is 30.94 kg/m².     Physical Examinations:  Physical Exam   Constitutional: She is oriented to person, place, and time.   HENT:   Head: Normocephalic and atraumatic.   Mouth/Throat: Oropharynx is clear and moist.   Eyes: Pupils are equal, round, and reactive to light.   Neck: No thyromegaly present.   Cardiovascular: Normal rate, regular rhythm and normal heart sounds. Exam reveals no gallop and no friction rub.   No murmur heard.  Pulmonary/Chest: Breath sounds normal. She has no wheezes. She has no rales. She exhibits no tenderness.   Abdominal: There is no abdominal tenderness. There is no rebound and no guarding.   Musculoskeletal:         General: Tenderness and deformity present.      Right shoulder: Tenderness present.      Left shoulder: Tenderness present.      Right elbow: Normal.      Left elbow: Normal.      Right wrist: Swelling and tenderness present.      Left wrist: Swelling and tenderness present.      Cervical back: Neck supple.      Right knee: No effusion. Tenderness present.      Left knee: No effusion. Tenderness present.      Left ankle: Swelling present.   Lymphadenopathy:     She has no cervical adenopathy.   Neurological: She is alert and oriented to person, place, and time. Gait normal.   Skin: No rash noted. No erythema. No pallor.   Psychiatric: Mood and affect normal.   Nursing note and vitals reviewed.      Right Side Rheumatological Exam     Examination finds the elbow normal.    The patient is tender to palpation of the shoulder, wrist, knee, 1st PIP, 1st MCP, 2nd PIP, 2nd MCP, 3rd PIP, 3rd MCP, 4th PIP, 4th MCP, 5th PIP and 5th MCP    She has swelling of the wrist, 1st PIP, 1st MCP, 2nd PIP, 2nd MCP, 3rd PIP, 3rd MCP, 4th PIP, 4th MCP, 5th PIP and 5th MCP    The patient has " an enlarged wrist    Shoulder Exam   Tenderness Location: no tenderness    Range of Motion   Active abduction:  abnormal   Adduction: abnormal  Sensation: normal    Knee Exam   Tenderness Location: lateral joint line  Patellofemoral Crepitus: positive  Effusion: negative  Sensation: normal    Hip Exam   Tenderness Location: posterior  Sensation: normal    Elbow/Wrist Exam   Tenderness Location: no tenderness  Sensation: normal    Muscle Strength (0-5 scale):  Neck Flexion:  2  Neck Extension: 2  : 2/5     Left Side Rheumatological Exam     Examination finds the elbow normal.    The patient is tender to palpation of the shoulder, wrist, knee, 1st PIP, 1st MCP, 2nd PIP, 2nd MCP, 3rd PIP, 3rd MCP, 4th PIP, 4th MCP, 5th PIP, 5th MCP and temporomandibular.    She has swelling of the wrist, 1st PIP, 1st MCP, 2nd PIP, 2nd MCP, 3rd PIP, 3rd MCP, 4th PIP, 4th MCP, 5th PIP, 5th MCP, 1st CMC, 2nd DIP, 3rd DIP, 4th DIP, 5th DIP, knee, 1st MTP, 2nd MTP, 3rd MTP, 4th MTP, 1st toe IP, 2nd toe IP, 3rd toe IP, 4th toe IP and 5th toe IP    The patient has an enlarged wrist, 1st CMC, 2nd DIP, 3rd DIP, 4th DIP, 5th DIP, 1st toe IP, 2nd toe IP, 3rd toe IP, 4th toe IP and 5th toe IP.    Shoulder Exam   Tenderness Location: acromioclavicular joint    Range of Motion   Active abduction:  abnormal   Sensation: normal    Knee Exam     Patellofemoral Crepitus: positive  Effusion: negative  Sensation: normal    Hip Exam   Tenderness Location: posterior  Sensation: normal    Elbow/Wrist Exam   Sensation: normal    Muscle Strength (0-5 scale):  Neck Flexion:  2  Neck Extension: 2  :  1/5       Back/Neck Exam   General Inspection   Gait: normal            Disease Assessment Scores:  Patient's Global Assessment of arthritis (0-10): 3  Physician's Global Assessment of arthritis (0-10): 3  Number of Tender Joints (0-28): 3  Number of Swollen Joints (0-28): 3        1/25/2024     3:43 PM   Rapid3 Question Responses and Scores   MDHAQ Score  "0.9   Psychologic Score 1.1   Pain Score 5   When you awakened in the morning OVER THE LAST WEEK, did you feel stiff? Yes   If Yes, please indicate the number of hours until you are as limber as you will be for the day 1   Fatigue Score 5   Global Health Score 5   RAPID3 Score 4.33       Monitoring Lab Results:  Lab Results   Component Value Date    WBC 10.24 07/12/2024    RBC 4.59 07/12/2024    HGB 13.3 07/12/2024    HCT 42.5 07/12/2024    MCV 93 07/12/2024    MCH 29.0 07/12/2024    MCHC 31.3 (L) 07/12/2024    RDW 13.3 07/12/2024     07/12/2024        Lab Results   Component Value Date     07/12/2024    K 3.3 (L) 07/12/2024     07/12/2024    CO2 23 07/12/2024     (H) 07/12/2024    BUN 4 (L) 07/12/2024    CREATININE 0.7 07/12/2024    CALCIUM 9.1 07/12/2024    PROT 6.7 07/12/2024    ALBUMIN 3.9 07/12/2024    BILITOT 0.2 07/12/2024    ALKPHOS 98 07/12/2024    AST 15 07/12/2024    ALT 13 07/12/2024    ANIONGAP 12 07/12/2024    EGFRNORACEVR >60.0 07/12/2024       Lab Results   Component Value Date    SEDRATE 10 07/12/2024    CRP 6.0 07/12/2024        Lab Results   Component Value Date    YASHRVCV71YV 22 (L) 04/17/2023        Lab Results   Component Value Date    CHOL 218 (H) 07/31/2023    HDL 43 07/31/2023    LDLCALC 135.2 07/31/2023    TRIG 199 (H) 07/31/2023       Lab Results   Component Value Date    RF 16.0 (H) 07/28/2021    CCPANTIBODIE <0.5 07/28/2021     Lab Results   Component Value Date    ANASCREEN Positive (A) 04/17/2023    ANATITER 1:320 04/17/2023    DSDNA Negative 1:10 04/17/2023     No results found for: "HLABB27"    Infectious Disease Screening:  Lab Results   Component Value Date    HEPBSAG Non-reactive 07/31/2023    HEPBCAB Non-reactive 07/31/2023    HEPBSAB <3.00 07/31/2023    HEPBSAB Non-reactive 07/31/2023    HEPBIGM Negative 04/14/2021     Lab Results   Component Value Date    HEPCAB Negative 04/14/2021     Lab Results   Component Value Date    TBGOLDPLUS Negative " 01/23/2023     Lab Results   Component Value Date    QUANTIFERON NEGATIVE 04/17/2018        Imaging: DEXA, Xrays, MRIs, CTs, etc    Old & Outside Medical Records:  Reviewed old and all outside medical records available in Care Everywhere     Assessment:     Encounter Diagnoses   Name Primary?    Melasma Yes    Arthralgia, unspecified joint     Ankylosing spondylitis of thoracolumbar region     Chronic pain syndrome     Immunodeficiency disorder     CVID (common variable immunodeficiency)     Immunocompromised     Nausea     Sjogren's syndrome, with unspecified organ involvement     Osteoporosis, unspecified osteoporosis type, unspecified pathological fracture presence     Raynaud's disease without gangrene        Plan:      Encounter Diagnoses   Name Primary?    Melasma Yes    Arthralgia, unspecified joint     Ankylosing spondylitis of thoracolumbar region     Chronic pain syndrome     Immunodeficiency disorder     CVID (common variable immunodeficiency)     Immunocompromised     Nausea     Sjogren's syndrome, with unspecified organ involvement     Osteoporosis, unspecified osteoporosis type, unspecified pathological fracture presence     Raynaud's disease without gangrene      Breanna was seen today for disease management.    Diagnoses and all orders for this visit:    Melasma  -     tranexamic acid (LYSTEDA) 650 mg tablet; Take 1 tablet (650 mg total) by mouth once daily.    Arthralgia, unspecified joint  -     oxyCODONE-acetaminophen (PERCOCET) 7.5-325 mg per tablet; Take 1 tablet by mouth every 8 (eight) hours as needed for Pain.  -     oxyCODONE-acetaminophen (PERCOCET) 7.5-325 mg per tablet; Take 1 tablet by mouth every 8 (eight) hours as needed for Pain.  -     oxyCODONE-acetaminophen (PERCOCET) 7.5-325 mg per tablet; Take 1 tablet by mouth every 8 (eight) hours as needed for Pain.  -     tirzepatide 7.5 mg/0.5 mL PnIj; Inject 7.5 mg into the skin every 7 days.    Ankylosing spondylitis of thoracolumbar  region  -     oxyCODONE-acetaminophen (PERCOCET) 7.5-325 mg per tablet; Take 1 tablet by mouth every 8 (eight) hours as needed for Pain.  -     oxyCODONE-acetaminophen (PERCOCET) 7.5-325 mg per tablet; Take 1 tablet by mouth every 8 (eight) hours as needed for Pain.  -     oxyCODONE-acetaminophen (PERCOCET) 7.5-325 mg per tablet; Take 1 tablet by mouth every 8 (eight) hours as needed for Pain.  -     tiZANidine (ZANAFLEX) 4 MG tablet; Take 1 tablet (4 mg total) by mouth every 8 (eight) hours.  -     ketorolac injection 60 mg  -     methylPREDNISolone acetate injection 160 mg  -     cyanocobalamin injection 1,000 mcg  -     Sedimentation rate; Future  -     C-Reactive Protein; Future  -     Comprehensive Metabolic Panel; Future  -     CBC Auto Differential; Future    Chronic pain syndrome  -     oxyCODONE-acetaminophen (PERCOCET) 7.5-325 mg per tablet; Take 1 tablet by mouth every 8 (eight) hours as needed for Pain.  -     oxyCODONE-acetaminophen (PERCOCET) 7.5-325 mg per tablet; Take 1 tablet by mouth every 8 (eight) hours as needed for Pain.  -     oxyCODONE-acetaminophen (PERCOCET) 7.5-325 mg per tablet; Take 1 tablet by mouth every 8 (eight) hours as needed for Pain.  -     ketorolac injection 60 mg  -     methylPREDNISolone acetate injection 160 mg  -     cyanocobalamin injection 1,000 mcg  -     Sedimentation rate; Future  -     C-Reactive Protein; Future  -     Comprehensive Metabolic Panel; Future  -     CBC Auto Differential; Future    Immunodeficiency disorder  -     ketorolac injection 60 mg  -     methylPREDNISolone acetate injection 160 mg  -     cyanocobalamin injection 1,000 mcg  -     Sedimentation rate; Future  -     C-Reactive Protein; Future  -     Comprehensive Metabolic Panel; Future  -     CBC Auto Differential; Future    CVID (common variable immunodeficiency)  -     oxyCODONE-acetaminophen (PERCOCET) 7.5-325 mg per tablet; Take 1 tablet by mouth every 8 (eight) hours as needed for Pain.  -      oxyCODONE-acetaminophen (PERCOCET) 7.5-325 mg per tablet; Take 1 tablet by mouth every 8 (eight) hours as needed for Pain.  -     oxyCODONE-acetaminophen (PERCOCET) 7.5-325 mg per tablet; Take 1 tablet by mouth every 8 (eight) hours as needed for Pain.  -     ketorolac injection 60 mg  -     methylPREDNISolone acetate injection 160 mg  -     cyanocobalamin injection 1,000 mcg  -     Sedimentation rate; Future  -     C-Reactive Protein; Future  -     Comprehensive Metabolic Panel; Future  -     CBC Auto Differential; Future    Immunocompromised  -     oxyCODONE-acetaminophen (PERCOCET) 7.5-325 mg per tablet; Take 1 tablet by mouth every 8 (eight) hours as needed for Pain.  -     oxyCODONE-acetaminophen (PERCOCET) 7.5-325 mg per tablet; Take 1 tablet by mouth every 8 (eight) hours as needed for Pain.  -     oxyCODONE-acetaminophen (PERCOCET) 7.5-325 mg per tablet; Take 1 tablet by mouth every 8 (eight) hours as needed for Pain.  -     ketorolac injection 60 mg  -     methylPREDNISolone acetate injection 160 mg  -     cyanocobalamin injection 1,000 mcg    Nausea  -     ondansetron (ZOFRAN-ODT) 4 MG TbDL; Take 1 tablet (4 mg total) by mouth every 8 (eight) hours as needed (nausea).  -     ketorolac injection 60 mg  -     methylPREDNISolone acetate injection 160 mg  -     cyanocobalamin injection 1,000 mcg    Sjogren's syndrome, with unspecified organ involvement  -     tiZANidine (ZANAFLEX) 4 MG tablet; Take 1 tablet (4 mg total) by mouth every 8 (eight) hours.  -     Sedimentation rate; Future  -     C-Reactive Protein; Future  -     Comprehensive Metabolic Panel; Future  -     CBC Auto Differential; Future    Osteoporosis, unspecified osteoporosis type, unspecified pathological fracture presence  -     Sedimentation rate; Future  -     C-Reactive Protein; Future  -     Comprehensive Metabolic Panel; Future  -     CBC Auto Differential; Future    Raynaud's disease without gangrene  -     sildenafil (REVATIO) 20 mg Tab;  Take 1 tablet (20 mg total) by mouth 3 (three) times daily.        1. Perocet refilled  2. Labs ordered  3. F/u 6 months     Follow-up 6 months    More than 50% of the  61 minute encounter was spent face to face counseling the patient regarding current status and future plan of care as well as side effects  of the medications. All questions were answered to patient's satisfaction also includes  non-face to face time preparing to see the patient (eg, review of tests), Obtaining and/or reviewing separately obtained history, Documenting clinical information in the electronic or other health record, Independently interpreting results

## 2024-07-17 ENCOUNTER — DOCUMENTATION ONLY (OUTPATIENT)
Dept: PHARMACY | Facility: CLINIC | Age: 44
End: 2024-07-17
Payer: COMMERCIAL

## 2024-07-18 ENCOUNTER — INFUSION (OUTPATIENT)
Dept: INFUSION THERAPY | Facility: HOSPITAL | Age: 44
End: 2024-07-18
Attending: ALLERGY & IMMUNOLOGY
Payer: COMMERCIAL

## 2024-07-18 VITALS
RESPIRATION RATE: 18 BRPM | BODY MASS INDEX: 30.94 KG/M2 | TEMPERATURE: 98 F | HEIGHT: 63 IN | OXYGEN SATURATION: 98 % | DIASTOLIC BLOOD PRESSURE: 70 MMHG | WEIGHT: 174.63 LBS | SYSTOLIC BLOOD PRESSURE: 133 MMHG | HEART RATE: 110 BPM

## 2024-07-18 DIAGNOSIS — D83.9 CVID (COMMON VARIABLE IMMUNODEFICIENCY): Primary | ICD-10-CM

## 2024-07-18 PROCEDURE — A4216 STERILE WATER/SALINE, 10 ML: HCPCS | Mod: PN | Performed by: ALLERGY & IMMUNOLOGY

## 2024-07-18 PROCEDURE — 63600175 PHARM REV CODE 636 W HCPCS: Mod: JZ,JG,PN | Performed by: ALLERGY & IMMUNOLOGY

## 2024-07-18 PROCEDURE — 25000003 PHARM REV CODE 250: Mod: PN | Performed by: ALLERGY & IMMUNOLOGY

## 2024-07-18 PROCEDURE — 96365 THER/PROPH/DIAG IV INF INIT: CPT | Mod: PN

## 2024-07-18 PROCEDURE — 96375 TX/PRO/DX INJ NEW DRUG ADDON: CPT | Mod: PN

## 2024-07-18 PROCEDURE — 96366 THER/PROPH/DIAG IV INF ADDON: CPT | Mod: PN

## 2024-07-18 RX ORDER — SODIUM CHLORIDE 0.9 % (FLUSH) 0.9 %
10 SYRINGE (ML) INJECTION
Status: DISCONTINUED | OUTPATIENT
Start: 2024-07-18 | End: 2024-07-18 | Stop reason: HOSPADM

## 2024-07-18 RX ORDER — HEPARIN 100 UNIT/ML
500 SYRINGE INTRAVENOUS
OUTPATIENT
Start: 2024-08-15

## 2024-07-18 RX ORDER — SODIUM CHLORIDE 0.9 % (FLUSH) 0.9 %
10 SYRINGE (ML) INJECTION
OUTPATIENT
Start: 2024-08-15

## 2024-07-18 RX ORDER — FAMOTIDINE 10 MG/ML
20 INJECTION INTRAVENOUS
OUTPATIENT
Start: 2024-08-15

## 2024-07-18 RX ORDER — ACETAMINOPHEN 325 MG/1
650 TABLET ORAL
Status: COMPLETED | OUTPATIENT
Start: 2024-07-18 | End: 2024-07-18

## 2024-07-18 RX ORDER — DIPHENHYDRAMINE HYDROCHLORIDE 50 MG/ML
25 INJECTION INTRAMUSCULAR; INTRAVENOUS
OUTPATIENT
Start: 2024-08-15

## 2024-07-18 RX ORDER — HEPARIN 100 UNIT/ML
500 SYRINGE INTRAVENOUS
Status: DISCONTINUED | OUTPATIENT
Start: 2024-07-18 | End: 2024-07-18 | Stop reason: HOSPADM

## 2024-07-18 RX ORDER — ACETAMINOPHEN 325 MG/1
650 TABLET ORAL
OUTPATIENT
Start: 2024-08-15

## 2024-07-18 RX ORDER — FAMOTIDINE 10 MG/ML
20 INJECTION INTRAVENOUS
Status: COMPLETED | OUTPATIENT
Start: 2024-07-18 | End: 2024-07-18

## 2024-07-18 RX ORDER — DIPHENHYDRAMINE HYDROCHLORIDE 50 MG/ML
25 INJECTION INTRAMUSCULAR; INTRAVENOUS
Status: COMPLETED | OUTPATIENT
Start: 2024-07-18 | End: 2024-07-18

## 2024-07-18 RX ADMIN — DIPHENHYDRAMINE HYDROCHLORIDE 25 MG: 50 INJECTION, SOLUTION INTRAMUSCULAR; INTRAVENOUS at 10:07

## 2024-07-18 RX ADMIN — Medication 500 UNITS: at 01:07

## 2024-07-18 RX ADMIN — FAMOTIDINE 20 MG: 10 INJECTION INTRAVENOUS at 10:07

## 2024-07-18 RX ADMIN — HUMAN IMMUNOGLOBULIN G 35 G: 20 LIQUID INTRAVENOUS at 10:07

## 2024-07-18 RX ADMIN — SODIUM CHLORIDE: 9 INJECTION, SOLUTION INTRAVENOUS at 09:07

## 2024-07-18 RX ADMIN — Medication 10 ML: at 01:07

## 2024-07-18 RX ADMIN — ACETAMINOPHEN 650 MG: 325 TABLET ORAL at 10:07

## 2024-07-18 NOTE — PLAN OF CARE
Problem: Adult Inpatient Plan of Care  Goal: Plan of Care Review  7/18/2024 1502 by Crystal Barry, RN  Outcome: Progressing  Flowsheets (Taken 7/18/2024 1325)  Plan of Care Reviewed With:   patient   spouse  7/18/2024 1021 by Crystal Barry, RN  Outcome: Progressing   Pt tolerated her IVIG infusion well, NAD. No new c/o voiced. Pt given a schedule and reviewed, pt verbalized understanding. Pt ambulated out of the clinic without difficulty accompanied by her .

## 2024-07-18 NOTE — PLAN OF CARE
Problem: Fatigue  Goal: Improved Activity Tolerance  Intervention: Promote Improved Energy  Flowsheets (Taken 7/18/2024 1021)  Fatigue Management:   activity schedule adjusted   fatigue-related activity identified   frequent rest breaks encouraged   paced activity encouraged  Sleep/Rest Enhancement:   natural light exposure provided   relaxation techniques promoted   regular sleep/rest pattern promoted  Activity Management:   Ambulated -L4   Ambulated to bathroom - L4   Ambulated in magana - L4   Ambulated in room - L4   Up in stretcher chair - L1  Environmental Support:   personal routine supported   calm environment promoted   rest periods encouraged     Problem: Adult Inpatient Plan of Care  Goal: Patient-Specific Goal (Individualized)  Outcome: Progressing  Flowsheets (Taken 7/18/2024 1021)  Individualized Care Needs: recliner, conversation,  at chairside  Anxieties, Fears or Concerns: none  Patient/Family-Specific Goals (Include Timeframe): no s/s of reaction during treatment

## 2024-07-19 ENCOUNTER — TELEPHONE (OUTPATIENT)
Dept: ALLERGY | Facility: CLINIC | Age: 44
End: 2024-07-19
Payer: COMMERCIAL

## 2024-07-19 NOTE — TELEPHONE ENCOUNTER
Spoke to pt and relayed Dr. Prado's recommendations below. Pt voiced understanding.  Advised to reach out if any further concerns.          ----- Message from Kathie Prado MD sent at 7/19/2024  2:56 PM CDT -----  Contact: self  Systemic side effects are common with IVIG, often get better with subsequent infusions but HA, aches, fatigue, low grade fever are common. Can treat with tylenol and if needed benadryl  ----- Message -----  From: Andrés Alexis LPN  Sent: 7/19/2024   2:38 PM CDT  To: Kathie Prado MD    Hi Dr. Prado,    Above pt calls to report had 1st Privigen infusion 07/18/2024. Woke this am about 9:30 with bad headache, muscle cramps in legs,chills, and nausea but no vomiting. Also feels like she has fever, but temp normal. Also port feels sore. Took Tylenol 100 mg at 9:30 am, and after no relief of sx, took Ibuprofen 800 mg.    Please advise  ----- Message -----  From: Dia Alfaro  Sent: 7/19/2024   2:07 PM CDT  To: Johan CHRISTIAN Staff    Type: Needs Medical Advice  Who Called:  pt  Symptoms (please be specific):  horrible headache/chills/muscle cramp/nausea   How long has patient had these symptoms:  since this am but has gotten worse through the day  Pharm[facility] and phone #[facility]    [id number] [place] [facility] [address]  [address] [address]  Phone: [phone] Fax: [phone]     Best Call Back Number: [phone]   Additional Information: pt had first infusion yesterday and is now having the above symptoms.please call

## 2024-07-30 ENCOUNTER — TELEPHONE (OUTPATIENT)
Dept: ALLERGY | Facility: CLINIC | Age: 44
End: 2024-07-30
Payer: COMMERCIAL

## 2024-07-30 NOTE — TELEPHONE ENCOUNTER
resending inbasket message regariding lowering cost  Received: Today  Jah Elam Cathryn C., MD; JARED CHRISTIAN Staff  Cc: Ronald Mays LPN  good afternoon we are informing you that the patient is approved unitl august 1 2024 but then after that patient must go to a lower lever of cost facility to receive prvigen and that we will be forwarding the case to the medication access team, thank you

## 2024-07-30 NOTE — TELEPHONE ENCOUNTER
follow up ref on [id number]  Received: Today  Jah Elam Staff; Kathie Prado MD  Cc: Ronald Mays, KIP  Good afternoon    We are informing you that the the patient is approved until like August something But then after that  the patient must go to a lower level of cost facility to receive Privigen and that we will be forwarding the case to medication access team. Thank you.        Jah Elam RN

## 2024-07-30 NOTE — TELEPHONE ENCOUNTER
PRIVIGEN  DENIED  Received: Jah Davison Staff; Kathie Prado MD  Cc: Ronald Mays LPN  Good morning,    Please see denial details below:    Referral#[id number]-hotree [facility] CHOICE PLUS authorization request denied for  PRIVIGEN    The request has been denied [id number] due to criteria not met as per checking portal and also talk with primary insurance of the member and they said that the office already received the denial letter and was issued on 2024 and they provided appeals fax#[phone] and  expedited appeals fax # [phone].      details are also available via portal or may call [phone] for additional question  if you would like to appeal please reference the number on back of the card    name: christiano glover  :1980  primary coverage payor [facility]  primary plan name [facility] CHOICE PLUS  primary coverage id [id number]  primary group number# [id number]      Thank you,    Jah Elam RN  [facility]  Pre Service Department

## 2024-07-31 ENCOUNTER — TELEPHONE (OUTPATIENT)
Dept: ALLERGY | Facility: CLINIC | Age: 44
End: 2024-07-31
Payer: COMMERCIAL

## 2024-07-31 NOTE — TELEPHONE ENCOUNTER
MAC-JULY 31 2024 JARAD ARIAS PRIVIGEN  Received: Today  [facility]  Cc: JARED CHRISTIAN Staff; Ronald Mays LPN; Nevaeh Tony; Dimitrios Farmer; Bernice Vallecillo    Patient and Provider ID  Patient name:  JARAD ARIAS      Patient MRN:  [id number]    Provider Name or NPI:   [id number]       Patient Medication Info  Med Name:   PRIVIGEN    Med strength:   35 g    Administration Route:     Intravenous  Administration Frequency:    EVERY 4 WEEKS  J-Code:       Indication:    CVID (common variable immunodeficiency)  Diagnosis Code:     D83.9  Additional Med Info:     NA     Referral Info  Status  open  Denial Reason  Site of Care        Situation Explanation and Background Information  Current Situation  We are able to obtain prior authorization for date of service 07/01/2024-08/1/2024.upon requesting re authorization the request was denied due to site of care.patient's insurance requires that privigen be administered in a lower level of cost facility such as doctor's office, stand alone infusion, or home infusion.     What resolution avenues have been tried?  N/A     Reasons for Medication Access Center Referral  Prior Auth Submitted:     Yes  Prior Auth Denied:     Yes  Donut Hole:     N/A  Prohibitave Out of Pocket Cost:     N/A  Medical Benefit Only:     N/A  Pharmacy Benefit Only:     N/A     What actions are needed to resolve the situation?Needing MAC's assistance with re-direction to a lower level cost facility.  Please include names, phone numbers, and/or e-mail addresses.  [facility] -[phone]

## 2024-08-15 ENCOUNTER — INFUSION (OUTPATIENT)
Dept: INFUSION THERAPY | Facility: HOSPITAL | Age: 44
End: 2024-08-15
Attending: INTERNAL MEDICINE
Payer: COMMERCIAL

## 2024-08-15 VITALS
DIASTOLIC BLOOD PRESSURE: 98 MMHG | SYSTOLIC BLOOD PRESSURE: 156 MMHG | RESPIRATION RATE: 16 BRPM | WEIGHT: 173.94 LBS | HEIGHT: 63 IN | TEMPERATURE: 99 F | HEART RATE: 88 BPM | BODY MASS INDEX: 30.82 KG/M2

## 2024-08-15 DIAGNOSIS — M45.9 ANKYLOSING SPONDYLITIS, UNSPECIFIED SITE OF SPINE: ICD-10-CM

## 2024-08-15 DIAGNOSIS — M81.0 OSTEOPOROSIS, UNSPECIFIED OSTEOPOROSIS TYPE, UNSPECIFIED PATHOLOGICAL FRACTURE PRESENCE: Primary | ICD-10-CM

## 2024-08-15 PROCEDURE — 63600175 PHARM REV CODE 636 W HCPCS: Mod: JZ,JG,PN | Performed by: INTERNAL MEDICINE

## 2024-08-15 PROCEDURE — 96372 THER/PROPH/DIAG INJ SC/IM: CPT | Mod: PN

## 2024-08-15 RX ADMIN — DENOSUMAB 60 MG: 60 INJECTION SUBCUTANEOUS at 02:08

## 2024-08-15 NOTE — PLAN OF CARE
Problem: Adult Inpatient Plan of Care  Goal: Plan of Care Review  Outcome: Progressing  Flowsheets (Taken 8/15/2024 1446)  Plan of Care Reviewed With: patient  Goal: Patient-Specific Goal (Individualized)  Outcome: Progressing  Flowsheets (Taken 8/15/2024 1446)  Individualized Care Needs: recliner, conversation, education  Anxieties, Fears or Concerns: none  Patient/Family-Specific Goals (Include Timeframe): no s/s of reaction with injection     Problem: Fatigue  Goal: Improved Activity Tolerance  Outcome: Progressing  Intervention: Promote Improved Energy  Flowsheets (Taken 8/15/2024 1446)  Activity Management: Up in chair - L3   Patient tolerated Prolia injection well, d/c in no acute distress.

## 2024-08-28 ENCOUNTER — PATIENT MESSAGE (OUTPATIENT)
Dept: ALLERGY | Facility: CLINIC | Age: 44
End: 2024-08-28
Payer: COMMERCIAL

## 2024-08-29 DIAGNOSIS — M45.5 ANKYLOSING SPONDYLITIS OF THORACOLUMBAR REGION: ICD-10-CM

## 2024-08-29 RX ORDER — UPADACITINIB 15 MG/1
15 TABLET, EXTENDED RELEASE ORAL DAILY
Qty: 30 TABLET | Refills: 11 | Status: ACTIVE | OUTPATIENT
Start: 2024-08-29

## 2024-08-29 NOTE — TELEPHONE ENCOUNTER
----- Message from Whit Santamaria sent at 8/29/2024  7:53 AM CDT -----  [facility]  Type:  Needs Medical Advice    [facility]  Symptoms (please be specific): pt needs a rx script renewal for Rinvoq 15mg.         Would the patient rather a call back or a response via [facility]? call  Best Call Back Number: [phone] fax # [phone]    Additional Information: please advise and thank you.

## 2024-09-20 ENCOUNTER — TELEPHONE (OUTPATIENT)
Dept: ALLERGY | Facility: CLINIC | Age: 44
End: 2024-09-20
Payer: COMMERCIAL

## 2024-09-27 DIAGNOSIS — R11.0 NAUSEA: ICD-10-CM

## 2024-09-27 RX ORDER — ONDANSETRON 4 MG/1
4 TABLET, ORALLY DISINTEGRATING ORAL EVERY 8 HOURS PRN
Qty: 45 TABLET | Refills: 2 | Status: SHIPPED | OUTPATIENT
Start: 2024-09-27

## 2024-10-28 ENCOUNTER — PATIENT MESSAGE (OUTPATIENT)
Dept: GASTROENTEROLOGY | Facility: CLINIC | Age: 44
End: 2024-10-28
Payer: COMMERCIAL

## 2024-10-29 ENCOUNTER — OFFICE VISIT (OUTPATIENT)
Dept: UROLOGY | Facility: CLINIC | Age: 44
End: 2024-10-29
Payer: COMMERCIAL

## 2024-10-29 VITALS — HEIGHT: 63 IN | WEIGHT: 162.06 LBS | BODY MASS INDEX: 28.71 KG/M2

## 2024-10-29 DIAGNOSIS — D83.9 CVID (COMMON VARIABLE IMMUNODEFICIENCY): ICD-10-CM

## 2024-10-29 DIAGNOSIS — N39.0 RECURRENT UTI: Primary | ICD-10-CM

## 2024-10-29 DIAGNOSIS — N20.0 KIDNEY STONES: ICD-10-CM

## 2024-10-29 LAB
BILIRUBIN, UA POC OHS: NEGATIVE
BLOOD, UA POC OHS: NEGATIVE
CLARITY, UA POC OHS: CLEAR
COLOR, UA POC OHS: YELLOW
GLUCOSE, UA POC OHS: NEGATIVE
KETONES, UA POC OHS: NEGATIVE
LEUKOCYTES, UA POC OHS: NEGATIVE
NITRITE, UA POC OHS: NEGATIVE
PH, UA POC OHS: 7
PROTEIN, UA POC OHS: NEGATIVE
SPECIFIC GRAVITY, UA POC OHS: <=1.005
UROBILINOGEN, UA POC OHS: 0.2

## 2024-10-29 PROCEDURE — 3008F BODY MASS INDEX DOCD: CPT | Mod: CPTII,S$GLB,, | Performed by: STUDENT IN AN ORGANIZED HEALTH CARE EDUCATION/TRAINING PROGRAM

## 2024-10-29 PROCEDURE — 81003 URINALYSIS AUTO W/O SCOPE: CPT | Mod: QW,S$GLB,, | Performed by: STUDENT IN AN ORGANIZED HEALTH CARE EDUCATION/TRAINING PROGRAM

## 2024-10-29 PROCEDURE — 99214 OFFICE O/P EST MOD 30 MIN: CPT | Mod: S$GLB,,, | Performed by: STUDENT IN AN ORGANIZED HEALTH CARE EDUCATION/TRAINING PROGRAM

## 2024-10-29 PROCEDURE — 1159F MED LIST DOCD IN RCRD: CPT | Mod: CPTII,S$GLB,, | Performed by: STUDENT IN AN ORGANIZED HEALTH CARE EDUCATION/TRAINING PROGRAM

## 2024-10-29 PROCEDURE — 99999 PR PBB SHADOW E&M-EST. PATIENT-LVL IV: CPT | Mod: PBBFAC,,, | Performed by: STUDENT IN AN ORGANIZED HEALTH CARE EDUCATION/TRAINING PROGRAM

## 2024-10-29 RX ORDER — TRANEXAMIC ACID 650 MG/1
650 TABLET ORAL
COMMUNITY
Start: 2024-10-01

## 2024-10-29 RX ORDER — TESTOSTERONE CYPIONATE 200 MG/ML
INJECTION, SOLUTION INTRAMUSCULAR
COMMUNITY
Start: 2024-08-12

## 2024-10-29 RX ORDER — OXYCODONE AND ACETAMINOPHEN 7.5; 325 MG/1; MG/1
1 TABLET ORAL EVERY 8 HOURS PRN
COMMUNITY

## 2024-11-13 PROBLEM — E11.69 TYPE 2 DIABETES MELLITUS WITH OTHER SPECIFIED COMPLICATION, UNSPECIFIED WHETHER LONG TERM INSULIN USE: Status: RESOLVED | Noted: 2024-04-04 | Resolved: 2024-11-13

## 2024-11-20 ENCOUNTER — PATIENT MESSAGE (OUTPATIENT)
Dept: RHEUMATOLOGY | Facility: CLINIC | Age: 44
End: 2024-11-20
Payer: COMMERCIAL

## 2024-12-04 ENCOUNTER — PATIENT MESSAGE (OUTPATIENT)
Dept: RHEUMATOLOGY | Facility: CLINIC | Age: 44
End: 2024-12-04
Payer: COMMERCIAL

## 2024-12-19 DIAGNOSIS — N39.0 RECURRENT UTI: Primary | ICD-10-CM

## 2024-12-20 RX ORDER — ESTRADIOL 0.1 MG/G
CREAM VAGINAL
Qty: 126 G | Refills: 2 | Status: SHIPPED | OUTPATIENT
Start: 2024-12-20

## 2025-01-16 ENCOUNTER — OFFICE VISIT (OUTPATIENT)
Dept: RHEUMATOLOGY | Facility: CLINIC | Age: 45
End: 2025-01-16
Payer: COMMERCIAL

## 2025-01-16 DIAGNOSIS — E11.69 TYPE 2 DIABETES MELLITUS WITH OTHER SPECIFIED COMPLICATION, UNSPECIFIED WHETHER LONG TERM INSULIN USE: ICD-10-CM

## 2025-01-16 DIAGNOSIS — R11.0 NAUSEA: ICD-10-CM

## 2025-01-16 DIAGNOSIS — E55.9 VITAMIN D DEFICIENCY: ICD-10-CM

## 2025-01-16 DIAGNOSIS — Z79.899 IMMUNOCOMPROMISED STATE DUE TO DRUG THERAPY: ICD-10-CM

## 2025-01-16 DIAGNOSIS — D84.9 IMMUNODEFICIENCY DISORDER: ICD-10-CM

## 2025-01-16 DIAGNOSIS — D84.821 IMMUNOCOMPROMISED STATE DUE TO DRUG THERAPY: ICD-10-CM

## 2025-01-16 DIAGNOSIS — M45.5 ANKYLOSING SPONDYLITIS OF THORACOLUMBAR REGION: Primary | ICD-10-CM

## 2025-01-16 DIAGNOSIS — H20.9 IRITIS: Primary | ICD-10-CM

## 2025-01-16 RX ORDER — ONDANSETRON 8 MG/1
8 TABLET, ORALLY DISINTEGRATING ORAL EVERY 6 HOURS PRN
Qty: 30 TABLET | Refills: 1 | Status: SHIPPED | OUTPATIENT
Start: 2025-01-16

## 2025-01-16 RX ORDER — ERGOCALCIFEROL 1.25 MG/1
50000 CAPSULE ORAL
Qty: 12 CAPSULE | Refills: 1 | Status: SHIPPED | OUTPATIENT
Start: 2025-01-16

## 2025-01-16 RX ORDER — SULFASALAZINE 500 MG/1
1000 TABLET, DELAYED RELEASE ORAL 2 TIMES DAILY
Qty: 120 TABLET | Refills: 3 | Status: SHIPPED | OUTPATIENT
Start: 2025-01-16

## 2025-01-16 NOTE — PROGRESS NOTES
The patient location is: home  The chief complaint leading to consultation is: AS/RA    Visit type: audiovisual    Face to Face time with patient: 25 minutes  35 minutes of total time spent on the encounter, which includes face to face time and non-face to face time preparing to see the patient (eg, review of tests), Obtaining and/or reviewing separately obtained history, Documenting clinical information in the electronic or other health record, Independently interpreting results (not separately reported) and communicating results to the patient/family/caregiver, or Care coordination (not separately reported).   Each patient to whom he or she provides medical services by telemedicine is:  (1) informed of the relationship between the physician and patient and the respective role of any other health care provider with respect to management of the patient; and (2) notified that he or she may decline to receive medical services by telemedicine and may withdraw from such care at any time.    Notes:     Subjective:       Patient ID: Breanna Sanchez is a 44 y.o. female.    Chief Complaint: Disease Management    Mrs. Sanchez is a 44 year old female who presents to telemedicine [facility] for follow up on AS/RA. She ran out of SSZ since her last visit. She missed appt in November. She complains of flare of iritis on the R eye--redness and burning started yesterday. She continues to have uncontrolled joint pain in her neck, thoracic, and lumbar spine. She has re-est with Dr. Chery who is prescribing percocet, gabapentin, and zanaflex. She also recently had thoracic JENNIFER and plans to have SI joint injections.     She is currently doing SCIG monthly with Dr. Prado.    Current tx:  1. rinvoq  2 SSZ    Prior treatment:  1. Humira  2. Enbrel  3. MTX  4. Orencia  5. Simponi aria  6. Remicade  7. Cosentyx            Review of Systems   Constitutional:  Positive for activity change. Negative for appetite change, chills, fatigue, fever  and unexpected weight change.   HENT:  Negative for mouth sores and trouble swallowing.    Eyes:  Positive for redness. Negative for visual disturbance.   Respiratory:  Negative for cough and shortness of breath.    Cardiovascular:  Negative for chest pain, palpitations and leg swelling.   Gastrointestinal:  Negative for abdominal pain, constipation, diarrhea, nausea and vomiting.   Genitourinary:  Negative for dysuria and genital sores.   Musculoskeletal:  Positive for arthralgias, back pain, joint swelling, myalgias, neck pain and neck stiffness.   Skin:  Negative for rash.   Allergic/Immunologic: Positive for immunocompromised state.   Neurological:  Negative for dizziness, weakness, light-headedness and headaches.   Hematological:  Does not bruise/bleed easily.   Psychiatric/Behavioral:  The patient is not nervous/anxious.          Objective:     There were no vitals filed for this visit.        Past Medical History:   Diagnosis Date    ADHD     Ankylosing spondylitis     Anxiety     Colitis     Colon polyp     Depression     Essential hypertension     Fibromyalgia     History of kidney stones     Lupus     Migraine headache     Port-A-Cath in place     Primary immune deficiency disorder     Primary immunodeficiency disorder     Pyelonephritis 5/8/2018    Recurrent UTI     Renal disorder     Rheumatoid arteritis     Sjoegren syndrome      Past Surgical History:   Procedure Laterality Date    APPENDECTOMY      BARTHOLIN GLAND CYST EXCISION      COLONOSCOPY  2011    COLONOSCOPY N/A 7/7/2022    Procedure: COLONOSCOPY;  Surgeon: Shaggy Mae MD;  Location: [facility] ENDO;  Service: Endoscopy;  Laterality: N/A;    CYSTOSCOPY WITH CALCULUS EXTRACTION Bilateral 2/2/2023    Procedure: CYSTOSCOPY, WITH CALCULUS REMOVAL;  Surgeon: Florentin Aleman MD;  Location: [facility] OR;  Service: Urology;  Laterality: Bilateral;    CYSTOURETEROSCOPY WITH RETROGRADE PYELOGRAPHY AND INSERTION OF STENT INTO URETER Bilateral 2/2/2023     Procedure: CYSTOURETEROSCOPY, WITH RETROGRADE PYELOGRAM AND URETERAL STENT INSERTION;  Surgeon: Florentin Aleman MD;  Location: [facility] OR;  Service: Urology;  Laterality: Bilateral;    DILATION OF URETHRA      X 2    ESOPHAGOGASTRODUODENOSCOPY N/A 6/16/2023    Procedure: EGD (ESOPHAGOGASTRODUODENOSCOPY);  Surgeon: Shaggy Mae MD;  Location: [facility] ENDO;  Service: Endoscopy;  Laterality: N/A;    HYSTERECTOMY      INSERTION OF TUNNELED CENTRAL VENOUS CATHETER (CVC) WITH SUBCUTANEOUS PORT N/A 10/2/2019    Procedure: VFTRMRCIH-GOMR-K-CATH;  Surgeon: Lenin Springer MD;  Location: [facility] OR;  Service: General;  Laterality: N/A;    KNEE ARTHROSCOPY Left     LAPAROSCOPIC LYSIS OF ADHESIONS N/A 7/22/2019    Procedure: LYSIS, ADHESIONS, LAPAROSCOPIC;  Surgeon: Clarence Adams MD;  Location: [facility] OR;  Service: OB/GYN;  Laterality: N/A;    LAPAROSCOPIC SALPINGO-OOPHORECTOMY Left 7/22/2019    Procedure: SALPINGO-OOPHORECTOMY, LAPAROSCOPIC- LEFT SIDE ONLY;  Surgeon: Clarence Adams MD;  Location: [facility] OR;  Service: OB/GYN;  Laterality: Left;    OVARIAN CYST REMOVAL Left     SALPINGOOPHORECTOMY Right     TONSILLECTOMY      TOTAL SHOULDER ARTHROPLASTY Right     TUBAL LIGATION            Physical Exam   Constitutional: She is oriented to person, place, and time.   Neurological: She is oriented to person, place, and time.         Labs:     Component      Latest Ref Rn 11/13/2024   WBC      3.90 - 12.70 K/uL 9.06    RBC      4.00 - 5.40 M/uL 4.67    Hemoglobin      12.0 - 16.0 g/dL 13.6    Hematocrit      37.0 - 48.5 % 41.0    MCV      82 - 98 fL 88    MCH      27.0 - 31.0 pg 29.1    MCHC      32.0 - 36.0 g/dL 33.2    RDW      11.5 - 14.5 % 12.9    Platelet Count      150 - 450 K/uL 326    MPV      9.2 - 12.9 fL 10.1    Immature Granulocytes      0.0 - 0.5 % 0.2    Gran # (ANC)      1.8 - 7.7 K/uL 3.8    Immature Grans (Abs)      0.00 - 0.04 K/uL 0.02    Lymph #      1.0 - 4.8 K/uL 4.1    Mono #      0.3 - 1.0 K/uL 0.7    Eos  #      0.0 - 0.5 K/uL 0.4    Baso #      0.00 - 0.20 K/uL 0.07    nRBC      0 /100 WBC 0    Gran %      38.0 - 73.0 % 41.9    Lymph %      18.0 - 48.0 % 45.1    Mono %      4.0 - 15.0 % 8.1    Eos %      0.0 - 8.0 % 3.9    Basophil %      0.0 - 1.9 % 0.8    Differential Method Automated    Sodium      136 - 145 mmol/L 139    Potassium      3.5 - 5.1 mmol/L 3.3 (L)    Chloride      95 - 110 mmol/L 100    CO2      22 - 31 mmol/L 27    Glucose      70 - 110 mg/dL 97    BUN      7 - 18 mg/dL 6 (L)    Creatinine      0.50 - 1.40 mg/dL 0.63    Calcium      8.4 - 10.2 mg/dL 9.1    PROTEIN TOTAL      6.0 - 8.4 g/dL 7.6    Albumin      3.5 - 5.2 g/dL 4.9    BILIRUBIN TOTAL      0.2 - 1.3 mg/dL 0.4    ALP      38 - 145 U/L 74    AST      14 - 36 U/L 25    ALT      0 - 35 U/L 29    Anion Gap      5 - 12 mmol/L 12    eGFR      >60 mL/min/1.73 m^2 >60    Cholesterol Total      120 - 199 mg/dL 220 (H)    Triglycerides      30 - 150 mg/dL 154 (H)    HDL      40 - 75 mg/dL 62    LDL Cholesterol      63.0 - 159.0 mg/dL 127.2    HDL/Cholesterol Ratio      20.0 - 50.0 % 28.2    Total Cholesterol/HDL Ratio      2.0 - 5.0  3.5    Non-HDL Cholesterol      mg/dL 158    Hemoglobin A1C External      0.0 - 5.6 % 5.0    Estimated Avg Glucose      68 - 131 mg/dL 97    TSH      0.400 - 4.000 uIU/mL 3.120    Free T4      0.78 - 2.19 ng/dL 1.43    CRP      0.00 - 0.90 mg/dL 0.80    Sed Rate      0 - 19 mm/Hr 15      Assessment and Plan:       Ankylosing spondylitis, sjogren's syndrome, CHARLIE, fibromyalgia  --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 Ankylosing spondylitis of thoracolumbar region (Primary)  Cont  mg bid  Consider changing Rinvoq to Xeljanz vs Bimzelx  - sulfaSALAzine (AZULFIDINE) 500 MG EC tablet; Take 2 tablets (1,000 mg total) by mouth 2 (two) times daily.  Dispense: 120 tablet;  Refill: 3    Check labs prior to next visit    2. Immunodeficiency disorder  On IVIG per Immunology    3. Immunocompromised state due to drug therapy  On Rinvoq, 2 ER visits since last appt for UTI    4. Type 2 diabetes mellitus with other specified complication, unspecified whether long term insulin use  Cont  - tirzepatide 7.5 mg/0.5 mL PnIj; Inject 7.5 mg into the skin every 7 days.  Dispense: 4 Pen; Refill: 2    5. Vitamin D deficiency  Cont  - ergocalciferol (ERGOCALCIFEROL) 50,000 unit Cap; Take 1 capsule (50,000 Units total) by mouth every 7 days.  Dispense: 12 capsule; Refill: 1    6. Nausea  Cont  - ondansetron (ZOFRAN-ODT) 8 MG TbDL; Take 1 tablet (8 mg total) by mouth every 6 (six) hours as needed (nausea).  Dispense: 30 tablet; Refill: 1    Pred forte eye drops pended for MD. If any worsening eye pain/redness go to Ophthalmology for evaluation  Find out if Dr. Sawant wants her to take procardia and sildenafil together?    Follow up:  2 mo Dr. Sawant

## 2025-01-17 ENCOUNTER — PATIENT MESSAGE (OUTPATIENT)
Dept: RHEUMATOLOGY | Facility: CLINIC | Age: 45
End: 2025-01-17
Payer: COMMERCIAL

## 2025-01-17 DIAGNOSIS — H20.9 UVEITIS: Primary | ICD-10-CM

## 2025-01-17 RX ORDER — PREDNISOLONE ACETATE 10 MG/ML
1 SUSPENSION/ DROPS OPHTHALMIC 4 TIMES DAILY
Qty: 10 ML | Refills: 0 | Status: SHIPPED | OUTPATIENT
Start: 2025-01-17 | End: 2025-01-27

## 2025-01-20 RX ORDER — PREDNISOLONE ACETATE 10 MG/ML
1 SUSPENSION/ DROPS OPHTHALMIC 2 TIMES DAILY
Qty: 5 ML | Refills: 0 | OUTPATIENT
Start: 2025-01-20 | End: 2025-01-30

## 2025-01-24 ENCOUNTER — TELEPHONE (OUTPATIENT)
Dept: RHEUMATOLOGY | Facility: CLINIC | Age: 45
End: 2025-01-24
Payer: COMMERCIAL

## 2025-01-24 ENCOUNTER — PATIENT MESSAGE (OUTPATIENT)
Dept: RHEUMATOLOGY | Facility: CLINIC | Age: 45
End: 2025-01-24
Payer: COMMERCIAL

## 2025-01-24 NOTE — TELEPHONE ENCOUNTER
Sent patient a message via [employer] requesting where to schedule her next set of labs prior to her next MD appointment.  Karmen SHIN

## 2025-01-24 NOTE — TELEPHONE ENCOUNTER
----- Message from Margo Acuna PA-C sent at 1/16/2025  6:03 PM CST -----  Labs 1 week prior to next visit

## 2025-01-27 ENCOUNTER — TELEPHONE (OUTPATIENT)
Dept: INFUSION THERAPY | Facility: HOSPITAL | Age: 45
End: 2025-01-27
Payer: COMMERCIAL

## 2025-01-27 ENCOUNTER — PATIENT MESSAGE (OUTPATIENT)
Dept: ALLERGY | Facility: CLINIC | Age: 45
End: 2025-01-27
Payer: COMMERCIAL

## 2025-01-27 NOTE — TELEPHONE ENCOUNTER
----- Message from Alberta sent at 1/27/2025 12:17 PM CST -----  Type: Needs Medical Advice  Who Called:  Patient   Symptoms (please be specific):    How long has patient had these symptoms:    Pharmacy name and phone #:    Best Call Back Number: [phone] /   Additional Information: Patient is requesting a call back from the nurse regarding her infusion.

## 2025-01-27 NOTE — TELEPHONE ENCOUNTER
Spoke with the patient and let her know that she goes the infusion center down [address] not at the main infusion center

## 2025-02-05 PROBLEM — F33.9 EPISODE OF RECURRENT MAJOR DEPRESSIVE DISORDER, UNSPECIFIED DEPRESSION EPISODE SEVERITY: Status: ACTIVE | Noted: 2025-02-05

## 2025-02-10 ENCOUNTER — PATIENT MESSAGE (OUTPATIENT)
Dept: RHEUMATOLOGY | Facility: CLINIC | Age: 45
End: 2025-02-10
Payer: COMMERCIAL

## 2025-02-10 DIAGNOSIS — M45.5 ANKYLOSING SPONDYLITIS OF THORACOLUMBAR REGION: Primary | ICD-10-CM

## 2025-02-11 RX ORDER — BIMEKIZUMAB 160 MG/ML
160 INJECTION, SOLUTION SUBCUTANEOUS
Qty: 1 ML | Refills: 11 | Status: SHIPPED | OUTPATIENT
Start: 2025-02-11

## 2025-02-19 ENCOUNTER — TELEPHONE (OUTPATIENT)
Dept: RHEUMATOLOGY | Facility: CLINIC | Age: 45
End: 2025-02-19
Payer: COMMERCIAL

## 2025-02-19 NOTE — TELEPHONE ENCOUNTER
----- Message from Pharmacist Leana sent at 2/18/2025  1:27 PM CST -----  Regarding: Bimzelx  Good afternoon Dr. Sawant,OSP received the order for Ms. Sanchez's Bimzelx.  After reviewing her chart, I see she has a history of depression.  I wanted to confirm you would like to proceed with Bimzelx since it has a warning of suicidal ideation.Thank you, Leana Low, PharmDClinical PharmacistOchsner Specialty Ylmtracn[address] [place], [place] [address]X [phone]

## 2025-02-19 NOTE — TELEPHONE ENCOUNTER
Reviewed patient chart. Seems that the patient is stable regarding depression and anxiety. It is considered appropriate to start Bimzelx, given the very low risk of acute worsening of dysphoric mood and the development of suicidal ideation and behavior. Will monitor.    Demetrio Mack,PharmD  PGY-1 Pharmacy Resident

## 2025-03-06 ENCOUNTER — TELEPHONE (OUTPATIENT)
Dept: RHEUMATOLOGY | Facility: CLINIC | Age: 45
End: 2025-03-06
Payer: COMMERCIAL

## 2025-03-06 NOTE — TELEPHONE ENCOUNTER
----- Message from Radha sent at 3/5/2025  1:42 PM CST -----  [facility]  Type:  Pharmacy Calling to Clarify an RXName of Caller: Inés / Phone [phone] Name:  [facility] Prescription Name: bimekizumab-bkzx (BIMZELX AUTOINJECTOR) 160 mg/mL AtInWhat do they need to clarify?: comes in an unbreakable pack of two / Rx written for Aurelia accept a verbal or fax: [phone] call to advise... Thank you...

## 2025-03-10 ENCOUNTER — LAB VISIT (OUTPATIENT)
Dept: LAB | Facility: HOSPITAL | Age: 45
End: 2025-03-10
Attending: PHYSICIAN ASSISTANT
Payer: COMMERCIAL

## 2025-03-10 DIAGNOSIS — M45.5 ANKYLOSING SPONDYLITIS OF THORACOLUMBAR REGION: ICD-10-CM

## 2025-03-10 DIAGNOSIS — E55.9 VITAMIN D DEFICIENCY: ICD-10-CM

## 2025-03-10 LAB — ERYTHROCYTE [SEDIMENTATION RATE] IN BLOOD BY WESTERGREN METHOD: 14 MM/HR (ref 0–20)

## 2025-03-10 PROCEDURE — 86140 C-REACTIVE PROTEIN: CPT | Performed by: PHYSICIAN ASSISTANT

## 2025-03-10 PROCEDURE — 82306 VITAMIN D 25 HYDROXY: CPT | Performed by: PHYSICIAN ASSISTANT

## 2025-03-10 PROCEDURE — 80053 COMPREHEN METABOLIC PANEL: CPT | Performed by: PHYSICIAN ASSISTANT

## 2025-03-10 PROCEDURE — 85651 RBC SED RATE NONAUTOMATED: CPT | Mod: PO | Performed by: PHYSICIAN ASSISTANT

## 2025-03-10 PROCEDURE — 36415 COLL VENOUS BLD VENIPUNCTURE: CPT | Mod: PO | Performed by: PHYSICIAN ASSISTANT

## 2025-03-10 PROCEDURE — 85025 COMPLETE CBC W/AUTO DIFF WBC: CPT | Performed by: PHYSICIAN ASSISTANT

## 2025-03-11 ENCOUNTER — RESULTS FOLLOW-UP (OUTPATIENT)
Dept: HEMATOLOGY/ONCOLOGY | Facility: CLINIC | Age: 45
End: 2025-03-11

## 2025-03-11 LAB
25(OH)D3+25(OH)D2 SERPL-MCNC: 18 NG/ML (ref 30–96)
ALBUMIN SERPL BCP-MCNC: 4 G/DL (ref 3.5–5.2)
ALP SERPL-CCNC: 93 U/L (ref 40–150)
ALT SERPL W/O P-5'-P-CCNC: 11 U/L (ref 10–44)
ANION GAP SERPL CALC-SCNC: 9 MMOL/L (ref 8–16)
AST SERPL-CCNC: 58 U/L (ref 10–40)
BASOPHILS # BLD AUTO: 0.06 K/UL (ref 0–0.2)
BASOPHILS NFR BLD: 0.8 % (ref 0–1.9)
BILIRUB SERPL-MCNC: 0.2 MG/DL (ref 0.1–1)
BUN SERPL-MCNC: 7 MG/DL (ref 6–20)
CALCIUM SERPL-MCNC: 9.8 MG/DL (ref 8.7–10.5)
CHLORIDE SERPL-SCNC: 101 MMOL/L (ref 95–110)
CO2 SERPL-SCNC: 27 MMOL/L (ref 23–29)
CREAT SERPL-MCNC: 0.8 MG/DL (ref 0.5–1.4)
CRP SERPL-MCNC: 5 MG/L (ref 0–8.2)
DIFFERENTIAL METHOD BLD: ABNORMAL
EOSINOPHIL # BLD AUTO: 0.3 K/UL (ref 0–0.5)
EOSINOPHIL NFR BLD: 4.2 % (ref 0–8)
ERYTHROCYTE [DISTWIDTH] IN BLOOD BY AUTOMATED COUNT: 12.6 % (ref 11.5–14.5)
EST. GFR  (NO RACE VARIABLE): >60 ML/MIN/1.73 M^2
GLUCOSE SERPL-MCNC: 93 MG/DL (ref 70–110)
HCT VFR BLD AUTO: 41.3 % (ref 37–48.5)
HGB BLD-MCNC: 13.1 G/DL (ref 12–16)
IMM GRANULOCYTES # BLD AUTO: 0.02 K/UL (ref 0–0.04)
IMM GRANULOCYTES NFR BLD AUTO: 0.3 % (ref 0–0.5)
LYMPHOCYTES # BLD AUTO: 3.6 K/UL (ref 1–4.8)
LYMPHOCYTES NFR BLD: 46.6 % (ref 18–48)
MCH RBC QN AUTO: 29.4 PG (ref 27–31)
MCHC RBC AUTO-ENTMCNC: 31.7 G/DL (ref 32–36)
MCV RBC AUTO: 93 FL (ref 82–98)
MONOCYTES # BLD AUTO: 0.5 K/UL (ref 0.3–1)
MONOCYTES NFR BLD: 7 % (ref 4–15)
NEUTROPHILS # BLD AUTO: 3.2 K/UL (ref 1.8–7.7)
NEUTROPHILS NFR BLD: 41.1 % (ref 38–73)
NRBC BLD-RTO: 0 /100 WBC
PLATELET # BLD AUTO: 353 K/UL (ref 150–450)
PMV BLD AUTO: 10.5 FL (ref 9.2–12.9)
POTASSIUM SERPL-SCNC: 3.8 MMOL/L (ref 3.5–5.1)
PROT SERPL-MCNC: 7.2 G/DL (ref 6–8.4)
RBC # BLD AUTO: 4.46 M/UL (ref 4–5.4)
SODIUM SERPL-SCNC: 137 MMOL/L (ref 136–145)
WBC # BLD AUTO: 7.69 K/UL (ref 3.9–12.7)

## 2025-03-17 ENCOUNTER — OFFICE VISIT (OUTPATIENT)
Dept: RHEUMATOLOGY | Facility: CLINIC | Age: 45
End: 2025-03-17
Payer: COMMERCIAL

## 2025-03-17 VITALS
BODY MASS INDEX: 27.97 KG/M2 | DIASTOLIC BLOOD PRESSURE: 113 MMHG | WEIGHT: 157.88 LBS | HEIGHT: 63 IN | SYSTOLIC BLOOD PRESSURE: 158 MMHG | HEART RATE: 114 BPM

## 2025-03-17 DIAGNOSIS — D84.821 IMMUNOCOMPROMISED STATE DUE TO DRUG THERAPY: ICD-10-CM

## 2025-03-17 DIAGNOSIS — H20.9 UVEITIS: ICD-10-CM

## 2025-03-17 DIAGNOSIS — E11.69 TYPE 2 DIABETES MELLITUS WITH OTHER SPECIFIED COMPLICATION, UNSPECIFIED WHETHER LONG TERM INSULIN USE: ICD-10-CM

## 2025-03-17 DIAGNOSIS — R11.0 NAUSEA: ICD-10-CM

## 2025-03-17 DIAGNOSIS — L70.0 CYSTIC ACNE: Primary | ICD-10-CM

## 2025-03-17 DIAGNOSIS — Z79.899 IMMUNOCOMPROMISED STATE DUE TO DRUG THERAPY: ICD-10-CM

## 2025-03-17 DIAGNOSIS — E55.9 VITAMIN D DEFICIENCY: ICD-10-CM

## 2025-03-17 DIAGNOSIS — H20.9 IRITIS: ICD-10-CM

## 2025-03-17 DIAGNOSIS — R23.2 HOT FLASH NOT DUE TO MENOPAUSE: ICD-10-CM

## 2025-03-17 DIAGNOSIS — M45.5 ANKYLOSING SPONDYLITIS OF THORACOLUMBAR REGION: ICD-10-CM

## 2025-03-17 PROCEDURE — 3077F SYST BP >= 140 MM HG: CPT | Mod: CPTII,S$GLB,, | Performed by: INTERNAL MEDICINE

## 2025-03-17 PROCEDURE — 1159F MED LIST DOCD IN RCRD: CPT | Mod: CPTII,S$GLB,, | Performed by: INTERNAL MEDICINE

## 2025-03-17 PROCEDURE — 99215 OFFICE O/P EST HI 40 MIN: CPT | Mod: S$GLB,,, | Performed by: INTERNAL MEDICINE

## 2025-03-17 PROCEDURE — 99999 PR PBB SHADOW E&M-EST. PATIENT-LVL IV: CPT | Mod: PBBFAC,,, | Performed by: INTERNAL MEDICINE

## 2025-03-17 PROCEDURE — 3008F BODY MASS INDEX DOCD: CPT | Mod: CPTII,S$GLB,, | Performed by: INTERNAL MEDICINE

## 2025-03-17 PROCEDURE — 3080F DIAST BP >= 90 MM HG: CPT | Mod: CPTII,S$GLB,, | Performed by: INTERNAL MEDICINE

## 2025-03-17 RX ORDER — HEPARIN 100 UNIT/ML
500 SYRINGE INTRAVENOUS
OUTPATIENT
Start: 2025-03-17

## 2025-03-17 RX ORDER — SODIUM CHLORIDE 0.9 % (FLUSH) 0.9 %
10 SYRINGE (ML) INJECTION
OUTPATIENT
Start: 2025-03-17

## 2025-03-17 RX ORDER — ONDANSETRON 8 MG/1
8 TABLET, ORALLY DISINTEGRATING ORAL EVERY 6 HOURS PRN
Qty: 45 TABLET | Refills: 6 | Status: SHIPPED | OUTPATIENT
Start: 2025-03-17

## 2025-03-17 RX ORDER — TRETINOIN 0.5 MG/G
CREAM TOPICAL NIGHTLY
Qty: 60 G | Refills: 1 | Status: SHIPPED | OUTPATIENT
Start: 2025-03-17 | End: 2025-09-13

## 2025-03-17 RX ORDER — ERGOCALCIFEROL 1.25 MG/1
50000 CAPSULE ORAL
Qty: 12 CAPSULE | Refills: 1 | Status: SHIPPED | OUTPATIENT
Start: 2025-03-17

## 2025-03-17 RX ORDER — GABAPENTIN 800 MG/1
800 TABLET ORAL 3 TIMES DAILY
Qty: 90 TABLET | Refills: 5 | Status: SHIPPED | OUTPATIENT
Start: 2025-03-17 | End: 2026-03-17

## 2025-03-17 RX ORDER — NITROFURANTOIN 25; 75 MG/1; MG/1
100 CAPSULE ORAL 2 TIMES DAILY
COMMUNITY
Start: 2025-02-07

## 2025-03-17 ASSESSMENT — ROUTINE ASSESSMENT OF PATIENT INDEX DATA (RAPID3)
TOTAL RAPID3 SCORE: 4.72
PSYCHOLOGICAL DISTRESS SCORE: 1.1
PAIN SCORE: 5.5
PATIENT GLOBAL ASSESSMENT SCORE: 5
MDHAQ FUNCTION SCORE: 1.1
FATIGUE SCORE: 1.1

## 2025-03-17 NOTE — PROGRESS NOTES
Subjective:     Patient ID:  Breanna Sanchez    Chief Complaint:  Disease Management     History of Present Illness:  Pt is a 44 y.o. female  AS/RA. She ran out of SSZ since her last visit. She was seen in Jan 2024 and Bimzelx was ordered. She restarted her privigen iv. She had a  uti when she was off tx. She continues to have uncontrolled joint pain in her neck, thoracic, and lumbar spine. She has re-est with Dr. Chery who is prescribing percocet, gabapentin, and zanaflex. She also recently had thoracic JENNIFER and plans to have SI joint injections.      She is currently doing SCIG monthly with Dr. Prado.     Current tx:  1. rinvoq  2 SSZ     Prior treatment:  1. Humira  2. Enbrel  3. MTX  4. Orencia  5. Simponi aria  6. Remicade  7. Cosentyx  Rheumatologic History:   - Diagnosis/es:  - Positive serologies:  - Infectious screening labs:  - Previous Treatments:  - Current Treatments:     Interval History:   Hospitalization since last office visit: No    Patient Active Problem List    Diagnosis Date Noted    Episode of recurrent major depressive disorder, unspecified depression episode severity 02/05/2025    Osteoporosis 07/16/2024    CVID (common variable immunodeficiency) 06/27/2024    Intractable chronic migraine without aura and without status migrainosus 04/04/2024    Bilateral renal stones 01/12/2023    COVID 08/11/2022    Asymptomatic bacteriuria 10/12/2021    Depression 04/20/2021    Anxiety 04/20/2021    Primary immunodeficiency disorder 04/20/2021    Fever 04/13/2021    Intractable vomiting 04/12/2021    Chronic midline low back pain without sciatica 10/11/2019    Decreased ROM of lumbar spine 10/11/2019    Poor venous access 10/02/2019    Pelvic mass 07/22/2019    Female pelvic peritoneal adhesion 07/22/2019    Seasonal allergic rhinitis due to pollen 04/29/2019    B12 deficiency 04/29/2019    Class 1 obesity 04/29/2019    Atherosclerosis of artery 04/29/2019    Atelectasis  04/29/2019    Tobacco dependence 04/29/2019    Essential hypertension 04/29/2019    Leukemoid reaction 04/01/2019    Cough 01/07/2019    Shortness of breath 01/07/2019    Abnormal glucose 01/07/2019    Abnormal thyroid function test 01/07/2019    Acute cystitis without hematuria 05/10/2018    Hypokalemia 05/08/2018    Rheumatoid arthritis     Lupus     Fibromyalgia     Ankylosing spondylitis 03/22/2018    Menorrhagia with regular cycle 02/14/2017    Rheumatoid arthritis involving multiple sites with positive rheumatoid factor 02/14/2017    Pelvic pain in female 02/13/2017     Past Surgical History:   Procedure Laterality Date    APPENDECTOMY      BARTHOLIN GLAND CYST EXCISION      COLONOSCOPY  2011    COLONOSCOPY N/A 7/7/2022    Procedure: COLONOSCOPY;  Surgeon: Shaggy Mae MD;  Location: [facility];  Service: Endoscopy;  Laterality: N/A;    CYSTOSCOPY WITH CALCULUS EXTRACTION Bilateral 2/2/2023    Procedure: CYSTOSCOPY, WITH CALCULUS REMOVAL;  Surgeon: Florentin Aleman MD;  Location: [facility] OR;  Service: Urology;  Laterality: Bilateral;    CYSTOURETEROSCOPY WITH RETROGRADE PYELOGRAPHY AND INSERTION OF STENT INTO URETER Bilateral 2/2/2023    Procedure: CYSTOURETEROSCOPY, WITH RETROGRADE PYELOGRAM AND URETERAL STENT INSERTION;  Surgeon: Florentin Aleman MD;  Location: [facility] OR;  Service: Urology;  Laterality: Bilateral;    DILATION OF URETHRA      X 2    ESOPHAGOGASTRODUODENOSCOPY N/A 6/16/2023    Procedure: EGD (ESOPHAGOGASTRODUODENOSCOPY);  Surgeon: Shaggy Mae MD;  [facility];  Service: Endoscopy;  Laterality: N/A;    HYSTERECTOMY      INSERTION OF TUNNELED CENTRAL VENOUS CATHETER (CVC) WITH SUBCUTANEOUS PORT N/A 10/2/2019    Procedure: [id number];  Surgeon: Lenin Springer MD;  Location: [facility] OR;  Service: General;  Laterality: N/A;    KNEE ARTHROSCOPY Left     LAPAROSCOPIC LYSIS OF ADHESIONS N/A 7/22/2019    Procedure: LYSIS, ADHESIONS, LAPAROSCOPIC;  Surgeon:  Clarence Adams MD;  Location: [facility] OR;  Service: OB/GYN;  Laterality: N/A;    LAPAROSCOPIC SALPINGO-OOPHORECTOMY Left 7/22/2019    Procedure: SALPINGO-OOPHORECTOMY, LAPAROSCOPIC- LEFT SIDE ONLY;  Surgeon: Clarence Adams MD;  Location: [facility] OR;  Service: OB/GYN;  Laterality: Left;    OVARIAN CYST REMOVAL Left     SALPINGOOPHORECTOMY Right     TONSILLECTOMY      TOTAL SHOULDER ARTHROPLASTY Right     TUBAL LIGATION       Social History[1]  Family History   Problem Relation Name Age of Onset    Diabetes Mother          type 1    Stroke Mother      Heart disease Mother      Rheum arthritis Mother      Cancer Father          prostate    Alzheimer's disease Father      Colon polyps Father      Cancer Sister          uterine    Ovarian cancer Sister      Diabetes Brother          type 2    Rheum arthritis Maternal Grandmother      Crohn's disease Neg Hx      Ulcerative colitis Neg Hx       Review of patient's allergies indicates:   Allergen Reactions    Seroquel [quetiapine] Anaphylaxis    Aleve [naproxen sodium]     Bentyl [dicyclomine] Other (See Comments)     Fatigue    Tramadol      seizure    Levaquin [levofloxacin] Other (See Comments)     Tendon tear    Miralax [polyethylene glycol 3350] Rash       Review of Systems   Review of Systems   Constitutional:  Positive for chills and fatigue. Negative for activity change, appetite change, diaphoresis, fever and unexpected weight change.   HENT:  Negative for congestion, dental problem, ear discharge, ear pain, facial swelling, mouth sores, nosebleeds, postnasal drip, rhinorrhea, sinus pressure, sneezing, sore throat, tinnitus, trouble swallowing and voice change.    Eyes:  Negative for photophobia, pain, discharge, redness and itching.   Respiratory:  Positive for cough. Negative for apnea, chest tightness, shortness of breath and wheezing.    Cardiovascular:  Positive for leg swelling. Negative for chest pain and palpitations.    Gastrointestinal:  Positive for abdominal pain. Negative for abdominal distention, constipation, diarrhea, nausea and vomiting.   Endocrine: Negative for cold intolerance, heat intolerance, polydipsia and polyuria.   Genitourinary:  Negative for decreased urine volume, difficulty urinating, dysuria, flank pain, frequency, hematuria and urgency.   Musculoskeletal:  Positive for arthralgias, back pain, gait problem, joint swelling, myalgias, neck pain and neck stiffness.   Skin:  Negative for pallor, rash and wound.   Allergic/Immunologic: Negative for immunocompromised state.   Neurological:  Negative for dizziness, tremors, numbness and headaches.   Hematological:  Negative for adenopathy. Does not bruise/bleed easily.   Psychiatric/Behavioral:  Negative for sleep disturbance. The patient is not nervous/anxious.       Current Medications:  Current Outpatient Medications   Medication Instructions    (Magic mouthwash) 1:1:1 diphenhydrAMINE(Benadryl) 12.5mg/5ml liq, aluminum & magnesium hydroxide-simethicone (Maalox), LIDOcaine viscous 2% 10 mLs, Swish & Spit, Every 4 hours PRN, for mouth sores    albuterol (VENTOLIN HFA) 90 mcg/actuation inhaler 1-2 puffs, Inhalation, Every 6 hours PRN, Rescue    BIMZELX AUTOINJECTOR 160 mg, Subcutaneous, Every 28 days    clobetasoL (TEMOVATE) 0.05 % external solution Topical (Top), 2 times daily    desvenlafaxine succinate (PRISTIQ) 100 mg, Daily    dextroamphetamine-amphetamine 30 mg Tab 30 mg, 2 times daily    docusate sodium (COLACE) 100 mg, Oral, 2 times daily    epinephrine (EPIPEN INJ) 1 application , Continuous PRN    ergocalciferol (ERGOCALCIFEROL) 50,000 Units, Oral, Every 7 days    estradioL (ESTRACE) 0.01 % (0.1 mg/gram) vaginal cream PLACE 1 FINGERTIP OF CREAM AT URETHRAL OPENING AND EXTERANL VAGINA 3 TIMES A WEEK    gabapentin (NEURONTIN) 800 mg, Oral, 3 times daily    hyoscyamine (ANASPAZ,LEVSIN) 0.125 mg Tab TAKE 1 TABLET BY MOUTH EVERY 6 HOURS AS NEEDED  "FOR ABDOMINAL PAIN OR DIARRHEA    NIFEdipine (PROCARDIA-XL) 60 mg, Oral    nitrofurantoin, macrocrystal-monohydrate, (MACROBID) 100 MG capsule 100 mg, 2 times daily    NURTEC 75 mg, Oral, Daily PRN, Place ODT tablet on the tongue; alternatively the ODT tablet may be placed under the tongue    ondansetron (ZOFRAN-ODT) 4 mg, Oral, Every 8 hours PRN    ondansetron (ZOFRAN-ODT) 4 mg, Oral, Every 6 hours PRN    ondansetron (ZOFRAN-ODT) 8 mg, Oral, Every 6 hours PRN    oxybutynin (DITROPAN) 5 mg, Oral, 3 times daily PRN    oxyCODONE-acetaminophen (PERCOCET) 5-325 mg per tablet 1 tablet, Oral, Every 6 hours PRN    piroxicam (FELDENE) 20 mg, Daily    predniSONE (DELTASONE) 5 MG tablet Take 2 tablets by mouth daily as needed for arthritis flare    promethazine (PHENERGAN) 25 mg, Oral, Every 6 hours PRN    sildenafil (REVATIO) 20 mg, Oral, 3 times daily    testosterone cypionate (DEPOTESTOTERONE CYPIONATE) 200 mg/mL injection INJECT 0.25MG INTO THE MUSCLE EVERY 4 WEEKS    tirzepatide 7.5 mg, Subcutaneous, Every 7 days    tirzepatide 10 mg, Subcutaneous, Every 7 days    tiZANidine (ZANAFLEX) 4 mg, Oral, Every 8 hours    tretinoin (RETIN-A) 0.05 % cream Topical (Top), Nightly    triamcinolone acetonide 0.1% (KENALOG) 0.1 % cream Topical (Top), 2 times daily         Objective:     Vitals:    03/17/25 1214   BP: (!) 158/113   Pulse: (!) 114   Weight: 71.6 kg (157 lb 13.6 oz)   Height: 5' 3" (1.6 m)   PainSc:   3   PainLoc: Back      Body mass index is 27.96 kg/m².     Physical Examinations:  Physical Exam     Disease Assessment Scores:  Patient's Global Assessment of arthritis (0-10): 1  Physician's Global Assessment of arthritis (0-10): 1  Number of Tender Joints (0-28): 1  Number of Swollen Joints (0-28): 2        1/25/2024     3:43 PM   Rapid3 Question Responses and Scores   MDHAQ Score 0.9   Psychologic Score 1.1   Pain Score 5   When you awakened in the morning OVER THE LAST WEEK, did you feel stiff? Yes " "  If Yes, please indicate the number of hours until you are as limber as you will be for the day 1   Fatigue Score 5   Global Health Score 5   RAPID3 Score 4.33       Monitoring Lab Results:  Lab Results   Component Value Date    WBC 7.69 03/10/2025    RBC 4.46 03/10/2025    HGB 13.1 03/10/2025    HCT 41.3 03/10/2025    MCV 93 03/10/2025    MCH 29.4 03/10/2025    MCHC 31.7 (L) 03/10/2025    RDW 12.6 03/10/2025     03/10/2025        Lab Results   Component Value Date     03/10/2025    K 3.8 03/10/2025     03/10/2025    CO2 27 03/10/2025    GLU 93 03/10/2025    BUN 7 03/10/2025    CREATININE 0.8 03/10/2025    CALCIUM 9.8 03/10/2025    PROT 7.2 03/10/2025    ALBUMIN 4.0 03/10/2025    BILITOT 0.2 03/10/2025    ALKPHOS 93 03/10/2025    AST 58 (H) 03/10/2025    ALT 11 03/10/2025    ANIONGAP 9 03/10/2025    EGFRNORACEVR >60.0 03/10/2025       Lab Results   Component Value Date    SEDRATE 14 03/10/2025    CRP 5.0 03/10/2025        Lab Results   Component Value Date    [id number] 18 (L) 03/10/2025        Lab Results   Component Value Date    CHOL 220 (H) 11/13/2024    HDL 62 11/13/2024    LDLCALC 127.2 11/13/2024    TRIG 154 (H) 11/13/2024       Lab Results   Component Value Date    RF 16.0 (H) 07/28/2021    CCPANTIBODIE <0.5 07/28/2021     Lab Results   Component Value Date    ANASCREEN Positive (A) 04/17/2023    ANATITER 1:320 04/17/2023    DSDNA Negative 1:10 04/17/2023     No results found for: "HLABB27"    Infectious Disease Screening:  Lab Results   Component Value Date    HEPBSAG Non-reactive 07/31/2023    HEPBCAB Non-reactive 07/31/2023    HEPBSAB <3.00 07/31/2023    HEPBSAB Non-reactive 07/31/2023    HEPBIGM Negative 04/14/2021     Lab Results   Component Value Date    HEPCAB Negative 04/14/2021     Lab Results   Component Value Date    TBGOLDPLUS Negative 01/23/2023     Lab Results   Component Value Date    QUANTIFERON NEGATIVE 04/17/2018        Imaging:  DEXA, Xrays, MRIs, CTs, " etc  Narrative    Indication: [M54.14]-Radiculopathy, thoracic region     Technique: Multiplanar T1 and T2 weighted images were obtained through the thoracic spine.     Comparison: None     Findings:   Thoracic vertebral body heights and alignment are maintained. There is exaggeration of the normal thoracic kyphosis. There is a posterior disc osteophyte complex at T7-8 with anterior cord effacement. There is moderate resulting spinal stenosis without   cord edema. There is mild diffuse thoracic spinal stenosis present. No suspicious cord edema. There are moderate facet degenerative changes without high-grade foraminal narrowing.     The common bile duct appears mildly dilated. The common hepatic duct appears mildly dilated.     Impression:   Moderate spinal stenosis at T7-8 with anterior cord effacement due to a posterior disc osteophyte complex. Mild diffuse spinal stenosis.     Prominent common bile duct and common hepatic duct.     Electronically signed by Zohaib Hassan MD on 10/7/2024 4:21 PM  Procedure Note    Zohaib Hassan MD - 10/07/2024   Formatting of this note might be different from the original.   Indication: [M54.14]-Radiculopathy, thoracic region     Technique: Multiplanar T1 and T2 weighted images were obtained through the thoracic spine.     Comparison: None     Findings:   Thoracic vertebral body heights and alignment are maintained. There is exaggeration of the normal thoracic kyphosis. There is a posterior disc osteophyte complex at T7-8 with anterior cord effacement. There is moderate resulting spinal stenosis without cord edema. There is mild diffuse thoracic spinal stenosis present. No suspicious cord edema. There are moderate facet degenerative changes without high-grade foraminal narrowing.     The common bile duct appears mildly dilated. The common hepatic duct appears mildly dilated.     Impression:   Moderate spinal stenosis at T7-8 with anterior cord effacement due to a posterior disc  osteophyte complex. Mild diffuse spinal stenosis.     Prominent common bile duct and common hepatic duct.     Electronically signed by Zohaib Hassan MD on 10/7/2024 4:21 PM  Exam End: 10/07/24 12:54    Specimen Collected: 10/07/24 16:19 Last Resulted: 10/07/24 16:21   Received From: [facility]  Result Received: 11/12/24 06:31       Anatomical Region Laterality Modality   C-spine -- Magnetic Resonance     Narrative    Indication: [M54.12]-Radiculopathy, cervical region     Technique: Multiplanar T1 and T2 weighted images were obtained through the cervical spine.     Comparison: 2/20/2013     Findings:   Cervical vertebral body heights and alignment are maintained. There is no suspicious marrow signal. The cord appears normal.     C2-C3: No spinal stenosis or foraminal narrowing. Mild facet and uncovertebral DJD.     C3-C4: No spinal stenosis or foraminal narrowing. Moderate facet and uncovertebral DJD.     C4-C5: Mild spinal stenosis. Mild bilateral foraminal narrowing. Mild facet and uncovertebral DJD.     C5-C6: Mild spinal stenosis without foraminal narrowing. Moderate facet and uncovertebral DJD.     C6-C7: Mild spinal stenosis with mild left foraminal narrowing. Moderate facet and uncovertebral DJD.     C7-T1: No spinal stenosis or foraminal. Mild facet DJD.     Vertebral artery flow voids are maintained.     Impression:   Mild cervical spine DJD, as described.           Electronically signed by Zohaib Hassan MD on 10/7/2024 4:19 PM      Zohaib Hassan MD - 10/07/2024   Formatting of this note might be different from the original.   Indication: [M54.12]-Radiculopathy, cervical region     Technique: Multiplanar T1 and T2 weighted images were obtained through the cervical spine.     Comparison: 2/20/2013    IgG 1 382 - 929 mg/dL   508   IgG 2 242 - 700 mg/dL   138    IgG 3 22 - 176 mg/dL   68   IgG 4 4 - 86 mg/dL   11   Comment: Test performed at [facility],   [address], [place]  [address]     [phone]   Benson Mcknight MD  - Medical Director   Old & Outside Medical Records:  Reviewed old and all outside medical records available in Care Everywhere     Assessment:     Encounter Diagnoses   Name Primary?    Cystic acne Yes    Ankylosing spondylitis of thoracolumbar region     Uveitis     Iritis     Immunocompromised state due to drug therapy     Vitamin D deficiency     Nausea     Type 2 diabetes mellitus with other specified complication, unspecified whether long term insulin use     Hot flash not due to menopause       Plan:      Encounter Diagnoses   Name Primary?    Cystic acne Yes    Ankylosing spondylitis of thoracolumbar region     Uveitis     Iritis     Immunocompromised state due to drug therapy     Vitamin D deficiency     Nausea     Type 2 diabetes mellitus with other specified complication, unspecified whether long term insulin use     Hot flash not due to menopause      Cystic acne  -     tretinoin (RETIN-A) 0.05 % cream; Apply topically every evening.  Dispense: 60 g; Refill: 1  -     Sedimentation rate; Future; Expected date: 03/17/2025  -     C-Reactive Protein; Future; Expected date: 03/17/2025  -     Comprehensive Metabolic Panel; Future; Expected date: 03/17/2025  -     CBC Auto Differential; Future; Expected date: 03/17/2025  -     T4, Free; Future; Expected date: 03/17/2025  -     TSH; Future; Expected date: 03/17/2025  -     T3, Free; Future; Expected date: 03/17/2025  -     Follicle Stimulating Hormone; Future; Expected date: 03/17/2025  -     Luteinizing Hormone; Future; Expected date: 03/17/2025  -     DHEA; Future; Expected date: 03/17/2025  -     Testosterone; Future; Expected date: 03/17/2025  -     Progesterone; Future; Expected date: 03/17/2025  -     Estrogens, Total; Future; Expected date: 03/17/2025    Ankylosing spondylitis of thoracolumbar region  -     gabapentin (NEURONTIN) 800 MG tablet; Take 1 tablet (800 mg total) by mouth 3  (three) times daily.  Dispense: 90 tablet; Refill: 5  -     Sedimentation rate; Future; Expected date: 03/17/2025  -     C-Reactive Protein; Future; Expected date: 03/17/2025  -     Comprehensive Metabolic Panel; Future; Expected date: 03/17/2025  -     CBC Auto Differential; Future; Expected date: 03/17/2025  -     T4, Free; Future; Expected date: 03/17/2025  -     TSH; Future; Expected date: 03/17/2025  -     T3, Free; Future; Expected date: 03/17/2025  -     Follicle Stimulating Hormone; Future; Expected date: 03/17/2025  -     Luteinizing Hormone; Future; Expected date: 03/17/2025  -     DHEA; Future; Expected date: 03/17/2025  -     Testosterone; Future; Expected date: 03/17/2025  -     Progesterone; Future; Expected date: 03/17/2025  -     Estrogens, Total; Future; Expected date: 03/17/2025    Uveitis  -     Sedimentation rate; Future; Expected date: 03/17/2025  -     C-Reactive Protein; Future; Expected date: 03/17/2025  -     Comprehensive Metabolic Panel; Future; Expected date: 03/17/2025  -     CBC Auto Differential; Future; Expected date: 03/17/2025  -     T4, Free; Future; Expected date: 03/17/2025  -     TSH; Future; Expected date: 03/17/2025  -     T3, Free; Future; Expected date: 03/17/2025  -     Follicle Stimulating Hormone; Future; Expected date: 03/17/2025  -     Luteinizing Hormone; Future; Expected date: 03/17/2025  -     DHEA; Future; Expected date: 03/17/2025  -     Testosterone; Future; Expected date: 03/17/2025  -     Progesterone; Future; Expected date: 03/17/2025  -     Estrogens, Total; Future; Expected date: 03/17/2025    Iritis  -     Sedimentation rate; Future; Expected date: 03/17/2025  -     C-Reactive Protein; Future; Expected date: 03/17/2025  -     Comprehensive Metabolic Panel; Future; Expected date: 03/17/2025  -     CBC Auto Differential; Future; Expected date: 03/17/2025  -     T4, Free; Future; Expected date: 03/17/2025  -     TSH; Future; Expected date: 03/17/2025  -     T3,  Free; Future; Expected date: 03/17/2025  -     Follicle Stimulating Hormone; Future; Expected date: 03/17/2025  -     Luteinizing Hormone; Future; Expected date: 03/17/2025  -     DHEA; Future; Expected date: 03/17/2025  -     Testosterone; Future; Expected date: 03/17/2025  -     Progesterone; Future; Expected date: 03/17/2025  -     Estrogens, Total; Future; Expected date: 03/17/2025    Immunocompromised state due to drug therapy  -     Sedimentation rate; Future; Expected date: 03/17/2025  -     C-Reactive Protein; Future; Expected date: 03/17/2025  -     Comprehensive Metabolic Panel; Future; Expected date: 03/17/2025  -     CBC Auto Differential; Future; Expected date: 03/17/2025  -     T4, Free; Future; Expected date: 03/17/2025  -     TSH; Future; Expected date: 03/17/2025  -     T3, Free; Future; Expected date: 03/17/2025  -     Follicle Stimulating Hormone; Future; Expected date: 03/17/2025  -     Luteinizing Hormone; Future; Expected date: 03/17/2025  -     DHEA; Future; Expected date: 03/17/2025  -     Testosterone; Future; Expected date: 03/17/2025  -     Progesterone; Future; Expected date: 03/17/2025  -     Estrogens, Total; Future; Expected date: 03/17/2025    Vitamin D deficiency  -     ergocalciferol (ERGOCALCIFEROL) 50,000 unit Cap; Take 1 capsule (50,000 Units total) by mouth every 7 days.  Dispense: 12 capsule; Refill: 1  -     Sedimentation rate; Future; Expected date: 03/17/2025  -     C-Reactive Protein; Future; Expected date: 03/17/2025  -     Comprehensive Metabolic Panel; Future; Expected date: 03/17/2025  -     CBC Auto Differential; Future; Expected date: 03/17/2025  -     T4, Free; Future; Expected date: 03/17/2025  -     TSH; Future; Expected date: 03/17/2025  -     T3, Free; Future; Expected date: 03/17/2025  -     Follicle Stimulating Hormone; Future; Expected date: 03/17/2025  -     Luteinizing Hormone; Future; Expected date: 03/17/2025  -     DHEA; Future; Expected date:  03/17/2025  -     Testosterone; Future; Expected date: 03/17/2025  -     Progesterone; Future; Expected date: 03/17/2025  -     Estrogens, Total; Future; Expected date: 03/17/2025    Nausea  -     ondansetron (ZOFRAN-ODT) 8 MG TbDL; Take 1 tablet (8 mg total) by mouth every 6 (six) hours as needed (nausea).  Dispense: 45 tablet; Refill: 6  -     Sedimentation rate; Future; Expected date: 03/17/2025  -     C-Reactive Protein; Future; Expected date: 03/17/2025  -     Comprehensive Metabolic Panel; Future; Expected date: 03/17/2025  -     CBC Auto Differential; Future; Expected date: 03/17/2025  -     T4, Free; Future; Expected date: 03/17/2025  -     TSH; Future; Expected date: 03/17/2025  -     T3, Free; Future; Expected date: 03/17/2025  -     Follicle Stimulating Hormone; Future; Expected date: 03/17/2025  -     Luteinizing Hormone; Future; Expected date: 03/17/2025  -     DHEA; Future; Expected date: 03/17/2025  -     Testosterone; Future; Expected date: 03/17/2025  -     Progesterone; Future; Expected date: 03/17/2025  -     Estrogens, Total; Future; Expected date: 03/17/2025    Type 2 diabetes mellitus with other specified complication, unspecified whether long term insulin use  -     tirzepatide 10 mg/0.5 mL PnIj; Inject 10 mg into the skin every 7 days.  Dispense: 2 mL; Refill: 12  -     Sedimentation rate; Future; Expected date: 03/17/2025  -     C-Reactive Protein; Future; Expected date: 03/17/2025  -     Comprehensive Metabolic Panel; Future; Expected date: 03/17/2025  -     CBC Auto Differential; Future; Expected date: 03/17/2025  -     T4, Free; Future; Expected date: 03/17/2025  -     TSH; Future; Expected date: 03/17/2025  -     T3, Free; Future; Expected date: 03/17/2025  -     Follicle Stimulating Hormone; Future; Expected date: 03/17/2025  -     Luteinizing Hormone; Future; Expected date: 03/17/2025  -     DHEA; Future; Expected date: 03/17/2025  -     Testosterone; Future; Expected date:  03/17/2025  -     Progesterone; Future; Expected date: 03/17/2025  -     Estrogens, Total; Future; Expected date: 03/17/2025    Hot flash not due to menopause  -     Sedimentation rate; Future; Expected date: 03/17/2025  -     C-Reactive Protein; Future; Expected date: 03/17/2025  -     Comprehensive Metabolic Panel; Future; Expected date: 03/17/2025  -     CBC Auto Differential; Future; Expected date: 03/17/2025  -     T4, Free; Future; Expected date: 03/17/2025  -     TSH; Future; Expected date: 03/17/2025  -     T3, Free; Future; Expected date: 03/17/2025  -     Follicle Stimulating Hormone; Future; Expected date: 03/17/2025  -     Luteinizing Hormone; Future; Expected date: 03/17/2025  -     DHEA; Future; Expected date: 03/17/2025  -     Testosterone; Future; Expected date: 03/17/2025  -     Progesterone; Future; Expected date: 03/17/2025  -     Estrogens, Total; Future; Expected date: 03/17/2025    Other orders  -     denosumab (PROLIA) injection 60 mg  -     heparin, porcine (PF) 100 unit/mL injection flush 500 Units  -     sodium chloride 0.9% flush 10 mL        1. Restart prolia  2. Start   bimzelx pt has an epipen at home and will do her own first injection  3. F/u  6 months     Follow-up 6 months    More than 50% of the  40 minute encounter was spent face to face counseling the patient regarding current status and future plan of care as well as side effects  of the medications. All questions were answered to patient's satisfaction also includes  non-face to face time preparing to see the patient (eg, review of tests), Obtaining and/or reviewing separately obtained history, Documenting clinical information in the electronic or other health record, Independently interpreting results              [1]  Social History  Tobacco Use    Smoking status: Former     Current packs/day: 0.50     Average packs/day: 0.5 packs/day for 30.1 years (15.0 ttl pk-yrs)     Types: Cigarettes     Start date: 2/10/1995     Smokeless tobacco: Never   Substance Use Topics    Alcohol use: Not Currently     Comment: rarely    Drug use: No

## 2025-05-22 ENCOUNTER — TELEPHONE (OUTPATIENT)
Dept: UROLOGY | Facility: CLINIC | Age: 45
End: 2025-05-22
Payer: COMMERCIAL

## 2025-05-22 DIAGNOSIS — R30.0 DYSURIA: ICD-10-CM

## 2025-05-22 DIAGNOSIS — N39.0 RECURRENT UTI: Primary | ICD-10-CM

## 2025-05-22 NOTE — TELEPHONE ENCOUNTER
Called and changed pt appt to NP, per MD. Pt appt changed to 05/29. Also put in orders for pt to drop off urine specimen at lab in [place]. Pt verbalized understanding.

## 2025-05-23 ENCOUNTER — LAB VISIT (OUTPATIENT)
Dept: LAB | Facility: HOSPITAL | Age: 45
End: 2025-05-23
Payer: COMMERCIAL

## 2025-05-23 DIAGNOSIS — R30.0 DYSURIA: ICD-10-CM

## 2025-05-23 DIAGNOSIS — N39.0 RECURRENT UTI: ICD-10-CM

## 2025-05-23 PROCEDURE — 87086 URINE CULTURE/COLONY COUNT: CPT

## 2025-05-26 ENCOUNTER — RESULTS FOLLOW-UP (OUTPATIENT)
Dept: UROLOGY | Facility: CLINIC | Age: 45
End: 2025-05-26

## 2025-05-26 ENCOUNTER — PATIENT MESSAGE (OUTPATIENT)
Dept: UROLOGY | Facility: CLINIC | Age: 45
End: 2025-05-26
Payer: COMMERCIAL

## 2025-05-26 DIAGNOSIS — N30.00 ACUTE CYSTITIS WITHOUT HEMATURIA: Primary | ICD-10-CM

## 2025-05-26 LAB — BACTERIA UR CULT: ABNORMAL

## 2025-05-26 RX ORDER — SULFAMETHOXAZOLE AND TRIMETHOPRIM 800; 160 MG/1; MG/1
1 TABLET ORAL 2 TIMES DAILY
Qty: 10 TABLET | Refills: 0 | Status: ON HOLD | OUTPATIENT
Start: 2025-05-26 | End: 2025-05-31

## 2025-05-29 ENCOUNTER — PATIENT MESSAGE (OUTPATIENT)
Dept: UROLOGY | Facility: CLINIC | Age: 45
End: 2025-05-29

## 2025-05-30 PROBLEM — R53.1 GENERALIZED WEAKNESS: Status: ACTIVE | Noted: 2025-05-30

## 2025-06-11 ENCOUNTER — OUTPATIENT CASE MANAGEMENT (OUTPATIENT)
Dept: ADMINISTRATIVE | Facility: OTHER | Age: 45
End: 2025-06-11
Payer: COMMERCIAL

## 2025-06-11 NOTE — PROGRESS NOTES
6/11/2025  1st attempt to complete Initial Assessment  for Outpatient Care Management, left message.  Will send via [employer] -  unable to assess letter.

## 2025-06-11 NOTE — LETTER
Breanna Sanchez  [address]  [place] [address]      Dear Breanna Sanchez,     I work with [facility] Outpatient Care Management Department. We received a referral to call you to discuss your medical history. These services are free of charge and are offered to [facility] patients who have recently been discharged from any of our facilities or who have medical conditions that may require the skill of a nurse to assist with management.     I am a Registered Nurse who specializes in connecting patients with available medical and financial resources as well as addressing any educational needs that may be indicated.    I attempted to reach you by telephone, but I was unsuccessful. Please call our department so that we can go over some questions with you, regarding your health.    The Outpatient Care Management Department can be reached at [phone], from 8:00AM to 4:30 PM, on Monday thru Friday.     Additionally, [facility] also has a program where a nurse is available 24/7 to answer questions or provide medical advice, their number is [phone].      Thanks,        Charleen Bullock RN  Outpatient Care Management  Phone #: [phone]

## 2025-06-18 ENCOUNTER — OUTPATIENT CASE MANAGEMENT (OUTPATIENT)
Dept: ADMINISTRATIVE | Facility: OTHER | Age: 45
End: 2025-06-18
Payer: COMMERCIAL

## 2025-06-18 NOTE — PROGRESS NOTES
6/18/2025  2nd attempt to complete Initial Assessment  for Outpatient Care Management, left message.  Will send via [employer] -  unable to assess letter.

## 2025-06-18 NOTE — LETTER
Breanna Sanchez  [address]  [place] [address]      Dear Breanna Sanchez,     I work with [facility] Outpatient [facility] Management Department. We received a referral to call you to discuss your medical history. These services are free of charge and are offered to [facility] patients who have recently been discharged from any of our facilities or who have medical conditions that may require the skill of a nurse to assist with management.     I am a Registered Nurse who specializes in connecting patients with available medical and financial resources as well as addressing any educational needs that may be indicated.    I attempted to reach you by telephone, but I was unsuccessful. Please call our department so that we can go over some questions with you, regarding your health.    The Outpatient Care Management Department can be reached at [phone], from 8:00AM to 4:30 PM, on Monday thru Friday.     Additionally, [facility] also has a program where a nurse is available 24/7 to answer questions or provide medical advice, their number is [phone].      Thanks,        Charleen Bullock RN  Outpatient Care Management  Phone #: [phone]

## 2025-06-23 ENCOUNTER — OUTPATIENT CASE MANAGEMENT (OUTPATIENT)
Dept: ADMINISTRATIVE | Facility: OTHER | Age: 45
End: 2025-06-23
Payer: COMMERCIAL

## 2025-06-23 NOTE — PROGRESS NOTES
Outpatient Care Management  Patient Does Not Consent    Patient: Breanna Sanchez  MRN:  [id number]  Date of Service:  6/23/2025  Completed by:  Charleen Bullock RN    Chief Complaint   Patient presents with    [facility] Enrollment Call     6/23/2025  3rd attempt to complete Initial Assessment  for Outpatient Care Management, left message.  Case closure.    Case Closure       Patient Summary           Consent Received:  Decline

## 2025-07-17 PROBLEM — E16.2 HYPOGLYCEMIA: Status: ACTIVE | Noted: 2025-07-17
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
## 2025-07-17 PROBLEM — N17.9 AKI (ACUTE KIDNEY INJURY): Status: ACTIVE | Noted: 2025-07-17

## 2025-07-17 PROBLEM — Z71.89 ACP (ADVANCE CARE PLANNING): Status: ACTIVE | Noted: 2025-07-17

## 2025-07-18 PROBLEM — I95.9 HYPOTENSION: Status: ACTIVE | Noted: 2025-07-18

## 2025-07-19 PROBLEM — R09.89 LABILE BLOOD PRESSURE: Status: ACTIVE | Noted: 2025-07-19

## 2025-07-25 PROBLEM — Z79.01: Status: ACTIVE | Noted: 2025-07-25

## 2025-07-25 PROBLEM — Z91.89 AT RISK FOR VENOUS THROMBOEMBOLISM (VTE): Status: ACTIVE | Noted: 2025-07-25

## 2025-07-25 PROBLEM — Z87.440 HISTORY OF RECURRENT UTIS: Status: ACTIVE | Noted: 2025-07-25

## 2025-07-25 PROBLEM — R30.0 DYSURIA: Status: ACTIVE | Noted: 2025-07-25

## 2025-07-26 PROBLEM — Z79.890 ON HORMONE REPLACEMENT THERAPY: Status: ACTIVE | Noted: 2025-07-26

## 2025-07-26 PROBLEM — Z79.890 ON HORMONE REPLACEMENT THERAPY: Status: RESOLVED | Noted: 2025-07-26 | Resolved: 2025-07-26

## 2025-07-26 PROBLEM — E05.90 HYPERTHYROIDISM: Status: ACTIVE | Noted: 2025-07-26

## 2025-07-26 PROBLEM — R30.0 DYSURIA: Status: RESOLVED | Noted: 2025-07-25 | Resolved: 2025-07-26

## 2025-07-26 PROBLEM — Z79.01: Status: RESOLVED | Noted: 2025-07-25 | Resolved: 2025-07-26

## 2025-07-26 PROBLEM — E05.90 HYPERTHYROIDISM: Status: RESOLVED | Noted: 2025-07-26 | Resolved: 2025-07-26

## 2025-07-26 PROBLEM — R09.89 LABILE BLOOD PRESSURE: Status: RESOLVED | Noted: 2025-07-19 | Resolved: 2025-07-26

## 2025-08-01 ENCOUNTER — OFFICE VISIT (OUTPATIENT)
Dept: UROLOGY | Facility: CLINIC | Age: 45
End: 2025-08-01
Payer: COMMERCIAL

## 2025-08-01 VITALS — BODY MASS INDEX: 27.26 KG/M2 | WEIGHT: 153.88 LBS

## 2025-08-01 DIAGNOSIS — N20.0 KIDNEY STONES: ICD-10-CM

## 2025-08-01 DIAGNOSIS — N20.0 KIDNEY STONES: Primary | ICD-10-CM

## 2025-08-01 DIAGNOSIS — N39.0 RECURRENT UTI: Primary | ICD-10-CM

## 2025-08-01 DIAGNOSIS — N20.1 URETERAL STONE: ICD-10-CM

## 2025-08-01 PROCEDURE — 99999 PR PBB SHADOW E&M-EST. PATIENT-LVL III: CPT | Mod: PBBFAC,,, | Performed by: STUDENT IN AN ORGANIZED HEALTH CARE EDUCATION/TRAINING PROGRAM

## 2025-08-01 RX ORDER — METHENAMINE HIPPURATE 1000 MG/1
1 TABLET ORAL 2 TIMES DAILY
Qty: 60 TABLET | Refills: 11 | Status: SHIPPED | OUTPATIENT
Start: 2025-08-01 | End: 2026-08-01

## 2025-08-01 NOTE — PATIENT INSTRUCTIONS
Recurrent UTIs    Lifestyle & Hygiene Modifications    Increase Fluid Intake - Drinking plenty of water (at least 2-3 liters per day) helps flush bacteria from the urinary tract  Urinate Frequently - Avoid holding urine for long periods; emptying the bladder regularly reduces bacterial buildup  Avoid constipation - Try to have 1 soft bowel movement daily or every other day.   - Bowel regimens include: miralax daily, colace, fiber supplementation, and hydration as above  Wipe Front to Back - This prevents bacteria from spreading to the urethra  Avoid Irritants - Minimize use of harsh soaps, douches, or feminine hygiene sprays that can disrupt the natural fam  Empty Bladder After Lakeline - Helps flush out bacteria that may have entered the urethra    Dietary & Supplement Considerations    Cranberry Products -  tablets formulations are preferred. Be careful to avoid high sugar content in juices  Probiotics (Lactobacillus) - Many are designed for vagina/urinary health and can help restore normal vaginal and urinary tract fam, reducing the risk of infection.  - Recommend at least 20 billion CFU  - You may also increase foods with natural probiotics such as yogurt and other fermented foods  D-Mannose Supplements - 2000 U once a day or 1000 U twice a day - this may help prevent E. coli bacteria from adhering to the urinary tract lining    Medical & Preventive Strategies    Topical Estrogen (For Nery- and Post-menopausal Women) - Vaginal estrogen therapy may help restore the natural defense mechanisms of the urinary tract by regulating pH balance  Antibiotic Prophylaxis (For High-Risk Patients) - Prophylaxis is rarely recommended due to concerns about antibiotic resistance but can be used as a last resort, post-coital prophylaxis, or in immunocompromised patients

## 2025-08-01 NOTE — PROGRESS NOTES
"[facility] - Urology   Clinic Note    Subjective:     Chief Complaint: Nephrolithiasis    History of Present Illness    CHIEF COMPLAINT:  Breanna presents today for recurrent urinary tract infections.    RECURRENT UTI AND URINARY SYMPTOMS:  She reports recurrent urinary symptoms occurring approximately 3 times per week, characterized by significant burning sensation and bladder spasms. She experienced a severe cystitis flare-up in May with intense pain requiring heating pad application. During one episode, she experienced right-sided kidney pain. She had a prior urgent care visit in May where urinalysis was unrevealing, followed by hospitalization two weeks later. Symptoms are primarily localized to the bladder region, with occasional right kidney discomfort. She has been hospitalized multiple times in recent months for these symptoms.    CURRENT MEDICATIONS:  She is currently using estrogen cream 3 times per week, taking D-Mannose for urinary tract infection prevention, and started antibiotics on Monday for a two-week course.        7/17/25:  CT shows bilateral nonobstructing lower pole renal stones.    She has common variable immunodeficiency     Urine Culture   2/5/2025 ESCHERICHIA COLI ESBL !    4/12/2025 ESCHERICHIA COLI ESBL !    5/23/2025 >100,000 cfu/ml Escherichia coli ESBL !    7/14/2025 KLEBSIELLA PNEUMONIAE !    7/16/2025 KLEBSIELLA PNEUMONIAE !    7/25/2025 ESCHERICHIA COLI ESBL !       Past medical, family, surgical and social history reviewed as documented in chart with pertinent positive medical, family, surgical and social history detailed in HPI.    A review systems was conducted with pertinent positive and negative findings documented in HPI.    Objective:     Estimated body mass index is 27.26 kg/m² as calculated from the following:    Height as of 7/29/25: 5' 3" (1.6 m).    Weight as of this encounter: 69.8 kg (153 lb 14.1 oz).    Vital Signs (Most Recent)       General: No acute distress, " well developed.   Head: Normocephalic, atraumatic  CV: no cyanosis  Lungs: normal respiratory effort, no respiratory distress    Labs reviewed below:  Lab Results   Component Value Date    BUN 7 07/28/2025    CREATININE 0.52 07/28/2025    WBC 6.91 07/28/2025    HGB 13.5 07/28/2025    HCT 39.6 07/28/2025     07/28/2025    AST 18 07/28/2025    ALT 18 07/28/2025    ALKPHOS 125 07/28/2025    ALBUMIN 4.6 07/28/2025    HGBA1C 5.0 11/13/2024        Lab Results   Component Value Date    BLOODUA Negative 10/29/2024    WBCUR Negative 10/29/2024    NITRITE Positive (A) 07/25/2025     Assessment:     1. Recurrent UTI    2. Kidney stones      Plan:     Assessment & Plan    - Attributed recurrent infections partly to immunosuppression.  - Advised against prophylactic antibiotics due to concerns about developing resistance.  - Continued estrogen cream 3 times per week despite stopping testosterone due to BP fluctuations.  - Plan to perform kidney stone removal procedure while still on antibiotics.  - we discussed that while stones can be a nidus of infection, infections may persist despite complete eradication of the stones    - Started methenamine (Hiprex) twice daily for UTI prevention, noting its potential to sterilize urine without causing antibiotic resistance.     Plan bilateral URS  I discussed the indication, risks, benefits, alternatives, and expectations of the procedure in detail.  We reviewed the rationale for the ureteral stent and its side effects. We reviewed the risks of bleeding, infection, pain, injury to the urinary tract, inability, or incomplete removal of kidney stones. She voiced understanding and all questions were answered to her satisfaction. She agrees to proceed as planned with bilateral ureteroscopy, laser lithotripsy, and possible ureteral stent placement.          Portions of this note were generated by [employer] and [employer] dictation services    Florentin Aleman MD

## (undated) DEVICE — DRAPE LAP TIBURON 77X122IN

## (undated) DEVICE — SEE MEDLINE ITEM 152622

## (undated) DEVICE — SEE MEDLINE ITEM 157128

## (undated) DEVICE — SPONGE DERMACEA 4X4IN 12PLY

## (undated) DEVICE — SYR 10CC LUER LOCK

## (undated) DEVICE — ELECTRODE REM PLYHSV RETURN 9

## (undated) DEVICE — DRAPE C ARM 42 X 120 10/BX

## (undated) DEVICE — SEE L#120831

## (undated) DEVICE — APPLICATOR CHLORAPREP ORN 26ML

## (undated) DEVICE — SUT 2-0 VICRYL / SH (J417)

## (undated) DEVICE — SUT 3-0 VICRYL / SH (J416)

## (undated) DEVICE — CLOSURE SKIN STERI STRIP 1/2X4

## (undated) DEVICE — DRESSING TRANS 4X4 TEGADERM

## (undated) DEVICE — DEVICE CARESITE LUER ACCESS

## (undated) DEVICE — SUT MONOCRYL 4-0 PS-2

## (undated) DEVICE — Device

## (undated) DEVICE — GLOVE SURG BIOGEL LATEX SZ 7.5

## (undated) DEVICE — PENCIL ROCKER SWITCH 10FT CORD